# Patient Record
Sex: FEMALE | Race: WHITE | HISPANIC OR LATINO | Employment: OTHER | URBAN - METROPOLITAN AREA
[De-identification: names, ages, dates, MRNs, and addresses within clinical notes are randomized per-mention and may not be internally consistent; named-entity substitution may affect disease eponyms.]

---

## 2017-03-22 ENCOUNTER — OFFICE VISIT (OUTPATIENT)
Dept: INTERNAL MEDICINE | Facility: CLINIC | Age: 72
End: 2017-03-22
Payer: MEDICARE

## 2017-03-22 VITALS
OXYGEN SATURATION: 96 % | BODY MASS INDEX: 27.18 KG/M2 | WEIGHT: 138.44 LBS | SYSTOLIC BLOOD PRESSURE: 128 MMHG | HEART RATE: 74 BPM | DIASTOLIC BLOOD PRESSURE: 72 MMHG | HEIGHT: 60 IN

## 2017-03-22 DIAGNOSIS — Z00.00 ANNUAL PHYSICAL EXAM: Primary | ICD-10-CM

## 2017-03-22 DIAGNOSIS — E11.69 HYPERLIPIDEMIA ASSOCIATED WITH TYPE 2 DIABETES MELLITUS: ICD-10-CM

## 2017-03-22 DIAGNOSIS — K52.9 INFLAMMATORY BOWEL DISEASE: ICD-10-CM

## 2017-03-22 DIAGNOSIS — E78.5 HYPERLIPIDEMIA ASSOCIATED WITH TYPE 2 DIABETES MELLITUS: ICD-10-CM

## 2017-03-22 DIAGNOSIS — E11.65 CONTROLLED TYPE 2 DIABETES MELLITUS WITH HYPERGLYCEMIA, WITHOUT LONG-TERM CURRENT USE OF INSULIN: ICD-10-CM

## 2017-03-22 DIAGNOSIS — E11.59 HYPERTENSION COMPLICATING DIABETES: ICD-10-CM

## 2017-03-22 DIAGNOSIS — Z12.11 COLON CANCER SCREENING: ICD-10-CM

## 2017-03-22 DIAGNOSIS — Z78.0 ASYMPTOMATIC POSTMENOPAUSAL ESTROGEN DEFICIENCY: ICD-10-CM

## 2017-03-22 DIAGNOSIS — Z12.31 ENCOUNTER FOR SCREENING MAMMOGRAM FOR BREAST CANCER: ICD-10-CM

## 2017-03-22 DIAGNOSIS — I15.2 HYPERTENSION COMPLICATING DIABETES: ICD-10-CM

## 2017-03-22 PROBLEM — E11.9 HYPERTENSION COMPLICATING DIABETES: Status: ACTIVE | Noted: 2017-03-22

## 2017-03-22 PROBLEM — I10 HYPERTENSION COMPLICATING DIABETES: Status: ACTIVE | Noted: 2017-03-22

## 2017-03-22 PROCEDURE — 99387 INIT PM E/M NEW PAT 65+ YRS: CPT | Mod: S$GLB,,, | Performed by: INTERNAL MEDICINE

## 2017-03-22 PROCEDURE — 3074F SYST BP LT 130 MM HG: CPT | Mod: S$GLB,,, | Performed by: INTERNAL MEDICINE

## 2017-03-22 PROCEDURE — 3078F DIAST BP <80 MM HG: CPT | Mod: S$GLB,,, | Performed by: INTERNAL MEDICINE

## 2017-03-22 PROCEDURE — 99999 PR PBB SHADOW E&M-NEW PATIENT-LVL IV: CPT | Mod: PBBFAC,,, | Performed by: INTERNAL MEDICINE

## 2017-03-22 RX ORDER — VALSARTAN AND HYDROCHLOROTHIAZIDE 80; 12.5 MG/1; MG/1
1 TABLET, FILM COATED ORAL DAILY
Refills: 2 | COMMUNITY
Start: 2017-02-08 | End: 2017-07-18 | Stop reason: SDUPTHER

## 2017-03-22 RX ORDER — ATORVASTATIN CALCIUM 20 MG/1
20 TABLET, FILM COATED ORAL DAILY
Refills: 2 | COMMUNITY
Start: 2017-01-24 | End: 2017-07-18 | Stop reason: SDUPTHER

## 2017-03-22 RX ORDER — BALSALAZIDE DISODIUM 750 MG/1
CAPSULE ORAL
Refills: 2 | COMMUNITY
Start: 2017-01-24 | End: 2021-11-19

## 2017-03-22 RX ORDER — METFORMIN HYDROCHLORIDE 500 MG/1
500 TABLET, EXTENDED RELEASE ORAL DAILY
Refills: 2 | COMMUNITY
Start: 2016-12-16 | End: 2017-07-18 | Stop reason: SDUPTHER

## 2017-03-22 RX ORDER — HYDROCODONE BITARTRATE AND ACETAMINOPHEN 7.5; 325 MG/1; MG/1
1 TABLET ORAL
Refills: 0 | COMMUNITY
Start: 2017-02-09 | End: 2017-03-22

## 2017-03-22 NOTE — PROGRESS NOTES
Portions of this note are generated with voice recognition software. Typographical errors may exist.       Patient Name:MARIELENA OCAMPO  Patient MRN:   [id number]    History of Present Illness   ================================================================  MARIELENA OCAMPO is a 71 y.o. female here for primary care visit for  Chief Complaint   Patient presents with    [facility] [place] [facility]    Diabetes       Past Medical History:   Diagnosis Date    Controlled type 2 diabetes mellitus with hyperglycemia, without long-term current use of insulin 3/22/2017    Hyperlipidemia associated with type 2 diabetes mellitus 3/22/2017    Hypertension complicating diabetes 3/22/2017    Inflammatory bowel disease 3/22/2017       Past Surgical History:   Procedure Laterality Date    MASTECTOMY Left     for benign recurrent growth. Dr. Stephan Brown MD - [facility]  General Surgery & Surgery    TOTAL ABDOMINAL HYSTERECTOMY W/ BILATERAL SALPINGOOPHORECTOMY  2009    for benign cysts with concern for future malginancy. Fibromoid uterus for AUB. Bengin results       Review of patient's allergies indicates:  Allergies not on file    No current outpatient prescriptions on file prior to visit.     No current facility-administered medications on file prior to visit.        Family History   Problem Relation Age of Onset    Breast cancer Neg Hx        Social History     Social History    Marital status:      Spouse name: N/A    Number of children: N/A    Years of education: N/A     Occupational History    Not on file.     Social History Main Topics    Smoking status: Never Smoker    Smokeless tobacco: Never Used    Alcohol use Yes      Comment: not often    Drug use: No    Sexual activity: Yes     Partners: Male     Other Topics Concern    Not on file     Social History Narrative    MICHA Hall - [facility]        Dr. Stephan Brown MD - [facility]  General Surgery & Surgery -  mammograms        History   Sexual Activity    Sexual activity: Yes    Partners: Male         SUBJECTIVE:    Patient states that last mammogram was May 2016.  States that she wants to continue mammograms on schedule.    Patient states that she has inflammatory bowel disease.  She does not know what kind.  States that she was started on treatment about one or 2 years ago.  Has issues with loose stool but no bloody bowel movements.    History of mastectomy left side.  Patient states that she had a benign growth in the left breast that required mastectomy.  She is vague about the ultimate diagnosis.  States that the general surgeon that performed the procedure was ordering her mammographies ever since.    Diabetes hypertension and hyperlipidemia.  Patient states that all these things were diagnosed approximately 2 years ago.  She was kept on pharmacotherapy.  Denies any significant endorgan damage    Medications Reviewed and Updated    Past medical, family, and social histories were reviewed and updated.    Review of Systems negative unless otherwise noted in history of present illness-   General ROS: negative  Psychological ROS: negative  ENT ROS: negative  Allergy and Immunology ROS: negative  Cardiovascular ROS: negative  Gastrointestinal ROS: negative  Genito-Urinary ROS: negative  Musculoskeletal ROS: negative  Neurological ROS: negative  Dermatological ROS: negative      Allergic:  Review of patient's allergies indicates:  Allergies not on file    OBJECTIVE:  BP: 128/72 Pulse: 74    Wt Readings from Last 3 Encounters:   03/22/17 62.8 kg (138 lb 7.2 oz)    Body mass index is 27.04 kg/(m^2).  Previous Blood Pressure Readings :   BP Readings from Last 3 Encounters:   03/22/17 128/72     GEN: healthy appearing  HEENT: sclera non-icteric, conjunctiva clear.  No cervical lymphadenopathy.  No thyromegaly  CV: no peripheral edema.  Regular rate and rhythm.  No murmurs appreciated  PULM: breathing non-labored.  No  wheezing.  ABD: obese .  No point tenderness.  PSYCH: appropriate affect  MSK: able to rise from chair without assistance  SKIN: normal skin turgor    Pertinent Labs Reviewed       ASSESSMENT/PLAN:    Annual physical exam    Encounter for screening mammogram for breast cancer  -     Mammo Digital Screening Bilateral With CAD; Future; Expected date: 3/22/17    Asymptomatic postmenopausal estrogen deficiency  -     DXA Bone Density Spine And Hip; Future; Expected date: 3/22/17  -     DXA Bone Density Appendicular Skeleton; Future; Expected date: 3/22/17    Colon cancer screening  -     Ambulatory Referral to Gastroenterology    Controlled type 2 diabetes mellitus with hyperglycemia, without long-term current use of insulin  Continue current medical therapy.    Hyperlipidemia associated with type 2 diabetes mellitus  Continue current medical therapy.    Hypertension complicating diabetes.  Continue current medical therapy.    Inflammatory bowel disease.  Plan to get outside medical records.          Future Appointments  Date Time Provider Department Center   3/27/2017 1:00 PM [facility] DEXA1 [facility] [place] [facility]   3/27/2017 1:30 PM [facility] DEXA1 [facility] [place] [facility]   4/18/2017 10:20 AM Lawrence Logan MD [facility]   7/31/2017 10:00 AM [facility] MAMMO1 [facility] MAMMO [place] [facility]       Lawrence Logan  3/22/2017  10:14 AM

## 2017-03-22 NOTE — PATIENT INSTRUCTIONS
diovan - HCT tomorrow take in AM going forwaerd     If they cause dizziness call us, again       Call us with antibiotic reaction

## 2017-03-22 NOTE — MR AVS SNAPSHOT
[facility]   [address]  [place] [address]  Phone: [phone]  Fax: [phone]                  Aretha Nguyen   3/22/2017 8:20 AM   Office Visit    Descripción:  Female : 1945   Personal Médico:  Lawrence Logan MD   Departamento:  [facility]           Razón de la raphael     Establish Care           Diagnósticos de Esta Visita        Comentarios    Annual physical exam    -  Primario     Encounter for screening mammogram for breast cancer         Asymptomatic postmenopausal estrogen deficiency         Colon cancer screening         Controlled type 2 diabetes mellitus with hyperglycemia, without long-term current use of insulin         Hyperlipidemia associated with type 2 diabetes mellitus         Hypertension complicating diabetes         Inflammatory bowel disease                Lista de tareas           Citas próximas        Personal Médico Departamento Tfno del dpto    3/27/2017 1:00 PM [facility] DEXA1 [facility] [phone]    3/27/2017 1:30 PM [facility] DEXA1 [facility] [phone]    2017 10:20 AM Lawrence Logan MD [facility] [phone]    2017 10:00 AM [facility] MAMMO1 [facility] [phone]      Metas (5 Years of Data)     Ninguna      [facility] en Llamada     [facility] de Enfermeras - Asistencia   Enfermeras registradas de [facility] pueden ayudarle a reservar sree raphael, proveer educación para la shefali, asesoría clínica, y otros servicios de asesoramiento.   Llame para con servicio gratuito a [phone].             Medicamentos           Mensaje sobre Medicamentos     Verificar los cambios y / o adiciones a mauricio régimen de medicación son los mismos que discutir con mauricio médico. Si cualquiera de estos cambios o adiciones son incorrectos, por favor notifique a mauricio proveedor de atención médica.        DEJAR de lissette estos medicamentos      hydrocodone-acetaminophen 7.5-325mg (NORCO) 7.5-325 mg per tablet Take 1 tablet by mouth every 4 to 6 hours as needed.           Verifique que la siguiente lista de medicamentos es sree representación exacta de los medicamentos que está tomando actualmente. Si no hay ningunos reportados, la lista puede estar en mcrae. Si no es correcta, por favor póngase en contacto con mauricio proveedor de atención médica. Lleve esta lista con usted en bailee de emergencia.           Medicamentos Actuales     atorvastatin (LIPITOR) 20 MG tablet Take 20 mg by mouth nightly.    balsalazide (COLAZAL) 750 mg capsule TAKE 3 CAPSULE BY MOUTH THREE TIMES A DAY    metformin (GLUCOPHAGE-XR) 500 MG 24 hr tablet Take 500 mg by mouth once daily.     valsartan-hydrochlorothiazide (DIOVAN-HCT) 80-12.5 mg per tablet Take 1 tablet by mouth once daily.           Información de referencia clínica           Dorothy signos vitales philippe     PS [place] SpO2 BMI ([facility])    128/72 (BP Location: Right arm, Patient Position: Sitting, BP Method: Manual) 74 5' (1.524 m) 62.8 kg (138 lb 7.2 oz) 96% 27.04 kg/m2      Blood Pressure          Most Recent Value    BP  128/72      Alergias     A partir del:  3/22/2017        No está archivado/a      Vacunas     Administradas en la fecha de la visita:  3/22/2017        None      Orders Placed During Today's Visit      Órdenes normales de esta visita    Ambulatory Referral to Gastroenterology     Exámenes/Procedimientos futuros Se espera el Vence    DXA Bone Density Appendicular Skeleton  3/22/2017 3/22/2018    DXA Bone Density Spine And Hip  3/22/2017 3/22/2018    Mammo Digital Screening Bilateral With CAD  3/22/2017 5/22/2018      Registrarse para [facility]     La activación de mauricio cuenta [facility] es tan fácil do 1-2-3!    1) Ir a [email or web address], seleccione Registrarse Ahora, meter el código de activación y mauricio fecha de nacimiento, y seleccione Próximo.    [id number]  Expires: 5/6/2017  9:21 AM      2) Crear un  nombre de usuario y contraseña para usar cuando se visita [facility] en el futuro y selecciona sree pregunta de seguridad en bailee de que pierda mauricio contraseña y seleccione Próximo.    3) Introduzca mauricio dirección de correo electrónico y tal rita en Registrarse!    Información Adicional  Si tiene alguna pregunta, por favor, e-mail [email or web address] o llame al [phone] para hablar con nuestro personal. Recuerde, [facility] no debe ser usada para necesidades urgentes. En bailee de emergencia médica, llame al 911.        Instrucciones    diovan - HCT tomorrow take in AM going forwaerd     If they cause dizziness call us, again       Call us with antibiotic reaction              Language Assistance Services     ATTENTION: Language assistance services are available, free of charge. Please call [phone].      ATENCIÓN: Si habla español, tiene a mauricio disposición servicios gratuitos de asistencia lingüística. Llame al [phone].     CHÚ Ý: N?u b?n nói Ti?ng Vi?t, có các d?ch v? h? tr? ngôn ng? mi?n phí dành cho b?n. G?i s? [phone].         [facility] cumple con las leyes federales aplicables de derechos civiles y no discrimina por motivos de bereket, color, origen nacional, edad, discapacidad, o sexo.                 Aretha Patrick   3/22/2017 8:20 AM   Office Visit    Description:  Female : 1945   Provider:  Lawrence Logan MD   Department:  [facility] Internal Medicine           Reason for Visit     Establish Care           Diagnoses this Visit        Comments    Annual physical exam    -  Primary     Encounter for screening mammogram for breast cancer         Asymptomatic postmenopausal estrogen deficiency         Colon cancer screening         Controlled type 2 diabetes mellitus with hyperglycemia, without long-term current use of insulin         Hyperlipidemia associated with type 2 diabetes mellitus         Hypertension complicating diabetes         Inflammatory bowel  disease                To Do List           Future Appointments        Provider Department Dept Phone    3/27/2017 1:00 PM [facility] DEXA1 [facility] [phone]    3/27/2017 1:30 PM [facility] DEXA1 [facility] [phone]    4/18/2017 10:20 AM Lawrence Logan MD [facility] [phone]    7/31/2017 10:00 AM [facility] MAMMO1 [facility] [phone]      Goals     None      [facility] On Call     [facility] On Call Nurse Care Line - 24/7 Assistance  Registered nurses in the [facility] Center provide clinical advisement, health education, appointment booking, and other advisory services.  Call for this free service at [phone].             Medications           Message regarding Medications     Verify the changes and/or additions to your medication regime listed below are the same as discussed with your clinician today.  If any of these changes or additions are incorrect, please notify your healthcare provider.        STOP taking these medications     hydrocodone-acetaminophen 7.5-325mg (NORCO) 7.5-325 mg per tablet Take 1 tablet by mouth every 4 to 6 hours as needed.           Verify that the below list of medications is an accurate representation of the medications you are currently taking.  If none reported, the list may be blank. If incorrect, please contact your healthcare provider. Carry this list with you in case of emergency.           Current Medications     atorvastatin (LIPITOR) 20 MG tablet Take 20 mg by mouth nightly.    balsalazide (COLAZAL) 750 mg capsule TAKE 3 CAPSULE BY MOUTH THREE TIMES A DAY    metformin (GLUCOPHAGE-XR) 500 MG 24 hr tablet Take 500 mg by mouth once daily.     valsartan-hydrochlorothiazide (DIOVAN-HCT) 80-12.5 mg per tablet Take 1 tablet by mouth once daily.           Clinical Reference Information           Your Vitals Were     BP Pulse Height Weight SpO2 BMI    128/72 (BP Location: Right arm, Patient  Position: Sitting, BP Method: Manual) 74 5' (1.524 m) 62.8 kg (138 lb 7.2 oz) 96% 27.04 kg/m2      Blood Pressure          Most Recent Value    BP  128/72      Allergies as of 3/22/2017     Not on File      Immunizations Administered on Date of Encounter - 3/22/2017     None      Orders Placed During Today's Visit      Normal Orders This Visit    Ambulatory Referral to Gastroenterology     Future Labs/Procedures Expected by Expires    DXA Bone Density Appendicular Skeleton  3/22/2017 3/22/2018    DXA Bone Density Spine And Hip  3/22/2017 3/22/2018    Mammo Digital Screening Bilateral With CAD  3/22/2017 5/22/2018      [facility] Sign-Up     Activating your [facility] account is as easy as 1-2-3!     1) Visit [email or web address], select Sign Up Now, enter this activation code and your date of birth, then select Next.  [email or web address]  Expires: 5/6/2017  9:21 AM      2) Create a username and password to use when you visit [facility] in the future and select a security question in case you lose your password and select Next.    3) Enter your e-mail address and click Sign Up!    Additional Information  If you have questions, please e-mail [email or web address] or call [phone] to talk to our [facility] staff. Remember, [facility] is NOT to be used for urgent needs. For medical emergencies, dial 911.         Instructions    diovan - HCT tomorrow take in AM going forwaerd     If they cause dizziness call us, again       Call us with antibiotic reaction              Language Assistance Services     ATTENTION: Language assistance services are available, free of charge. Please call [phone].      ATENCIÓN: Si habla español, tiene a mauricio disposición servicios gratuitos de asistencia lingüística. Llame al [phone].     CHÚ Ý: N?u b?n nói Ti?ng Vi?t, có các d?ch v? h? tr? ngôn ng? mi?n phí dành cho b?n. G?i s? [phone].         [facility] complies with applicable Federal civil rights laws and does  not discriminate on the basis of race, color, national origin, age, disability, or sex.

## 2017-03-27 ENCOUNTER — HOSPITAL ENCOUNTER (OUTPATIENT)
Dept: RADIOLOGY | Facility: HOSPITAL | Age: 72
Discharge: HOME | End: 2017-03-27
Attending: INTERNAL MEDICINE
Payer: MEDICARE

## 2017-03-27 DIAGNOSIS — Z78.0 ASYMPTOMATIC POSTMENOPAUSAL ESTROGEN DEFICIENCY: ICD-10-CM

## 2017-03-27 PROCEDURE — 77080 DXA BONE DENSITY AXIAL: CPT | Mod: TC

## 2017-03-27 PROCEDURE — 77080 DXA BONE DENSITY AXIAL: CPT | Mod: 26,,, | Performed by: INTERNAL MEDICINE

## 2017-04-18 ENCOUNTER — LAB VISIT (OUTPATIENT)
Dept: LAB | Facility: HOSPITAL | Age: 72
End: 2017-04-18
Attending: INTERNAL MEDICINE
Payer: MEDICARE

## 2017-04-18 ENCOUNTER — OFFICE VISIT (OUTPATIENT)
Dept: INTERNAL MEDICINE | Facility: CLINIC | Age: 72
End: 2017-04-18
Payer: MEDICARE

## 2017-04-18 VITALS
SYSTOLIC BLOOD PRESSURE: 122 MMHG | HEIGHT: 60 IN | HEART RATE: 94 BPM | OXYGEN SATURATION: 98 % | BODY MASS INDEX: 27.09 KG/M2 | DIASTOLIC BLOOD PRESSURE: 70 MMHG | WEIGHT: 138 LBS

## 2017-04-18 DIAGNOSIS — E78.5 HYPERLIPIDEMIA ASSOCIATED WITH TYPE 2 DIABETES MELLITUS: ICD-10-CM

## 2017-04-18 DIAGNOSIS — E55.9 VITAMIN D DEFICIENCY: ICD-10-CM

## 2017-04-18 DIAGNOSIS — E11.65 CONTROLLED TYPE 2 DIABETES MELLITUS WITH HYPERGLYCEMIA, WITHOUT LONG-TERM CURRENT USE OF INSULIN: ICD-10-CM

## 2017-04-18 DIAGNOSIS — E11.69 HYPERLIPIDEMIA ASSOCIATED WITH TYPE 2 DIABETES MELLITUS: ICD-10-CM

## 2017-04-18 DIAGNOSIS — J30.9 ALLERGIC RHINITIS WITH POSTNASAL DRIP: ICD-10-CM

## 2017-04-18 DIAGNOSIS — D48.62 BORDERLINE FINDING OF PHYLLODES NEOPLASM OF BREAST, LEFT: Primary | ICD-10-CM

## 2017-04-18 DIAGNOSIS — K51.20 ULCERATIVE PROCTITIS WITHOUT COMPLICATION: ICD-10-CM

## 2017-04-18 DIAGNOSIS — R09.82 ALLERGIC RHINITIS WITH POSTNASAL DRIP: ICD-10-CM

## 2017-04-18 DIAGNOSIS — Z12.31 ENCOUNTER FOR SCREENING MAMMOGRAM FOR BREAST CANCER: ICD-10-CM

## 2017-04-18 LAB
25(OH)D3+25(OH)D2 SERPL-MCNC: 23 NG/ML
ALBUMIN SERPL BCP-MCNC: 4 G/DL
ALP SERPL-CCNC: 105 U/L
ALT SERPL W/O P-5'-P-CCNC: 10 U/L
ANION GAP SERPL CALC-SCNC: 16 MMOL/L
AST SERPL-CCNC: 25 U/L
BASOPHILS # BLD AUTO: 0.05 K/UL
BASOPHILS NFR BLD: 0.5 %
BILIRUB SERPL-MCNC: 0.6 MG/DL
BUN SERPL-MCNC: 15 MG/DL
CALCIUM SERPL-MCNC: 9.5 MG/DL
CHLORIDE SERPL-SCNC: 100 MMOL/L
CO2 SERPL-SCNC: 25 MMOL/L
CREAT SERPL-MCNC: 0.8 MG/DL
DIFFERENTIAL METHOD: ABNORMAL
EOSINOPHIL # BLD AUTO: 0.3 K/UL
EOSINOPHIL NFR BLD: 3.2 %
ERYTHROCYTE [DISTWIDTH] IN BLOOD BY AUTOMATED COUNT: 17.8 %
EST. GFR  (AFRICAN AMERICAN): >60 ML/MIN/1.73 M^2
EST. GFR  (NON AFRICAN AMERICAN): >60 ML/MIN/1.73 M^2
GLUCOSE SERPL-MCNC: 96 MG/DL
HCT VFR BLD AUTO: 34.8 %
HDLC SERPL-MCNC: 177 MG/DL
HDLC SERPL-MCNC: 62 MG/DL
HGB BLD-MCNC: 11.6 G/DL
LYMPHOCYTES # BLD AUTO: 2 K/UL
LYMPHOCYTES NFR BLD: 20.6 %
MCH RBC QN AUTO: 26 PG
MCHC RBC AUTO-ENTMCNC: 33.3 %
MCV RBC AUTO: 78 FL
MONOCYTES # BLD AUTO: 0.7 K/UL
MONOCYTES NFR BLD: 6.9 %
NEUTROPHILS # BLD AUTO: 6.7 K/UL
NEUTROPHILS NFR BLD: 68.6 %
PLATELET # BLD AUTO: 340 K/UL
PMV BLD AUTO: 10.3 FL
POTASSIUM SERPL-SCNC: 3.8 MMOL/L
PROT SERPL-MCNC: 8.5 G/DL
RBC # BLD AUTO: 4.46 M/UL
SODIUM SERPL-SCNC: 141 MMOL/L
WBC # BLD AUTO: 9.7 K/UL

## 2017-04-18 PROCEDURE — 3044F HG A1C LEVEL LT 7.0%: CPT | Mod: S$GLB,,, | Performed by: INTERNAL MEDICINE

## 2017-04-18 PROCEDURE — 83701 LIPOPROTEIN BLD HR FRACTION: CPT

## 2017-04-18 PROCEDURE — 1126F AMNT PAIN NOTED NONE PRSNT: CPT | Mod: S$GLB,,, | Performed by: INTERNAL MEDICINE

## 2017-04-18 PROCEDURE — 1159F MED LIST DOCD IN RCRD: CPT | Mod: S$GLB,,, | Performed by: INTERNAL MEDICINE

## 2017-04-18 PROCEDURE — 80053 COMPREHEN METABOLIC PANEL: CPT

## 2017-04-18 PROCEDURE — 83036 HEMOGLOBIN GLYCOSYLATED A1C: CPT

## 2017-04-18 PROCEDURE — 85025 COMPLETE CBC W/AUTO DIFF WBC: CPT

## 2017-04-18 PROCEDURE — 82306 VITAMIN D 25 HYDROXY: CPT

## 2017-04-18 PROCEDURE — 83718 ASSAY OF LIPOPROTEIN: CPT

## 2017-04-18 PROCEDURE — 3078F DIAST BP <80 MM HG: CPT | Mod: S$GLB,,, | Performed by: INTERNAL MEDICINE

## 2017-04-18 PROCEDURE — 99999 PR PBB SHADOW E&M-EST. PATIENT-LVL III: CPT | Mod: PBBFAC,,, | Performed by: INTERNAL MEDICINE

## 2017-04-18 PROCEDURE — 3074F SYST BP LT 130 MM HG: CPT | Mod: S$GLB,,, | Performed by: INTERNAL MEDICINE

## 2017-04-18 PROCEDURE — 1160F RVW MEDS BY RX/DR IN RCRD: CPT | Mod: S$GLB,,, | Performed by: INTERNAL MEDICINE

## 2017-04-18 PROCEDURE — 99215 OFFICE O/P EST HI 40 MIN: CPT | Mod: S$GLB,,, | Performed by: INTERNAL MEDICINE

## 2017-04-18 PROCEDURE — 4010F ACE/ARB THERAPY RXD/TAKEN: CPT | Mod: S$GLB,,, | Performed by: INTERNAL MEDICINE

## 2017-04-18 PROCEDURE — 82465 ASSAY BLD/SERUM CHOLESTEROL: CPT

## 2017-04-18 RX ORDER — BENZONATATE 200 MG/1
200 CAPSULE ORAL 2 TIMES DAILY PRN
Qty: 20 CAPSULE | Refills: 0 | Status: SHIPPED | OUTPATIENT
Start: 2017-04-18 | End: 2017-04-25

## 2017-04-18 NOTE — PATIENT INSTRUCTIONS
lissette por la shavon       Claritin, congestion toz seca. 4-5 dixon, la toz esau claritin, llama otros claritin.

## 2017-04-18 NOTE — PROGRESS NOTES
"Portions of this note are generated with voice recognition software. Typographical errors may exist.     SUBJECTIVE:    This is a/an 71 y.o. female here for primary care visit for  Chief Complaint   Patient presents with    Cough     Patient states that for the past several days she has been having cough with postnasal drip.  additional symptoms.  States that over-the-counter medications are not working adequately.    Ulcerative colitis.  Patient states that since starting medical therapy hematochezia has resolved.  Endorses formed stools.  No diarrhea.  States that she has colonoscopy scheduled with outside gastroenterologist.  States that she has not had cancerous lesions in the colon.    Phyllodes tumor.  States that she had removal of left breast several years ago because of a "benign tumor."  Patient states that she has not been back to surgical office because of insurance reasons and also because she doesn't know if she needs long-term follow-up for this issue other than mammograms.    Diabetes.  Patient states she is complying with medical therapy.    Bone density.  Patient states that she does not recall previous bone density scan.        Medications Reviewed and Updated    Past medical, family, and social histories were reviewed and updated.    Review of Systems negative unless otherwise noted in history of present illness-   General ROS: negative  Psychological ROS: negative  ENT ROS: negative  Allergy and Immunology ROS: negative  Cardiovascular ROS: negative  Gastrointestinal ROS: negative  Genito-Urinary ROS: negative  Musculoskeletal ROS: negative  Neurological ROS: negative  Dermatological ROS: negative      Allergic:    Review of patient's allergies indicates:   Allergen Reactions    Ciprofloxacin hcl Rash       OBJECTIVE:  BP: 122/70 Pulse: 94    Wt Readings from Last 3 Encounters:   04/18/17 62.6 kg (138 lb 0.1 oz)   03/22/17 62.8 kg (138 lb 7.2 oz)    Body mass index is 26.95 kg/(m^2).  Previous " Blood Pressure Readings :   BP Readings from Last 3 Encounters:   04/18/17 122/70   03/22/17 128/72     GEN: No apparent distress  HEENT: sclera non-icteric, conjunctiva clear  CV: no peripheral edema  PULM: breathing non-labored.  No wheezing bilateral lung fields.  ABD: non, protuberant abdomen.  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  Dispense: 20 capsule; Refill: 0    Ulcerative proctitis without complication  -     CBC auto differential; Future; Expected date: 4/18/17    Hyperlipidemia associated with type 2 diabetes mellitus  -     Cholesterol, total; Future; Expected date: 4/18/17  -     HDL CHOLESTEROL; Future; Expected date: 4/18/17  -     LDL CHOLESTEROL, DIRECT; Future; Expected date: 4/18/17    Controlled type 2 diabetes mellitus with hyperglycemia, without long-term current use of insulin  -     Comprehensive metabolic panel; Future; Expected date: 4/18/17  -     Hemoglobin A1c; Future; Expected date: 4/18/17    Vitamin D deficiency  -     Vitamin D; Future; Expected date: 4/18/17          Future Appointments  Date Time Provider Department Center   7/18/2017 9:00 AM Lawrence Logan MD [facility]   7/31/2017 10:00 AM [facility] MAMMO1 [facility] MAMMO [place]       Lawrence Logan  4/18/2017  3:28 PM

## 2017-04-18 NOTE — MR AVS SNAPSHOT
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, left    -  Primario     Encounter for screening mammogram for breast cancer         Allergic rhinitis with postnasal drip         Ulcerative proctitis without complication         Hyperlipidemia associated with type 2 diabetes mellitus         Controlled type 2 diabetes mellitus with hyperglycemia, without long-term current use of insulin         Vitamin D deficiency                Lista de tareas           Citas próximas        Personal Médico Departamento Tfno del dpto    2017 11:00 AM JERSEY KENNER [facility] [phone]    2017 9:00 AM Lawrence Logan MD [facility] [phone]    2017 10:00 AM [facility] MAMMO1 [facility] [phone]      Metas (5 Years of Data)     Ninguna      Recetas para recoger        Disp Refills Start End    benzonatate (TESSALON) 200 MG capsule 20 capsule 0 2017    Take 1 capsule (200 mg total) by mouth 2 (two) times daily as needed for Cough. - Oral    Farmacia: [facility]/pharmacy #[id number] - [place] - [address] AT [facility] No. de tlfo: #: [phone]         [facility]     [facility] Línea de Enfermeras - Asistencia   Enfermeras registradas de [facility] pueden ayudarle a reservar sree raphael, proveer educación para la shefali, asesoría clínica, y otros servicios de asesoramiento.   Llame para con servicio gratuito a [phone].             Medicamentos           Mensaje sobre Medicamentos     Verificar los cambios y / o adiciones a mauricio  régimen de medicación son los mismos que discutir con mauricio médico. Si cualquiera de estos cambios o adiciones son incorrectos, por favor notifique a mauricio proveedor de atención médica.        EMPEZAR a lissette estos medicamentos NUEVOS        Refills    benzonatate (TESSALON) 200 MG capsule 0    Sig: Take 1 capsule (200 mg total) by mouth 2 (two) times daily as needed for Cough.    Categoría: Normal    Vía: Oral           Verifique que la siguiente lista de medicamentos es sree representación exacta de los medicamentos que está tomando actualmente. Si no hay ningunos reportados, la lista puede estar en mcrae. Si no es correcta, por favor póngase en contacto con mauricio proveedor de atención médica. Lleve esta lista con usted en bailee de emergencia.           Medicamentos Actuales     atorvastatin (LIPITOR) 20 MG tablet Take 20 mg by mouth once daily.     balsalazide (COLAZAL) 750 mg capsule 2 tablets 2 times daily    metformin (GLUCOPHAGE-XR) 500 MG 24 hr tablet Take 500 mg by mouth once daily.     valsartan-hydrochlorothiazide (DIOVAN-HCT) 80-12.5 mg per tablet Take 1 tablet by mouth once daily.    benzonatate (TESSALON) 200 MG capsule Take 1 capsule (200 mg total) by mouth 2 (two) times daily as needed for Cough.           Información de referencia clínica           Dorothy signos vitales philippe     PS [place] Pes[place] SpO2 BMI (IMC)    122/70 (BP Location: Right arm, Patient Position: Sitting, BP Method: Manual) 94 5' (1.524 m) 62.6 kg (138 lb 0.1 oz) 98% 26.95 kg/m2      Blood Pressure          Most Recent Value    BP  122/70      Alergias     A partir del:  4/18/2017        Ciprofloxacin Hcl      Vacunas     Administradas en la fecha de la visita:  4/18/2017        None      Orders Placed During Today's Visit     Exámenes/Procedimientos futuros Se espera el Vence    CBC auto differential  4/18/2017 7/17/2017    Cholesterol, total  4/18/2017 6/17/2018    Comprehensive metabolic panel  4/18/2017 7/17/2017    HDL CHOLESTEROL   2017    Hemoglobin A1c  2017    LDL CHOLESTEROL, DIRECT  2017    Mammo Digital Screening Right with CAD  2017    Vitamin D  2017      Registrarse para [facility]     La activación de mauricio cuenta [facility] es tan fácil do 1-2-3!    1) Ir a [email or web address], seleccione Registrarse Ahora, meter el código de activación y mauricio fecha de nacimiento, y seleccione Próximo.    [id number]  Expires: 2017  9:21 AM      2) Crear un nombre de usuario y contraseña para usar cuando se visita [facility] en el futuro y selecciona sree pregunta de seguridad en bailee de que pierda mauricio contraseña y seleccione Próximo.    3) Introduzca mauricio dirección de correo electrónico y tal clic en Registrarse!    Información Adicional  Si tiene alguna pregunta, por favor, e-mail [email or web address] o llame al [phone] para hablar con nuestro personal. Recuerde, [facility] no debe ser usada para necesidades urgentes. En bailee de emergencia médica, llame al 911.        Instrucciones    lissette por la manana       Anton, congestion toz seca. 4-5 dixon, la toz mejora claritin, llama otros claritin.              Language Assistance Services     ATTENTION: Language assistance services are available, free of charge. Please call [phone].      ATENCIÓN: Si habla español, tiene a mauricio disposición servicios gratuitos de asistencia lingüística. Llame al [phone].     CHÚ Ý: N?u b?n nói Ti?ng Vi?t, có các d?ch v? h? tr? ngôn ng? mi?n phí dành cho b?n. G?i s? [phone].         [place] - Internal Medicine cumple con las leyes federales aplicables de derechos civiles y no discrimina por motivos de bereket, color, origen nacional, edad, discapacidad, o sexo.                 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, left    -  Primary     Encounter for screening mammogram for breast cancer         Allergic rhinitis with postnasal drip         Ulcerative proctitis without complication         Hyperlipidemia associated with type 2 diabetes mellitus         Controlled type 2 diabetes mellitus with hyperglycemia, without long-term current use of insulin         Vitamin D deficiency                To Do List           Future Appointments        Provider Department Dept Phone    4/18/2017 11:00 AM [place] KENNER [facility] [phone]    7/18/2017 9:00 AM Lawrence Logan MD [place] [facility] [phone]    7/31/2017 10:00 AM [facility] MAMMO1 [facility] [phone]      Goals     None       These Medications        Disp Refills Start End    benzonatate (TESSALON) 200 MG capsule 20 capsule 0 4/18/2017 4/25/2017    Take 1 capsule (200 mg total) by mouth 2 (two) times daily as needed for Cough. - Oral    Pharmacy: [facility]/pharmacy #[id number] - [place] - [address] AT [facility] Ph #: [phone]         [facility]     [facility] Nurse Care Line - 24/7 Assistance  Unless otherwise directed by your provider, please contact [facility], our nurse care line that is available for 24/7 assistance.     Registered nurses in the [facility] Center provide: appointment scheduling, clinical advisement, health education, and other advisory services.  Call: [phone] (toll free)               Medications           Message regarding Medications     Verify the changes and/or additions to your medication regime listed below are the same as discussed with your clinician today.  If any of these changes or additions are incorrect, please notify your healthcare provider.        START taking these NEW medications        Refills    benzonatate (TESSALON) 200 MG capsule 0    Sig:  Take 1 capsule (200 mg total) by mouth 2 (two) times daily as needed for Cough.    Class: Normal    Route: Oral           Verify that the below list of medications is an accurate representation of the medications you are currently taking.  If none reported, the list may be blank. If incorrect, please contact your healthcare provider. Carry this list with you in case of emergency.           Current Medications     atorvastatin (LIPITOR) 20 MG tablet Take 20 mg by mouth once daily.     balsalazide (COLAZAL) 750 mg capsule 2 tablets 2 times daily    metformin (GLUCOPHAGE-XR) 500 MG 24 hr tablet Take 500 mg by mouth once daily.     valsartan-hydrochlorothiazide (DIOVAN-HCT) 80-12.5 mg per tablet Take 1 tablet by mouth once daily.    benzonatate (TESSALON) 200 MG capsule Take 1 capsule (200 mg total) by mouth 2 (two) times daily as needed for Cough.           Clinical Reference Information           Your Vitals Were     BP Pulse Height Weight SpO2 BMI    122/70 (BP Location: Right arm, Patient Position: Sitting, BP Method: Manual) 94 5' (1.524 m) 62.6 kg (138 lb 0.1 oz) 98% 26.95 kg/m2      Blood Pressure          Most Recent Value    BP  122/70      Allergies as of 4/18/2017     Ciprofloxacin Hcl      Immunizations Administered on Date of Encounter - 4/18/2017     None      Orders Placed During Today's Visit     Future Labs/Procedures Expected by Expires    CBC auto differential  4/18/2017 7/17/2017    Cholesterol, total  4/18/2017 6/17/2018    Comprehensive metabolic panel  4/18/2017 7/17/2017    HDL CHOLESTEROL  4/18/2017 6/17/2018    Hemoglobin A1c  4/18/2017 7/17/2017    LDL CHOLESTEROL, DIRECT  4/18/2017 6/17/2018    Mammo Digital Screening Right with CAD  4/18/2017 6/18/2018    Vitamin D  4/18/2017 6/17/2018      [facility] Sign-Up     Activating your [facility] account is as easy as 1-2-3!     1) Visit [email or web address], select Sign Up Now, enter this activation code and your date of birth, then select  Next.  [email or web address]  Expires: 5/6/2017  9:21 AM      2) Create a username and password to use when you visit [facility] in the future and select a security question in case you lose your password and select Next.    3) Enter your e-mail address and click Sign Up!    Additional Information  If you have questions, please e-mail [email or web address] or call [phone] to talk to our [facility] staff. Remember, [facility] is NOT to be used for urgent needs. For medical emergencies, dial 911.         Instructions    lissette por la manana       Claritin, congestion toz seca. 4-5 dixon, la toz julitara claritin, llama otcarlos claritin.              Language Assistance Services     ATTENTION: Language assistance services are available, free of charge. Please call [phone].      ATENCIÓN: Si habla español, tiene a mauricio disposición servicios gratuitos de asistencia lingüística. Llame al [phone].     [facility] Ý: N?u b?n nói Ti?ng Vi?t, có các d?ch v? h? tr? ngôn ng? mi?n phí dành cho b?n. G?i s? [phone].         [facility] Internal Medicine complies with applicable Federal civil rights laws and does not discriminate on the basis of race, color, national origin, age, disability, or sex.

## 2017-04-19 LAB
ESTIMATED AVG GLUCOSE: 140 MG/DL
HBA1C MFR BLD HPLC: 6.5 %

## 2017-04-20 LAB — LDLC SERPL-MCNC: 111 MG/DL

## 2017-04-28 ENCOUNTER — TELEPHONE (OUTPATIENT)
Dept: SURGERY | Facility: CLINIC | Age: 72
End: 2017-04-28

## 2017-04-28 ENCOUNTER — TELEPHONE (OUTPATIENT)
Dept: INTERNAL MEDICINE | Facility: CLINIC | Age: 72
End: 2017-04-28

## 2017-04-28 DIAGNOSIS — D48.62 PHYLLODES TUMOR, LEFT: Primary | ICD-10-CM

## 2017-04-28 NOTE — TELEPHONE ENCOUNTER
----- Message from Aleta Spaulding MD sent at 4/28/2017 11:19 AM CDT -----  Can you call to make an appointment for her.  No huge rush.    Thanks  ----- Message -----     From: Lawrence Logan MD     Sent: 4/27/2017   2:33 PM       To: Aleta Spaulding MD, Evita Draper MD    Yes, Dr. Spaulding, can your staff contact patient to get in clinic?     ----- Message -----     From: Aleta Spaulding MD     Sent: 4/20/2017   8:55 AM       To: Evita Draper MD, #    She will need serial surveillance clinical exams of the chest wall.  There is no indication for imaging on that side as long as there is no abnormality noted on exam.  I am happy to follow her in my clinic.  Please just let me know if you would like me to arrange an appointment for her.    Thanks!  Aleta Spaulding      ----- Message -----     From: Evita Draper MD     Sent: 4/19/2017   4:23 PM       To: Aleta Spaulding MD, Lawrence Logan MD    I think the best person for this is one of our breast specialty docs. I've copied Dr. Aleta Spaulding on this to see if she has any thoughts on how best to follow and whom with.    Aleta, what do you think?       ----- Message -----     From: Lawrence Logan MD     Sent: 4/19/2017   4:18 PM       To: Evita Draper MD    Patient establishing in primary care practice with me. About 2 years ago she had mastectomy with outside path results saying borderline finding of phyllodes neoplasm of left breast. For insurance reasons don't think she can go back to her original surgeon. Don't know what the proper surveillance should be for L chest area. Doing MMG for right breast. Should she be seen by Gyn Onc to resume physical exam of the L chest?

## 2017-04-28 NOTE — TELEPHONE ENCOUNTER
----- Message from Cintia Nielsen sent at 4/28/2017 12:33 PM CDT -----  Contact: Nadya with Dr.Ricahrd Hall/[phone]  Nadya said patient needs referral for a procedure. Please advise

## 2017-04-28 NOTE — TELEPHONE ENCOUNTER
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 Pt did not answer. Call went to voice mail and message left to call back. Will await call back from pt.

## 2017-05-03 ENCOUNTER — TELEPHONE (OUTPATIENT)
Dept: INTERNAL MEDICINE | Facility: CLINIC | Age: 72
End: 2017-05-03

## 2017-05-03 NOTE — TELEPHONE ENCOUNTER
----- Message from Evonne Varma sent at 5/3/2017  8:17 AM CDT -----  Contact: ms deanna avina [phone]  Ms huerta states she never received the referral    Fax [phone]

## 2017-05-08 DIAGNOSIS — Z12.11 COLON CANCER SCREENING: Primary | ICD-10-CM

## 2017-05-08 DIAGNOSIS — K51.20 ULCERATIVE PROCTITIS WITHOUT COMPLICATION: Primary | ICD-10-CM

## 2017-05-09 ENCOUNTER — TELEPHONE (OUTPATIENT)
Dept: INTERNAL MEDICINE | Facility: CLINIC | Age: 72
End: 2017-05-09

## 2017-05-09 NOTE — TELEPHONE ENCOUNTER
----- Message from Lawrence Logan MD sent at 5/8/2017  6:30 PM CDT -----  Contact: [phone] Nadya from [facility]   Referrals are signed and waiting in the box on my desk for follow-up    ----- Message -----     From: Georgina Cabrera LPN     Sent: 5/1/2017   2:01 PM       To: MD Dr. Desmond Garcia pt needs outside referral for GI seen 3- and scheduled for Colonoscopy on 5-4-2017 both at [facility].  Thanks  ----- Message -----     From: Marilou Vargas     Sent: 5/1/2017   1:46 PM       To: Desmond MARTINEZ Staff    Patient is having a procedure on Thursday and it requires a referral from pcp. Nadya would like to have a referral sent to her so they can complete patient's colonoscopy. Please advise.

## 2017-05-17 ENCOUNTER — TELEPHONE (OUTPATIENT)
Dept: SURGERY | Facility: CLINIC | Age: 72
End: 2017-05-17

## 2017-05-17 NOTE — TELEPHONE ENCOUNTER
Call to pt per Dr Spaulding place Thursday 5/11/17, to have pt come in for an appt for f/u on hx of having a phyllodes tumor removed in past. Spoke w/pt and pt's . Per , does not want appt w/Dr Spaulding, as they already have a doctor. Per Dr Spaulding, pt should continue with her annual mammograms through her PCP or GYN.

## 2017-05-26 ENCOUNTER — TELEPHONE (OUTPATIENT)
Dept: INTERNAL MEDICINE | Facility: CLINIC | Age: 72
End: 2017-05-26

## 2017-07-18 ENCOUNTER — LAB VISIT (OUTPATIENT)
Dept: LAB | Facility: HOSPITAL | Age: 72
End: 2017-07-18
Attending: INTERNAL MEDICINE
Payer: MEDICARE

## 2017-07-18 ENCOUNTER — OFFICE VISIT (OUTPATIENT)
Dept: INTERNAL MEDICINE | Facility: CLINIC | Age: 72
End: 2017-07-18
Payer: MEDICARE

## 2017-07-18 ENCOUNTER — TELEPHONE (OUTPATIENT)
Dept: SURGERY | Facility: CLINIC | Age: 72
End: 2017-07-18

## 2017-07-18 VITALS
WEIGHT: 137.13 LBS | BODY MASS INDEX: 26.92 KG/M2 | HEIGHT: 60 IN | SYSTOLIC BLOOD PRESSURE: 126 MMHG | HEART RATE: 84 BPM | DIASTOLIC BLOOD PRESSURE: 84 MMHG | OXYGEN SATURATION: 96 %

## 2017-07-18 DIAGNOSIS — M85.80 OSTEOPENIA, UNSPECIFIED LOCATION: ICD-10-CM

## 2017-07-18 DIAGNOSIS — D48.62: ICD-10-CM

## 2017-07-18 DIAGNOSIS — Z11.59 ENCOUNTER FOR HEPATITIS C SCREENING TEST FOR LOW RISK PATIENT: ICD-10-CM

## 2017-07-18 DIAGNOSIS — E11.65 CONTROLLED TYPE 2 DIABETES MELLITUS WITH HYPERGLYCEMIA, WITHOUT LONG-TERM CURRENT USE OF INSULIN: Primary | ICD-10-CM

## 2017-07-18 DIAGNOSIS — E55.9 VITAMIN D DEFICIENCY: ICD-10-CM

## 2017-07-18 PROCEDURE — 1126F AMNT PAIN NOTED NONE PRSNT: CPT | Mod: S$GLB,,, | Performed by: INTERNAL MEDICINE

## 2017-07-18 PROCEDURE — 99214 OFFICE O/P EST MOD 30 MIN: CPT | Mod: S$GLB,,, | Performed by: INTERNAL MEDICINE

## 2017-07-18 PROCEDURE — 4010F ACE/ARB THERAPY RXD/TAKEN: CPT | Mod: S$GLB,,, | Performed by: INTERNAL MEDICINE

## 2017-07-18 PROCEDURE — 36415 COLL VENOUS BLD VENIPUNCTURE: CPT | Mod: PO

## 2017-07-18 PROCEDURE — 99499 UNLISTED E&M SERVICE: CPT | Mod: S$GLB,,, | Performed by: INTERNAL MEDICINE

## 2017-07-18 PROCEDURE — 3044F HG A1C LEVEL LT 7.0%: CPT | Mod: S$GLB,,, | Performed by: INTERNAL MEDICINE

## 2017-07-18 PROCEDURE — 86803 HEPATITIS C AB TEST: CPT

## 2017-07-18 PROCEDURE — 1159F MED LIST DOCD IN RCRD: CPT | Mod: S$GLB,,, | Performed by: INTERNAL MEDICINE

## 2017-07-18 PROCEDURE — 99999 PR PBB SHADOW E&M-EST. PATIENT-LVL III: CPT | Mod: PBBFAC,,, | Performed by: INTERNAL MEDICINE

## 2017-07-18 RX ORDER — METFORMIN HYDROCHLORIDE 500 MG/1
500 TABLET, EXTENDED RELEASE ORAL DAILY
Qty: 90 TABLET | Refills: 1 | Status: SHIPPED | OUTPATIENT
Start: 2017-07-18 | End: 2018-01-10 | Stop reason: SDUPTHER

## 2017-07-18 RX ORDER — ACETAMINOPHEN 500 MG
1 TABLET ORAL DAILY
COMMUNITY
Start: 2017-07-18

## 2017-07-18 RX ORDER — ONDANSETRON 4 MG/1
4 TABLET, FILM COATED ORAL EVERY 6 HOURS PRN
Refills: 0 | COMMUNITY
Start: 2017-05-03 | End: 2017-10-26

## 2017-07-18 RX ORDER — VALSARTAN AND HYDROCHLOROTHIAZIDE 80; 12.5 MG/1; MG/1
1 TABLET, FILM COATED ORAL DAILY
Qty: 90 TABLET | Refills: 1 | Status: SHIPPED | OUTPATIENT
Start: 2017-07-18 | End: 2018-01-24 | Stop reason: SDUPTHER

## 2017-07-18 RX ORDER — ATORVASTATIN CALCIUM 20 MG/1
20 TABLET, FILM COATED ORAL DAILY
Qty: 90 TABLET | Refills: 1 | Status: SHIPPED | OUTPATIENT
Start: 2017-07-18 | End: 2018-02-11 | Stop reason: SDUPTHER

## 2017-07-18 NOTE — PROGRESS NOTES
Portions of this note are generated with voice recognition software. Typographical errors may exist.     SUBJECTIVE:    This is a/an 71 y.o. female here for primary care visit for  Chief Complaint   Patient presents with    Diabetes     3 month follow up     Complying with meds. Not checking blood sugar consistently. Denies low BG symptoms. Overdue for diabetic eye exam.       Medications Reviewed and Updated    Past medical, family, and social histories were reviewed and updated.    Review of Systems negative unless otherwise noted in history of present illness-   General ROS: negative  Psychological ROS: negative  ENT ROS: negative  Allergy and Immunology ROS: negative  Cardiovascular ROS: negative  Gastrointestinal ROS: negative  Genito-Urinary ROS: negative  Musculoskeletal ROS: negative  Neurological ROS: negative  Dermatological ROS: negative      Allergic:    Review of patient's allergies indicates:   Allergen Reactions    Ciprofloxacin hcl Rash       OBJECTIVE:  BP: 126/84 Pulse: 84    Wt Readings from Last 3 Encounters:   07/18/17 62.2 kg (137 lb 2 oz)   04/18/17 62.6 kg (138 lb 0.1 oz)   03/22/17 62.8 kg (138 lb 7.2 oz)    Body mass index is 26.78 kg/m².  Previous Blood Pressure Readings :   BP Readings from Last 3 Encounters:   07/18/17 126/84   04/18/17 122/70   03/22/17 128/72       GEN: No apparent distress  HEENT: sclera non-icteric, conjunctiva clear  CV: no peripheral edema  PULM: breathing non-labored  ABD: non protuberant abdomen.  PSYCH: appropriate affect  MSK: able to rise from chair without assistance  SKIN: normal skin turgor    Pertinent Labs Reviewed       ASSESSMENT/PLAN:    Controlled type 2 diabetes mellitus with hyperglycemia, without long-term current use of insulin  -     Ambulatory Referral to Optometry  -     metformin (GLUCOPHAGE-XR) 500 MG 24 hr tablet; Take 1 tablet (500 mg total) by mouth once daily.  Dispense: 90 tablet; Refill: 1  -     valsartan-hydrochlorothiazide  (DIOVAN-HCT) 80-12.5 mg per tablet; Take 1 tablet by mouth once daily.  Dispense: 90 tablet; Refill: 1  -     atorvastatin (LIPITOR) 20 MG tablet; Take 1 tablet (20 mg total) by mouth once daily.  Dispense: 90 tablet; Refill: 1    Encounter for hepatitis C screening test for low risk patient  -     Hepatitis C antibody; Future; Expected date: 07/18/2017    Vitamin D deficiency  -     cholecalciferol, vitamin D3, 2,000 unit Cap; Take 1 capsule (2,000 Units total) by mouth once daily.    Osteopenia, unspecified location  -     multivit-min-iron-FA-lutein (CENTRUM SILVER WOMEN) 8 mg iron-400 mcg-300 mcg Tab; Take 1 tablet by mouth once daily at 6am.    Borderline finding of phyllodes neoplasm of left breast          Future Appointments  Date Time Provider Department Center   7/24/2017 3:00 PM Alexander Fregoso OD [facility] OPTOMTY [place]   7/31/2017 10:00 AM [facility] MAMMO1 [facility] MAMMO [place] [facility]   10/19/2017 9:00 AM Lawrence Logan MD [facility] Prudence Logan  7/18/2017  9:48 AM

## 2017-07-18 NOTE — TELEPHONE ENCOUNTER
----- Message from Letha Delvalle MD sent at 7/18/2017 11:05 AM CDT -----   Can you arrange consult please?          ----- Message -----  From: Lawrence Logan MD  Sent: 7/18/2017   9:44 AM  To: Edith Sweet DO, Letha Delvalle MD    Patient follows in primary care clinic with me.  She has history of phyllodes tumor status post mastectomy and former surgical oncologist was been doing annual physical examination on the side of the mastectomy for recurrence surveillance.  It was recommended that this continue but the patient does not like the idea of traveling to [facility] for surveillance visit.  She requests [place] appointment.  Would either review feel comfortable taking this case?

## 2017-07-18 NOTE — TELEPHONE ENCOUNTER
7/18/2017 Zuleyka please coordinate a new pt appointment for alissa 7/19/2017 at 1pm with Dr Delvalle in [place].

## 2017-07-18 NOTE — PATIENT INSTRUCTIONS
Recomendaciones para hoy    Es muy importante que revise mauricio nivel de azúcar en la maribel al menos 3-4 veces por semana. Compruebe cuándo está ayunando a primera hora de la mañana. El azúcar en la maribel debe estar entre 90 y 120. Si el nivel de azúcar en la maribel es consistentemente inferior a 85 o constantemente por encima de 130, comuníquese con la clínica.    No ajuste la dosificación de la medicina para la diabetes sin hablar con mauricio proveedor médico.    Le recomendamos que complete el examen ocular anual de la diabetes.    También le recomendamos que siga con un cirujano al menos sree vez al año para asegurarse de que el tumor phyllodes no regrese.      Recommendations for today    It is very important that you check your blood sugar at least 3-4 times per week.  Check when you are fasting first thing in the morning.  Blood sugar should be between 90 and 120.  If blood sugar is consistently below 85 or consistently above 130 please contact the clinic.    Do not adjust the dosage of diabetes medicine without talking to your medical provider.    We highly recommend that you complete annual eye examination for diabetes.    We also recommend that you follow with a surgeon at least annually to make sure phyllodes tumor is not coming back.

## 2017-07-19 LAB — HCV AB SERPL QL IA: NEGATIVE

## 2017-07-24 ENCOUNTER — OFFICE VISIT (OUTPATIENT)
Dept: OPTOMETRY | Facility: CLINIC | Age: 72
End: 2017-07-24
Payer: MEDICARE

## 2017-07-24 DIAGNOSIS — H25.13 NUCLEAR SCLEROSIS, BILATERAL: ICD-10-CM

## 2017-07-24 DIAGNOSIS — H52.7 REFRACTIVE ERROR: ICD-10-CM

## 2017-07-24 DIAGNOSIS — E11.9 TYPE 2 DIABETES MELLITUS WITHOUT RETINOPATHY: Primary | ICD-10-CM

## 2017-07-24 PROCEDURE — 99499 UNLISTED E&M SERVICE: CPT | Mod: S$GLB,,, | Performed by: OPTOMETRIST

## 2017-07-24 PROCEDURE — 99999 PR PBB SHADOW E&M-EST. PATIENT-LVL II: CPT | Mod: PBBFAC,,, | Performed by: OPTOMETRIST

## 2017-07-24 PROCEDURE — 92004 COMPRE OPH EXAM NEW PT 1/>: CPT | Mod: S$GLB,,, | Performed by: OPTOMETRIST

## 2017-07-24 PROCEDURE — 92015 DETERMINE REFRACTIVE STATE: CPT | Mod: S$GLB,,, | Performed by: OPTOMETRIST

## 2017-07-24 NOTE — LETTER
July 24, 2017      Lawrence Logan MD  [address] [address]           [place] [facility] Opt[facility]  [address]  [place] [address]  Phone: [phone]  Fax: [phone]          Patient: Aretha Nguyen   MR Number: [id number]   YOB: 1945   Date of Visit: 7/24/2017       Dear Dr. Lawrence Logan:    Thank you for referring Aretha Nguyen to me for evaluation. Attached you will find relevant portions of my assessment and plan of care.    If you have questions, please do not hesitate to call me. I look forward to following Aretha Nguyen along with you.    Sincerely,    Alexander Fregoso, OD    Enclosure  CC:  No Recipients    If you would like to receive this communication electronically, please contact [email or web address] or [phone] to request more information on [facility] Link access.    For providers and/or their staff who would like to refer a patient to [facility], please contact us through our one-stop-shop provider referral line, [facility], at [phone].    If you feel you have received this communication in error or would no longer like to receive these types of communications, please e-mail [email or web address]

## 2017-07-24 NOTE — PROGRESS NOTES
HPI     Diabetic eye exam  Hemoglobin A1C       Date                     Value               Ref Range             Status                04/18/2017               6.5 (H)             4.5 - 6.2 %           Final              Comment:    According to ADA guidelines, hemoglobin A1C <7.0% represents  optimal   control in non-pregnant diabetic patients.  Different  metrics may apply   to specific populations.   Standards of Medical Care in Diabetes -   2016.  For the purpose of screening for the presence of diabetes:  <5.7%       Consistent with the absence of diabetes  5.7-6.4%  Consistent with   increasing risk for diabetes   (prediabetes)  >or=6.5%  Consistent with   diabetes  Currently no consensus exists for use of hemoglobin A1C  for   diagnosis of diabetes for children.    ----------    Last edited by Alexander Fregoso, OD on 7/24/2017  3:30 PM. (History)            Assessment /Plan     For exam results, see Encounter Report.    Type 2 diabetes mellitus without retinopathy    Nuclear sclerosis, bilateral    Refractive error      1. No diabetic retinopathy, no csme. Return in 1 year for dilated eye exam.  2. Educated pt on presence of cataracts and effects on vision. No surgery at this time. Recheck in one year.  3. Spec Rx given. Different lens options discussed with patient. RTC 1 year full exam.

## 2017-07-31 ENCOUNTER — HOSPITAL ENCOUNTER (OUTPATIENT)
Dept: RADIOLOGY | Facility: HOSPITAL | Age: 72
Discharge: HOME | End: 2017-07-31
Attending: INTERNAL MEDICINE
Payer: MEDICARE

## 2017-07-31 DIAGNOSIS — Z12.31 ENCOUNTER FOR SCREENING MAMMOGRAM FOR BREAST CANCER: ICD-10-CM

## 2017-07-31 PROCEDURE — 77067 SCR MAMMO BI INCL CAD: CPT | Mod: TC

## 2017-07-31 PROCEDURE — 77067 SCR MAMMO BI INCL CAD: CPT | Mod: 26,52,, | Performed by: RADIOLOGY

## 2017-07-31 PROCEDURE — 77063 BREAST TOMOSYNTHESIS BI: CPT | Mod: 26,52,, | Performed by: RADIOLOGY

## 2017-08-04 ENCOUNTER — OFFICE VISIT (OUTPATIENT)
Dept: SURGERY | Facility: CLINIC | Age: 72
End: 2017-08-04
Payer: MEDICARE

## 2017-08-04 VITALS
WEIGHT: 140.13 LBS | BODY MASS INDEX: 27.51 KG/M2 | SYSTOLIC BLOOD PRESSURE: 148 MMHG | TEMPERATURE: 98 F | HEART RATE: 72 BPM | HEIGHT: 60 IN | DIASTOLIC BLOOD PRESSURE: 83 MMHG

## 2017-08-04 DIAGNOSIS — D48.62: Primary | ICD-10-CM

## 2017-08-04 PROCEDURE — 3079F DIAST BP 80-89 MM HG: CPT | Mod: S$GLB,,, | Performed by: SURGERY

## 2017-08-04 PROCEDURE — 3077F SYST BP >= 140 MM HG: CPT | Mod: S$GLB,,, | Performed by: SURGERY

## 2017-08-04 PROCEDURE — 1126F AMNT PAIN NOTED NONE PRSNT: CPT | Mod: S$GLB,,, | Performed by: SURGERY

## 2017-08-04 PROCEDURE — 1159F MED LIST DOCD IN RCRD: CPT | Mod: S$GLB,,, | Performed by: SURGERY

## 2017-08-04 PROCEDURE — 3008F BODY MASS INDEX DOCD: CPT | Mod: S$GLB,,, | Performed by: SURGERY

## 2017-08-04 PROCEDURE — 99999 PR PBB SHADOW E&M-EST. PATIENT-LVL III: CPT | Mod: PBBFAC,,, | Performed by: SURGERY

## 2017-08-04 PROCEDURE — 99204 OFFICE O/P NEW MOD 45 MIN: CPT | Mod: S$GLB,,, | Performed by: SURGERY

## 2017-08-04 NOTE — LETTER
August 7, 2017      Lawrence Logan MD  [address]  [place] [address]           [facility]  [address]  [address] [facility]  [place] [address]  Phone: [phone]          Patient: Aretha Nguyen   MR Number: [id number]   YOB: 1945   Date of Visit: 8/4/2017       Dear Dr. Lawrence Logan:    Thank you for referring Aretha Nguyen to me for evaluation. Attached you will find relevant portions of my assessment and plan of care.    If you have questions, please do not hesitate to call me. I look forward to following Aretha Nguyen along with you.    Sincerely,        Enclosure  CC:  No Recipients    If you would like to receive this communication electronically, please contact [email or web address] or [phone] to request more information on [facility] Link access.    For providers and/or their staff who would like to refer a patient to [facility], please contact us through our one-stop-shop provider referral line, [facility], at [phone].    If you feel you have received this communication in error or would no longer like to receive these types of communications, please e-mail [email or web address]

## 2017-08-04 NOTE — PROGRESS NOTES
History & Physical    Subjective:    Pt referred by Dr. 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 Dr. Justin portillo [facility] was her surgeon. Her surgery was done at [place] 4-5 years ago.  There was no cancer in her specimen per her report. No chemo or radiation.     She was 17 at onset of menstruation. Menopause occurred in her 59. She took hrt for 2 years.  with age of first pregnancy 26. No tobacco use. No family history of breast, ovary, uterine malignancy.      her last mmg on Right was 2017 BIRAD 1        Chief Complaint   Patient presents with    phyllodes       Review of patient's allergies indicates:   Allergen Reactions    Ciprofloxacin hcl Rash            Current Outpatient Prescriptions   Medication Sig Dispense Refill     Current Outpatient Prescriptions on File Prior to Visit   Medication Sig Dispense Refill    atorvastatin (LIPITOR) 20 MG tablet Take 1 tablet (20 mg total) by mouth once daily. 90 tablet 1    balsalazide (COLAZAL) 750 mg capsule 2 tablets 2 times daily  2    cholecalciferol, vitamin D3, 2,000 unit Cap Take 1 capsule (2,000 Units total) by mouth once daily.      metformin (GLUCOPHAGE-XR) 500 MG 24 hr tablet Take 1 tablet (500 mg total) by mouth once daily. 90 tablet 1    multivit-min-iron-FA-lutein (CENTRUM SILVER WOMEN) 8 mg iron-400 mcg-300 mcg Tab Take 1 tablet by mouth once daily at 6am.      valsartan-hydrochlorothiazide (DIOVAN-HCT) 80-12.5 mg per tablet Take 1 tablet by mouth once daily. 90 tablet 1    ondansetron (ZOFRAN) 4 MG tablet Take 4 mg by mouth every 6 (six) hours as needed.  0     No current facility-administered medications on file prior to visit.          Past Medical History     Past Medical History:   Diagnosis Date    Controlled type 2 diabetes mellitus with hyperglycemia, without long-term  current use of insulin 3/22/2017    Hyperlipidemia associated with type 2 diabetes mellitus 3/22/2017    Hypertension complicating diabetes 3/22/2017    Inflammatory bowel disease 3/22/2017       Past Surgical History     Past Surgical History:   Procedure Laterality Date    HYSTERECTOMY      MASTECTOMY Left     for benign recurrent growth. Dr. Stephan Brown MD - [place], [place] - General Surgery & Surgery    TOTAL ABDOMINAL HYSTERECTOMY W/ BILATERAL SALPINGOOPHORECTOMY  2009    for benign cysts with concern for future malginancy. Fibromoid uterus for AUB. [facility] results       Family History     Family History   Problem Relation Age of Onset    Breast cancer Neg Hx          Review of Systems  Review of Systems   Constitutional: Negative for chills and fever.   HENT: Negative for congestion.    Eyes: Negative for blurred vision and double vision.   Respiratory: Negative for cough and shortness of breath.    Cardiovascular: Negative for chest pain and palpitations.   Gastrointestinal: Negative for abdominal pain, nausea and vomiting.   Genitourinary: Negative for dysuria and frequency.   Musculoskeletal: Negative for back pain and neck pain.   Skin: Negative for itching and rash.   Neurological: Negative for dizziness, seizures and headaches.   Endo/Heme/Allergies: Does not bruise/bleed easily.   Psychiatric/Behavioral: Negative for depression and substance abuse.         OBJECTIVE:     Vital Signs (Most Recent)  Vitals:    08/04/17 1053   BP: (!) 148/83   Pulse: 72   Temp: 97.9 °F (36.6 °C)       Physical Exam   Constitutional: She is oriented to person, place, and time and well-developed, well-nourished, and in no distress. No distress.   HENT:   Head: Normocephalic and atraumatic.   Eyes: Conjunctivae are normal. No scleral icterus.   Neck: No JVD present.   Cardiovascular: Normal rate and regular rhythm.    Pulmonary/Chest: Effort normal and breath sounds normal. No stridor.       Abdominal: Soft. She  exhibits no distension. There is no tenderness.   Musculoskeletal: She exhibits no edema or tenderness.   Neurological: She is alert and oriented to person, place, and time.   Skin: No rash noted. She is not diaphoretic. No pallor.   Psychiatric: Affect and judgment normal.         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  Her mammogram of her right breast was reviewed and benign BI-RADS 1.  Return to clinic in 6 months for clinical breast exam.  Next Mammogram July 2018 right screening.

## 2017-10-26 ENCOUNTER — LAB VISIT (OUTPATIENT)
Dept: LAB | Facility: HOSPITAL | Age: 72
End: 2017-10-26
Attending: INTERNAL MEDICINE
Payer: MEDICARE

## 2017-10-26 ENCOUNTER — OFFICE VISIT (OUTPATIENT)
Dept: INTERNAL MEDICINE | Facility: CLINIC | Age: 72
End: 2017-10-26
Payer: MEDICARE

## 2017-10-26 VITALS
BODY MASS INDEX: 26.61 KG/M2 | HEIGHT: 60 IN | OXYGEN SATURATION: 97 % | DIASTOLIC BLOOD PRESSURE: 78 MMHG | SYSTOLIC BLOOD PRESSURE: 126 MMHG | HEART RATE: 72 BPM | WEIGHT: 135.56 LBS

## 2017-10-26 DIAGNOSIS — E55.9 VITAMIN D DEFICIENCY: ICD-10-CM

## 2017-10-26 DIAGNOSIS — E11.65 CONTROLLED TYPE 2 DIABETES MELLITUS WITH HYPERGLYCEMIA, WITHOUT LONG-TERM CURRENT USE OF INSULIN: ICD-10-CM

## 2017-10-26 DIAGNOSIS — E11.65 CONTROLLED TYPE 2 DIABETES MELLITUS WITH HYPERGLYCEMIA, WITHOUT LONG-TERM CURRENT USE OF INSULIN: Primary | ICD-10-CM

## 2017-10-26 DIAGNOSIS — D64.9 CHRONIC ANEMIA: ICD-10-CM

## 2017-10-26 DIAGNOSIS — K51.20 ULCERATIVE PROCTITIS WITHOUT COMPLICATION: ICD-10-CM

## 2017-10-26 DIAGNOSIS — E11.59 HYPERTENSION COMPLICATING DIABETES: ICD-10-CM

## 2017-10-26 DIAGNOSIS — I15.2 HYPERTENSION COMPLICATING DIABETES: ICD-10-CM

## 2017-10-26 LAB
25(OH)D3+25(OH)D2 SERPL-MCNC: 36 NG/ML
ESTIMATED AVG GLUCOSE: 123 MG/DL
HBA1C MFR BLD HPLC: 5.9 %

## 2017-10-26 PROCEDURE — 99999 PR PBB SHADOW E&M-EST. PATIENT-LVL III: CPT | Mod: PBBFAC,,, | Performed by: INTERNAL MEDICINE

## 2017-10-26 PROCEDURE — 82306 VITAMIN D 25 HYDROXY: CPT

## 2017-10-26 PROCEDURE — 99499 UNLISTED E&M SERVICE: CPT | Mod: S$GLB,,, | Performed by: INTERNAL MEDICINE

## 2017-10-26 PROCEDURE — 83036 HEMOGLOBIN GLYCOSYLATED A1C: CPT

## 2017-10-26 PROCEDURE — 36415 COLL VENOUS BLD VENIPUNCTURE: CPT | Mod: PO

## 2017-10-26 PROCEDURE — 99214 OFFICE O/P EST MOD 30 MIN: CPT | Mod: S$GLB,,, | Performed by: INTERNAL MEDICINE

## 2017-10-26 NOTE — PROGRESS NOTES
Portions of this note are generated with voice recognition software. Typographical errors may exist.     SUBJECTIVE:    This is a/an 72 y.o. female here for primary care visit for  Chief Complaint   Patient presents with    Diabetes     3 month follow up     Patient continues to comply with metformin.  Not checking blood sugar very often.  Denies hypoglycemic episodes.    Continues to follow with gastroenterology.  Ulcerative colitis symptoms have not flared up.  No change in medication.    Patient ambivalent about pursuing vaccination.  Limited understanding of actual risk factors for respiratory infections due to diabetes and ulcerative colitis.  Patient having anticipatory anxiety about vaccination side effects.    Patient complying with vitamin D and calcium supplementation is recommended at the previous visit.        Medications Reviewed and Updated    Past medical, family, and social histories were reviewed and updated.    Review of Systems negative unless otherwise noted in history of present illness-  ROS    General ROS: negative  Psychological ROS: negative  ENT ROS: negative  Allergy and Immunology ROS: negative  Cardiovascular ROS: negative  Gastrointestinal ROS: negative  Genito-Urinary ROS: negative  Musculoskeletal ROS: negative      Allergic:    Review of patient's allergies indicates:   Allergen Reactions    Ciprofloxacin hcl Rash       OBJECTIVE:  BP: 126/78 Pulse: 72    Wt Readings from Last 3 Encounters:   10/26/17 61.5 kg (135 lb 9.3 oz)   08/04/17 63.6 kg (140 lb 1.6 oz)   07/18/17 62.2 kg (137 lb 2 oz)    Body mass index is 26.48 kg/m².  Previous Blood Pressure Readings :   BP Readings from Last 3 Encounters:   10/26/17 126/78   08/04/17 (!) 148/83   07/18/17 126/84       Physical Exam    GEN: No apparent distress  HEENT: sclera non-icteric, conjunctiva clear  CV: no peripheral edema, RRR, no r/m/g  PULM: breathing non-labored  ABD: non, protuberant abdomen.  PSYCH: appropriate affect  MSK:  able to rise from chair without assistance  SKIN: normal skin turgor    Pertinent Labs Reviewed       ASSESSMENT/PLAN:    Controlled type 2 diabetes mellitus with hyperglycemia, without long-term current use of insulin.Condition stable.  Counseling on self-care measures. Plan to monitor clinically. Continue current medical plan.   -     Hemoglobin A1c; Standing  -     Lipid panel; Future; Expected date: 10/26/2017  -     Comprehensive metabolic panel; Future; Expected date: 10/26/2017    Ulcerative proctitis without complication.Condition stable.  Counseling on self-care measures. Plan to monitor clinically. Continue current medical plan.   - follow with GI    Chronic anemia.Condition stable.  . Plan to monitor clinically. Continue current medical plan.   -     Ferritin; Future; Expected date: 11/09/2017    Vitamin D deficiency.Condition stable.  Counseling on self-care measures. Plan to monitor clinically. Continue current medical plan.   -     Vitamin D; Standing    Hypertension complicating diabetes.Condition stable.  Counseling on self-care measures. Plan to monitor clinically. Continue current medical plan.       No future appointments.    Lawrence Logan  10/26/2017  8:30 AM

## 2018-01-10 DIAGNOSIS — E11.65 CONTROLLED TYPE 2 DIABETES MELLITUS WITH HYPERGLYCEMIA, WITHOUT LONG-TERM CURRENT USE OF INSULIN: ICD-10-CM

## 2018-01-10 RX ORDER — METFORMIN HYDROCHLORIDE 500 MG/1
500 TABLET, EXTENDED RELEASE ORAL DAILY
Qty: 90 TABLET | Refills: 1 | Status: SHIPPED | OUTPATIENT
Start: 2018-01-10 | End: 2018-04-26 | Stop reason: SDUPTHER

## 2018-01-18 ENCOUNTER — PES CALL (OUTPATIENT)
Dept: ADMINISTRATIVE | Facility: CLINIC | Age: 73
End: 2018-01-18

## 2018-01-24 DIAGNOSIS — E11.65 CONTROLLED TYPE 2 DIABETES MELLITUS WITH HYPERGLYCEMIA, WITHOUT LONG-TERM CURRENT USE OF INSULIN: ICD-10-CM

## 2018-01-24 RX ORDER — VALSARTAN AND HYDROCHLOROTHIAZIDE 80; 12.5 MG/1; MG/1
1 TABLET, FILM COATED ORAL DAILY
Qty: 90 TABLET | Refills: 3 | Status: SHIPPED | OUTPATIENT
Start: 2018-01-24 | End: 2019-01-25 | Stop reason: SDUPTHER

## 2018-02-11 DIAGNOSIS — E11.65 CONTROLLED TYPE 2 DIABETES MELLITUS WITH HYPERGLYCEMIA, WITHOUT LONG-TERM CURRENT USE OF INSULIN: ICD-10-CM

## 2018-02-12 RX ORDER — ATORVASTATIN CALCIUM 20 MG/1
20 TABLET, FILM COATED ORAL DAILY
Qty: 90 TABLET | Refills: 1 | Status: SHIPPED | OUTPATIENT
Start: 2018-02-12 | End: 2018-09-07 | Stop reason: SDUPTHER

## 2018-04-25 ENCOUNTER — LAB VISIT (OUTPATIENT)
Dept: LAB | Facility: HOSPITAL | Age: 73
End: 2018-04-25
Attending: INTERNAL MEDICINE
Payer: MEDICARE

## 2018-04-25 DIAGNOSIS — E11.65 CONTROLLED TYPE 2 DIABETES MELLITUS WITH HYPERGLYCEMIA, WITHOUT LONG-TERM CURRENT USE OF INSULIN: ICD-10-CM

## 2018-04-25 DIAGNOSIS — E55.9 VITAMIN D DEFICIENCY: ICD-10-CM

## 2018-04-25 DIAGNOSIS — D64.9 CHRONIC ANEMIA: ICD-10-CM

## 2018-04-25 LAB
25(OH)D3+25(OH)D2 SERPL-MCNC: 41 NG/ML
ALBUMIN SERPL BCP-MCNC: 3.9 G/DL
ALP SERPL-CCNC: 79 U/L
ALT SERPL W/O P-5'-P-CCNC: 13 U/L
ANION GAP SERPL CALC-SCNC: 12 MMOL/L
AST SERPL-CCNC: 28 U/L
BILIRUB SERPL-MCNC: 0.5 MG/DL
BUN SERPL-MCNC: 11 MG/DL
CALCIUM SERPL-MCNC: 10.2 MG/DL
CHLORIDE SERPL-SCNC: 104 MMOL/L
CHOLEST SERPL-MCNC: 198 MG/DL
CHOLEST/HDLC SERPL: 3.2 {RATIO}
CO2 SERPL-SCNC: 28 MMOL/L
CREAT SERPL-MCNC: 0.7 MG/DL
EST. GFR  (AFRICAN AMERICAN): >60 ML/MIN/1.73 M^2
EST. GFR  (NON AFRICAN AMERICAN): >60 ML/MIN/1.73 M^2
ESTIMATED AVG GLUCOSE: 117 MG/DL
FERRITIN SERPL-MCNC: 10 NG/ML
GLUCOSE SERPL-MCNC: 111 MG/DL
HBA1C MFR BLD HPLC: 5.7 %
HDLC SERPL-MCNC: 62 MG/DL
HDLC SERPL: 31.3 %
LDLC SERPL CALC-MCNC: 108.8 MG/DL
NONHDLC SERPL-MCNC: 136 MG/DL
POTASSIUM SERPL-SCNC: 3.8 MMOL/L
PROT SERPL-MCNC: 8 G/DL
SODIUM SERPL-SCNC: 144 MMOL/L
TRIGL SERPL-MCNC: 136 MG/DL

## 2018-04-25 PROCEDURE — 80053 COMPREHEN METABOLIC PANEL: CPT

## 2018-04-25 PROCEDURE — 80061 LIPID PANEL: CPT

## 2018-04-25 PROCEDURE — 82728 ASSAY OF FERRITIN: CPT

## 2018-04-25 PROCEDURE — 36415 COLL VENOUS BLD VENIPUNCTURE: CPT | Mod: PO

## 2018-04-25 PROCEDURE — 83036 HEMOGLOBIN GLYCOSYLATED A1C: CPT

## 2018-04-25 PROCEDURE — 82306 VITAMIN D 25 HYDROXY: CPT

## 2018-04-26 ENCOUNTER — OFFICE VISIT (OUTPATIENT)
Dept: INTERNAL MEDICINE | Facility: CLINIC | Age: 73
End: 2018-04-26
Payer: MEDICARE

## 2018-04-26 VITALS
TEMPERATURE: 98 F | WEIGHT: 135.56 LBS | SYSTOLIC BLOOD PRESSURE: 122 MMHG | OXYGEN SATURATION: 97 % | DIASTOLIC BLOOD PRESSURE: 89 MMHG | BODY MASS INDEX: 26.48 KG/M2 | HEART RATE: 76 BPM

## 2018-04-26 DIAGNOSIS — E11.65 CONTROLLED TYPE 2 DIABETES MELLITUS WITH HYPERGLYCEMIA, WITHOUT LONG-TERM CURRENT USE OF INSULIN: ICD-10-CM

## 2018-04-26 DIAGNOSIS — D50.0 IRON DEFICIENCY ANEMIA DUE TO CHRONIC BLOOD LOSS: Primary | ICD-10-CM

## 2018-04-26 DIAGNOSIS — M85.80 OSTEOPENIA, UNSPECIFIED LOCATION: ICD-10-CM

## 2018-04-26 DIAGNOSIS — E11.59 HYPERTENSION COMPLICATING DIABETES: ICD-10-CM

## 2018-04-26 DIAGNOSIS — I15.2 HYPERTENSION COMPLICATING DIABETES: ICD-10-CM

## 2018-04-26 DIAGNOSIS — K51.20 ULCERATIVE PROCTITIS WITHOUT COMPLICATION: ICD-10-CM

## 2018-04-26 PROCEDURE — 99999 PR PBB SHADOW E&M-EST. PATIENT-LVL III: CPT | Mod: PBBFAC,,, | Performed by: INTERNAL MEDICINE

## 2018-04-26 PROCEDURE — 3074F SYST BP LT 130 MM HG: CPT | Mod: CPTII,S$GLB,, | Performed by: INTERNAL MEDICINE

## 2018-04-26 PROCEDURE — 3079F DIAST BP 80-89 MM HG: CPT | Mod: CPTII,S$GLB,, | Performed by: INTERNAL MEDICINE

## 2018-04-26 PROCEDURE — 3044F HG A1C LEVEL LT 7.0%: CPT | Mod: CPTII,S$GLB,, | Performed by: INTERNAL MEDICINE

## 2018-04-26 PROCEDURE — 99499 UNLISTED E&M SERVICE: CPT | Mod: S$PBB,,, | Performed by: INTERNAL MEDICINE

## 2018-04-26 PROCEDURE — 99214 OFFICE O/P EST MOD 30 MIN: CPT | Mod: S$GLB,,, | Performed by: INTERNAL MEDICINE

## 2018-04-26 RX ORDER — FERROUS SULFATE 325(65) MG
325 TABLET ORAL 2 TIMES DAILY
Refills: 0 | COMMUNITY
Start: 2018-04-26 | End: 2019-01-31

## 2018-04-26 NOTE — PATIENT INSTRUCTIONS
Recomendaciones para hoy    En con momento puede suspender la vitamina B12 y el suplemento de calcio. Recomendamos que continúe con las unidades de vitamina Centrum Silver y vitamina D3 2000 sree vez al día. Además, le recomendamos que inicie el suplemento de dean llamado sulfato ferroso. Intenta lissette 1 tableta al día. Si esto no causa estreñimiento, recomendamos aumentar la dosis de suplemento de dean a sree tableta dos veces al día.    Recommendations for today    At this time you can suspend vitamin B12 and calcium supplement.  We recommend that you continue the Centrum Silver vitamin and vitamin D3 2000 units once daily.  In addition we recommend that you start the iron supplement called ferrous sulfate.  Try taking 1 tablet daily.  If this does not cause constipation and we recommend trying to increase the dosage of iron supplement to one tablet twice daily.

## 2018-04-26 NOTE — PROGRESS NOTES
Portions of this note are generated with voice recognition software. Typographical errors may exist.     SUBJECTIVE:    This is a/an 72 y.o. female here for primary care visit for  Chief Complaint   Patient presents with    ulcerative colitis     6mth     Patient states that she has not had any overt symptoms of GI bleeding.  States that she continues to have 3 bowel movements daily.  She is complying with ulcerative colitis medicines.  She has not had any complications and has not had a repeat colonoscopy in the interval since last evaluation.  She is still actively engaged with care and gastroenterology.    Patient has been monitored closely for iron deficiency anemia.  States that she has always had a mild degree of anemia but has not been on iron supplementation.  Current multivitamin does not include iron but she continues to take vitamin D3.    The patient states that she forgot to take blood pressure medication last night but otherwise home blood pressure numbers have been normal.    Patient is complying with diabetic medication.      Medications Reviewed and Updated    Past medical, family, and social histories were reviewed and updated.    Review of Systems negative unless otherwise noted in history of present illness-  ROS    General ROS: negative  Psychological ROS: negative  ENT ROS: negative  Endocrine ROS: Negative  Allergy and Immunology ROS: negative  Cardiovascular ROS: negative  Gastrointestinal ROS: negative  Genito-Urinary ROS: negative  Musculoskeletal ROS: negative      Allergic:    Review of patient's allergies indicates:   Allergen Reactions    Ciprofloxacin hcl Rash       OBJECTIVE:  BP: 122/89 Pulse: 76 Temp: 97.7 °F (36.5 °C)  Wt Readings from Last 3 Encounters:   04/26/18 61.5 kg (135 lb 9.3 oz)   10/26/17 61.5 kg (135 lb 9.3 oz)   08/04/17 63.6 kg (140 lb 1.6 oz)    Body mass index is 26.48 kg/m².  Previous Blood Pressure Readings :   BP Readings from Last 3 Encounters:   04/26/18  122/89   10/26/17 126/78   08/04/17 (!) 148/83       Physical Exam    GEN: No apparent distress  HEENT: sclera non-icteric, conjunctiva clear  CV: no peripheral edema  PULM: breathing non-labored  ABD: non protuberant abdomen.  Supple abdomen.  PSYCH: appropriate affect  MSK: able to rise from chair without assistance  SKIN: normal skin turgor    Pertinent Labs Reviewed       ASSESSMENT/PLAN:    Iron deficiency anemia due to chronic blood loss.Condition not optimally controlled. Detailed counseling on self care measures. Plan to monitor clinically in addition to plan below and on After Visit Summary.   -     Ferritin; Future; Expected date: 04/26/2018  -     Iron and TIBC; Future; Expected date: 04/26/2018  -     ferrous sulfate 325 mg (65 mg iron) Tab tablet; Take 1 tablet (325 mg total) by mouth 2 (two) times daily.; Refill: 0    Ulcerative proctitis without complication.Condition stable.  Counseling on self-care measures. Plan to monitor clinically. Continue current medical plan.     Controlled type 2 diabetes mellitus with hyperglycemia, without long-term current use of insulin.Condition stable.  Counseling on self-care measures. Plan to monitor clinically. Continue current medical plan.     Osteopenia, unspecified location.Condition stable.  Counseling on self-care measures. Plan to monitor clinically. Continue current medical plan.     Hypertension complicating diabetes.Condition stable.  Counseling on self-care measures. Plan to monitor clinically. Continue current medical plan.     Future Appointments  Date Time Provider Department Center   7/26/2018 10:15 AM [facility] [place]   7/30/2018 10:40 AM Lawrence Logan MD [facility]       Lawrence Logan  4/29/2018  8:31 AM

## 2018-04-30 RX ORDER — METFORMIN HYDROCHLORIDE 500 MG/1
500 TABLET, EXTENDED RELEASE ORAL DAILY
Qty: 90 TABLET | Refills: 3 | Status: SHIPPED | OUTPATIENT
Start: 2018-04-30 | End: 2019-07-13 | Stop reason: SDUPTHER

## 2018-05-10 ENCOUNTER — OFFICE VISIT (OUTPATIENT)
Dept: FAMILY MEDICINE | Facility: CLINIC | Age: 73
End: 2018-05-10
Payer: MEDICARE

## 2018-05-10 VITALS
TEMPERATURE: 99 F | WEIGHT: 136.44 LBS | HEIGHT: 60 IN | OXYGEN SATURATION: 98 % | DIASTOLIC BLOOD PRESSURE: 60 MMHG | BODY MASS INDEX: 26.79 KG/M2 | HEART RATE: 62 BPM | SYSTOLIC BLOOD PRESSURE: 124 MMHG

## 2018-05-10 DIAGNOSIS — I10 ESSENTIAL HYPERTENSION: Primary | ICD-10-CM

## 2018-05-10 PROCEDURE — 99499 UNLISTED E&M SERVICE: CPT | Mod: S$PBB,,, | Performed by: FAMILY MEDICINE

## 2018-05-10 PROCEDURE — 99213 OFFICE O/P EST LOW 20 MIN: CPT | Mod: S$GLB,,, | Performed by: FAMILY MEDICINE

## 2018-05-10 PROCEDURE — 3078F DIAST BP <80 MM HG: CPT | Mod: CPTII,S$GLB,, | Performed by: FAMILY MEDICINE

## 2018-05-10 PROCEDURE — 3074F SYST BP LT 130 MM HG: CPT | Mod: CPTII,S$GLB,, | Performed by: FAMILY MEDICINE

## 2018-05-10 PROCEDURE — 99999 PR PBB SHADOW E&M-EST. PATIENT-LVL III: CPT | Mod: PBBFAC,,, | Performed by: FAMILY MEDICINE

## 2018-05-10 NOTE — PROGRESS NOTES
Subjective:       Patient ID: Aretha Nguyen is a 72 y.o. female.    Chief Complaint: Hypertension (Recheck BP) and Headache    Aretha is a 72 y.o. female who presents today for an urgent care visit for possible elevated blood pressures. She checked her BP at home and her cuff showed that it was 146/80. However, today it was 124/60 when checked by nursing. She is currently asymptomatic.       Hypertension   This is a chronic problem. The current episode started more than 1 year ago. The problem has been waxing and waning since onset. The problem is controlled. Pertinent negatives include no anxiety, blurred vision, chest pain, headaches, malaise/fatigue, neck pain, orthopnea, palpitations, peripheral edema, shortness of breath or sweats. There are no associated agents to hypertension. Risk factors for coronary artery disease include diabetes mellitus and dyslipidemia. Past treatments include angiotensin blockers and diuretics. The current treatment provides significant improvement. There are no compliance problems.    Headache    Pertinent negatives include no blurred vision or neck pain.     Review of Systems   Constitutional: Negative for malaise/fatigue.   Eyes: Negative for blurred vision.   Respiratory: Negative for shortness of breath.    Cardiovascular: Negative for chest pain, palpitations and orthopnea.   Musculoskeletal: Negative for neck pain.   Neurological: Negative for headaches.       Objective:     Vitals:    05/10/18 1351   BP: 124/60   Pulse: 62   Temp: 98.6 °F (37 °C)        Physical Exam   Constitutional: She is oriented to person, place, and time. She appears well-developed and well-nourished. No distress.   HENT:   Head: Normocephalic and atraumatic.   Eyes: Conjunctivae are normal.   Neck: Normal range of motion. Neck supple.   Cardiovascular: Normal rate and regular rhythm.    Pulmonary/Chest: Effort normal and breath sounds normal.   Abdominal: Soft. There is no tenderness.   Musculoskeletal:  She exhibits no edema.   Neurological: She is alert and oriented to person, place, and time.   Skin: Skin is warm. She is not diaphoretic.   Psychiatric: She has a normal mood and affect. Her behavior is normal. Judgment and thought content normal.   Nursing note and vitals reviewed.      Assessment:       1. Essential hypertension        Plan:         Essential hypertension  Although this patient has been seen in [facility] for this diagnosis in the past by a previous provider, they are presenting today for the first time to see me.     BP stable  Repeat 128/80 (left arm)  Explained natural course of HTN and how it may increase and decrease with anger, anxiety, food, etc  No smart phone at home, can't use digital HTN. Advised to replaced batteries on her current machine and to bring it to her next appointment with her PCP  Continue current medications  DASH diet  Ambulatory BP monitoring PRN  Follow up as needed with PCP      Warning signs discussed, patient to call with any further issues or worsening of symptoms.

## 2018-05-10 NOTE — PATIENT INSTRUCTIONS

## 2018-06-11 ENCOUNTER — PES CALL (OUTPATIENT)
Dept: ADMINISTRATIVE | Facility: CLINIC | Age: 73
End: 2018-06-11

## 2018-07-26 ENCOUNTER — LAB VISIT (OUTPATIENT)
Dept: LAB | Facility: HOSPITAL | Age: 73
End: 2018-07-26
Attending: INTERNAL MEDICINE
Payer: MEDICARE

## 2018-07-26 DIAGNOSIS — D50.0 IRON DEFICIENCY ANEMIA DUE TO CHRONIC BLOOD LOSS: ICD-10-CM

## 2018-07-26 LAB
FERRITIN SERPL-MCNC: 39 NG/ML
IRON SERPL-MCNC: 66 UG/DL
SATURATED IRON: 16 %
TOTAL IRON BINDING CAPACITY: 419 UG/DL
TRANSFERRIN SERPL-MCNC: 283 MG/DL

## 2018-07-26 PROCEDURE — 83540 ASSAY OF IRON: CPT

## 2018-07-26 PROCEDURE — 82728 ASSAY OF FERRITIN: CPT

## 2018-07-26 PROCEDURE — 36415 COLL VENOUS BLD VENIPUNCTURE: CPT | Mod: PO

## 2018-07-30 ENCOUNTER — OFFICE VISIT (OUTPATIENT)
Dept: INTERNAL MEDICINE | Facility: CLINIC | Age: 73
End: 2018-07-30
Payer: MEDICARE

## 2018-07-30 VITALS
BODY MASS INDEX: 26.79 KG/M2 | DIASTOLIC BLOOD PRESSURE: 82 MMHG | HEIGHT: 60 IN | OXYGEN SATURATION: 97 % | HEART RATE: 76 BPM | WEIGHT: 136.44 LBS | SYSTOLIC BLOOD PRESSURE: 124 MMHG

## 2018-07-30 DIAGNOSIS — E11.9 ENCOUNTER FOR DIABETIC FOOT EXAM: ICD-10-CM

## 2018-07-30 DIAGNOSIS — N64.89 DEFECT DUE TO MASTECTOMY: ICD-10-CM

## 2018-07-30 DIAGNOSIS — Z12.31 BREAST CANCER SCREENING BY MAMMOGRAM: ICD-10-CM

## 2018-07-30 DIAGNOSIS — Z90.10 DEFECT DUE TO MASTECTOMY: ICD-10-CM

## 2018-07-30 DIAGNOSIS — E11.65 CONTROLLED TYPE 2 DIABETES MELLITUS WITH HYPERGLYCEMIA, WITHOUT LONG-TERM CURRENT USE OF INSULIN: Primary | ICD-10-CM

## 2018-07-30 DIAGNOSIS — D50.0 IRON DEFICIENCY ANEMIA DUE TO CHRONIC BLOOD LOSS: ICD-10-CM

## 2018-07-30 PROCEDURE — 99999 PR PBB SHADOW E&M-EST. PATIENT-LVL III: CPT | Mod: PBBFAC,,, | Performed by: INTERNAL MEDICINE

## 2018-07-30 PROCEDURE — 3079F DIAST BP 80-89 MM HG: CPT | Mod: CPTII,S$GLB,, | Performed by: INTERNAL MEDICINE

## 2018-07-30 PROCEDURE — 99215 OFFICE O/P EST HI 40 MIN: CPT | Mod: S$GLB,,, | Performed by: INTERNAL MEDICINE

## 2018-07-30 PROCEDURE — 3074F SYST BP LT 130 MM HG: CPT | Mod: CPTII,S$GLB,, | Performed by: INTERNAL MEDICINE

## 2018-07-30 PROCEDURE — 3044F HG A1C LEVEL LT 7.0%: CPT | Mod: CPTII,S$GLB,, | Performed by: INTERNAL MEDICINE

## 2018-07-30 NOTE — PROGRESS NOTES
Portions of this note are generated with voice recognition software. Typographical errors may exist.     SUBJECTIVE:    This is a/an 72 y.o. female here for primary care visit for  Chief Complaint   Patient presents with    Diabetes     follow up     Patient states that she only checks home blood pressure sporadically and was surprised by the 1st time she saw blood pressure above 140 systolic.  She has repeated the blood pressure a few times afterwards and has not seen blood pressure this high ever again.    Patient states that she has doubled her dosage of ferrous sulfate since the last evaluation.  She is taking 2 tablets once daily.  She voices no gastrointestinal complaints with this routine in will continue to do this.    Patient states that she has been taking a product that contain valsartan.  She received a letter from [facility] indicating that she would need to have a conversation with her pharmacist to see if she has a valsartan that has been recalled.  She has not contacted her pharmacy.  Today we contacted the pharmacy on the patient's behalf.  The patient's pharmacist indicates that she has a valsartan product that is not on the recall list.    Patient states that she does not have dysmorphic nails or any problems with sensory changes in the lower extremities particularly no tingling or burning on a recurring basis.    Medications Reviewed and Updated    Past medical, family, and social histories were reviewed and updated.    Review of Systems negative unless otherwise noted in history of present illness-  ROS    General ROS: negative  Psychological ROS: negative  ENT ROS: negative  Endocrine ROS: Negative  Allergy and Immunology ROS: negative  Cardiovascular ROS: negative  Pulmonary ROS: Negative  Gastrointestinal ROS: negative  Musculoskeletal ROS: negative  Neurological ROS: negative  Dermatological ROS: negative        Allergic:    Review of patient's allergies indicates:   Allergen Reactions     Ciprofloxacin hcl Rash       OBJECTIVE:  BP: 124/82 Pulse: 76    Wt Readings from Last 3 Encounters:   07/30/18 61.9 kg (136 lb 7.4 oz)   05/10/18 61.9 kg (136 lb 7.4 oz)   04/26/18 61.5 kg (135 lb 9.3 oz)    Body mass index is 26.65 kg/m².  Previous Blood Pressure Readings :   BP Readings from Last 3 Encounters:   07/30/18 124/82   05/10/18 124/60   04/26/18 122/89       Physical Exam    GEN: No apparent distress  HEENT: sclera non-icteric, conjunctiva clear  CV: no peripheral edema  PULM: breathing non-labored  ABD: Obese, protuberant abdomen.  PSYCH: appropriate affect  MSK: able to rise from chair without assistance  SKIN: normal skin turgor        Dermatologic Skin  - cracking: No  - thickened nails  No  - blistering  No    Infection:   - fungal infection between toes: No    Ulceration: No    Musculoskeletal Deformity  - claw toes No  - prominent metatarsal heads  No  - charcot joint  No  - muscle wasting (guttering between metatarsals) No      Pertinent Labs Reviewed       ASSESSMENT/PLAN:    Controlled type 2 diabetes mellitus with hyperglycemia, without long-term current use of insulin.Condition stable.  Counseling given today on self-care measures. Plan to monitor clinically. Continue current medical plan.   -     Hemoglobin A1c; Standing  -     Comprehensive metabolic panel; Standing  -     Lipid panel; Standing  -     Microalbumin/creatinine urine ratio; Standing    Encounter for diabetic foot exam    Breast cancer screening by mammogram  -     Mammo Digital Screening Bilat with CAD; Standing    Iron deficiency anemia due to chronic blood loss.Condition improving.  Counseling on self-care measures today.  Continue with current plan in addition to recommendations written on After Visit Summary.   -     Ferritin; Standing          Future Appointments  Date Time Provider Department Center   8/1/2018 11:15 AM [facility] MAMMO1 [facility]   1/28/2019 8:15 AM [facility] [place]   1/28/2019  8:30 AM [facility] [place]   [facility]2019 [facility]  7/30/2018  11:03 AM

## 2018-07-30 NOTE — PATIENT INSTRUCTIONS
Recommendations for today    We will contact your [facility] pharmacy to see if valsartan needs to be changed.  You should receive a telephone call within the next 2 days regarding the final status of the valsartan.  For the time being continue taking the medication.

## 2018-07-31 ENCOUNTER — TELEPHONE (OUTPATIENT)
Dept: INTERNAL MEDICINE | Facility: CLINIC | Age: 73
End: 2018-07-31

## 2018-07-31 PROBLEM — D50.0 IRON DEFICIENCY ANEMIA DUE TO CHRONIC BLOOD LOSS: Status: ACTIVE | Noted: 2018-07-31

## 2018-07-31 PROBLEM — N64.89 DEFECT DUE TO MASTECTOMY: Status: ACTIVE | Noted: 2018-07-31

## 2018-07-31 PROBLEM — Z90.10 DEFECT DUE TO MASTECTOMY: Status: ACTIVE | Noted: 2018-07-31

## 2018-07-31 NOTE — TELEPHONE ENCOUNTER
----- Message from Lawrence Logan MD sent at 7/30/2018  5:54 PM CDT -----  Please contact patient about her valsartan prescription.  I spoke with her pharmacist today who indicates that her valsartan was not on the recall list so she can continue taking this medication.

## 2018-08-13 ENCOUNTER — HOSPITAL ENCOUNTER (OUTPATIENT)
Dept: RADIOLOGY | Facility: HOSPITAL | Age: 73
Discharge: HOME | End: 2018-08-13
Attending: INTERNAL MEDICINE
Payer: MEDICARE

## 2018-08-13 DIAGNOSIS — Z12.31 BREAST CANCER SCREENING BY MAMMOGRAM: ICD-10-CM

## 2018-08-13 PROCEDURE — 77063 BREAST TOMOSYNTHESIS BI: CPT | Mod: 26,,, | Performed by: RADIOLOGY

## 2018-08-13 PROCEDURE — 77067 SCR MAMMO BI INCL CAD: CPT | Mod: 26,,, | Performed by: RADIOLOGY

## 2018-08-13 PROCEDURE — 77067 SCR MAMMO BI INCL CAD: CPT | Mod: TC

## 2018-09-07 DIAGNOSIS — E11.65 CONTROLLED TYPE 2 DIABETES MELLITUS WITH HYPERGLYCEMIA, WITHOUT LONG-TERM CURRENT USE OF INSULIN: ICD-10-CM

## 2018-09-07 RX ORDER — ATORVASTATIN CALCIUM 20 MG/1
20 TABLET, FILM COATED ORAL DAILY
Qty: 90 TABLET | Refills: 1 | Status: SHIPPED | OUTPATIENT
Start: 2018-09-07 | End: 2019-03-06 | Stop reason: SDUPTHER

## 2018-09-14 ENCOUNTER — PES CALL (OUTPATIENT)
Dept: ADMINISTRATIVE | Facility: CLINIC | Age: 73
End: 2018-09-14

## 2019-01-25 DIAGNOSIS — E11.65 CONTROLLED TYPE 2 DIABETES MELLITUS WITH HYPERGLYCEMIA, WITHOUT LONG-TERM CURRENT USE OF INSULIN: ICD-10-CM

## 2019-01-25 RX ORDER — VALSARTAN AND HYDROCHLOROTHIAZIDE 80; 12.5 MG/1; MG/1
1 TABLET, FILM COATED ORAL DAILY
Qty: 90 TABLET | Refills: 0 | Status: SHIPPED | OUTPATIENT
Start: 2019-01-25 | End: 2019-01-31

## 2019-01-28 ENCOUNTER — LAB VISIT (OUTPATIENT)
Dept: LAB | Facility: HOSPITAL | Age: 74
End: 2019-01-28
Attending: INTERNAL MEDICINE
Payer: MEDICARE

## 2019-01-28 DIAGNOSIS — E11.65 CONTROLLED TYPE 2 DIABETES MELLITUS WITH HYPERGLYCEMIA, WITHOUT LONG-TERM CURRENT USE OF INSULIN: ICD-10-CM

## 2019-01-28 DIAGNOSIS — D50.0 IRON DEFICIENCY ANEMIA DUE TO CHRONIC BLOOD LOSS: ICD-10-CM

## 2019-01-28 LAB
ALBUMIN SERPL BCP-MCNC: 3.7 G/DL
ALP SERPL-CCNC: 81 U/L
ALT SERPL W/O P-5'-P-CCNC: 11 U/L
ANION GAP SERPL CALC-SCNC: 10 MMOL/L
AST SERPL-CCNC: 26 U/L
BILIRUB SERPL-MCNC: 0.5 MG/DL
BUN SERPL-MCNC: 13 MG/DL
CALCIUM SERPL-MCNC: 9.7 MG/DL
CHLORIDE SERPL-SCNC: 103 MMOL/L
CHOLEST SERPL-MCNC: 176 MG/DL
CHOLEST/HDLC SERPL: 3.5 {RATIO}
CO2 SERPL-SCNC: 30 MMOL/L
CREAT SERPL-MCNC: 0.7 MG/DL
EST. GFR  (AFRICAN AMERICAN): >60 ML/MIN/1.73 M^2
EST. GFR  (NON AFRICAN AMERICAN): >60 ML/MIN/1.73 M^2
ESTIMATED AVG GLUCOSE: 128 MG/DL
FERRITIN SERPL-MCNC: 65 NG/ML
GLUCOSE SERPL-MCNC: 116 MG/DL
HBA1C MFR BLD HPLC: 6.1 %
HDLC SERPL-MCNC: 51 MG/DL
HDLC SERPL: 29 %
LDLC SERPL CALC-MCNC: 92.2 MG/DL
NONHDLC SERPL-MCNC: 125 MG/DL
POTASSIUM SERPL-SCNC: 3.6 MMOL/L
PROT SERPL-MCNC: 7.8 G/DL
SODIUM SERPL-SCNC: 143 MMOL/L
TRIGL SERPL-MCNC: 164 MG/DL

## 2019-01-28 PROCEDURE — 80061 LIPID PANEL: CPT | Mod: HCNC

## 2019-01-28 PROCEDURE — 82728 ASSAY OF FERRITIN: CPT | Mod: HCNC

## 2019-01-28 PROCEDURE — 83036 HEMOGLOBIN GLYCOSYLATED A1C: CPT | Mod: HCNC

## 2019-01-28 PROCEDURE — 80053 COMPREHEN METABOLIC PANEL: CPT | Mod: HCNC

## 2019-01-28 PROCEDURE — 36415 COLL VENOUS BLD VENIPUNCTURE: CPT | Mod: HCNC,PO

## 2019-01-31 ENCOUNTER — TELEPHONE (OUTPATIENT)
Dept: INTERNAL MEDICINE | Facility: CLINIC | Age: 74
End: 2019-01-31

## 2019-01-31 ENCOUNTER — OFFICE VISIT (OUTPATIENT)
Dept: INTERNAL MEDICINE | Facility: CLINIC | Age: 74
End: 2019-01-31
Payer: MEDICARE

## 2019-01-31 VITALS
OXYGEN SATURATION: 96 % | HEIGHT: 61 IN | DIASTOLIC BLOOD PRESSURE: 86 MMHG | SYSTOLIC BLOOD PRESSURE: 132 MMHG | BODY MASS INDEX: 26.02 KG/M2 | WEIGHT: 137.81 LBS | HEART RATE: 78 BPM

## 2019-01-31 DIAGNOSIS — E11.65 CONTROLLED TYPE 2 DIABETES MELLITUS WITH HYPERGLYCEMIA, WITHOUT LONG-TERM CURRENT USE OF INSULIN: ICD-10-CM

## 2019-01-31 DIAGNOSIS — Z23 NEED FOR SHINGLES VACCINE: ICD-10-CM

## 2019-01-31 DIAGNOSIS — D50.9 IRON DEFICIENCY ANEMIA, UNSPECIFIED IRON DEFICIENCY ANEMIA TYPE: ICD-10-CM

## 2019-01-31 DIAGNOSIS — Z00.00 ANNUAL PHYSICAL EXAM: Primary | ICD-10-CM

## 2019-01-31 DIAGNOSIS — Z23 NEED FOR VACCINATION WITH 13-POLYVALENT PNEUMOCOCCAL CONJUGATE VACCINE: ICD-10-CM

## 2019-01-31 DIAGNOSIS — Z78.0 ASYMPTOMATIC POSTMENOPAUSAL ESTROGEN DEFICIENCY: ICD-10-CM

## 2019-01-31 DIAGNOSIS — I10 ESSENTIAL HYPERTENSION: ICD-10-CM

## 2019-01-31 PROCEDURE — G0009 ADMIN PNEUMOCOCCAL VACCINE: HCPCS | Mod: HCNC,S$GLB,, | Performed by: INTERNAL MEDICINE

## 2019-01-31 PROCEDURE — 3079F PR MOST RECENT DIASTOLIC BLOOD PRESSURE 80-89 MM HG: ICD-10-PCS | Mod: HCNC,CPTII,S$GLB, | Performed by: INTERNAL MEDICINE

## 2019-01-31 PROCEDURE — 99397 PR PREVENTIVE VISIT,EST,65 & OVER: ICD-10-PCS | Mod: HCNC,25,S$GLB, | Performed by: INTERNAL MEDICINE

## 2019-01-31 PROCEDURE — 90670 PCV13 VACCINE IM: CPT | Mod: HCNC,S$GLB,, | Performed by: INTERNAL MEDICINE

## 2019-01-31 PROCEDURE — 3044F PR MOST RECENT HEMOGLOBIN A1C LEVEL <7.0%: ICD-10-PCS | Mod: HCNC,CPTII,S$GLB, | Performed by: INTERNAL MEDICINE

## 2019-01-31 PROCEDURE — 99999 PR PBB SHADOW E&M-EST. PATIENT-LVL III: CPT | Mod: PBBFAC,HCNC,, | Performed by: INTERNAL MEDICINE

## 2019-01-31 PROCEDURE — 3075F SYST BP GE 130 - 139MM HG: CPT | Mod: HCNC,CPTII,S$GLB, | Performed by: INTERNAL MEDICINE

## 2019-01-31 PROCEDURE — G0009 PNEUMOCOCCAL CONJUGATE VACCINE 13-VALENT LESS THAN 5YO & GREATER THAN: ICD-10-PCS | Mod: HCNC,S$GLB,, | Performed by: INTERNAL MEDICINE

## 2019-01-31 PROCEDURE — 3044F HG A1C LEVEL LT 7.0%: CPT | Mod: HCNC,CPTII,S$GLB, | Performed by: INTERNAL MEDICINE

## 2019-01-31 PROCEDURE — 3079F DIAST BP 80-89 MM HG: CPT | Mod: HCNC,CPTII,S$GLB, | Performed by: INTERNAL MEDICINE

## 2019-01-31 PROCEDURE — 90670 PNEUMOCOCCAL CONJUGATE VACCINE 13-VALENT LESS THAN 5YO & GREATER THAN: ICD-10-PCS | Mod: HCNC,S$GLB,, | Performed by: INTERNAL MEDICINE

## 2019-01-31 PROCEDURE — 3075F PR MOST RECENT SYSTOLIC BLOOD PRESS GE 130-139MM HG: ICD-10-PCS | Mod: HCNC,CPTII,S$GLB, | Performed by: INTERNAL MEDICINE

## 2019-01-31 PROCEDURE — 99999 PR PBB SHADOW E&M-EST. PATIENT-LVL III: ICD-10-PCS | Mod: PBBFAC,HCNC,, | Performed by: INTERNAL MEDICINE

## 2019-01-31 PROCEDURE — 99397 PER PM REEVAL EST PAT 65+ YR: CPT | Mod: HCNC,25,S$GLB, | Performed by: INTERNAL MEDICINE

## 2019-01-31 RX ORDER — OLMESARTAN MEDOXOMIL AND HYDROCHLOROTHIAZIDE 20/12.5 20; 12.5 MG/1; MG/1
1 TABLET ORAL DAILY
Qty: 90 TABLET | Refills: 1 | Status: SHIPPED | OUTPATIENT
Start: 2019-01-31 | End: 2019-06-27 | Stop reason: SDUPTHER

## 2019-01-31 RX ORDER — FERROUS SULFATE 324(65)MG
325 TABLET, DELAYED RELEASE (ENTERIC COATED) ORAL 2 TIMES DAILY
Qty: 180 TABLET | Refills: 3 | Status: SHIPPED | OUTPATIENT
Start: 2019-01-31 | End: 2020-05-04

## 2019-01-31 NOTE — TELEPHONE ENCOUNTER
----- Message from Lawrence Logan MD sent at 1/31/2019 10:31 AM CST -----  Please contact patient pharmacy to discontinue valsartan hydrochlorothiazide    [id number] - [place] - [address] AT [facility] ([phone])

## 2019-02-01 NOTE — PROGRESS NOTES
Portions of this note are generated with voice recognition software. Typographical errors may exist.     SUBJECTIVE:    This is a/an 73 y.o. female here for primary care visit for  Chief Complaint   Patient presents with    Diabetes     6m     Patient continues to follow with gastroenterologist and has not had any major flare-ups of her inflammatory bowel disease. She continues to take twice daily iron supplementation.  Patient is complying with blood pressure and diabetes medicine.  She was told that she might have had blood pressure medication recall by the FDA.  She would like to change medication in the event that she continues to struggle with further recalls.    Patient states that she continues with calcium and vitamin-D supplementation    Medications Reviewed and Updated    Past medical, family, and social histories were reviewed and updated.    Review of Systems negative unless otherwise noted in history of present illness-  ROS    General ROS: negative  Psychological ROS: negative  ENT ROS: negative  Endocrine ROS: Negative  Allergy and Immunology ROS: negative  Cardiovascular ROS: negative  Pulmonary ROS: Negative  Gastrointestinal ROS: negative  Genito-Urinary ROS: negative  Musculoskeletal ROS: negative    Allergic:    Review of patient's allergies indicates:   Allergen Reactions    Ciprofloxacin hcl Rash       OBJECTIVE:  BP: 132/86 Pulse: 78    Wt Readings from Last 3 Encounters:   01/31/19 62.5 kg (137 lb 12.6 oz)   07/30/18 61.9 kg (136 lb 7.4 oz)   05/10/18 61.9 kg (136 lb 7.4 oz)    Body mass index is 26.03 kg/m².  Previous Blood Pressure Readings :   BP Readings from Last 3 Encounters:   01/31/19 132/86   07/30/18 124/82   05/10/18 124/60       Physical Exam    GEN: No apparent distress  HEENT: sclera non-icteric, conjunctiva clear  CV: no peripheral edema regular rate and rhythm.  No murmurs.  PULM: breathing non-labored  ABD: non, protuberant abdomen.  PSYCH: appropriate affect  MSK: able to  rise from chair without assistance  SKIN: normal skin turgor    Pertinent Labs Reviewed       ASSESSMENT/PLAN:    Annual physical exam    Iron deficiency anemia, unspecified iron deficiency anemia type  -     ferrous sulfate 324 mg (65 mg iron) TbEC; Take 1 tablet (324 mg total) by mouth 2 (two) times daily.  Dispense: 180 tablet; Refill: 3    Essential hypertension  -     olmesartan-hydrochlorothiazide (BENICAR HCT) 20-12.5 mg per tablet; Take 1 tablet by mouth once daily.  Dispense: 90 tablet; Refill: 1    Controlled type 2 diabetes mellitus with hyperglycemia, without long-term current use of insulin    Need for vaccination with 13-polyvalent pneumococcal conjugate vaccine  -     (In Office Administered) Pneumococcal Conjugate Vaccine (13 Valent) (IM)    Need for shingles vaccine  -     varicella-zoster [id number], PF, (SHINGRIX, PF,) 50 mcg/0.5 mL injection; Inject 0.5 mLs into the muscle As instructed. 2 doses  Dispense: 0.5 mL; Refill: 2    Asymptomatic postmenopausal estrogen deficiency   -     DXA Bone Density Spine And Hip; Future; Expected date: 01/31/2019          Future Appointments   Date Time Provider Department Center   3/29/2019  9:00 AM [facility] DEXA1 LIMIT 350 LBS [facility]   6/24/2019  7:30 AM [facility], [facility] [place]   6/28/2019  9:40 AM Lawrence Logan MD [facility] Prudence Logan  2/1/2019  1:47 PM

## 2019-02-27 ENCOUNTER — PES CALL (OUTPATIENT)
Dept: ADMINISTRATIVE | Facility: CLINIC | Age: 74
End: 2019-02-27

## 2019-03-06 DIAGNOSIS — E11.65 CONTROLLED TYPE 2 DIABETES MELLITUS WITH HYPERGLYCEMIA, WITHOUT LONG-TERM CURRENT USE OF INSULIN: ICD-10-CM

## 2019-03-06 RX ORDER — ATORVASTATIN CALCIUM 20 MG/1
TABLET, FILM COATED ORAL
Qty: 90 TABLET | Refills: 1 | Status: SHIPPED | OUTPATIENT
Start: 2019-03-06 | End: 2019-06-27 | Stop reason: SDUPTHER

## 2019-03-29 ENCOUNTER — HOSPITAL ENCOUNTER (OUTPATIENT)
Dept: RADIOLOGY | Facility: HOSPITAL | Age: 74
Discharge: HOME | End: 2019-03-29
Attending: INTERNAL MEDICINE
Payer: MEDICARE

## 2019-03-29 DIAGNOSIS — Z78.0 ASYMPTOMATIC POSTMENOPAUSAL ESTROGEN DEFICIENCY: ICD-10-CM

## 2019-03-29 PROCEDURE — 77080 DXA BONE DENSITY AXIAL: CPT | Mod: 26,HCNC,, | Performed by: RADIOLOGY

## 2019-03-29 PROCEDURE — 77080 DEXA BONE DENSITY SPINE HIP: ICD-10-PCS | Mod: 26,HCNC,, | Performed by: RADIOLOGY

## 2019-03-29 PROCEDURE — 77080 DXA BONE DENSITY AXIAL: CPT | Mod: TC,HCNC

## 2019-05-22 ENCOUNTER — TELEPHONE (OUTPATIENT)
Dept: ADMINISTRATIVE | Facility: HOSPITAL | Age: 74
End: 2019-05-22

## 2019-05-22 ENCOUNTER — CLINICAL SUPPORT (OUTPATIENT)
Dept: FAMILY MEDICINE | Facility: CLINIC | Age: 74
End: 2019-05-22
Attending: INTERNAL MEDICINE
Payer: MEDICARE

## 2019-05-22 ENCOUNTER — OFFICE VISIT (OUTPATIENT)
Dept: INTERNAL MEDICINE | Facility: CLINIC | Age: 74
End: 2019-05-22
Payer: MEDICARE

## 2019-05-22 VITALS
WEIGHT: 138 LBS | OXYGEN SATURATION: 98 % | HEIGHT: 61 IN | DIASTOLIC BLOOD PRESSURE: 80 MMHG | BODY MASS INDEX: 26.06 KG/M2 | SYSTOLIC BLOOD PRESSURE: 140 MMHG | HEART RATE: 90 BPM

## 2019-05-22 DIAGNOSIS — Z13.5 SCREENING FOR DIABETIC RETINOPATHY: Primary | ICD-10-CM
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
## 2019-05-22 DIAGNOSIS — Z13.5 SCREENING FOR DIABETIC RETINOPATHY: ICD-10-CM

## 2019-05-22 DIAGNOSIS — F33.9 RECURRENT DEPRESSION: Primary | ICD-10-CM

## 2019-05-22 PROCEDURE — 99213 PR OFFICE/OUTPT VISIT, EST, LEVL III, 20-29 MIN: ICD-10-PCS | Mod: HCNC,S$GLB,, | Performed by: INTERNAL MEDICINE

## 2019-05-22 PROCEDURE — 1101F PR PT FALLS ASSESS DOC 0-1 FALLS W/OUT INJ PAST YR: ICD-10-PCS | Mod: HCNC,CPTII,S$GLB, | Performed by: INTERNAL MEDICINE

## 2019-05-22 PROCEDURE — 99999 PR PBB SHADOW E&M-EST. PATIENT-LVL I: ICD-10-PCS | Mod: PBBFAC,HCNC,,

## 2019-05-22 PROCEDURE — 99499 RISK ADDL DX/OHS AUDIT: ICD-10-PCS | Mod: HCNC,S$GLB,, | Performed by: INTERNAL MEDICINE

## 2019-05-22 PROCEDURE — 3079F DIAST BP 80-89 MM HG: CPT | Mod: HCNC,CPTII,S$GLB, | Performed by: INTERNAL MEDICINE

## 2019-05-22 PROCEDURE — 99213 OFFICE O/P EST LOW 20 MIN: CPT | Mod: HCNC,S$GLB,, | Performed by: INTERNAL MEDICINE

## 2019-05-22 PROCEDURE — 3079F PR MOST RECENT DIASTOLIC BLOOD PRESSURE 80-89 MM HG: ICD-10-PCS | Mod: HCNC,CPTII,S$GLB, | Performed by: INTERNAL MEDICINE

## 2019-05-22 PROCEDURE — 99999 PR PBB SHADOW E&M-EST. PATIENT-LVL IV: ICD-10-PCS | Mod: PBBFAC,HCNC,, | Performed by: INTERNAL MEDICINE

## 2019-05-22 PROCEDURE — 92250 FUNDUS PHOTOGRAPHY W/I&R: CPT | Mod: HCNC,S$GLB,, | Performed by: OPHTHALMOLOGY

## 2019-05-22 PROCEDURE — 99999 PR PBB SHADOW E&M-EST. PATIENT-LVL IV: CPT | Mod: PBBFAC,HCNC,, | Performed by: INTERNAL MEDICINE

## 2019-05-22 PROCEDURE — 3077F PR MOST RECENT SYSTOLIC BLOOD PRESSURE >= 140 MM HG: ICD-10-PCS | Mod: HCNC,CPTII,S$GLB, | Performed by: INTERNAL MEDICINE

## 2019-05-22 PROCEDURE — 99999 PR PBB SHADOW E&M-EST. PATIENT-LVL I: CPT | Mod: PBBFAC,HCNC,,

## 2019-05-22 PROCEDURE — 92250 DIABETIC EYE SCREENING PHOTO: ICD-10-PCS | Mod: HCNC,S$GLB,, | Performed by: OPHTHALMOLOGY

## 2019-05-22 PROCEDURE — 3077F SYST BP >= 140 MM HG: CPT | Mod: HCNC,CPTII,S$GLB, | Performed by: INTERNAL MEDICINE

## 2019-05-22 PROCEDURE — 99499 UNLISTED E&M SERVICE: CPT | Mod: HCNC,S$GLB,, | Performed by: INTERNAL MEDICINE

## 2019-05-22 PROCEDURE — 1101F PT FALLS ASSESS-DOCD LE1/YR: CPT | Mod: HCNC,CPTII,S$GLB, | Performed by: INTERNAL MEDICINE

## 2019-05-22 RX ORDER — ESCITALOPRAM OXALATE 5 MG/1
5 TABLET ORAL DAILY
Qty: 30 TABLET | Refills: 1 | Status: SHIPPED | OUTPATIENT
Start: 2019-05-22 | End: 2019-06-13 | Stop reason: SDUPTHER

## 2019-05-22 NOTE — PROGRESS NOTES
Portions of this note are generated with voice recognition software. Typographical errors may exist.     SUBJECTIVE:    This is a/an 73 y.o. female here for primary care visit for  Chief Complaint   Patient presents with    Fall     x1w     Mass     x1w    Depression       The patient completed the following standardized mental health symptom questionnaire(s) PHQ9. Results where reviewed and recommendations made to the patient based on these results. The questionnaire(s) are scanned into the EPIC media tab.       Medications Reviewed and Updated    Past medical, family, and social histories were reviewed and updated.    Review of Systems negative unless otherwise noted in history of present illness-  ROS    General ROS: negative  Psychological ROS: negative  ENT ROS: negative  Endocrine ROS: Negative  Allergy and Immunology ROS: negative  Cardiovascular ROS: negative  Pulmonary ROS: Negative  Gastrointestinal ROS: negative  Genito-Urinary ROS: negative  Musculoskeletal ROS: negative  Neurological ROS: negative  Dermatological ROS: negative        Allergic:    Review of patient's allergies indicates:   Allergen Reactions    Ciprofloxacin hcl Rash       OBJECTIVE:  BP: (!) 140/80 Pulse: 90    Wt Readings from Last 3 Encounters:   05/22/19 62.6 kg (138 lb 0.1 oz)   01/31/19 62.5 kg (137 lb 12.6 oz)   07/30/18 61.9 kg (136 lb 7.4 oz)    Body mass index is 26.08 kg/m².  Previous Blood Pressure Readings :   BP Readings from Last 3 Encounters:   05/22/19 (!) 140/80   01/31/19 132/86   07/30/18 124/82       Physical Exam    GEN: No apparent distress  HEENT: sclera non-icteric, conjunctiva clear  CV: no peripheral edema  PULM: breathing non-labored  ABD: non, protuberant abdomen.  PSYCH: appropriate affect  MSK: able to rise from chair without assistance  SKIN: normal skin turgor.  Small contusion to the scalp healing well.  No hematoma.    Pertinent Labs Reviewed       ASSESSMENT/PLAN:    Recurrent depression.Condition  not optimally controlled. Detailed counseling on self care measures. Plan to monitor clinically in addition to plan below or as listed on After Visit Summary.   -     Ambulatory referral to Psychology  -     escitalopram oxalate (LEXAPRO) 5 MG Tab; Take 1 tablet (5 mg total) by mouth once daily.  Dispense: 30 tablet; Refill: 1    Fall, initial encounter.Condition improving.  Counseling on self-care measures today.  Continue with current plan in addition to recommendations written on After Visit Summary.         Future Appointments   Date Time Provider Department [place]   6/24/2019  7:30 AM LAB, [facility]   6/27/2019 11:00 AM Lawrence Logan MD [facility] [place]   8/12/2019 11:00 AM Cain Hill LCSW [facility] Yared michael Logan  5/25/2019  3:06 PM

## 2019-05-22 NOTE — PROGRESS NOTES
Aretha Nguyen is a 73 y.o. female here for a diabetic eye screening with non-dilated fundus photos per DR EPPERSON.    Patient cooperative?: Yes  Small pupils?: Yes  Last eye exam: 03.08.18    For exam results, see Encounter Report.

## 2019-05-22 NOTE — PATIENT INSTRUCTIONS
AFTER CARE INSTRUCTIONS   · The name of your medicine is Lexapro    · You do not become addicted to this medicine.  However your body does become dependent on it after taking it more than 21 days.  Therefore do not stop taking it abruptly once you have been taking it more than 21 days.  If you need to address discontinuing the medicine call the office so we can give you special instructions.    · This medicine can cause you to either have more energy or make you sleepy. If it makes you sleepy take it at night. If it makes you have energy take it in the morning    · You will start on a low-dose of this medication.  This is to avoid any unwanted side effects or to let yourbody get use to mild side effects until they go away.     · Depression\anxiety symptoms do not typically get better until you have been on the medication for at least 3-4 weeks.  Therefore we will likely need to communicate with you after 3-4 weeks of therapy to determine if you need increased dosage of medication.    · This medicine may give you some stomach upset . Give this some time. If it is minor it should go away on it's own.     · If it does not go away or it bothers you, please call our office. Do NOT stop taking it all of a sudden. Stopping this medicine suddenly can cause your mood symptoms to come back in an unpleasant way.    · This is not generally a life threatening problem but if your mood symptoms worsen and you have persistent thoughts of hurting yourself or others, please call our office ASAP. If after hours call [phone] TALK or [phone]

## 2019-06-13 DIAGNOSIS — F33.9 RECURRENT DEPRESSION: ICD-10-CM

## 2019-06-14 RX ORDER — ESCITALOPRAM OXALATE 5 MG/1
TABLET ORAL
Qty: 30 TABLET | Refills: 0 | Status: SHIPPED | OUTPATIENT
Start: 2019-06-14 | End: 2019-06-27 | Stop reason: SDUPTHER

## 2019-06-24 ENCOUNTER — LAB VISIT (OUTPATIENT)
Dept: LAB | Facility: HOSPITAL | Age: 74
End: 2019-06-24
Attending: INTERNAL MEDICINE
Payer: MEDICARE

## 2019-06-24 DIAGNOSIS — E11.65 CONTROLLED TYPE 2 DIABETES MELLITUS WITH HYPERGLYCEMIA, WITHOUT LONG-TERM CURRENT USE OF INSULIN: ICD-10-CM

## 2019-06-24 LAB
ALBUMIN SERPL BCP-MCNC: 3.9 G/DL (ref 3.5–5.2)
ALP SERPL-CCNC: 80 U/L (ref 55–135)
ALT SERPL W/O P-5'-P-CCNC: 10 U/L (ref 10–44)
ANION GAP SERPL CALC-SCNC: 10 MMOL/L (ref 8–16)
AST SERPL-CCNC: 24 U/L (ref 10–40)
BILIRUB SERPL-MCNC: 0.6 MG/DL (ref 0.1–1)
BUN SERPL-MCNC: 11 MG/DL (ref 8–23)
CALCIUM SERPL-MCNC: 9.9 MG/DL (ref 8.7–10.5)
CHLORIDE SERPL-SCNC: 102 MMOL/L (ref 95–110)
CO2 SERPL-SCNC: 28 MMOL/L (ref 23–29)
CREAT SERPL-MCNC: 0.8 MG/DL (ref 0.5–1.4)
EST. GFR  (AFRICAN AMERICAN): >60 ML/MIN/1.73 M^2
EST. GFR  (NON AFRICAN AMERICAN): >60 ML/MIN/1.73 M^2
ESTIMATED AVG GLUCOSE: 123 MG/DL (ref 68–131)
GLUCOSE SERPL-MCNC: 101 MG/DL (ref 70–110)
HBA1C MFR BLD HPLC: 5.9 % (ref 4–5.6)
POTASSIUM SERPL-SCNC: 3.8 MMOL/L (ref 3.5–5.1)
PROT SERPL-MCNC: 7.8 G/DL (ref 6–8.4)
SODIUM SERPL-SCNC: 140 MMOL/L (ref 136–145)

## 2019-06-24 PROCEDURE — 80053 COMPREHEN METABOLIC PANEL: CPT | Mod: HCNC

## 2019-06-24 PROCEDURE — 83036 HEMOGLOBIN GLYCOSYLATED A1C: CPT | Mod: HCNC

## 2019-06-24 PROCEDURE — 36415 COLL VENOUS BLD VENIPUNCTURE: CPT | Mod: HCNC,PO

## 2019-06-27 ENCOUNTER — OFFICE VISIT (OUTPATIENT)
Dept: INTERNAL MEDICINE | Facility: CLINIC | Age: 74
End: 2019-06-27
Payer: MEDICARE

## 2019-06-27 VITALS
BODY MASS INDEX: 26.1 KG/M2 | DIASTOLIC BLOOD PRESSURE: 80 MMHG | SYSTOLIC BLOOD PRESSURE: 116 MMHG | HEART RATE: 72 BPM | WEIGHT: 138.25 LBS | OXYGEN SATURATION: 96 % | HEIGHT: 61 IN

## 2019-06-27 DIAGNOSIS — Z90.10 POST-MASTECTOMY DEFORMITY OF BREAST: Primary | ICD-10-CM

## 2019-06-27 DIAGNOSIS — I10 ESSENTIAL HYPERTENSION: ICD-10-CM

## 2019-06-27 DIAGNOSIS — N64.89 POST-MASTECTOMY DEFORMITY OF BREAST: Primary | ICD-10-CM

## 2019-06-27 DIAGNOSIS — F33.9 RECURRENT DEPRESSION: ICD-10-CM

## 2019-06-27 DIAGNOSIS — E11.65 CONTROLLED TYPE 2 DIABETES MELLITUS WITH HYPERGLYCEMIA, WITHOUT LONG-TERM CURRENT USE OF INSULIN: ICD-10-CM

## 2019-06-27 PROCEDURE — 99214 PR OFFICE/OUTPT VISIT, EST, LEVL IV, 30-39 MIN: ICD-10-PCS | Mod: HCNC,S$GLB,, | Performed by: INTERNAL MEDICINE

## 2019-06-27 PROCEDURE — 99499 RISK ADDL DX/OHS AUDIT: ICD-10-PCS | Mod: HCNC,S$GLB,, | Performed by: INTERNAL MEDICINE

## 2019-06-27 PROCEDURE — 99999 PR PBB SHADOW E&M-EST. PATIENT-LVL III: ICD-10-PCS | Mod: PBBFAC,HCNC,, | Performed by: INTERNAL MEDICINE

## 2019-06-27 PROCEDURE — 3079F PR MOST RECENT DIASTOLIC BLOOD PRESSURE 80-89 MM HG: ICD-10-PCS | Mod: HCNC,CPTII,S$GLB, | Performed by: INTERNAL MEDICINE

## 2019-06-27 PROCEDURE — 1101F PR PT FALLS ASSESS DOC 0-1 FALLS W/OUT INJ PAST YR: ICD-10-PCS | Mod: HCNC,CPTII,S$GLB, | Performed by: INTERNAL MEDICINE

## 2019-06-27 PROCEDURE — 3044F HG A1C LEVEL LT 7.0%: CPT | Mod: HCNC,CPTII,S$GLB, | Performed by: INTERNAL MEDICINE

## 2019-06-27 PROCEDURE — 3074F SYST BP LT 130 MM HG: CPT | Mod: HCNC,CPTII,S$GLB, | Performed by: INTERNAL MEDICINE

## 2019-06-27 PROCEDURE — 3044F PR MOST RECENT HEMOGLOBIN A1C LEVEL <7.0%: ICD-10-PCS | Mod: HCNC,CPTII,S$GLB, | Performed by: INTERNAL MEDICINE

## 2019-06-27 PROCEDURE — 2024F 7 FLD RTA PHOTO EVC RTNOPTHY: CPT | Mod: HCNC,S$GLB,, | Performed by: INTERNAL MEDICINE

## 2019-06-27 PROCEDURE — 1101F PT FALLS ASSESS-DOCD LE1/YR: CPT | Mod: HCNC,CPTII,S$GLB, | Performed by: INTERNAL MEDICINE

## 2019-06-27 PROCEDURE — 99214 OFFICE O/P EST MOD 30 MIN: CPT | Mod: HCNC,S$GLB,, | Performed by: INTERNAL MEDICINE

## 2019-06-27 PROCEDURE — 3079F DIAST BP 80-89 MM HG: CPT | Mod: HCNC,CPTII,S$GLB, | Performed by: INTERNAL MEDICINE

## 2019-06-27 PROCEDURE — 99499 UNLISTED E&M SERVICE: CPT | Mod: HCNC,S$GLB,, | Performed by: INTERNAL MEDICINE

## 2019-06-27 PROCEDURE — 3074F PR MOST RECENT SYSTOLIC BLOOD PRESSURE < 130 MM HG: ICD-10-PCS | Mod: HCNC,CPTII,S$GLB, | Performed by: INTERNAL MEDICINE

## 2019-06-27 PROCEDURE — 2024F PR 7 FIELD PHOTOS WITH INTERP/ REVIEW: ICD-10-PCS | Mod: HCNC,S$GLB,, | Performed by: INTERNAL MEDICINE

## 2019-06-27 PROCEDURE — 99999 PR PBB SHADOW E&M-EST. PATIENT-LVL III: CPT | Mod: PBBFAC,HCNC,, | Performed by: INTERNAL MEDICINE

## 2019-06-27 RX ORDER — OLMESARTAN MEDOXOMIL AND HYDROCHLOROTHIAZIDE 20/12.5 20; 12.5 MG/1; MG/1
1 TABLET ORAL DAILY
Qty: 90 TABLET | Refills: 1 | Status: SHIPPED | OUTPATIENT
Start: 2019-06-27 | End: 2019-07-02 | Stop reason: SDUPTHER

## 2019-06-27 RX ORDER — ESCITALOPRAM OXALATE 5 MG/1
5 TABLET ORAL DAILY
Qty: 90 TABLET | Refills: 0 | Status: SHIPPED | OUTPATIENT
Start: 2019-06-27 | End: 2019-10-07 | Stop reason: SDUPTHER

## 2019-06-27 RX ORDER — ATORVASTATIN CALCIUM 20 MG/1
20 TABLET, FILM COATED ORAL DAILY
Qty: 90 TABLET | Refills: 1 | Status: SHIPPED | OUTPATIENT
Start: 2019-06-27 | End: 2020-02-12

## 2019-06-30 NOTE — PROGRESS NOTES
Portions of this note are generated with voice recognition software. Typographical errors may exist.     SUBJECTIVE:    This is a/an 73 y.o. female here for primary care visit for  Chief Complaint   Patient presents with    Diabetes     follow up      Patient states that she has had a long history of problems with recurring significant depression and anxiety.  She feels that 3 weeks and to taking Lexapro she has had some stabilization of her depressive symptoms.  Overall she feels that her psychosocial stressors have improved as well.  The patient has never establish care with a counselor and does feel that she has some issues in her history that do way on her but she is nervous about can finding in anybody about prior painful events in her life.  She does on the other hand feel that she does not have many people to talk to and her primary coping strategy has been avoidance.  She recognizes that long-term this is not the healthiest approach to help with root causes of anxiety and depression and is willing to consider establishing care with a counselor    Patient states she has been complying with blood pressure and cholesterol medication and needs refills.  No adverse effects.    Medications Reviewed and Updated    Past medical, family, and social histories were reviewed and updated.    Review of Systems negative unless otherwise noted in history of present illness-  ROS    General ROS: negative  Psychological ROS: negative  ENT ROS: negative  Allergy and Immunology ROS: negative  Cardiovascular ROS: negative  Pulmonary ROS: Negative  Gastrointestinal ROS: negative  Genito-Urinary ROS: negative  Musculoskeletal ROS: negative  Neurological ROS: negative    Allergic:    Review of patient's allergies indicates:   Allergen Reactions    Ciprofloxacin hcl Rash       OBJECTIVE:  BP: 116/80 Pulse: 72    Wt Readings from Last 3 Encounters:   06/27/19 62.7 kg (138 lb 3.7 oz)   05/22/19 62.6 kg (138 lb 0.1 oz)   01/31/19  62.5 kg (137 lb 12.6 oz)    Body mass index is 26.12 kg/m².  Previous Blood Pressure Readings :   BP Readings from Last 3 Encounters:   06/27/19 116/80   05/22/19 (!) 140/80   01/31/19 132/86       Physical Exam    GEN: No apparent distress  HEENT: sclera non-icteric, conjunctiva clear  CV: no peripheral edema  PULM: breathing non-labored  ABD: non    , protuberant abdomen.  PSYCH: appropriate affect  MSK: able to rise from chair without assistance  SKIN: normal skin turgor    Pertinent Labs Reviewed       ASSESSMENT/PLAN:    Recurrent depression.Condition improving.  Counseling on self-care measures today.  Continue with current plan in addition to recommendations written on After Visit Summary.   -     escitalopram oxalate (LEXAPRO) 5 MG Tab; Take 1 tablet (5 mg total) by mouth once daily.  Dispense: 90 tablet; Refill: 0    Essential hypertension.Condition stable.  Counseling given today on self-care measures. Plan to monitor clinically. Continue current medical plan.   -     olmesartan-hydrochlorothiazide (BENICAR HCT) 20-12.5 mg per tablet; Take 1 tablet by mouth once daily.  Dispense: 90 tablet; Refill: 1    Controlled type 2 diabetes mellitus with hyperglycemia, without long-term current use of insulin.Condition stable.  Counseling given today on self-care measures. Plan to monitor clinically. Continue current medical plan.   -     atorvastatin (LIPITOR) 20 MG tablet; Take 1 tablet (20 mg total) by mouth once daily.  Dispense: 90 tablet; Refill: 1          Future Appointments   Date Time Provider Department Center   8/12/2019  9:40 AM Lawrence Logan MD [facility]   8/12/2019 11:00 AM Cain Hill LCSW [facility]I[facility]N Logan  6/30/2019  4:17 PM

## 2019-07-02 DIAGNOSIS — I10 ESSENTIAL HYPERTENSION: ICD-10-CM

## 2019-07-02 RX ORDER — VALSARTAN AND HYDROCHLOROTHIAZIDE 80; 12.5 MG/1; MG/1
TABLET, FILM COATED ORAL
Qty: 90 TABLET | Refills: 0 | OUTPATIENT
Start: 2019-07-02

## 2019-07-02 RX ORDER — OLMESARTAN MEDOXOMIL AND HYDROCHLOROTHIAZIDE 20/12.5 20; 12.5 MG/1; MG/1
1 TABLET ORAL DAILY
Qty: 90 TABLET | Refills: 0 | Status: SHIPPED | OUTPATIENT
Start: 2019-07-02 | End: 2019-07-06

## 2019-07-05 ENCOUNTER — TELEPHONE (OUTPATIENT)
Dept: INTERNAL MEDICINE | Facility: CLINIC | Age: 74
End: 2019-07-05

## 2019-07-05 NOTE — TELEPHONE ENCOUNTER
Pt come in to the office stating her BP medication  Valsartan HCT was change to Olmesartan and she wasn't it informed.  Patient med was changed since January 2019 due to back order.  Patient states that she went to pharmacy and the med will be $30 (olmesartan)  patient would like old medication back or to have something else cheaper sent in.  Please advised.

## 2019-07-06 DIAGNOSIS — I10 ESSENTIAL HYPERTENSION: Primary | ICD-10-CM

## 2019-07-06 RX ORDER — HYDROCHLOROTHIAZIDE 12.5 MG/1
12.5 CAPSULE ORAL DAILY
Qty: 90 CAPSULE | Refills: 0 | Status: SHIPPED | OUTPATIENT
Start: 2019-07-06 | End: 2019-09-29 | Stop reason: SDUPTHER

## 2019-07-06 RX ORDER — OLMESARTAN MEDOXOMIL 20 MG/1
20 TABLET ORAL DAILY
Qty: 90 TABLET | Refills: 0 | Status: SHIPPED | OUTPATIENT
Start: 2019-07-06 | End: 2019-09-29 | Stop reason: SDUPTHER

## 2019-07-08 ENCOUNTER — TELEPHONE (OUTPATIENT)
Dept: INTERNAL MEDICINE | Facility: CLINIC | Age: 74
End: 2019-07-08

## 2019-07-08 NOTE — TELEPHONE ENCOUNTER
Patient here in [facility]. Wants to know if she can switch Benicar back to Diovan. Informed will send message to the Dr. Patient adelinas nahomy.

## 2019-07-09 NOTE — TELEPHONE ENCOUNTER
Attempted to call patient.  Phone just rang and rang.  Unable to leave message.  Will tried to call her back later this pm.

## 2019-07-10 NOTE — TELEPHONE ENCOUNTER
Several attempted to call patient. Patient's phones numbers just rang and rang.  Unable to leave message.

## 2019-07-13 DIAGNOSIS — E11.65 CONTROLLED TYPE 2 DIABETES MELLITUS WITH HYPERGLYCEMIA, WITHOUT LONG-TERM CURRENT USE OF INSULIN: ICD-10-CM

## 2019-07-15 RX ORDER — METFORMIN HYDROCHLORIDE 500 MG/1
500 TABLET, EXTENDED RELEASE ORAL DAILY
Qty: 90 TABLET | Refills: 1 | Status: SHIPPED | OUTPATIENT
Start: 2019-07-15 | End: 2020-01-09

## 2019-08-12 ENCOUNTER — OFFICE VISIT (OUTPATIENT)
Dept: PSYCHIATRY | Facility: CLINIC | Age: 74
End: 2019-08-12
Payer: MEDICARE

## 2019-08-12 DIAGNOSIS — F43.23 ADJUSTMENT DISORDER WITH MIXED ANXIETY AND DEPRESSED MOOD: Primary | ICD-10-CM

## 2019-08-12 PROCEDURE — 90791 PSYCH DIAGNOSTIC EVALUATION: CPT | Mod: HCNC,S$GLB,, | Performed by: SOCIAL WORKER

## 2019-08-12 PROCEDURE — 90791 PR PSYCHIATRIC DIAGNOSTIC EVALUATION: ICD-10-PCS | Mod: HCNC,S$GLB,, | Performed by: SOCIAL WORKER

## 2019-08-12 NOTE — PROGRESS NOTES
Psychiatry Initial Visit (PhD/LCSW)  Diagnostic Interview - CPT 65012    Date: 8/12/2019    Site: [facility]    Referral source:   Dr. Logan    Clinical status of patient: Outpatient    Aretha Nguyen, a 73 y.o. female, for initial evaluation visit.  Met with patient.    Chief complaint/reason for encounter: depression    History of present illness:    That Dr. Logan gave her lexapro and she has been on this for over one month and she is no longer depressed.  Prior to this she had been dysphoric for 6 months.  Her sleep was normal and continues to be good.   No crying spells.  No thoughts of suicide and never.  She has no access to firearm.  Energy was normal.   Her motivation was ok.   That she was doing the same things.  Reading as she likes this.   That her concentration was normal.    She denies she was critical.   She reports that she has good appetite but this is her status quo.  However her weight is stable.     I addressed the contradiction between her answers today and her answers in the recent test (PHQ-9) that was given by Dr. Logan.  Pt looked at the test results and explained that she most have been nervious during the test.       Tells me that when she was feeling down she saw her close friend start losing her memory.      Pain: noncontributory    Symptoms:   · Mood: currently is euthymic.  Reports no dysphoria since on lexapro.     · Anxiety: excessive worry,  No irritable,  No restless,  Generally no muscle tension unless under more responsibility.   No hx of trauma.   No panic attacks.     · Substance abuse: denied  · Cognitive functioning: denied  · Health behaviors: noncontributory    Psychiatric history: none    Medical history: noncontributory    Family history of psychiatric illness: none    Social history (marriage, employment, etc.):        Daughter is a nurse.   Pt volunteers that her mother was/is controlling.  That pt had to please mother in every way.   is also  controlling.  Pt believes she finally got tire of not being herself.    Daughter in [place] and son in [place].   She sees her grandchildren weekly and sees daughter monthly but talks to her more often.      Substance use:   Alcohol: infrequent   Drugs: none   Tobacco: none   Caffeine: 3-4      Current medications and drug reactions (include OTC, herbal): see medication list     Strengths and liabilities: Strength: Patient accepts guidance/feedback, Strength: Patient is expressive/articulate., Strength: Patient is intelligent., Strength: Patient is motivated for change., Strength: Patient is physically healthy., Strength: Patient has positive support network., Strength: Patient has reasonable judgment., Strength: Patient is stable.    Current Evaluation:     Mental Status Exam:  General Appearance:  unremarkable, age appropriate   Speech: normal tone, normal rate, normal pitch, normal volume      Level of Cooperation: cooperative      Thought Processes: normal and logical   Mood: euthymic      Thought Content: normal, no suicidality, no homicidality, delusions, or paranoia   Affect: congruent and appropriate   Orientation: Oriented x3   Memory: recent >  words after brief delay: 3 of 3 words   Attention Span & Concentration: intact   Fund of General Knowledge: 3 of 4 recent presidents   Abstract Reasoning: interpretation of similarities was abstract, interpretation of proverbs was abstract   Judgment & Insight: good     Language  intact     Diagnostic Impression - Plan:       ICD-10-CM ICD-9-CM   1. Adjustment disorder with mixed anxiety and depressed mood F43.23 309.28       Plan:individual psychotherapy    Return to Clinic: as scheduled    Length of Service (minutes): 45

## 2019-08-13 ENCOUNTER — OFFICE VISIT (OUTPATIENT)
Dept: INTERNAL MEDICINE | Facility: CLINIC | Age: 74
End: 2019-08-13
Payer: MEDICARE

## 2019-08-13 VITALS
HEIGHT: 61 IN | HEART RATE: 72 BPM | SYSTOLIC BLOOD PRESSURE: 102 MMHG | DIASTOLIC BLOOD PRESSURE: 70 MMHG | WEIGHT: 138.44 LBS | BODY MASS INDEX: 26.14 KG/M2 | OXYGEN SATURATION: 97 %

## 2019-08-13 DIAGNOSIS — Z90.10 POST-MASTECTOMY DEFORMITY OF BREAST: ICD-10-CM

## 2019-08-13 DIAGNOSIS — G89.29 HIP PAIN, CHRONIC, LEFT: Primary | ICD-10-CM

## 2019-08-13 DIAGNOSIS — N64.89 POST-MASTECTOMY DEFORMITY OF BREAST: ICD-10-CM

## 2019-08-13 DIAGNOSIS — F41.9 ANXIETY: ICD-10-CM

## 2019-08-13 DIAGNOSIS — M25.552 HIP PAIN, CHRONIC, LEFT: Primary | ICD-10-CM

## 2019-08-13 PROCEDURE — 1101F PR PT FALLS ASSESS DOC 0-1 FALLS W/OUT INJ PAST YR: ICD-10-PCS | Mod: HCNC,CPTII,S$GLB, | Performed by: INTERNAL MEDICINE

## 2019-08-13 PROCEDURE — 99999 PR PBB SHADOW E&M-EST. PATIENT-LVL III: CPT | Mod: PBBFAC,HCNC,, | Performed by: INTERNAL MEDICINE

## 2019-08-13 PROCEDURE — 3078F DIAST BP <80 MM HG: CPT | Mod: HCNC,CPTII,S$GLB, | Performed by: INTERNAL MEDICINE

## 2019-08-13 PROCEDURE — 99213 OFFICE O/P EST LOW 20 MIN: CPT | Mod: HCNC,S$GLB,, | Performed by: INTERNAL MEDICINE

## 2019-08-13 PROCEDURE — 3074F SYST BP LT 130 MM HG: CPT | Mod: HCNC,CPTII,S$GLB, | Performed by: INTERNAL MEDICINE

## 2019-08-13 PROCEDURE — 3078F PR MOST RECENT DIASTOLIC BLOOD PRESSURE < 80 MM HG: ICD-10-PCS | Mod: HCNC,CPTII,S$GLB, | Performed by: INTERNAL MEDICINE

## 2019-08-13 PROCEDURE — 1101F PT FALLS ASSESS-DOCD LE1/YR: CPT | Mod: HCNC,CPTII,S$GLB, | Performed by: INTERNAL MEDICINE

## 2019-08-13 PROCEDURE — 99213 PR OFFICE/OUTPT VISIT, EST, LEVL III, 20-29 MIN: ICD-10-PCS | Mod: HCNC,S$GLB,, | Performed by: INTERNAL MEDICINE

## 2019-08-13 PROCEDURE — 3074F PR MOST RECENT SYSTOLIC BLOOD PRESSURE < 130 MM HG: ICD-10-PCS | Mod: HCNC,CPTII,S$GLB, | Performed by: INTERNAL MEDICINE

## 2019-08-13 PROCEDURE — 99999 PR PBB SHADOW E&M-EST. PATIENT-LVL III: ICD-10-PCS | Mod: PBBFAC,HCNC,, | Performed by: INTERNAL MEDICINE

## 2019-08-13 NOTE — PROGRESS NOTES
Portions of this note are generated with voice recognition software. Typographical errors may exist.     SUBJECTIVE:    This is a/an 73 y.o. female here for primary care visit for  Chief Complaint   Patient presents with    Anxiety     follow up     Hip Pain     left side x2m     Patient states that she is more accepting of things that she cannot control especially difficulties that she has with her 's personality.  Patient states that since she has started to see mental health support he has started to behave better toward her.  She denies physical abuse.  Patient states that she wants to for the time being continue on Lexapro.    Patient states that since her ground level fall she has had issues with persisting left hip pain.  Tends to be worse when she is sitting for prolonged time and then when getting up is stiff and has pain. There isn't much of a radiating component to the pain. No motor sensory deficits.    Medications Reviewed and Updated    Past medical, family, and social histories were reviewed and updated.    Review of Systems negative unless otherwise noted in history of present illness-  ROS    General ROS: negative  Psychological ROS: negative  ENT ROS: negative  Allergy and Immunology ROS: negative  Cardiovascular ROS: negative  Pulmonary ROS: Negative  Gastrointestinal ROS: negative  Genito-Urinary ROS: negative  Musculoskeletal ROS: negative  Neurological ROS: negative      Allergic:    Review of patient's allergies indicates:   Allergen Reactions    Ciprofloxacin hcl Rash       OBJECTIVE:  BP: 102/70 Pulse: 72    Wt Readings from Last 3 Encounters:   08/13/19 62.8 kg (138 lb 7.2 oz)   06/27/19 62.7 kg (138 lb 3.7 oz)   05/22/19 62.6 kg (138 lb 0.1 oz)    Body mass index is 26.16 kg/m².  Previous Blood Pressure Readings :   BP Readings from Last 3 Encounters:   08/13/19 102/70   06/27/19 116/80   05/22/19 (!) 140/80       Physical Exam    GEN: No apparent distress  HEENT: sclera  non-icteric, conjunctiva clear  CV: no peripheral edema  PULM: breathing non-labored  ABD: non, protuberant abdomen.  PSYCH: appropriate affect  MSK: able to rise from chair without assistance focal tenderness left trochanteric bursa.  Gluteal muscle point tenderness.  SKIN: normal skin turgor    Pertinent Labs Reviewed       ASSESSMENT/PLAN:    Hip pain, chronic, left.Condition not optimally controlled. Detailed counseling on self care measures. Plan to monitor clinically in addition to plan below or as listed on After Visit Summary.   -     Ambulatory Referral to Physical/Occupational Therapy    Post-mastectomy deformity of breast  -     POST-MASTECTOMY BRA FOR HOME USE    Anxiety.Condition improving.  Counseling on self-care measures today.  Continue with current plan in addition to recommendations written on After Visit Summary.       Future Appointments   Date Time Provider Department Center   8/26/2019  9:45 AM [facility] MAMMO1 [facility]   1/9/2020 10:00 AM [facility], [facility] [place]   1/14/2020  1:20 PM Lawrence Logan MD [facility] [place]       Lawrence Logan  8/13/2019  5:52 PM

## 2019-09-10 ENCOUNTER — HOSPITAL ENCOUNTER (OUTPATIENT)
Dept: RADIOLOGY | Facility: HOSPITAL | Age: 74
Discharge: HOME | End: 2019-09-10
Attending: INTERNAL MEDICINE
Payer: MEDICARE

## 2019-09-10 VITALS — WEIGHT: 138 LBS | HEIGHT: 61 IN | BODY MASS INDEX: 26.06 KG/M2

## 2019-09-10 DIAGNOSIS — Z12.31 BREAST CANCER SCREENING BY MAMMOGRAM: ICD-10-CM

## 2019-09-10 PROCEDURE — 77067 SCR MAMMO BI INCL CAD: CPT | Mod: TC,HCNC

## 2019-09-10 PROCEDURE — 77067 SCR MAMMO BI INCL CAD: CPT | Mod: 26,52,HCNC, | Performed by: RADIOLOGY

## 2019-09-10 PROCEDURE — 77063 MAMMO DIGITAL SCREENING RIGHT WITH TOMOSYNTHESIS_CAD: ICD-10-PCS | Mod: 26,52,HCNC, | Performed by: RADIOLOGY

## 2019-09-10 PROCEDURE — 77063 BREAST TOMOSYNTHESIS BI: CPT | Mod: 26,52,HCNC, | Performed by: RADIOLOGY

## 2019-09-10 PROCEDURE — 77067 MAMMO DIGITAL SCREENING RIGHT WITH TOMOSYNTHESIS_CAD: ICD-10-PCS | Mod: 26,52,HCNC, | Performed by: RADIOLOGY

## 2019-09-12 ENCOUNTER — TELEPHONE (OUTPATIENT)
Dept: RADIOLOGY | Facility: HOSPITAL | Age: 74
End: 2019-09-12

## 2019-09-23 ENCOUNTER — HOSPITAL ENCOUNTER (OUTPATIENT)
Dept: RADIOLOGY | Facility: HOSPITAL | Age: 74
Discharge: HOME | End: 2019-09-23
Attending: INTERNAL MEDICINE
Payer: MEDICARE

## 2019-09-23 DIAGNOSIS — R92.8 ABNORMAL MAMMOGRAM: ICD-10-CM

## 2019-09-23 PROCEDURE — 77061 BREAST TOMOSYNTHESIS UNI: CPT | Mod: TC,HCNC

## 2019-09-23 PROCEDURE — 76642 ULTRASOUND BREAST LIMITED: CPT | Mod: 26,HCNC,RT, | Performed by: RADIOLOGY

## 2019-09-23 PROCEDURE — 77065 DX MAMMO INCL CAD UNI: CPT | Mod: 26,HCNC,, | Performed by: RADIOLOGY

## 2019-09-23 PROCEDURE — 76642 ULTRASOUND BREAST LIMITED: CPT | Mod: TC,HCNC,RT

## 2019-09-23 PROCEDURE — 76642 US BREAST RIGHT LIMITED: ICD-10-PCS | Mod: 26,HCNC,RT, | Performed by: RADIOLOGY

## 2019-09-23 PROCEDURE — 77065 MAMMO DIGITAL DIAGNOSTIC RIGHT WITH TOMOSYNTHESIS_CAD: ICD-10-PCS | Mod: 26,HCNC,, | Performed by: RADIOLOGY

## 2019-09-23 PROCEDURE — 77061 MAMMO DIGITAL DIAGNOSTIC RIGHT WITH TOMOSYNTHESIS_CAD: ICD-10-PCS | Mod: 26,HCNC,, | Performed by: RADIOLOGY

## 2019-09-23 PROCEDURE — 77065 DX MAMMO INCL CAD UNI: CPT | Mod: TC,HCNC

## 2019-09-23 PROCEDURE — 77061 BREAST TOMOSYNTHESIS UNI: CPT | Mod: 26,HCNC,, | Performed by: RADIOLOGY

## 2019-09-29 DIAGNOSIS — I10 ESSENTIAL HYPERTENSION: ICD-10-CM

## 2019-09-30 RX ORDER — OLMESARTAN MEDOXOMIL 20 MG/1
TABLET ORAL
Qty: 90 TABLET | Refills: 1 | Status: SHIPPED | OUTPATIENT
Start: 2019-09-30 | End: 2020-03-31

## 2019-09-30 RX ORDER — HYDROCHLOROTHIAZIDE 12.5 MG/1
12.5 CAPSULE ORAL DAILY
Qty: 90 CAPSULE | Refills: 1 | Status: SHIPPED | OUTPATIENT
Start: 2019-09-30 | End: 2020-03-31

## 2019-10-07 DIAGNOSIS — F33.9 RECURRENT DEPRESSION: ICD-10-CM

## 2019-10-07 RX ORDER — ESCITALOPRAM OXALATE 5 MG/1
TABLET ORAL
Qty: 90 TABLET | Refills: 0 | Status: SHIPPED | OUTPATIENT
Start: 2019-10-07 | End: 2020-01-07 | Stop reason: SDUPTHER

## 2019-12-27 ENCOUNTER — HOSPITAL ENCOUNTER (EMERGENCY)
Facility: HOSPITAL | Age: 74
Discharge: HOME | End: 2019-12-27
Attending: EMERGENCY MEDICINE
Payer: MEDICARE

## 2019-12-27 VITALS
SYSTOLIC BLOOD PRESSURE: 115 MMHG | WEIGHT: 140 LBS | TEMPERATURE: 99 F | OXYGEN SATURATION: 95 % | RESPIRATION RATE: 20 BRPM | HEART RATE: 89 BPM | DIASTOLIC BLOOD PRESSURE: 73 MMHG | BODY MASS INDEX: 26.43 KG/M2 | HEIGHT: 61 IN

## 2019-12-27 DIAGNOSIS — J20.9 ACUTE BRONCHITIS, UNSPECIFIED ORGANISM: Primary | ICD-10-CM

## 2019-12-27 DIAGNOSIS — R06.2 WHEEZING: ICD-10-CM

## 2019-12-27 DIAGNOSIS — R07.89 CHEST TIGHTNESS: ICD-10-CM

## 2019-12-27 LAB
ALBUMIN SERPL BCP-MCNC: 4 G/DL (ref 3.5–5.2)
ALP SERPL-CCNC: 104 U/L (ref 55–135)
ALT SERPL W/O P-5'-P-CCNC: 16 U/L (ref 10–44)
ANION GAP SERPL CALC-SCNC: 14 MMOL/L (ref 8–16)
AST SERPL-CCNC: 33 U/L (ref 10–40)
BACTERIA #/AREA URNS HPF: NORMAL /HPF
BASOPHILS # BLD AUTO: 0.05 K/UL (ref 0–0.2)
BASOPHILS NFR BLD: 0.6 % (ref 0–1.9)
BILIRUB SERPL-MCNC: 0.4 MG/DL (ref 0.1–1)
BILIRUB UR QL STRIP: NEGATIVE
BNP SERPL-MCNC: 25 PG/ML (ref 0–99)
BUN SERPL-MCNC: 6 MG/DL (ref 8–23)
CALCIUM SERPL-MCNC: 9.9 MG/DL (ref 8.7–10.5)
CHLORIDE SERPL-SCNC: 100 MMOL/L (ref 95–110)
CLARITY UR: CLEAR
CO2 SERPL-SCNC: 22 MMOL/L (ref 23–29)
COLOR UR: YELLOW
CREAT SERPL-MCNC: 0.7 MG/DL (ref 0.5–1.4)
DIFFERENTIAL METHOD: ABNORMAL
EOSINOPHIL # BLD AUTO: 0.5 K/UL (ref 0–0.5)
EOSINOPHIL NFR BLD: 6.2 % (ref 0–8)
ERYTHROCYTE [DISTWIDTH] IN BLOOD BY AUTOMATED COUNT: 14.4 % (ref 11.5–14.5)
EST. GFR  (AFRICAN AMERICAN): >60 ML/MIN/1.73 M^2
EST. GFR  (NON AFRICAN AMERICAN): >60 ML/MIN/1.73 M^2
GLUCOSE SERPL-MCNC: 92 MG/DL (ref 70–110)
GLUCOSE UR QL STRIP: NEGATIVE
HCT VFR BLD AUTO: 39.1 % (ref 37–48.5)
HGB BLD-MCNC: 12.5 G/DL (ref 12–16)
HGB UR QL STRIP: NEGATIVE
INFLUENZA A, MOLECULAR: NEGATIVE
INFLUENZA B, MOLECULAR: NEGATIVE
KETONES UR QL STRIP: NEGATIVE
LEUKOCYTE ESTERASE UR QL STRIP: ABNORMAL
LYMPHOCYTES # BLD AUTO: 1.5 K/UL (ref 1–4.8)
LYMPHOCYTES NFR BLD: 16.5 % (ref 18–48)
MCH RBC QN AUTO: 27.7 PG (ref 27–31)
MCHC RBC AUTO-ENTMCNC: 32 G/DL (ref 32–36)
MCV RBC AUTO: 87 FL (ref 82–98)
MICROSCOPIC COMMENT: NORMAL
MONOCYTES # BLD AUTO: 0.8 K/UL (ref 0.3–1)
MONOCYTES NFR BLD: 9.1 % (ref 4–15)
NEUTROPHILS # BLD AUTO: 5.9 K/UL (ref 1.8–7.7)
NEUTROPHILS NFR BLD: 67.6 % (ref 38–73)
NITRITE UR QL STRIP: NEGATIVE
PH UR STRIP: 7 [PH] (ref 5–8)
PLATELET # BLD AUTO: 298 K/UL (ref 150–350)
PMV BLD AUTO: 10.4 FL (ref 9.2–12.9)
POCT GLUCOSE: 102 MG/DL (ref 70–110)
POTASSIUM SERPL-SCNC: 4.3 MMOL/L (ref 3.5–5.1)
PROT SERPL-MCNC: 8.5 G/DL (ref 6–8.4)
PROT UR QL STRIP: NEGATIVE
RBC # BLD AUTO: 4.52 M/UL (ref 4–5.4)
RBC #/AREA URNS HPF: 0 /HPF (ref 0–4)
SODIUM SERPL-SCNC: 136 MMOL/L (ref 136–145)
SP GR UR STRIP: <=1.005 (ref 1–1.03)
SPECIMEN SOURCE: NORMAL
TROPONIN I SERPL DL<=0.01 NG/ML-MCNC: <0.006 NG/ML (ref 0–0.03)
URN SPEC COLLECT METH UR: ABNORMAL
UROBILINOGEN UR STRIP-ACNC: NEGATIVE EU/DL
WBC # BLD AUTO: 8.78 K/UL (ref 3.9–12.7)
WBC #/AREA URNS HPF: 1 /HPF (ref 0–5)

## 2019-12-27 PROCEDURE — 87502 INFLUENZA DNA AMP PROBE: CPT | Mod: HCNC

## 2019-12-27 PROCEDURE — 27100098 HC SPACER: Mod: HCNC

## 2019-12-27 PROCEDURE — 96374 THER/PROPH/DIAG INJ IV PUSH: CPT | Mod: HCNC

## 2019-12-27 PROCEDURE — 25000242 PHARM REV CODE 250 ALT 637 W/ HCPCS: Mod: HCNC | Performed by: PHYSICIAN ASSISTANT

## 2019-12-27 PROCEDURE — 93005 ELECTROCARDIOGRAM TRACING: CPT | Mod: HCNC

## 2019-12-27 PROCEDURE — 85025 COMPLETE CBC W/AUTO DIFF WBC: CPT | Mod: HCNC

## 2019-12-27 PROCEDURE — 96361 HYDRATE IV INFUSION ADD-ON: CPT | Mod: HCNC

## 2019-12-27 PROCEDURE — 94640 AIRWAY INHALATION TREATMENT: CPT | Mod: HCNC

## 2019-12-27 PROCEDURE — 80053 COMPREHEN METABOLIC PANEL: CPT | Mod: HCNC

## 2019-12-27 PROCEDURE — 82962 GLUCOSE BLOOD TEST: CPT | Mod: HCNC

## 2019-12-27 PROCEDURE — 81000 URINALYSIS NONAUTO W/SCOPE: CPT | Mod: HCNC

## 2019-12-27 PROCEDURE — 63600175 PHARM REV CODE 636 W HCPCS: Mod: HCNC | Performed by: PHYSICIAN ASSISTANT

## 2019-12-27 PROCEDURE — 93010 ELECTROCARDIOGRAM REPORT: CPT | Mod: HCNC,,, | Performed by: INTERNAL MEDICINE

## 2019-12-27 PROCEDURE — 93010 EKG 12-LEAD: ICD-10-PCS | Mod: HCNC,,, | Performed by: INTERNAL MEDICINE

## 2019-12-27 PROCEDURE — 99285 EMERGENCY DEPT VISIT HI MDM: CPT | Mod: 25,HCNC

## 2019-12-27 PROCEDURE — 84484 ASSAY OF TROPONIN QUANT: CPT | Mod: HCNC

## 2019-12-27 PROCEDURE — 83880 ASSAY OF NATRIURETIC PEPTIDE: CPT | Mod: HCNC

## 2019-12-27 RX ORDER — METHYLPREDNISOLONE SOD SUCC 125 MG
125 VIAL (EA) INJECTION
Status: COMPLETED | OUTPATIENT
Start: 2019-12-27 | End: 2019-12-27

## 2019-12-27 RX ORDER — PREDNISONE 20 MG/1
40 TABLET ORAL DAILY
Qty: 10 TABLET | Refills: 0 | Status: SHIPPED | OUTPATIENT
Start: 2019-12-27 | End: 2020-01-01

## 2019-12-27 RX ORDER — ALBUTEROL SULFATE 90 UG/1
1-2 AEROSOL, METERED RESPIRATORY (INHALATION) EVERY 6 HOURS PRN
Qty: 1 INHALER | Refills: 0 | Status: SHIPPED | OUTPATIENT
Start: 2019-12-27 | End: 2020-09-11

## 2019-12-27 RX ORDER — CETIRIZINE HYDROCHLORIDE 10 MG/1
10 TABLET ORAL DAILY
Qty: 30 TABLET | Refills: 0 | Status: SHIPPED | OUTPATIENT
Start: 2019-12-27 | End: 2020-09-11 | Stop reason: SDUPTHER

## 2019-12-27 RX ORDER — ALBUTEROL SULFATE 2.5 MG/.5ML
2.5 SOLUTION RESPIRATORY (INHALATION) EVERY 4 HOURS PRN
Qty: 1 EACH | Refills: 30 | Status: SHIPPED | OUTPATIENT
Start: 2019-12-27 | End: 2020-09-11

## 2019-12-27 RX ORDER — GUAIFENESIN AND DEXTROMETHORPHAN HYDROBROMIDE 1200; 60 MG/1; MG/1
1 TABLET, EXTENDED RELEASE ORAL 2 TIMES DAILY PRN
Qty: 30 TABLET | Refills: 0 | Status: SHIPPED | OUTPATIENT
Start: 2019-12-27 | End: 2020-01-06

## 2019-12-27 RX ORDER — ALBUTEROL SULFATE 90 UG/1
2 AEROSOL, METERED RESPIRATORY (INHALATION) ONCE
Status: COMPLETED | OUTPATIENT
Start: 2019-12-27 | End: 2019-12-27

## 2019-12-27 RX ORDER — IPRATROPIUM BROMIDE AND ALBUTEROL SULFATE 2.5; .5 MG/3ML; MG/3ML
3 SOLUTION RESPIRATORY (INHALATION)
Status: COMPLETED | OUTPATIENT
Start: 2019-12-27 | End: 2019-12-27

## 2019-12-27 RX ADMIN — IPRATROPIUM BROMIDE AND ALBUTEROL SULFATE 3 ML: .5; 3 SOLUTION RESPIRATORY (INHALATION) at 12:12

## 2019-12-27 RX ADMIN — ALBUTEROL SULFATE 2 PUFF: 90 AEROSOL, METERED RESPIRATORY (INHALATION) at 03:12

## 2019-12-27 RX ADMIN — METHYLPREDNISOLONE SODIUM SUCCINATE 125 MG: 125 INJECTION, POWDER, FOR SOLUTION INTRAMUSCULAR; INTRAVENOUS at 02:12

## 2019-12-27 RX ADMIN — IPRATROPIUM BROMIDE AND ALBUTEROL SULFATE 3 ML: .5; 2.5 SOLUTION RESPIRATORY (INHALATION) at 02:12

## 2019-12-27 RX ADMIN — SODIUM CHLORIDE 1000 ML: 0.9 INJECTION, SOLUTION INTRAVENOUS at 01:12

## 2019-12-27 NOTE — ED TRIAGE NOTES
Pt seen in the ER with complaints of SOB, and chest tightness. Pt reports that she has been having a cold for the pat three weeks and this morning she started feeling SOB and experiencing chest tightness. Pt denies any cardiac or pulmonary hx. She is in not cute distress during time of assessment, pt has auditory wheezing. She denies any fever, chills, body aches and she had the flu shot this season.

## 2019-12-27 NOTE — ED PROVIDER NOTES
Encounter Date: 12/27/2019       History     Chief Complaint   Patient presents with    Shortness of Breath     sob x 2 days. pt reports uri symptoms x 1 month     Aretha Nguyen 74 y.o.afebrile female with PMH of DM, HLD, HTN and inflammatory bowel disease presented to the ED with c/o cough and flu like symptoms began one month ago and improved after one wee. She reports that they recurred one week ago. She c/o congestion, cough, wheezing, SOB and chest tightness. She states symptoms have been constant since onset. She has tried OTC medications with n o improvement in symptoms. She denies any headache, productive cough, vomiting, diarrhea, or other complaints at this time.     The history is provided by the patient and the spouse.     Review of patient's allergies indicates:   Allergen Reactions    Ciprofloxacin hcl Rash     Past Medical History:   Diagnosis Date    Controlled type 2 diabetes mellitus with hyperglycemia, without long-term current use of insulin 3/22/2017    Hyperlipidemia associated with type 2 diabetes mellitus 3/22/2017    Hypertension complicating diabetes 3/22/2017    Inflammatory bowel disease 3/22/2017     Past Surgical History:   Procedure Laterality Date    HYSTERECTOMY      MASTECTOMY Left     for benign recurrent growth. Dr. Stephan Brown MD - [place], [place] - General Surgery & Surgery    TOTAL ABDOMINAL HYSTERECTOMY W/ BILATERAL SALPINGOOPHORECTOMY  2009    for benign cysts with concern for future malginancy. Fibromoid uterus for AUB. Bengin results     Family History   Problem Relation Age of Onset    Breast cancer Neg Hx      Social History     Tobacco Use    Smoking status: Never Smoker    Smokeless tobacco: Never Used   Substance Use Topics    Alcohol use: Yes     Comment: not often    Drug use: No     Review of Systems   Constitutional: Negative for chills and fever.   HENT: Positive for congestion, postnasal drip and rhinorrhea. Negative for sore throat.    Eyes:  Negative for visual disturbance.   Respiratory: Positive for cough, chest tightness, shortness of breath and wheezing.    Cardiovascular: Negative for chest pain and palpitations.   Gastrointestinal: Negative for nausea and vomiting.   Genitourinary: Negative for dysuria.   Musculoskeletal: Negative for arthralgias, back pain and myalgias.   Skin: Negative for rash.   Allergic/Immunologic: Negative for immunocompromised state.   Neurological: Negative for weakness, light-headedness and headaches.   Hematological: Does not bruise/bleed easily.       Physical Exam     Initial Vitals   BP Pulse Resp Temp SpO2   12/27/19 1204 12/27/19 1204 12/27/19 1204 12/27/19 1214 12/27/19 1204   (!) 149/75 90 18 98.6 °F (37 °C) (!) 94 %      MAP       --                Vitals:    12/27/19 1559   BP: 115/73   Pulse: 89   Resp: 20   Temp:        Physical Exam    Nursing note and vitals reviewed.  Constitutional: She appears well-developed and well-nourished. She is cooperative.  Non-toxic appearance. She appears ill. She appears distressed.   HENT:   Head: Normocephalic and atraumatic.   Nose: Mucosal edema present.   Mouth/Throat: Posterior oropharyngeal erythema present. No posterior oropharyngeal edema.   Eyes: Conjunctivae and lids are normal.   Neck: Neck supple. No neck rigidity.   Cardiovascular: Normal rate and regular rhythm.   Pulmonary/Chest: No accessory muscle usage. Tachypnea noted. No bradypnea. No respiratory distress. She has wheezes. She has no rhonchi.   Abdominal: Soft. Normal appearance and bowel sounds are normal. There is no tenderness. There is no rigidity and no guarding.   Musculoskeletal: Normal range of motion.   Neurological: She is alert and oriented to person, place, and time. GCS eye subscore is 4. GCS verbal subscore is 5. GCS motor subscore is 6.   Skin: Skin is warm, dry and intact. No rash noted.   Psychiatric: She has a normal mood and affect. Her speech is normal and behavior is normal. Thought  content normal.         ED Course   Procedures  Labs Reviewed   CBC W/ AUTO DIFFERENTIAL - Abnormal; Notable for the following components:       Result Value    Lymph% 16.5 (*)     All other components within normal limits   COMPREHENSIVE METABOLIC PANEL - Abnormal; Notable for the following components:    CO2 22 (*)     BUN, Bld 6 (*)     Total Protein 8.5 (*)     All other components within normal limits   URINALYSIS, REFLEX TO URINE CULTURE - Abnormal; Notable for the following components:    Specific Gravity, UA <=1.005 (*)     Leukocytes, UA 1+ (*)     All other components within normal limits    Narrative:     Preferred Collection Type->Urine, Clean Catch   INFLUENZA A & B BY MOLECULAR   B-TYPE NATRIURETIC PEPTIDE   TROPONIN I   URINALYSIS MICROSCOPIC    Narrative:     Preferred Collection Type->Urine, Clean Catch   POCT GLUCOSE        ECG Results          EKG 12-lead (Final result)  Result time 12/27/19 13:56:58    Final result by Interface, Lab In [facility] (12/27/19 13:56:58)                 Narrative:    Test Reason : R07.89,    Vent. Rate : 089 BPM     Atrial Rate : 089 BPM     P-R Int : 138 ms          QRS Dur : 074 ms      QT Int : 372 ms       P-R-T Axes : 108 013 097 degrees     QTc Int : 452 ms    Normal sinus rhythm  Low voltage QRS  Abnormal QRS-T angle, consider primary T wave abnormality  Abnormal ECG  No previous ECGs available  Confirmed by Mason GREENWOOD, Dino PETE ([id number]) on 12/27/2019 1:56:42 PM    Referred By: [id number]   SELF           Confirmed By:Dino Cuevas MD                            Imaging Results          X-Ray Chest PA And Lateral (Final result)  Result time 12/27/19 13:00:45    Final result by Ale Marques MD (12/27/19 13:00:45)                 Impression:      No convincing evidence of intrathoracic disease identified in this patient with a history of asthma.      Electronically signed by: Ale Marques MD  Date:    12/27/2019  Time:    13:00             Narrative:     EXAMINATION:  XR CHEST PA AND LATERAL    CLINICAL HISTORY:  Asthma;    TECHNIQUE:  PA and lateral views of the chest were performed.    COMPARISON:  None    FINDINGS:  Soft tissues of the patient's arms project over lateral view obscuring some detail of the retrosternal airspace and mediastinal margins.    Mediastinal structures are midline. Cardiac silhouette and pulmonary vascular distribution are normal.  Descending thoracic aorta is tortuous.    Lung volumes are at the upper limits of normal and symmetric. I detect no pulmonary disease, pleural fluid, lymph node enlargement, cardiac decompensation, pneumothorax, pneumomediastinum, pneumoperitoneum or significant osseous abnormality.                                Aretha Nguyen 74 y.o.afebrile female with PMH of DM, HLD, HTN and inflammatory bowel disease presented to the ED with c/o cough and flu like symptoms began one month ago and improved after one wee. She reports that they recurred one week ago. She c/o congestion, cough, wheezing, SOB and chest tightness. She states symptoms have been constant since onset. She has tried OTC medications with n o improvement in symptoms. She denies any headache, productive cough, vomiting, diarrhea, or other complaints at this time. ROS positive for URI symptoms.  Physical exam reveals patient that appears ill but nontoxic. TM's with bilateral serous otitis media; nose with congestion and rhinorrhea. Oropharynx with mild erythema; no edema or exudate. Tachypnea an wheezes noted. Heart regular rate and rhythm. Abdomen is soft and nontender with normal bowel sounds. FROM of neck, no lymphadenopathy and FROM of all extremities with strength 5/5 bilaterally. Skin free of rash, pallor and diaphoresis.    DDX: influenza, viral URI with cough, bronchitis, pneumonia, ACS, CHF, electrolyte abnormality, UTI, asthma exacerbation    ED management: EKG with no acute abnormalities. Flu swab negative. Labs including troponin grossly  unremarkable. CXR with no acute process. Moderate improvement in symptoms and wheezing after DUO neb and solumedrol in the ED. No further labs or imaging warranted at this time as patient remains well appearing, afebrile and in no distress at this time.  We will send home with supportive medications and continued course of steroids as patient reports feeling well to go home with instructions on close follow up and return. Instructed patient on fever and symptom control.      Impression/Plan: The primary encounter diagnosis was Acute bronchitis, unspecified organism. Diagnoses of Chest tightness and Wheezing were also pertinent to this visit. Discharged with pro air, albuterol inhaler, nebulizer, zyrtec,Mucinex and deltasone. Patient will follow up with Primary.  Patient cautioned on when to return to ED.  Pt. Understands and agrees with current treatment plan                                                         Clinical Impression:       ICD-10-CM ICD-9-CM   1. Acute bronchitis, unspecified organism J20.9 466.0   2. Chest tightness R07.89 786.59   3. Wheezing R06.2 786.07                             DAVID Hernandez  12/28/19 1835

## 2020-01-07 DIAGNOSIS — F33.9 RECURRENT DEPRESSION: ICD-10-CM

## 2020-01-07 RX ORDER — ESCITALOPRAM OXALATE 5 MG/1
5 TABLET ORAL DAILY
Qty: 90 TABLET | Refills: 0 | Status: SHIPPED | OUTPATIENT
Start: 2020-01-07 | End: 2020-02-13 | Stop reason: SDUPTHER

## 2020-01-07 NOTE — TELEPHONE ENCOUNTER
----- Message from Celine Lindsay sent at 1/7/2020 12:46 PM CST -----  Patient requested refill for ESCITALOPRAM  5 MG, patient stated that she really needs her refill asap, that she can't stop taking this medicine.

## 2020-01-09 ENCOUNTER — LAB VISIT (OUTPATIENT)
Dept: LAB | Facility: HOSPITAL | Age: 75
End: 2020-01-09
Attending: INTERNAL MEDICINE
Payer: MEDICARE

## 2020-01-09 DIAGNOSIS — E11.65 CONTROLLED TYPE 2 DIABETES MELLITUS WITH HYPERGLYCEMIA, WITHOUT LONG-TERM CURRENT USE OF INSULIN: ICD-10-CM

## 2020-01-09 DIAGNOSIS — D50.0 IRON DEFICIENCY ANEMIA DUE TO CHRONIC BLOOD LOSS: ICD-10-CM

## 2020-01-09 LAB
ALBUMIN SERPL BCP-MCNC: 3.7 G/DL (ref 3.5–5.2)
ALP SERPL-CCNC: 87 U/L (ref 55–135)
ALT SERPL W/O P-5'-P-CCNC: 12 U/L (ref 10–44)
ANION GAP SERPL CALC-SCNC: 7 MMOL/L (ref 8–16)
AST SERPL-CCNC: 23 U/L (ref 10–40)
BILIRUB SERPL-MCNC: 0.5 MG/DL (ref 0.1–1)
BUN SERPL-MCNC: 9 MG/DL (ref 8–23)
CALCIUM SERPL-MCNC: 9.6 MG/DL (ref 8.7–10.5)
CHLORIDE SERPL-SCNC: 103 MMOL/L (ref 95–110)
CO2 SERPL-SCNC: 31 MMOL/L (ref 23–29)
CREAT SERPL-MCNC: 0.8 MG/DL (ref 0.5–1.4)
EST. GFR  (AFRICAN AMERICAN): >60 ML/MIN/1.73 M^2
EST. GFR  (NON AFRICAN AMERICAN): >60 ML/MIN/1.73 M^2
ESTIMATED AVG GLUCOSE: 131 MG/DL (ref 68–131)
FERRITIN SERPL-MCNC: 118 NG/ML (ref 20–300)
GLUCOSE SERPL-MCNC: 112 MG/DL (ref 70–110)
HBA1C MFR BLD HPLC: 6.2 % (ref 4–5.6)
POTASSIUM SERPL-SCNC: 3.9 MMOL/L (ref 3.5–5.1)
PROT SERPL-MCNC: 7.7 G/DL (ref 6–8.4)
SODIUM SERPL-SCNC: 141 MMOL/L (ref 136–145)

## 2020-01-09 PROCEDURE — 36415 COLL VENOUS BLD VENIPUNCTURE: CPT | Mod: HCNC,PO

## 2020-01-09 PROCEDURE — 80053 COMPREHEN METABOLIC PANEL: CPT | Mod: HCNC

## 2020-01-09 PROCEDURE — 82728 ASSAY OF FERRITIN: CPT | Mod: HCNC

## 2020-01-09 PROCEDURE — 83036 HEMOGLOBIN GLYCOSYLATED A1C: CPT | Mod: HCNC

## 2020-01-09 RX ORDER — METFORMIN HYDROCHLORIDE 500 MG/1
500 TABLET, EXTENDED RELEASE ORAL DAILY
Qty: 90 TABLET | Refills: 1 | Status: SHIPPED | OUTPATIENT
Start: 2020-01-09 | End: 2020-08-24

## 2020-01-14 ENCOUNTER — OFFICE VISIT (OUTPATIENT)
Dept: INTERNAL MEDICINE | Facility: CLINIC | Age: 75
End: 2020-01-14
Payer: MEDICARE

## 2020-01-14 VITALS
DIASTOLIC BLOOD PRESSURE: 70 MMHG | BODY MASS INDEX: 26.76 KG/M2 | OXYGEN SATURATION: 98 % | HEART RATE: 80 BPM | SYSTOLIC BLOOD PRESSURE: 100 MMHG | HEIGHT: 61 IN | WEIGHT: 141.75 LBS

## 2020-01-14 DIAGNOSIS — Z23 NEED FOR SHINGLES VACCINE: ICD-10-CM

## 2020-01-14 DIAGNOSIS — F41.9 ANXIETY: Primary | ICD-10-CM

## 2020-01-14 DIAGNOSIS — Z23 NEED FOR 23-POLYVALENT PNEUMOCOCCAL POLYSACCHARIDE VACCINE: ICD-10-CM

## 2020-01-14 PROCEDURE — 3074F SYST BP LT 130 MM HG: CPT | Mod: HCNC,CPTII,S$GLB, | Performed by: INTERNAL MEDICINE

## 2020-01-14 PROCEDURE — 3074F PR MOST RECENT SYSTOLIC BLOOD PRESSURE < 130 MM HG: ICD-10-PCS | Mod: HCNC,CPTII,S$GLB, | Performed by: INTERNAL MEDICINE

## 2020-01-14 PROCEDURE — 1159F PR MEDICATION LIST DOCUMENTED IN MEDICAL RECORD: ICD-10-PCS | Mod: HCNC,S$GLB,, | Performed by: INTERNAL MEDICINE

## 2020-01-14 PROCEDURE — 3078F DIAST BP <80 MM HG: CPT | Mod: HCNC,CPTII,S$GLB, | Performed by: INTERNAL MEDICINE

## 2020-01-14 PROCEDURE — 99213 PR OFFICE/OUTPT VISIT, EST, LEVL III, 20-29 MIN: ICD-10-PCS | Mod: HCNC,S$GLB,, | Performed by: INTERNAL MEDICINE

## 2020-01-14 PROCEDURE — 1159F MED LIST DOCD IN RCRD: CPT | Mod: HCNC,S$GLB,, | Performed by: INTERNAL MEDICINE

## 2020-01-14 PROCEDURE — 99999 PR PBB SHADOW E&M-EST. PATIENT-LVL IV: ICD-10-PCS | Mod: PBBFAC,HCNC,, | Performed by: INTERNAL MEDICINE

## 2020-01-14 PROCEDURE — 1126F PR PAIN SEVERITY QUANTIFIED, NO PAIN PRESENT: ICD-10-PCS | Mod: HCNC,S$GLB,, | Performed by: INTERNAL MEDICINE

## 2020-01-14 PROCEDURE — G0009 ADMIN PNEUMOCOCCAL VACCINE: HCPCS | Mod: HCNC,S$GLB,, | Performed by: INTERNAL MEDICINE

## 2020-01-14 PROCEDURE — 99213 OFFICE O/P EST LOW 20 MIN: CPT | Mod: HCNC,S$GLB,, | Performed by: INTERNAL MEDICINE

## 2020-01-14 PROCEDURE — 90732 PPSV23 VACC 2 YRS+ SUBQ/IM: CPT | Mod: HCNC,S$GLB,, | Performed by: INTERNAL MEDICINE

## 2020-01-14 PROCEDURE — G0009 PNEUMOCOCCAL POLYSACCHARIDE VACCINE 23-VALENT =>2YO SQ IM: ICD-10-PCS | Mod: HCNC,S$GLB,, | Performed by: INTERNAL MEDICINE

## 2020-01-14 PROCEDURE — 1126F AMNT PAIN NOTED NONE PRSNT: CPT | Mod: HCNC,S$GLB,, | Performed by: INTERNAL MEDICINE

## 2020-01-14 PROCEDURE — 1101F PT FALLS ASSESS-DOCD LE1/YR: CPT | Mod: HCNC,CPTII,S$GLB, | Performed by: INTERNAL MEDICINE

## 2020-01-14 PROCEDURE — 3078F PR MOST RECENT DIASTOLIC BLOOD PRESSURE < 80 MM HG: ICD-10-PCS | Mod: HCNC,CPTII,S$GLB, | Performed by: INTERNAL MEDICINE

## 2020-01-14 PROCEDURE — 90732 PNEUMOCOCCAL POLYSACCHARIDE VACCINE 23-VALENT =>2YO SQ IM: ICD-10-PCS | Mod: HCNC,S$GLB,, | Performed by: INTERNAL MEDICINE

## 2020-01-14 PROCEDURE — 1101F PR PT FALLS ASSESS DOC 0-1 FALLS W/OUT INJ PAST YR: ICD-10-PCS | Mod: HCNC,CPTII,S$GLB, | Performed by: INTERNAL MEDICINE

## 2020-01-14 PROCEDURE — 99999 PR PBB SHADOW E&M-EST. PATIENT-LVL IV: CPT | Mod: PBBFAC,HCNC,, | Performed by: INTERNAL MEDICINE

## 2020-01-14 NOTE — PATIENT INSTRUCTIONS
ISAAC PARA Lexapro / Escitalopram     yani la tableta cada otro marisela por 2 semanas  despues para de tomarla     Llamanos, renovar la pastilla

## 2020-01-18 NOTE — PROGRESS NOTES
Portions of this note are generated with voice recognition software. Typographical errors may exist.     SUBJECTIVE:    This is a/an 74 y.o. female here for primary care visit for  Chief Complaint   Patient presents with    Follow-up    Anxiety     Feels like life circumstances have improved. Anxiety better. She wants to taper and stop SSRI.       Medications Reviewed and Updated    Past medical, family, and social histories were reviewed and updated.    Review of Systems negative unless otherwise noted in history of present illness-  ROS    General ROS: negative  Psychological ROS: negative  ENT ROS: negative  Endocrine ROS: Negative  Allergy and Immunology ROS: negative  Cardiovascular ROS: negative  Pulmonary ROS: Negative  Gastrointestinal ROS: negative  Genito-Urinary ROS: negative  Musculoskeletal ROS: negative      Allergic:    Review of patient's allergies indicates:   Allergen Reactions    Ciprofloxacin hcl Rash       OBJECTIVE:  BP: 100/70 Pulse: 80    Wt Readings from Last 3 Encounters:   01/14/20 64.3 kg (141 lb 12.1 oz)   12/27/19 63.5 kg (140 lb)   09/10/19 62.6 kg (138 lb)    Body mass index is 26.78 kg/m².  Previous Blood Pressure Readings :   BP Readings from Last 3 Encounters:   01/14/20 100/70   12/27/19 115/73   08/13/19 102/70       Physical Exam    GEN: No apparent distress  HEENT: sclera non-icteric, conjunctiva clear  CV: no peripheral edema  PULM: breathing non-labored  ABD: non, protuberant abdomen.  PSYCH: appropriate affect  MSK: able to rise from chair without assistance  SKIN: normal skin turgor    Pertinent Labs Reviewed       ASSESSMENT/PLAN:    Anxiety.Condition improving.  Counseling on self-care measures today.  Continue with current plan in addition to recommendations written on After Visit Summary.     Need for shingles vaccine  -     Discontinue: varicella-zoster gE-AS01B, PF, (SHINGRIX, PF,) 50 mcg/0.5 mL injection; Inject 0.5 mLs into the muscle As instructed. 2 doses   Dispense: 0.5 mL; Refill: 2  -     varicella-zoster gE-AS01B, PF, (SHINGRIX, PF,) 50 mcg/0.5 mL injection; Inject 0.5 mLs into the muscle As instructed. 2 doses  Dispense: 0.5 mL; Refill: 2    Need for 23-polyvalent pneumococcal polysaccharide vaccine  -     (In Office Administered) Pneumococcal Polysaccharide Vaccine (23 Valent) (SQ/IM)          Future Appointments   Date Time Provider Department [place]   6/4/2020  8:15 AM [facility] [place]   6/8/2020  1:40 PM Lawrence Logan MD [facility] [place]       Lawrence Logan  1/17/2020  6:44 PM

## 2020-02-07 ENCOUNTER — TELEPHONE (OUTPATIENT)
Dept: INTERNAL MEDICINE | Facility: CLINIC | Age: 75
End: 2020-02-07

## 2020-02-07 NOTE — TELEPHONE ENCOUNTER
Spoke with patient who informed me medication Lexapro has helped her . She had stopped medication for two weeks but she  did not feel good without taking medication . I informed patient I will send  a message . Patient voices understanding

## 2020-02-12 DIAGNOSIS — E11.65 CONTROLLED TYPE 2 DIABETES MELLITUS WITH HYPERGLYCEMIA, WITHOUT LONG-TERM CURRENT USE OF INSULIN: ICD-10-CM

## 2020-02-12 RX ORDER — ATORVASTATIN CALCIUM 20 MG/1
TABLET, FILM COATED ORAL
Qty: 90 TABLET | Refills: 1 | Status: SHIPPED | OUTPATIENT
Start: 2020-02-12 | End: 2020-09-11 | Stop reason: SDUPTHER

## 2020-02-12 NOTE — TELEPHONE ENCOUNTER
Spoke with patient who informed me she has been feeling much better while taking medication . I did inform patient a refill has been sent to pharmacy ,patient voices understanding and will  medication .

## 2020-02-12 NOTE — TELEPHONE ENCOUNTER
----- Message from Rosenda Pablo sent at 2/12/2020 10:29 AM CST -----  Contact: patient  Patient called to check the status of a previous message left last week.    Please call [phone] to discuss today.

## 2020-02-13 DIAGNOSIS — F33.9 RECURRENT DEPRESSION: ICD-10-CM

## 2020-02-13 RX ORDER — ESCITALOPRAM OXALATE 5 MG/1
5 TABLET ORAL DAILY
Qty: 90 TABLET | Refills: 1 | Status: SHIPPED | OUTPATIENT
Start: 2020-02-13 | End: 2020-09-11 | Stop reason: SDUPTHER

## 2020-02-14 NOTE — TELEPHONE ENCOUNTER
----- Message from Lawrence Logan MD sent at 2/13/2020  5:47 PM CST -----    Yes she can continue taking medication.  Refills have been sent to her pharmacy  ----- Message -----  From: Elizabeth Zambrano MA  Sent: 2/7/2020  10:32 AM CST  To: Lawrence Logan MD     called to inform us she followed last visits directions and stopped Lexapro as instructed.During the two weeks she was not taking medication she felt sick . Patient informed me she started to take medication last night and feels a lot better today . She would like to know if it is ok to keep taking medication .

## 2020-03-06 ENCOUNTER — PES CALL (OUTPATIENT)
Dept: ADMINISTRATIVE | Facility: CLINIC | Age: 75
End: 2020-03-06

## 2020-03-31 DIAGNOSIS — I10 ESSENTIAL HYPERTENSION: ICD-10-CM

## 2020-03-31 RX ORDER — HYDROCHLOROTHIAZIDE 12.5 MG/1
12.5 CAPSULE ORAL DAILY
Qty: 90 CAPSULE | Refills: 0 | Status: SHIPPED | OUTPATIENT
Start: 2020-03-31 | End: 2020-06-24

## 2020-03-31 RX ORDER — OLMESARTAN MEDOXOMIL 20 MG/1
TABLET ORAL
Qty: 90 TABLET | Refills: 0 | Status: SHIPPED | OUTPATIENT
Start: 2020-03-31 | End: 2020-06-24

## 2020-05-02 DIAGNOSIS — D50.9 IRON DEFICIENCY ANEMIA, UNSPECIFIED IRON DEFICIENCY ANEMIA TYPE: ICD-10-CM

## 2020-05-04 RX ORDER — FERROUS SULFATE 324(65)MG
325 TABLET, DELAYED RELEASE (ENTERIC COATED) ORAL 2 TIMES DAILY
Qty: 180 TABLET | Refills: 0 | Status: SHIPPED | OUTPATIENT
Start: 2020-05-04 | End: 2020-08-24

## 2020-06-03 ENCOUNTER — LAB VISIT (OUTPATIENT)
Dept: LAB | Facility: HOSPITAL | Age: 75
End: 2020-06-03
Attending: INTERNAL MEDICINE
Payer: MEDICARE

## 2020-06-03 DIAGNOSIS — E11.65 CONTROLLED TYPE 2 DIABETES MELLITUS WITH HYPERGLYCEMIA, WITHOUT LONG-TERM CURRENT USE OF INSULIN: ICD-10-CM

## 2020-06-03 LAB
ALBUMIN SERPL BCP-MCNC: 4 G/DL (ref 3.5–5.2)
ALP SERPL-CCNC: 95 U/L (ref 55–135)
ALT SERPL W/O P-5'-P-CCNC: 11 U/L (ref 10–44)
ANION GAP SERPL CALC-SCNC: 11 MMOL/L (ref 8–16)
AST SERPL-CCNC: 23 U/L (ref 10–40)
BILIRUB SERPL-MCNC: 0.6 MG/DL (ref 0.1–1)
BUN SERPL-MCNC: 7 MG/DL (ref 8–23)
CALCIUM SERPL-MCNC: 10.1 MG/DL (ref 8.7–10.5)
CHLORIDE SERPL-SCNC: 100 MMOL/L (ref 95–110)
CHOLEST SERPL-MCNC: 187 MG/DL (ref 120–199)
CHOLEST/HDLC SERPL: 3.1 {RATIO} (ref 2–5)
CO2 SERPL-SCNC: 29 MMOL/L (ref 23–29)
CREAT SERPL-MCNC: 0.8 MG/DL (ref 0.5–1.4)
EST. GFR  (AFRICAN AMERICAN): >60 ML/MIN/1.73 M^2
EST. GFR  (NON AFRICAN AMERICAN): >60 ML/MIN/1.73 M^2
GLUCOSE SERPL-MCNC: 89 MG/DL (ref 70–110)
HDLC SERPL-MCNC: 61 MG/DL (ref 40–75)
HDLC SERPL: 32.6 % (ref 20–50)
LDLC SERPL CALC-MCNC: 89.6 MG/DL (ref 63–159)
NONHDLC SERPL-MCNC: 126 MG/DL
POTASSIUM SERPL-SCNC: 3.7 MMOL/L (ref 3.5–5.1)
PROT SERPL-MCNC: 8.2 G/DL (ref 6–8.4)
SODIUM SERPL-SCNC: 140 MMOL/L (ref 136–145)
TRIGL SERPL-MCNC: 182 MG/DL (ref 30–150)

## 2020-06-03 PROCEDURE — 80061 LIPID PANEL: CPT | Mod: HCNC

## 2020-06-03 PROCEDURE — 80053 COMPREHEN METABOLIC PANEL: CPT | Mod: HCNC

## 2020-06-03 PROCEDURE — 36415 COLL VENOUS BLD VENIPUNCTURE: CPT | Mod: HCNC,PO

## 2020-06-08 ENCOUNTER — TELEPHONE (OUTPATIENT)
Dept: INTERNAL MEDICINE | Facility: CLINIC | Age: 75
End: 2020-06-08

## 2020-06-08 NOTE — TELEPHONE ENCOUNTER
----- Message from Lawrence Logan MD sent at 6/8/2020  6:10 PM CDT -----  Call to encourage urgent care [facility]. She has not gone yet    ----- Message -----  From: Elizabeth Zambrano MA  Sent: 6/8/2020  12:01 PM CDT  To: Lawrence Logan MD     patient who informed me she has recently has a fall two weeks ago patient is not able to take pain anymore . Patient is wishing to go to ER I advised patient to go to urgent care if unable to wait until tomorrow .

## 2020-06-08 NOTE — TELEPHONE ENCOUNTER
Spoke with patient who informed me she has recently has a fall two weeks ago patient is not able to take pain anymore . Patient is wishing to go to ER I advised patient to go to urgent care if unable to wait until tomorrow . Patient voices understanding

## 2020-06-08 NOTE — TELEPHONE ENCOUNTER
----- Message from Jovita Orlando sent at 6/8/2020  9:31 AM CDT -----  Contact: [phone]/Self   Patient is requesting to speak with nurse regarding a fall she recently had. Please call and advise.

## 2020-06-09 ENCOUNTER — TELEPHONE (OUTPATIENT)
Dept: INTERNAL MEDICINE | Facility: CLINIC | Age: 75
End: 2020-06-09

## 2020-06-09 ENCOUNTER — HOSPITAL ENCOUNTER (OUTPATIENT)
Dept: RADIOLOGY | Facility: HOSPITAL | Age: 75
Discharge: HOME | End: 2020-06-09
Attending: INTERNAL MEDICINE
Payer: MEDICARE

## 2020-06-09 ENCOUNTER — OFFICE VISIT (OUTPATIENT)
Dept: INTERNAL MEDICINE | Facility: CLINIC | Age: 75
End: 2020-06-09
Payer: MEDICARE

## 2020-06-09 VITALS
DIASTOLIC BLOOD PRESSURE: 64 MMHG | TEMPERATURE: 98 F | HEART RATE: 84 BPM | SYSTOLIC BLOOD PRESSURE: 116 MMHG | OXYGEN SATURATION: 98 % | WEIGHT: 141.75 LBS | BODY MASS INDEX: 26.76 KG/M2 | HEIGHT: 61 IN

## 2020-06-09 DIAGNOSIS — S69.92XA INJURY OF LEFT WRIST, INITIAL ENCOUNTER: ICD-10-CM

## 2020-06-09 DIAGNOSIS — S69.92XA INJURY OF LEFT WRIST, INITIAL ENCOUNTER: Primary | ICD-10-CM

## 2020-06-09 PROCEDURE — 1125F AMNT PAIN NOTED PAIN PRSNT: CPT | Mod: HCNC,S$GLB,, | Performed by: INTERNAL MEDICINE

## 2020-06-09 PROCEDURE — 73100 XR WRIST 2 VIEW LEFT: ICD-10-PCS | Mod: 26,HCNC,LT, | Performed by: RADIOLOGY

## 2020-06-09 PROCEDURE — 3078F PR MOST RECENT DIASTOLIC BLOOD PRESSURE < 80 MM HG: ICD-10-PCS | Mod: HCNC,CPTII,S$GLB, | Performed by: INTERNAL MEDICINE

## 2020-06-09 PROCEDURE — 1100F PTFALLS ASSESS-DOCD GE2>/YR: CPT | Mod: HCNC,CPTII,S$GLB, | Performed by: INTERNAL MEDICINE

## 2020-06-09 PROCEDURE — 3078F DIAST BP <80 MM HG: CPT | Mod: HCNC,CPTII,S$GLB, | Performed by: INTERNAL MEDICINE

## 2020-06-09 PROCEDURE — 73100 X-RAY EXAM OF WRIST: CPT | Mod: 26,HCNC,LT, | Performed by: RADIOLOGY

## 2020-06-09 PROCEDURE — 99214 PR OFFICE/OUTPT VISIT, EST, LEVL IV, 30-39 MIN: ICD-10-PCS | Mod: HCNC,S$GLB,, | Performed by: INTERNAL MEDICINE

## 2020-06-09 PROCEDURE — 3074F SYST BP LT 130 MM HG: CPT | Mod: HCNC,CPTII,S$GLB, | Performed by: INTERNAL MEDICINE

## 2020-06-09 PROCEDURE — 99214 OFFICE O/P EST MOD 30 MIN: CPT | Mod: HCNC,S$GLB,, | Performed by: INTERNAL MEDICINE

## 2020-06-09 PROCEDURE — 1159F PR MEDICATION LIST DOCUMENTED IN MEDICAL RECORD: ICD-10-PCS | Mod: HCNC,S$GLB,, | Performed by: INTERNAL MEDICINE

## 2020-06-09 PROCEDURE — 99999 PR PBB SHADOW E&M-EST. PATIENT-LVL IV: ICD-10-PCS | Mod: PBBFAC,HCNC,, | Performed by: INTERNAL MEDICINE

## 2020-06-09 PROCEDURE — 1159F MED LIST DOCD IN RCRD: CPT | Mod: HCNC,S$GLB,, | Performed by: INTERNAL MEDICINE

## 2020-06-09 PROCEDURE — 1125F PR PAIN SEVERITY QUANTIFIED, PAIN PRESENT: ICD-10-PCS | Mod: HCNC,S$GLB,, | Performed by: INTERNAL MEDICINE

## 2020-06-09 PROCEDURE — 73100 X-RAY EXAM OF WRIST: CPT | Mod: TC,HCNC,FY,LT

## 2020-06-09 PROCEDURE — 3288F PR FALLS RISK ASSESSMENT DOCUMENTED: ICD-10-PCS | Mod: HCNC,CPTII,S$GLB, | Performed by: INTERNAL MEDICINE

## 2020-06-09 PROCEDURE — 1100F PR PT FALLS ASSESS DOC 2+ FALLS/FALL W/INJURY/YR: ICD-10-PCS | Mod: HCNC,CPTII,S$GLB, | Performed by: INTERNAL MEDICINE

## 2020-06-09 PROCEDURE — 3288F FALL RISK ASSESSMENT DOCD: CPT | Mod: HCNC,CPTII,S$GLB, | Performed by: INTERNAL MEDICINE

## 2020-06-09 PROCEDURE — 99999 PR PBB SHADOW E&M-EST. PATIENT-LVL IV: CPT | Mod: PBBFAC,HCNC,, | Performed by: INTERNAL MEDICINE

## 2020-06-09 PROCEDURE — 3074F PR MOST RECENT SYSTOLIC BLOOD PRESSURE < 130 MM HG: ICD-10-PCS | Mod: HCNC,CPTII,S$GLB, | Performed by: INTERNAL MEDICINE

## 2020-06-09 RX ORDER — TRAMADOL HYDROCHLORIDE 50 MG/1
50 TABLET ORAL EVERY 8 HOURS PRN
Qty: 21 TABLET | Refills: 0 | Status: SHIPPED | OUTPATIENT
Start: 2020-06-09 | End: 2020-06-16

## 2020-06-09 NOTE — PROGRESS NOTES
Portions of this note are generated with voice recognition software. Typographical errors may exist.     SUBJECTIVE:    This is a/an 74 y.o. female here for primary care visit for  Chief Complaint   Patient presents with    Arm Pain       2 semanas     Curled in arm left     ormigeo mano            Medications Reviewed and Updated    Past medical, family, and social histories were reviewed and updated.    Review of Systems negative unless otherwise noted in history of present illness-  ROS    General ROS: negative  Psychological ROS: negative  ENT ROS: negative  Endocrine ROS: Negative  Allergy and Immunology ROS: negative  Cardiovascular ROS: negative  Pulmonary ROS: Negative  Gastrointestinal ROS: negative  Genito-Urinary ROS: negative  Musculoskeletal ROS: negative  Neurological ROS: negative  Dermatological ROS: negative        Allergic:    Review of patient's allergies indicates:   Allergen Reactions    Ciprofloxacin hcl Rash       OBJECTIVE:  BP: 116/64 Pulse: 84 Temp: 97.8 °F (36.6 °C)  Wt Readings from Last 3 Encounters:   06/09/20 64.3 kg (141 lb 12.1 oz)   01/14/20 64.3 kg (141 lb 12.1 oz)   12/27/19 63.5 kg (140 lb)    Body mass index is 26.78 kg/m².  Previous Blood Pressure Readings :   BP Readings from Last 3 Encounters:   06/09/20 116/64   01/14/20 100/70   12/27/19 115/73       Physical Exam    GEN: No apparent distress  HEENT: sclera non-icteric, conjunctiva clear  CV: no peripheral edema  PULM: breathing non-labored  ABD: Obese, protuberant abdomen.  PSYCH: appropriate affect  MSK: able to rise from chair without assistance  SKIN: normal skin turgor    Pertinent Labs Reviewed       ASSESSMENT/PLAN:    Injury of left wrist, initial encounter  -     X-Ray Wrist 2 View Left; Future; Expected date: 06/09/2020          Future Appointments   Date Time Provider Department Center   6/9/2020  5:30 PM [facility] XR-A LIMIT 350 LBS [facility] AZRA Logan  6/9/2020  4:45 PM

## 2020-06-09 NOTE — PROGRESS NOTES
Portions of this note are generated with voice recognition software. Typographical errors may exist.     SUBJECTIVE:    This is a/an 74 y.o. female here for primary care visit for  Chief Complaint   Patient presents with    Arm Pain     Patient states that about 2 weeks ago she was walking and is stumbled forward on to hard ground.  Left forearm was curled inward is and left ulna sustain the brunt of the impact.  Patient thinks that the bone might have popped out of place but popped back in on its own.  Patient has had significant swelling of the distal ulna for 2 weeks in because of lack of improvement she comes in today for her 1st medical evaluation of the issue.  Pain is significant with basic activities such as cooking and holding utensils in the hand.  She has been using over-the-counter NSAID medication with only modest relief in pain.    She does report that there has been some tingling of the palm since the injury this is a new issue.  Denies any pallor or pain in the distal hand.  She has been wrapping the wrist with an Ace bandage.        Medications Reviewed and Updated    Past medical, family, and social histories were reviewed and updated.    Review of Systems negative unless otherwise noted in history of present illness-  ROS    General ROS: negative  Psychological ROS: negative  ENT ROS: negative  Endocrine ROS: Negative  Allergy and Immunology ROS: negative  Cardiovascular ROS: negative  Musculoskeletal ROS: negative  Neurological ROS: negative  Dermatological ROS: negative        Allergic:    Review of patient's allergies indicates:   Allergen Reactions    Ciprofloxacin hcl Rash       OBJECTIVE:  BP: 116/64 Pulse: 84 Temp: 97.8 °F (36.6 °C)  Wt Readings from Last 3 Encounters:   06/09/20 64.3 kg (141 lb 12.1 oz)   01/14/20 64.3 kg (141 lb 12.1 oz)   12/27/19 63.5 kg (140 lb)    Body mass index is 26.78 kg/m².  Previous Blood Pressure Readings :   BP Readings from Last 3 Encounters:   06/09/20  116/64   01/14/20 100/70   12/27/19 115/73       Physical Exam    GEN: No apparent distress  HEENT: sclera non-icteric, conjunctiva clear  CV: intact distal pulses L wrist  PULM: breathing non-labored  ABD: non, protuberant abdomen.  PSYCH: appropriate affect  MSK: able to rise from chair without assistance  Left wrist, swelling and point tenderness over distal ulna. No skin deformity.   NEURO: pinprick sensation diminished L palm   SKIN: normal skin turgor    Pertinent Labs Reviewed       ASSESSMENT/PLAN:    Injury of left wrist, initial encounter. Concerning for distal ulna fracture with displacement causing neurologic sequelae. Condition not optimally controlled. Detailed counseling on self care measures. Plan to monitor clinically in addition to plan below or as listed on After Visit Summary.   -     X-Ray Wrist 2 View Left; Future; Expected date: 06/09/2020  -     Ambulatory referral/consult to Orthopedics  - .patient advised if symptoms change or intensify to seek care in the nearest qualified health center   -     traMADoL (ULTRAM) 50 mg tablet; Take 1 tablet (50 mg total) by mouth every 8 (eight) hours as needed for Pain.  Dispense: 21 tablet; Refill: 0        Future Appointments   Date Time Provider Department Center   6/9/2020  5:30 PM [facility] XR-A LIMIT 350 LBS [facility] AZRA Logan  6/9/2020  4:39 PM

## 2020-06-09 NOTE — TELEPHONE ENCOUNTER
Spoke with patient and informed appt scheduled at [facility] for Ortho for Friday. Info given. Patient reports she will see if she can find someone to help her get to the appt. Instructed to call Dr. Logan office if unable to keep appt. Patient voices understanding.

## 2020-06-09 NOTE — TELEPHONE ENCOUNTER
----- Message from Lawrence Logan MD sent at 6/9/2020  4:55 PM CDT -----  Patient needs urgent appointment with [facility], dr french. If she cannot get in within the next 7 days, let me know

## 2020-06-10 ENCOUNTER — TELEPHONE (OUTPATIENT)
Dept: INTERNAL MEDICINE | Facility: CLINIC | Age: 75
End: 2020-06-10

## 2020-06-10 NOTE — TELEPHONE ENCOUNTER
Tell patient that the x-ray does confirm that there is a fracture and that there is some displacement of the bone which means it is absolutely important that she attend the hand specialist appointment in a couple days.  She should keep a bandage wrapped around the wrist to help keep it stable and reducing swelling.

## 2020-06-12 ENCOUNTER — DOCUMENTATION ONLY (OUTPATIENT)
Dept: ORTHOPEDICS | Facility: CLINIC | Age: 75
End: 2020-06-12

## 2020-06-12 ENCOUNTER — OFFICE VISIT (OUTPATIENT)
Dept: ORTHOPEDICS | Facility: CLINIC | Age: 75
End: 2020-06-12
Payer: MEDICARE

## 2020-06-12 VITALS
HEART RATE: 77 BPM | DIASTOLIC BLOOD PRESSURE: 70 MMHG | SYSTOLIC BLOOD PRESSURE: 115 MMHG | HEIGHT: 61 IN | WEIGHT: 141 LBS | BODY MASS INDEX: 26.62 KG/M2

## 2020-06-12 DIAGNOSIS — M65.312 TRIGGER THUMB OF LEFT HAND: ICD-10-CM

## 2020-06-12 DIAGNOSIS — S52.502A CLOSED FRACTURE OF DISTAL END OF LEFT RADIUS, UNSPECIFIED FRACTURE MORPHOLOGY, INITIAL ENCOUNTER: Primary | ICD-10-CM

## 2020-06-12 PROCEDURE — 29075 APPL CST ELBW FNGR SHORT ARM: CPT | Mod: 59,LT,S$GLB, | Performed by: PHYSICIAN ASSISTANT

## 2020-06-12 PROCEDURE — 99204 PR OFFICE/OUTPT VISIT, NEW, LEVL IV, 45-59 MIN: ICD-10-PCS | Mod: 25,HCNC,S$GLB, | Performed by: PHYSICIAN ASSISTANT

## 2020-06-12 PROCEDURE — 3074F SYST BP LT 130 MM HG: CPT | Mod: HCNC,CPTII,S$GLB, | Performed by: PHYSICIAN ASSISTANT

## 2020-06-12 PROCEDURE — 3288F FALL RISK ASSESSMENT DOCD: CPT | Mod: HCNC,CPTII,S$GLB, | Performed by: PHYSICIAN ASSISTANT

## 2020-06-12 PROCEDURE — 20550 NJX 1 TENDON SHEATH/LIGAMENT: CPT | Mod: 51,HCNC,FA,S$GLB | Performed by: PHYSICIAN ASSISTANT

## 2020-06-12 PROCEDURE — 3288F PR FALLS RISK ASSESSMENT DOCUMENTED: ICD-10-PCS | Mod: HCNC,CPTII,S$GLB, | Performed by: PHYSICIAN ASSISTANT

## 2020-06-12 PROCEDURE — 99999 PR PBB SHADOW E&M-EST. PATIENT-LVL III: CPT | Mod: PBBFAC,HCNC,, | Performed by: PHYSICIAN ASSISTANT

## 2020-06-12 PROCEDURE — 29075 PR APPLY FOREARM CAST: ICD-10-PCS | Mod: 59,LT,S$GLB, | Performed by: PHYSICIAN ASSISTANT

## 2020-06-12 PROCEDURE — 99999 PR PBB SHADOW E&M-EST. PATIENT-LVL III: ICD-10-PCS | Mod: PBBFAC,HCNC,, | Performed by: PHYSICIAN ASSISTANT

## 2020-06-12 PROCEDURE — 76942 ECHO GUIDE FOR BIOPSY: CPT | Mod: 26,HCNC,S$GLB, | Performed by: PHYSICIAN ASSISTANT

## 2020-06-12 PROCEDURE — 76942 PR U/S GUIDANCE FOR NEEDLE GUIDANCE: ICD-10-PCS | Mod: 26,HCNC,S$GLB, | Performed by: PHYSICIAN ASSISTANT

## 2020-06-12 PROCEDURE — 1125F PR PAIN SEVERITY QUANTIFIED, PAIN PRESENT: ICD-10-PCS | Mod: HCNC,S$GLB,, | Performed by: PHYSICIAN ASSISTANT

## 2020-06-12 PROCEDURE — 99204 OFFICE O/P NEW MOD 45 MIN: CPT | Mod: 25,HCNC,S$GLB, | Performed by: PHYSICIAN ASSISTANT

## 2020-06-12 PROCEDURE — 1100F PTFALLS ASSESS-DOCD GE2>/YR: CPT | Mod: HCNC,CPTII,S$GLB, | Performed by: PHYSICIAN ASSISTANT

## 2020-06-12 PROCEDURE — 1100F PR PT FALLS ASSESS DOC 2+ FALLS/FALL W/INJURY/YR: ICD-10-PCS | Mod: HCNC,CPTII,S$GLB, | Performed by: PHYSICIAN ASSISTANT

## 2020-06-12 PROCEDURE — 20550 PR INJECT TENDON SHEATH/LIGAMENT: ICD-10-PCS | Mod: 51,HCNC,FA,S$GLB | Performed by: PHYSICIAN ASSISTANT

## 2020-06-12 PROCEDURE — 1159F PR MEDICATION LIST DOCUMENTED IN MEDICAL RECORD: ICD-10-PCS | Mod: HCNC,S$GLB,, | Performed by: PHYSICIAN ASSISTANT

## 2020-06-12 PROCEDURE — 3078F PR MOST RECENT DIASTOLIC BLOOD PRESSURE < 80 MM HG: ICD-10-PCS | Mod: HCNC,CPTII,S$GLB, | Performed by: PHYSICIAN ASSISTANT

## 2020-06-12 PROCEDURE — 3074F PR MOST RECENT SYSTOLIC BLOOD PRESSURE < 130 MM HG: ICD-10-PCS | Mod: HCNC,CPTII,S$GLB, | Performed by: PHYSICIAN ASSISTANT

## 2020-06-12 PROCEDURE — 3078F DIAST BP <80 MM HG: CPT | Mod: HCNC,CPTII,S$GLB, | Performed by: PHYSICIAN ASSISTANT

## 2020-06-12 PROCEDURE — 1159F MED LIST DOCD IN RCRD: CPT | Mod: HCNC,S$GLB,, | Performed by: PHYSICIAN ASSISTANT

## 2020-06-12 PROCEDURE — 1125F AMNT PAIN NOTED PAIN PRSNT: CPT | Mod: HCNC,S$GLB,, | Performed by: PHYSICIAN ASSISTANT

## 2020-06-12 RX ORDER — LIDOCAINE HYDROCHLORIDE 10 MG/ML
0.5 INJECTION, SOLUTION EPIDURAL; INFILTRATION; INTRACAUDAL; PERINEURAL ONCE
Status: COMPLETED | OUTPATIENT
Start: 2020-06-12 | End: 2020-06-12

## 2020-06-12 RX ORDER — DEXAMETHASONE SODIUM PHOSPHATE 4 MG/ML
4 INJECTION, SOLUTION INTRA-ARTICULAR; INTRALESIONAL; INTRAMUSCULAR; INTRAVENOUS; SOFT TISSUE
Status: COMPLETED | OUTPATIENT
Start: 2020-06-12 | End: 2020-06-12

## 2020-06-12 RX ADMIN — DEXAMETHASONE SODIUM PHOSPHATE 4 MG: 4 INJECTION, SOLUTION INTRA-ARTICULAR; INTRALESIONAL; INTRAMUSCULAR; INTRAVENOUS; SOFT TISSUE at 10:06

## 2020-06-12 RX ADMIN — LIDOCAINE HYDROCHLORIDE 5 MG: 10 INJECTION, SOLUTION EPIDURAL; INFILTRATION; INTRACAUDAL; PERINEURAL at 11:06

## 2020-06-12 NOTE — PROGRESS NOTES
Subjective:      Patient ID: Aretha Nguyen is a 74 y.o. female.    Chief Complaint: Pain of the Left Wrist      HPI  Aretha Nguyen is a  74 y.o. female presenting today referred by her PCP for a left distal radius fracture. She reports an injury a little over two weeks ago. She was walking and stumble forward landing on the left wrist. She was recently seen by her PCP, xrays were obtained which revealed fracture. She presents with an ACE wrap over the wrist. Pt reports pain is intermittent. She is taking Tramadol as needed with adequate pain relief.     In addition, pt reports pain and triggering of the left thumb.     Review of patient's allergies indicates:   Allergen Reactions    Ciprofloxacin hcl Rash         Current Outpatient Medications   Medication Sig Dispense Refill    albuterol (PROVENTIL/VENTOLIN HFA) 90 mcg/actuation inhaler Inhale 1-2 puffs into the lungs every 6 (six) hours as needed for Wheezing. Rescue 1 Inhaler 0    albuterol sulfate 2.5 mg/0.5 mL Nebu Take 2.5 mg by nebulization every 4 (four) hours as needed. Rescue 1 each 30    atorvastatin (LIPITOR) 20 MG tablet TAKE 1 TABLET BY MOUTH EVERY DAY 90 tablet 1    balsalazide (COLAZAL) 750 mg capsule 2 tablets 2 times daily  2    cetirizine (ZYRTEC) 10 MG tablet Take 1 tablet (10 mg total) by mouth once daily. 30 tablet 0    cholecalciferol, vitamin D3, 2,000 unit Cap Take 1 capsule (2,000 Units total) by mouth once daily.      escitalopram oxalate (LEXAPRO) 5 MG Tab Take 1 tablet (5 mg total) by mouth once daily. 90 tablet 1    ferrous sulfate 324 mg (65 mg iron) TbEC TAKE 1 TABLET (324 MG TOTAL) BY MOUTH 2 (TWO) TIMES DAILY. 180 tablet 0    FLUAD 9595-4430, 65 YR UP,,PF, 45 mcg (15 mcg x 3)/0.5 mL Syrg       hydroCHLOROthiazide (MICROZIDE) 12.5 mg capsule TAKE 1 CAPSULE (12.5 MG TOTAL) BY MOUTH ONCE DAILY. 90 capsule 0    metFORMIN (GLUCOPHAGE-XR) 500 MG 24 hr tablet TAKE 1 TABLET (500 MG TOTAL) BY MOUTH ONCE DAILY. 90 tablet 1     "multivit-min-iron-FA-lutein (CENTRUM SILVER WOMEN) 8 mg iron-400 mcg-300 mcg Tab Take 1 tablet by mouth once daily at 6am.      olmesartan (BENICAR) 20 MG tablet TAKE 1 TABLET BY MOUTH EVERY DAY 90 tablet 0    traMADoL (ULTRAM) 50 mg tablet Take 1 tablet (50 mg total) by mouth every 8 (eight) hours as needed for Pain. 21 tablet 0    varicella-zoster gE-AS01B, PF, (SHINGRIX, PF,) 50 mcg/0.5 mL injection Inject 0.5 mLs into the muscle As instructed. 2 doses 0.5 mL 2     Current Facility-Administered Medications   Medication Dose Route Frequency Provider Last Rate Last Dose    dexamethasone injection 4 mg  4 mg Other 1 time in Clinic/HOD Radha Torres PA-C        lidocaine (PF) 10 mg/ml (1%) injection 5 mg  0.5 mL Other Once Radha Torres PA-C           Past Medical History:   Diagnosis Date    Controlled type 2 diabetes mellitus with hyperglycemia, without long-term current use of insulin 3/22/2017    Hyperlipidemia associated with type 2 diabetes mellitus 3/22/2017    Hypertension complicating diabetes 3/22/2017    Inflammatory bowel disease 3/22/2017       Past Surgical History:   Procedure Laterality Date    HYSTERECTOMY      MASTECTOMY Left     for benign recurrent growth. Dr. Stephan Brown MD - [place], [place] - General Surgery & Surgery    TOTAL ABDOMINAL HYSTERECTOMY W/ BILATERAL SALPINGOOPHORECTOMY  2009    for benign cysts with concern for future malginancy. Fibromoid uterus for AUB. Bengin results       Review of Systems:  Constitutional: Negative for chills and fever.   Respiratory: Negative for cough and shortness of breath.    Gastrointestinal: Negative for nausea and vomiting.   Skin: Negative for rash.   Neurological: Negative for dizziness and headaches.   Psychiatric/Behavioral: Negative for depression.   MSK as in HPI       OBJECTIVE:     PHYSICAL EXAM:  /70   Pulse 77   Ht 5' 1" (1.549 m)   Wt 64 kg (141 lb)   LMP  (LMP Unknown)   BMI 26.64 kg/m²     GEN:  NAD, well-developed, " well-groomed.  NEURO: Awake, alert, and oriented. Normal attention and concentration.    PSYCH: Normal mood and affect. Behavior is normal.  HEENT: No cervical lymphadenopathy noted.  CARDIOVASCULAR: Radial pulses 2+ bilaterally. No LE edema noted.  PULMONARY: Breath sounds normal. No respiratory distress.  SKIN: Intact, no rashes.      MSK:   LUE:  Good active ROM of the fingers. Wrist motion not assessed. Mild dorsal wrist edema with ttp over the fracture site. There is ttp over the thumb A1 pulley with triggering. AIN/PIN/Radial/Median/Ulnar Nerves assessed in isolation without deficit. Radial & Ulnar arteries palpated 2+. Capillary Refill <3s.      RADIOGRAPHS:  Xray left wrist 6/9/20  FINDINGS:  There is transverse appearing, minimally displaced, impaction-type fracture through the distal radius.  No definite intra-articular extension.  Remaining carpal articulations appear maintained.  Base of thumb DJD.  Soft tissue swelling about the wrist.  Comments: I have personally reviewed the imaging and I agree with the above radiologist's report.    The left wrist was also evaluated under fluoroscopy today to include an oblique view of the fracture. Fracture remains in stable alignment.     ASSESSMENT/PLAN:       ICD-10-CM ICD-9-CM   1. Closed fracture of distal end of left radius, unspecified fracture morphology, initial encounter S52.502A 813.42   2. Trigger thumb of left hand M65.312 727.03       Orders Placed This Encounter    X-Ray Wrist Complete Left    lidocaine (PF) 10 mg/ml (1%) injection 5 mg    dexamethasone injection 4 mg     Plan:   -plan for non-op treatment left distal radius fracture, short arm cast applied today   -pt wishes to proceed with a left trigger thumb injection today   -RTC about 3 weeks with xray out of cast       PROCEDURE:  I have explained the risks, benefits, and alternatives of the procedure in detail.  The patient voices understanding and all questions have been answered.  The  patient agrees to proceed as planned. US guidance used. So after a sterile prep of the skin in the normal fashion the left thumb flexor tendon sheath is injected from the volar approach using a 25 gauge needle with a combination of 0.5cc 1% plain lidocaine and 4 mg of dexamethasone.  The patient is cautioned and immediate relief of pain is secondary to the local anesthetic and will be temporary.  After the anesthetic wears off there may be a increase in pain that may last for a few hours or a few days and they should use ice to help alleviate this flair up of pain. Patient tolerated the procedure well.       The patient indicates understanding of these issues and agrees to the plan.    Radha Torres PA-C  [facility]   [facility] [place], [place]

## 2020-06-12 NOTE — LETTER
June 12, 2020      Lawrence Logan MD  [address]  [place] [address]           [place] [address]  [address] [address]  [facility] [address]  Phone: [phone]          Patient: Aretha Nguyen   MR Number: [id number]   YOB: 1945   Date of Visit: 6/12/2020       Dear Dr. Lawrence Logan:    Thank you for referring Aretha Nguyen to me for evaluation. Attached you will find relevant portions of my assessment and plan of care.    If you have questions, please do not hesitate to call me. I look forward to following Aretha Nguyen along with you.    Sincerely,    Radha Torres PA-C    Enclosure  CC:  No Recipients    If you would like to receive this communication electronically, please contact [email or web address] or [phone] to request more information on [employer] Link access.    For providers and/or their staff who would like to refer a patient to [facility], please contact us through our one-stop-shop provider referral line, [facility], at [phone].    If you feel you have received this communication in error or would no longer like to receive these types of communications, please e-mail [email or web address]

## 2020-07-06 ENCOUNTER — TELEPHONE (OUTPATIENT)
Dept: ORTHOPEDICS | Facility: CLINIC | Age: 75
End: 2020-07-06

## 2020-07-06 NOTE — TELEPHONE ENCOUNTER
Attempted to contact pt to remind her of xray and appt with Radha Torres tomorrow. Unable to leave a message. Voicemail not set up yet.

## 2020-07-07 ENCOUNTER — TELEPHONE (OUTPATIENT)
Dept: ORTHOPEDICS | Facility: CLINIC | Age: 75
End: 2020-07-07

## 2020-07-07 ENCOUNTER — PATIENT OUTREACH (OUTPATIENT)
Dept: ADMINISTRATIVE | Facility: OTHER | Age: 75
End: 2020-07-07

## 2020-07-07 NOTE — TELEPHONE ENCOUNTER
Unable to leave a message for pt to confirm tomorrow's xray and appt with Radha Torres. Voicemail not set up yet.

## 2020-07-07 NOTE — PROGRESS NOTES
Chart reviewed.   Immunizations: Triggered Imm Registry     Orders placed: n/a  Upcoming appts to satisfy MELISA topics: n/a

## 2020-07-08 ENCOUNTER — OFFICE VISIT (OUTPATIENT)
Dept: ORTHOPEDICS | Facility: CLINIC | Age: 75
End: 2020-07-08
Payer: MEDICARE

## 2020-07-08 ENCOUNTER — HOSPITAL ENCOUNTER (OUTPATIENT)
Dept: RADIOLOGY | Facility: OTHER | Age: 75
Discharge: HOME | End: 2020-07-08
Attending: PHYSICIAN ASSISTANT
Payer: MEDICARE

## 2020-07-08 VITALS — HEART RATE: 73 BPM | DIASTOLIC BLOOD PRESSURE: 70 MMHG | SYSTOLIC BLOOD PRESSURE: 107 MMHG

## 2020-07-08 DIAGNOSIS — S52.502A CLOSED FRACTURE OF DISTAL END OF LEFT RADIUS, UNSPECIFIED FRACTURE MORPHOLOGY, INITIAL ENCOUNTER: Primary | ICD-10-CM

## 2020-07-08 DIAGNOSIS — S52.502A CLOSED FRACTURE OF DISTAL END OF LEFT RADIUS, UNSPECIFIED FRACTURE MORPHOLOGY, INITIAL ENCOUNTER: ICD-10-CM

## 2020-07-08 PROCEDURE — 3074F SYST BP LT 130 MM HG: CPT | Mod: HCNC,CPTII,S$GLB, | Performed by: PHYSICIAN ASSISTANT

## 2020-07-08 PROCEDURE — 3078F PR MOST RECENT DIASTOLIC BLOOD PRESSURE < 80 MM HG: ICD-10-PCS | Mod: HCNC,CPTII,S$GLB, | Performed by: PHYSICIAN ASSISTANT

## 2020-07-08 PROCEDURE — 99214 PR OFFICE/OUTPT VISIT, EST, LEVL IV, 30-39 MIN: ICD-10-PCS | Mod: HCNC,25,S$GLB, | Performed by: PHYSICIAN ASSISTANT

## 2020-07-08 PROCEDURE — 97760 PR ORTHOTIC MGMT&TRAINJ INITIAL ENC EA 15 MINS: ICD-10-PCS | Mod: HCNC,GP,S$GLB, | Performed by: PHYSICIAN ASSISTANT

## 2020-07-08 PROCEDURE — 73110 X-RAY EXAM OF WRIST: CPT | Mod: 26,HCNC,LT, | Performed by: RADIOLOGY

## 2020-07-08 PROCEDURE — 1159F PR MEDICATION LIST DOCUMENTED IN MEDICAL RECORD: ICD-10-PCS | Mod: HCNC,S$GLB,, | Performed by: PHYSICIAN ASSISTANT

## 2020-07-08 PROCEDURE — 99214 OFFICE O/P EST MOD 30 MIN: CPT | Mod: HCNC,25,S$GLB, | Performed by: PHYSICIAN ASSISTANT

## 2020-07-08 PROCEDURE — 73110 XR WRIST COMPLETE 3 VIEWS LEFT: ICD-10-PCS | Mod: 26,HCNC,LT, | Performed by: RADIOLOGY

## 2020-07-08 PROCEDURE — 3074F PR MOST RECENT SYSTOLIC BLOOD PRESSURE < 130 MM HG: ICD-10-PCS | Mod: HCNC,CPTII,S$GLB, | Performed by: PHYSICIAN ASSISTANT

## 2020-07-08 PROCEDURE — 3078F DIAST BP <80 MM HG: CPT | Mod: HCNC,CPTII,S$GLB, | Performed by: PHYSICIAN ASSISTANT

## 2020-07-08 PROCEDURE — 99999 PR PBB SHADOW E&M-EST. PATIENT-LVL III: CPT | Mod: PBBFAC,HCNC,, | Performed by: PHYSICIAN ASSISTANT

## 2020-07-08 PROCEDURE — 99999 PR PBB SHADOW E&M-EST. PATIENT-LVL III: ICD-10-PCS | Mod: PBBFAC,HCNC,, | Performed by: PHYSICIAN ASSISTANT

## 2020-07-08 PROCEDURE — 1126F PR PAIN SEVERITY QUANTIFIED, NO PAIN PRESENT: ICD-10-PCS | Mod: HCNC,S$GLB,, | Performed by: PHYSICIAN ASSISTANT

## 2020-07-08 PROCEDURE — 97760 ORTHOTIC MGMT&TRAING 1ST ENC: CPT | Mod: HCNC,GP,S$GLB, | Performed by: PHYSICIAN ASSISTANT

## 2020-07-08 PROCEDURE — 1101F PT FALLS ASSESS-DOCD LE1/YR: CPT | Mod: HCNC,CPTII,S$GLB, | Performed by: PHYSICIAN ASSISTANT

## 2020-07-08 PROCEDURE — 73110 X-RAY EXAM OF WRIST: CPT | Mod: TC,HCNC,FY,LT

## 2020-07-08 PROCEDURE — 1159F MED LIST DOCD IN RCRD: CPT | Mod: HCNC,S$GLB,, | Performed by: PHYSICIAN ASSISTANT

## 2020-07-08 PROCEDURE — 1126F AMNT PAIN NOTED NONE PRSNT: CPT | Mod: HCNC,S$GLB,, | Performed by: PHYSICIAN ASSISTANT

## 2020-07-08 PROCEDURE — 1101F PR PT FALLS ASSESS DOC 0-1 FALLS W/OUT INJ PAST YR: ICD-10-PCS | Mod: HCNC,CPTII,S$GLB, | Performed by: PHYSICIAN ASSISTANT

## 2020-07-08 NOTE — PROGRESS NOTES
Subjective:      Patient ID: Aretha Nguyen is a 74 y.o. female.    Chief Complaint: f/u wrist fx      HPI  Aretha Nguyen is a  74 y.o. female presenting today referred by her PCP for a left distal radius fracture. She reports an injury a little over two weeks ago. She was walking and stumble forward landing on the left wrist. She was recently seen by her PCP, xrays were obtained which revealed fracture. She presents with an ACE wrap over the wrist. Pt reports pain is intermittent. She is taking Tramadol as needed with adequate pain relief.     In addition, pt reports pain and triggering of the left thumb.     7/8/20  Pt presents for follow up. No pain at fracture. Wrist stiffness following cast removal. No improvement from trigger thumb injection.       Review of patient's allergies indicates:   Allergen Reactions    Ciprofloxacin hcl Rash         Current Outpatient Medications   Medication Sig Dispense Refill    albuterol (PROVENTIL/VENTOLIN HFA) 90 mcg/actuation inhaler Inhale 1-2 puffs into the lungs every 6 (six) hours as needed for Wheezing. Rescue 1 Inhaler 0    albuterol sulfate 2.5 mg/0.5 mL Nebu Take 2.5 mg by nebulization every 4 (four) hours as needed. Rescue 1 each 30    atorvastatin (LIPITOR) 20 MG tablet TAKE 1 TABLET BY MOUTH EVERY DAY 90 tablet 1    balsalazide (COLAZAL) 750 mg capsule 2 tablets 2 times daily  2    cetirizine (ZYRTEC) 10 MG tablet Take 1 tablet (10 mg total) by mouth once daily. 30 tablet 0    cholecalciferol, vitamin D3, 2,000 unit Cap Take 1 capsule (2,000 Units total) by mouth once daily.      escitalopram oxalate (LEXAPRO) 5 MG Tab Take 1 tablet (5 mg total) by mouth once daily. 90 tablet 1    ferrous sulfate 324 mg (65 mg iron) TbEC TAKE 1 TABLET (324 MG TOTAL) BY MOUTH 2 (TWO) TIMES DAILY. 180 tablet 0    FLUAD 4393-3248, 65 YR UP,,PF, 45 mcg (15 mcg x 3)/0.5 mL Syrg       hydroCHLOROthiazide (MICROZIDE) 12.5 mg capsule TAKE 1 CAPSULE BY MOUTH ONCE DAILY 90 capsule 0     metFORMIN (GLUCOPHAGE-XR) 500 MG 24 hr tablet TAKE 1 TABLET (500 MG TOTAL) BY MOUTH ONCE DAILY. 90 tablet 1    multivit-min-iron-FA-lutein (CENTRUM SILVER WOMEN) 8 mg iron-400 mcg-300 mcg Tab Take 1 tablet by mouth once daily at 6am.      olmesartan (BENICAR) 20 MG tablet TAKE 1 TABLET BY MOUTH EVERY DAY 90 tablet 0    varicella-zoster gE-AS01B, PF, (SHINGRIX, PF,) 50 mcg/0.5 mL injection Inject 0.5 mLs into the muscle As instructed. 2 doses 0.5 mL 2     No current facility-administered medications for this visit.        Past Medical History:   Diagnosis Date    Controlled type 2 diabetes mellitus with hyperglycemia, without long-term current use of insulin 3/22/2017    Hyperlipidemia associated with type 2 diabetes mellitus 3/22/2017    Hypertension complicating diabetes 3/22/2017    Inflammatory bowel disease 3/22/2017       Past Surgical History:   Procedure Laterality Date    HYSTERECTOMY      MASTECTOMY Left     for benign recurrent growth. Dr. Stephan Brown MD - [place], [place] - General Surgery & Surgery    TOTAL ABDOMINAL HYSTERECTOMY W/ BILATERAL SALPINGOOPHORECTOMY  2009    for benign cysts with concern for future malginancy. Fibromoid uterus for AUB. Bengin results       Review of Systems:  Constitutional: Negative for chills and fever.   Respiratory: Negative for cough and shortness of breath.    Gastrointestinal: Negative for nausea and vomiting.   Skin: Negative for rash.   Neurological: Negative for dizziness and headaches.   Psychiatric/Behavioral: Negative for depression.   MSK as in HPI       OBJECTIVE:     PHYSICAL EXAM:  /70 (BP Location: Right arm, Patient Position: Sitting, BP Method: Large (Automatic))   Pulse 73   LMP  (LMP Unknown)     GEN:  NAD, well-developed, well-groomed.  NEURO: Awake, alert, and oriented. Normal attention and concentration.    PSYCH: Normal mood and affect. Behavior is normal.  HEENT: No cervical lymphadenopathy noted.  CARDIOVASCULAR: Radial  pulses 2+ bilaterally. No LE edema noted.  PULMONARY: Breath sounds normal. No respiratory distress.  SKIN: Intact, no rashes.      MSK:   LUE:  Good wrist/ finger motion. No ttp over fracture site. There is ttp over the thumb A1 pulley with triggering. AIN/PIN/Radial/Median/Ulnar Nerves assessed in isolation without deficit. Radial & Ulnar arteries palpated 2+. Capillary Refill <3s.      RADIOGRAPHS:  Xray left wrist 6/9/20  FINDINGS:  There is transverse appearing, minimally displaced, impaction-type fracture through the distal radius.  No definite intra-articular extension.  Remaining carpal articulations appear maintained.  Base of thumb DJD.  Soft tissue swelling about the wrist.  Comments: I have personally reviewed the imaging and I agree with the above radiologist's report.    The left wrist was also evaluated under fluoroscopy today to include an oblique view of the fracture. Fracture remains in stable alignment.     ASSESSMENT/PLAN:       ICD-10-CM ICD-9-CM   1. Closed fracture of distal end of left radius, unspecified fracture morphology, initial encounter  S52.502A 813.42       Orders Placed This Encounter    X-Ray Wrist Complete Left    Ambulatory referral/consult to Physical/Occupational Therapy     Plan:   -left forearm brace  -OT ordered  -RTC 4 wks with xray, will f/u trigger thumb       15 min spent preparing/fitting/educating pt on brace.   The patient indicates understanding of these issues and agrees to the plan.    Radha Torres PA-C  [facility]   [facility] [place]

## 2020-07-08 NOTE — LETTER
July 8, 2020      Lawrence Logan MD  [address]  [place] [address]           [place] [address]  [address] [address]  [facility] [address]  Phone: [phone]          Patient: Aretha Nguyen   MR Number: [id number]   YOB: 1945   Date of Visit: 7/8/2020       Dear Dr. Lawrence Logan:    Thank you for referring Aretha Nguyen to me for evaluation. Attached you will find relevant portions of my assessment and plan of care.    If you have questions, please do not hesitate to call me. I look forward to following Aretha Nguyen along with you.    Sincerely,    Radha Torres PA-C    Enclosure  CC:  No Recipients    If you would like to receive this communication electronically, please contact [email or web address] or [phone] to request more information on [employer] Link access.    For providers and/or their staff who would like to refer a patient to [facility], please contact us through our one-stop-shop provider referral line, [facility], at [phone].    If you feel you have received this communication in error or would no longer like to receive these types of communications, please e-mail [email or web address]

## 2020-07-20 ENCOUNTER — CLINICAL SUPPORT (OUTPATIENT)
Dept: REHABILITATION | Facility: HOSPITAL | Age: 75
End: 2020-07-20
Payer: MEDICARE

## 2020-07-20 DIAGNOSIS — M25.532 LEFT WRIST PAIN: ICD-10-CM

## 2020-07-20 DIAGNOSIS — M25.632 DECREASED RANGE OF MOTION OF LEFT WRIST: ICD-10-CM

## 2020-07-20 DIAGNOSIS — S52.502A CLOSED FRACTURE OF DISTAL END OF LEFT RADIUS, UNSPECIFIED FRACTURE MORPHOLOGY, INITIAL ENCOUNTER: ICD-10-CM

## 2020-07-20 PROCEDURE — 97166 OT EVAL MOD COMPLEX 45 MIN: CPT | Mod: HCNC,PN

## 2020-07-20 NOTE — PLAN OF CARE
[facility] Occupational Therapy  Initial Evaluation     Date: 7/20/2020  Name: Aretha Nguyen  Clinic Number: [id number]    Therapy Diagnosis:   Encounter Diagnoses   Name Primary?    Closed fracture of distal end of left radius, unspecified fracture morphology, initial encounter     Left wrist pain     Decreased range of motion of left wrist      Physician: Radha Torres PA-C    Physician Orders: eval and treat  Medical Diagnosis: Closed fracture of distal end of left radius, unspecified fracture morphology, initial encounter [S52.502A]  Surgical Procedure and Date: n/a / Date of Injury/Onset: early June  Evaluation Date: 7/20/2020  Insurance Authorization Period Expiration: 12/31/2020  Plan of Care Certification Period: 9/18/2020  Date of Return to MD: 8/5/2020    Visit # / Visits authorized: 1 / 1    FOTO: initial eval    Time In: 1:30 pm  Time Out: 2:10  Total treatment time: 40 minutes  Total Timed minutes0 minutes    Precautions:  Standard and Weightbearing, NWB    Subjective     Involved Side: left  Dominant Side: Right  Mechanism of Injury: fell on arm  History of Current Condition: Pt fell onto arm when she tripped at her son's house. Pt was in a cast for ~4 weeks then put in a pre alethea wrist brace  Imaging: x-ray on 7/8/2020 indicates: Fiberglass cast obscures bone and soft tissue detail.  Nondisplaced fracture of the distal radius demonstrates healing change.  Degenerative changes are noted at the base of thumb and triscaphe joints.  Previous Therapy: no prior therapy    Past Medical History/Physical Systems Review:   Aretha Nguyen  has a past medical history of Controlled type 2 diabetes mellitus with hyperglycemia, without long-term current use of insulin, Hyperlipidemia associated with type 2 diabetes mellitus, Hypertension complicating diabetes, and Inflammatory bowel disease.    Aretha Nguyen  has a past surgical history that includes Total abdominal hysterectomy w/ bilateral  salpingoophorectomy (2009); Mastectomy (Left); and Hysterectomy.    Aretha has a current medication list which includes the following prescription(s): albuterol, albuterol sulfate, atorvastatin, balsalazide, cetirizine, cholecalciferol (vitamin d3), escitalopram oxalate, ferrous sulfate, fluad [id number] (65 yr up)(pf), hydrochlorothiazide, metformin, multivit-min-iron-fa-lutein, olmesartan, and varicella-zoster ge-as01b (pf).    Review of patient's allergies indicates:   Allergen Reactions    Ciprofloxacin hcl Rash        Patient's Goals for Therapy: to have the hand like the other one    Pain:  Functional Pain Scale Rating 0-10:   1/10 on average  1/10 at best  6/10 at worst  Location: L ulnar styloid  Description: Aching and Sharp  Aggravating Factors: pronation and wrist movement  Easing Factors: rest    Occupation:    Working presently: home-maker  Duties: house hold cleaning, cooking    Functional Limitations/Social History:    Previous functional status includes: Independent with all ADLs. I    Current FunctionalStatus   Home/Living environment : lives with their spouse      Limitation of Functional Status as follows:   ADLs/IADLs:     - Feeding: I    - Bathing: mod I     - Dressing/Grooming: uses different method to [place],     - Driving: I    -unable to cook and clean,  assists, unable to open jars     Leisure: pt reports enjoys cooking, has not been able to         Objective     Pt is grossly 6 weeks post injury    Observation/Appearance: mod swelling at wrist, wearing pre alethea wrist brace to clinic. Mild atrophy noted in palm. Tenderness noted at volar thumb    Edema. Measured in centimeters.   7/20/2020 7/20/2020    Left Right   Wrist Crease 14.8 14.3   DPC 17.6 18   MCPs 17.4 17.5         Elbow and Wrist ROM. Measured in degrees.   7/20/2020 7/20/2020    Left Right   Elbow Ext/Flex     Supination/Pronation 50/46 WNL   Wrist Ext/Flex 52/44 56/66   Wrist RD/UD 16/10 18/26     Hand ROM. Measured in  degrees.  Digits 2-5 WNL   7/20/2020 7/20/2020    Left Right        Thumb: MP WFL 45                IP WFL 8       Rad ADD/ABD WFL 38       Pal ADD/ABD WFL 45          Opposition WFL WFL      Strength (Dynamometer) and Pinch Strength (Pinch Gauge)  Measured in pounds.   7/20/2020 7/20/2020    Left Right   Rung II deferred deferred   Pardo Pinch deferred deferred   3pt Pinch deferred deferred   2pt Pinch deferred deferred     Sensation: denies numbness and tingling, sensation intact    Manual Muscle Test: deferred          CMS Impairment/Limitation/Restriction for FOTO WRist Survey    Therapist reviewed FOTO scores for Aretha Nguyen on 7/20/2020.   FOTO documents entered into [employer] - see Media section.    Limitation Score: 56%         Treatment       Aretha received the following supervised modalities after being cleared for contradictions for 5 minutes:   -Patient received paraffin bath to L hand(s) for 5 minutes to increase blood flow, circulation, pain management and for tissue elasticity prior to therex.       Aretha received therapeutic exercises for 5 minutes including:  -  AROM   Sup/Pro  Wrist  Ext/flx  RD/UD    X 5 reps each    AROM   Composite fist  Lifts X 5 reps each   AROM thumb:  Rad abd/add  Palmar abd/add  Pinky Slides    X 5 reps each      Pt given Oval 8 - size 8 for L thumb to prevent triggering and rest joint. Good fit noted. Pt verbalized understanding of     Home Exercise Program/Education:  Issued HEP (see patient instructions in EMR) and educated on modality use for pain management . Exercises were reviewed and Aretha was able to demonstrate them prior to the end of the session.   Pt received a written copy of exercises to perform at home. Aretha demonstrated good  understanding of the education provided.  Pt was advised to perform these exercises free of pain, and to stop performing them if pain occurs.    Patient/Family Education: role of OT, goals for OT, scheduling/cancellations - pt  verbalized understanding. Discussed insurance limitations with patient.    Additional Education provided: retrograde massage    Assessment     Aretha Nguyen is a 74 y.o. female referred to outpatient occupational therapy and presents with a medical diagnosis of closed L DR fx and trigger thumb, resulting in pain, swelling, decreased ROM, limited functional hand use, and demonstrates limitations as described in the chart below. Following medical record review it is determined that pt will benefit from occupational therapy services in order to maximize pain free and/or functional use of left hand. The following goals were discussed with the patient and patient is in agreement with them as to be addressed in the treatment plan. The patient's rehab potential is Good.     Anticipated barriers to occupational therapy: none  Pt has no cultural, educational or language barriers to learning provided.    Profile and History Assessment of Occupational Performance Level of Clinical Decision Making Complexity Score   Occupational Profile:   Aretha Nguyen is a 74 y.o. female who lives with their spouse and is currently home-maker. Aretha Nguyen has difficulty with  grooming and dressing  driving/transportation management, phone/computer use, housework/household chores and cooking  affecting his/her daily functional abilities. His/her main goal for therapy is to get back normal ROM.     Comorbidities:   advanced age    Medical and Therapy History Review:   Expanded               Performance Deficits    Physical:  Joint Mobility  Joint Stability  Muscle Power/Strength  Muscle Endurance  Edema   Strength  Fine Motor Coordination  Pain    Cognitive:  No Deficits    Psychosocial:    Habits  Routines     Clinical Decision Making:  moderate    Assessment Process:  Detailed Assessments    Modification/Need for Assistance:  Minimal-Moderate Modifications/Assistance    Intervention Selection:  Multiple Treatment Options        moderate  Based on PMHX, co morbidities , data from assessments and functional level of assistance required with task and clinical presentation directly impacting function.         Goals:   The following goals were discussed with the patient and patient is in agreement with them as to be addressed in the treatment plan.   Long Term Goals (LTGs); to be met by discharge.  LTG #1: Pt will report a pain level of 0 out of 10 with cooking and cleaning tasks   LTG #2: Pt will demo improved FOTO score by at least 20 points.   LTG #3: Pt will return to prior level of function for ADLs and household management.   LTG #4: Pt will demonstrate improved L wrist AROM WFL for performing house hold tasks  LTG #5:  strength to be assessed when appropriate and goals set accordingly    Short Term Goals (STGs); to be met within 4 weeks (8/20/2020).  STG #1a: Pt will report 3 out of 10 pain level with ADLs and house hold tasks.  STG #2a: Pt will report/demo [place] with cooking.  STG #2b: Pt will report/demo [place] with house hold cleaning.  STG #3a: Pt will demonstrate independence with issued HEP.   STG #3b: Pt will demo improved L wrist AROM by at least 10 degrees needed to aid with ADLs.        Plan   Certification Period/Plan of care expiration: 7/20/2020 to 9/18/2020.    Outpatient Occupational Therapy 2 times weekly for 8 weeks to include the following interventions: Paraffin, Fluidotherapy, Manual therapy/joint mobilizations, Modalities for pain management, Therapeutic exercises/activities., Strengthening, Edema Control, Joint Protection and Energy Conservation.      ALFRED Sanchez  Date: 7/20/2020      I certify the need for these services furnished under the plan of treatment and while under my care.     ____________________________________________________________________  Physician/Referring Practitioner   Date of Signature

## 2020-07-20 NOTE — PATIENT INSTRUCTIONS
[facility]  OCCUPATIONAL THERAPY  HOME EXERCISE PROGRAM   [facility]  [phone]           Complete massage 2-3 minutes 4 times a day:        Complete 10 repetitions of each exercise 4 times a day:                 _

## 2020-07-28 ENCOUNTER — CLINICAL SUPPORT (OUTPATIENT)
Dept: REHABILITATION | Facility: HOSPITAL | Age: 75
End: 2020-07-28
Payer: MEDICARE

## 2020-07-28 DIAGNOSIS — M25.632 DECREASED RANGE OF MOTION OF LEFT WRIST: ICD-10-CM

## 2020-07-28 DIAGNOSIS — M25.532 LEFT WRIST PAIN: ICD-10-CM

## 2020-07-28 PROCEDURE — 97018 PARAFFIN BATH THERAPY: CPT | Mod: HCNC,PN

## 2020-07-28 PROCEDURE — 97110 THERAPEUTIC EXERCISES: CPT | Mod: HCNC,PN

## 2020-07-28 NOTE — PROGRESS NOTES
"  Occupational Therapy Daily Treatment Note     Name: Aretha Nguyen  Clinic Number: [id number]    Therapy Diagnosis:   Encounter Diagnoses   Name Primary?    Left wrist pain     Decreased range of motion of left wrist      Physician: Radha Torres PA-C    Visit Date: 7/28/2020  Physician Orders: eval and treat  Medical Diagnosis: Closed fracture of distal end of left radius, unspecified fracture morphology, initial encounter [S52.502A]  Surgical Procedure and Date: n/a / Date of Injury/Onset: early June  Evaluation Date: 7/20/2020  Insurance Authorization Period Expiration: 12/31/2020  Plan of Care Certification Period: 9/18/2020  Date of Return to MD: 8/5/2020     Visit # / Visits authorized: 2 / 24     FOTO: initial eval     Time In: 11:30 pm  Time Out: 12:08 pm   Total treatment time: 38 minutes  Total Timed minutes 28minutes  2 TE 1 P     Precautions:  Standard and Weightbearing, NWB      Subjective     Pt reports: "Its going good I did the homework"  she was compliant with home exercise program given last session.   Response to previous treatment:Good increased use  Functional change: States only pain with certain movements    Pain: 2/10  Location: left wrists      Objective   Pt is grossly 7 weeks post injury     Observation/Appearance: mod swelling at wrist, wearing pre alethea wrist brace to clinic. Mild atrophy noted in palm. Tenderness noted at volar thumb     Edema. Measured in centimeters.    7/20/2020 7/20/2020     Left Right   Wrist Crease 14.8 14.3   DPC 17.6 18   MCPs 17.4 17.5         Elbow and Wrist ROM. Measured in degrees.    7/20/2020 7/20/2020     Left Right   Elbow Ext/Flex       Supination/Pronation 50/46 WNL   Wrist Ext/Flex 52/44 56/66   Wrist RD/UD 16/10 18/26      Hand ROM. Measured in degrees.  Digits 2-5 WNL    7/20/2020 7/20/2020     Left Right           Thumb: MP WFL 45                IP WFL 8       Rad ADD/ABD WFL 38       Pal ADD/ABD WFL 45          Opposition WFL WFL       Strength " (Dynamometer) and Pinch Strength (Pinch Gauge)  Measured in pounds.    7/20/2020 7/20/2020     Left Right   Rung II deferred deferred   Pardo Pinch deferred deferred   3pt Pinch deferred deferred   2pt Pinch deferred deferred        Aretah received the following supervised modalities after being cleared for contradictions for 10 minutes:   -Patient received paraffin bath to L hand(s) for 10 minutes to increase blood flow, circulation, pain management and for tissue elasticity prior to therex.      Aretha received the following manual therapy techniques for 5 minutes:   -Pt received retrograde massage as well as scar massage to decrease edema and stiffness for increased ROM. STM performed to decreased stiffness in surrounding musculature.     Aretha received therapeutic exercises for 23 minutes including:  -  AROM   Sup/Pro  Wrist  Ext/flx  RD/UD    X 20  reps each    AROM   Composite fist  Lifts X 20 reps each   AROM thumb:  Rad abd/add  Palmar abd/add  Pinky Slides    X 20 reps each    Isospheres 2 min    Wrist Wheel 2 min    Wrist dextraciser 2 min    Yellow RB ext  2/10           Home Exercises and Education Provided     Education provided:   - Continue established HEP  - Progress towards goals     Written Home Exercises Provided: Patient instructed to cont prior HEP.  Exercises were reviewed and Aretha was able to demonstrate them prior to the end of the session.  Aretha demonstrated good  understanding of the HEP provided.   .   See EMR under Patient Instructions for exercises provided prior visit.        Assessment     Pt would continue to benefit from skilled OT. Pt tolerated first after well. She is doing her exercises. States progress at home. No pain today only fatigue with the new exercises. Progressing well and motivated.      Aretha is progressing well towards her goals and there are no updates to goals at this time. Pt prognosis is Excellent.     Pt will continue to benefit from skilled outpatient  occupational therapy to address the deficits listed in the problem list on initial evaluation provide pt/family education and to maximize pt's level of independence in the home and community environment.     Anticipated barriers to occupational therapy: none noted    Pt's spiritual, cultural and educational needs considered and pt agreeable to plan of care and goals.    Goals:  Goals:   The following goals were discussed with the patient and patient is in agreement with them as to be addressed in the treatment plan.   Long Term Goals (LTGs); to be met by discharge.  LTG #1: Pt will report a pain level of 0 out of 10 with cooking and cleaning tasks   Progressing 7/28/2020  LTG #2: Pt will demo improved FOTO score by at least 20 points. Progressing 7/28/2020  LTG #3: Pt will return to prior level of function for ADLs and household management. Progressing 7/28/2020  LTG #4: Pt will demonstrate improved L wrist AROM WFL for performing house hold tasks Progressing 7/28/2020  LTG #5:  strength to be assessed when appropriate and goals set accordingly Progressing 7/28/2020     Short Term Goals (STGs); to be met within 4 weeks (8/20/2020).  STG #1a: Pt will report 3 out of 10 pain level with ADLs and house hold tasks. Progressing 7/28/2020  STG #2a: Pt will report/demo [place] with cooking. Progressing 7/28/2020  STG #2b: Pt will report/demo [place] with house hold cleaning. Progressing 7/28/2020  STG #3a: Pt will demonstrate independence with issued HEP.  Progressing 7/28/2020  STG #3b: Pt will demo improved L wrist AROM by at least 10 degrees needed to aid with ADLs. Progressing 7/28/2020        Plan   Continue per initial POC.   Updates/Grading for next session: Progress as tolerated.       Nnamdi Albert, OT

## 2020-08-01 DIAGNOSIS — D50.9 IRON DEFICIENCY ANEMIA, UNSPECIFIED IRON DEFICIENCY ANEMIA TYPE: ICD-10-CM

## 2020-08-03 ENCOUNTER — CLINICAL SUPPORT (OUTPATIENT)
Dept: REHABILITATION | Facility: HOSPITAL | Age: 75
End: 2020-08-03
Payer: MEDICARE

## 2020-08-03 ENCOUNTER — TELEPHONE (OUTPATIENT)
Dept: ORTHOPEDICS | Facility: CLINIC | Age: 75
End: 2020-08-03

## 2020-08-03 DIAGNOSIS — M25.532 LEFT WRIST PAIN: ICD-10-CM

## 2020-08-03 DIAGNOSIS — M25.632 DECREASED RANGE OF MOTION OF LEFT WRIST: ICD-10-CM

## 2020-08-03 DIAGNOSIS — R52 PAIN: Primary | ICD-10-CM

## 2020-08-03 PROCEDURE — 97110 THERAPEUTIC EXERCISES: CPT | Mod: HCNC,PN

## 2020-08-03 PROCEDURE — 97018 PARAFFIN BATH THERAPY: CPT | Mod: HCNC,PN

## 2020-08-03 NOTE — PROGRESS NOTES
"  Occupational Therapy Daily Treatment Note     Name: Aretha Nguyen  Clinic Number: [id number]    Therapy Diagnosis:   Encounter Diagnoses   Name Primary?    Left wrist pain     Decreased range of motion of left wrist      Physician: Radha Torres PA-C    Visit Date: 8/3/2020  Physician Orders: eval and treat  Medical Diagnosis: Closed fracture of distal end of left radius, unspecified fracture morphology, initial encounter [S52.502A]  Surgical Procedure and Date: n/a / Date of Injury/Onset: early June  Evaluation Date: 7/20/2020  Insurance Authorization Period Expiration: 12/31/2020  Plan of Care Certification Period: 9/18/2020  Date of Return to MD: 8/5/2020     Visit # / Visits authorized: 3 / 24     FOTO: initial eval     Time In: 12:00 pm  Time Out: 12:40 pm   Total treatment time: 40 minutes  Total Timed minutes  30 minutes  2 TE 1 P     Precautions:  Standard and Weightbearing, NWB      Subjective     Pt reports: "the wrist is doing better"  she was compliant with home exercise program given last session.   Response to previous treatment:Good increased use  Functional change: States only pain with certain movements    Pain: 0/10  Location: left wrists      Objective   Pt is grossly 7 weeks post injury     Observation/Appearance: mod swelling at wrist, wearing pre alethea wrist brace to clinic. Mild atrophy noted in palm. Tenderness noted at volar thumb     Edema. Measured in centimeters.    7/20/2020 7/20/2020 8/3/2020     Left Right Left   Wrist Crease 14.8 14.3 15   DPC 17.6 18 NT   MCPs 17.4 17.5 NT         Elbow and Wrist ROM. Measured in degrees.    7/20/2020 7/20/2020 8/3/2020     Left Right Left   Elbow Ext/Flex        Supination/Pronation 50/46 WNL 56/58   Wrist Ext/Flex 52/44 56/66 54/50   Wrist RD/UD 16/10 18/26 20/16      Hand ROM. Measured in degrees.  Digits 2-5 WNL    7/20/2020 7/20/2020 8/3/2020     Left Right Left            Thumb: MP WFL 45 48                IP WFL 8 30       Rad ADD/ABD WFL 38 " 44       Pal ADD/ABD WFL 45 42          Opposition WFL WFL WFL       Strength (Dynamometer) and Pinch Strength (Pinch Gauge)  Measured in pounds.    7/20/2020 7/20/2020     Left Right   Rung II deferred deferred   Pardo Pinch deferred deferred   3pt Pinch deferred deferred   2pt Pinch deferred deferred        Aretha received the following supervised modalities after being cleared for contradictions for 10 minutes:   -Patient received paraffin bath to L hand(s) for 10 minutes to increase blood flow, circulation, pain management and for tissue elasticity prior to therex.      Aretha received the following manual therapy techniques for 5 minutes:   -Pt received retrograde massage as well as scar massage to decrease edema and stiffness for increased ROM. STM performed to decreased stiffness in surrounding musculature.     Aretha received therapeutic exercises for 25 minutes including:  -  AROM   Sup/Pro  Wrist  Ext/flx  RD/UD    X 20  reps each    AROM   Composite fist  Lifts X 20 reps each   AROM thumb:  Rad abd/add  Palmar abd/add  Pinky Slides    X 20 reps each    Isospheres 2 min    Wrist Wheel 2 min    Wrist dextraciser 3 min    Yellow RB ext  2/10    Wrist AROM with med dowel, 3 ways 2/10 each          Home Exercises and Education Provided     Education provided:   - Continue established HEP  - Progress towards goals     Written Home Exercises Provided: Patient instructed to cont prior HEP.  Exercises were reviewed and Aretha was able to demonstrate them prior to the end of the session.  Aretha demonstrated good  understanding of the HEP provided.   .   See EMR under Patient Instructions for exercises provided prior visit.        Assessment     Pt would continue to benefit from skilled OT. Pt tolerated tx well this date. She denied pain. She is doing her HEP, and states she feels progress at home. ROM improving. Progressing well and motivated.      Aretha is progressing well towards her goals and there are no  updates to goals at this time. Pt prognosis is Excellent.     Pt will continue to benefit from skilled outpatient occupational therapy to address the deficits listed in the problem list on initial evaluation provide pt/family education and to maximize pt's level of independence in the home and community environment.     Anticipated barriers to occupational therapy: none noted    Pt's spiritual, cultural and educational needs considered and pt agreeable to plan of care and goals.    Goals:  Goals:   The following goals were discussed with the patient and patient is in agreement with them as to be addressed in the treatment plan.   Long Term Goals (LTGs); to be met by discharge.  LTG #1: Pt will report a pain level of 0 out of 10 with cooking and cleaning tasks   Progressing 8/3/2020  LTG #2: Pt will demo improved FOTO score by at least 20 points. Progressing 8/3/2020  LTG #3: Pt will return to prior level of function for ADLs and household management. Progressing 8/3/2020  LTG #4: Pt will demonstrate improved L wrist AROM WFL for performing house hold tasks Progressing 8/3/2020  LTG #5:  strength to be assessed when appropriate and goals set accordingly Progressing 8/3/2020     Short Term Goals (STGs); to be met within 4 weeks (8/20/2020).  STG #1a: Pt will report 3 out of 10 pain level with ADLs and house hold tasks. Progressing 8/3/2020  STG #2a: Pt will report/demo [place] with cooking. Progressing 8/3/2020  STG #2b: Pt will report/demo [place] with house hold cleaning. Progressing 8/3/2020  STG #3a: Pt will demonstrate independence with issued HEP.  Progressing 8/3/2020  STG #3b: Pt will demo improved L wrist AROM by at least 10 degrees needed to aid with ADLs. Progressing 8/3/2020        Plan   Continue per initial POC.   Updates/Grading for next session: Progress as tolerated.       Sandrine Jackson, OT

## 2020-08-04 ENCOUNTER — PATIENT OUTREACH (OUTPATIENT)
Dept: ADMINISTRATIVE | Facility: OTHER | Age: 75
End: 2020-08-04

## 2020-08-04 DIAGNOSIS — E11.65 CONTROLLED TYPE 2 DIABETES MELLITUS WITH HYPERGLYCEMIA, WITHOUT LONG-TERM CURRENT USE OF INSULIN: Primary | ICD-10-CM

## 2020-08-04 DIAGNOSIS — E11.65 CONTROLLED TYPE 2 DIABETES MELLITUS WITH HYPERGLYCEMIA, WITHOUT LONG-TERM CURRENT USE OF INSULIN: ICD-10-CM

## 2020-08-04 NOTE — PROGRESS NOTES
Chart reviewed.   Immunizations: Triggered Imm Registry     Orders placed: eye exam   Upcoming appts to satisfy MELISA topics: n/a

## 2020-08-05 ENCOUNTER — HOSPITAL ENCOUNTER (OUTPATIENT)
Dept: RADIOLOGY | Facility: OTHER | Age: 75
Discharge: HOME | End: 2020-08-05
Attending: PHYSICIAN ASSISTANT
Payer: MEDICARE

## 2020-08-05 ENCOUNTER — OFFICE VISIT (OUTPATIENT)
Dept: ORTHOPEDICS | Facility: CLINIC | Age: 75
End: 2020-08-05
Payer: MEDICARE

## 2020-08-05 VITALS
WEIGHT: 141 LBS | DIASTOLIC BLOOD PRESSURE: 88 MMHG | HEART RATE: 65 BPM | BODY MASS INDEX: 26.62 KG/M2 | HEIGHT: 61 IN | SYSTOLIC BLOOD PRESSURE: 157 MMHG

## 2020-08-05 DIAGNOSIS — R52 PAIN: ICD-10-CM

## 2020-08-05 DIAGNOSIS — S52.502A CLOSED FRACTURE OF DISTAL END OF LEFT RADIUS, UNSPECIFIED FRACTURE MORPHOLOGY, INITIAL ENCOUNTER: Primary | ICD-10-CM

## 2020-08-05 DIAGNOSIS — M65.312 TRIGGER THUMB OF LEFT HAND: ICD-10-CM

## 2020-08-05 PROCEDURE — 99213 PR OFFICE/OUTPT VISIT, EST, LEVL III, 20-29 MIN: ICD-10-PCS | Mod: HCNC,S$GLB,, | Performed by: PHYSICIAN ASSISTANT

## 2020-08-05 PROCEDURE — 3008F PR BODY MASS INDEX (BMI) DOCUMENTED: ICD-10-PCS | Mod: HCNC,CPTII,S$GLB, | Performed by: PHYSICIAN ASSISTANT

## 2020-08-05 PROCEDURE — 3079F DIAST BP 80-89 MM HG: CPT | Mod: HCNC,CPTII,S$GLB, | Performed by: PHYSICIAN ASSISTANT

## 2020-08-05 PROCEDURE — 73110 XR WRIST COMPLETE 3 VIEWS LEFT: ICD-10-PCS | Mod: 26,HCNC,LT, | Performed by: RADIOLOGY

## 2020-08-05 PROCEDURE — 3079F PR MOST RECENT DIASTOLIC BLOOD PRESSURE 80-89 MM HG: ICD-10-PCS | Mod: HCNC,CPTII,S$GLB, | Performed by: PHYSICIAN ASSISTANT

## 2020-08-05 PROCEDURE — 3077F PR MOST RECENT SYSTOLIC BLOOD PRESSURE >= 140 MM HG: ICD-10-PCS | Mod: HCNC,CPTII,S$GLB, | Performed by: PHYSICIAN ASSISTANT

## 2020-08-05 PROCEDURE — 73110 X-RAY EXAM OF WRIST: CPT | Mod: 26,HCNC,LT, | Performed by: RADIOLOGY

## 2020-08-05 PROCEDURE — 1126F AMNT PAIN NOTED NONE PRSNT: CPT | Mod: HCNC,S$GLB,, | Performed by: PHYSICIAN ASSISTANT

## 2020-08-05 PROCEDURE — 1101F PT FALLS ASSESS-DOCD LE1/YR: CPT | Mod: HCNC,CPTII,S$GLB, | Performed by: PHYSICIAN ASSISTANT

## 2020-08-05 PROCEDURE — 99999 PR PBB SHADOW E&M-EST. PATIENT-LVL III: CPT | Mod: PBBFAC,HCNC,, | Performed by: PHYSICIAN ASSISTANT

## 2020-08-05 PROCEDURE — 3077F SYST BP >= 140 MM HG: CPT | Mod: HCNC,CPTII,S$GLB, | Performed by: PHYSICIAN ASSISTANT

## 2020-08-05 PROCEDURE — 1126F PR PAIN SEVERITY QUANTIFIED, NO PAIN PRESENT: ICD-10-PCS | Mod: HCNC,S$GLB,, | Performed by: PHYSICIAN ASSISTANT

## 2020-08-05 PROCEDURE — 1159F PR MEDICATION LIST DOCUMENTED IN MEDICAL RECORD: ICD-10-PCS | Mod: HCNC,S$GLB,, | Performed by: PHYSICIAN ASSISTANT

## 2020-08-05 PROCEDURE — 73110 X-RAY EXAM OF WRIST: CPT | Mod: TC,HCNC,FY,LT

## 2020-08-05 PROCEDURE — 99999 PR PBB SHADOW E&M-EST. PATIENT-LVL III: ICD-10-PCS | Mod: PBBFAC,HCNC,, | Performed by: PHYSICIAN ASSISTANT

## 2020-08-05 PROCEDURE — 1159F MED LIST DOCD IN RCRD: CPT | Mod: HCNC,S$GLB,, | Performed by: PHYSICIAN ASSISTANT

## 2020-08-05 PROCEDURE — 3008F BODY MASS INDEX DOCD: CPT | Mod: HCNC,CPTII,S$GLB, | Performed by: PHYSICIAN ASSISTANT

## 2020-08-05 PROCEDURE — 99213 OFFICE O/P EST LOW 20 MIN: CPT | Mod: HCNC,S$GLB,, | Performed by: PHYSICIAN ASSISTANT

## 2020-08-05 PROCEDURE — 1101F PR PT FALLS ASSESS DOC 0-1 FALLS W/OUT INJ PAST YR: ICD-10-PCS | Mod: HCNC,CPTII,S$GLB, | Performed by: PHYSICIAN ASSISTANT

## 2020-08-05 NOTE — PROGRESS NOTES
Subjective:      Patient ID: Aretha Nguyen is a 74 y.o. female.    Chief Complaint: f/u left radius fx      HPI  Aretha Nguyen is a  74 y.o. female presenting today referred by her PCP for a left distal radius fracture. She reports an injury a little over two weeks ago. She was walking and stumble forward landing on the left wrist. She was recently seen by her PCP, xrays were obtained which revealed fracture. She presents with an ACE wrap over the wrist. Pt reports pain is intermittent. She is taking Tramadol as needed with adequate pain relief.     In addition, pt reports pain and triggering of the left thumb.     7/8/20  Pt presents for follow up. No pain at fracture. Wrist stiffness following cast removal. No improvement from trigger thumb injection.     8/5/20   Pt presents for follow up left distal radius fracture and left trigger thumb. She has been attending OT. She reports she is doing well. The trigger thumb is improving. Denies pain at wrist.     Review of patient's allergies indicates:   Allergen Reactions    Ciprofloxacin hcl Rash         Current Outpatient Medications   Medication Sig Dispense Refill    albuterol (PROVENTIL/VENTOLIN HFA) 90 mcg/actuation inhaler Inhale 1-2 puffs into the lungs every 6 (six) hours as needed for Wheezing. Rescue 1 Inhaler 0    albuterol sulfate 2.5 mg/0.5 mL Nebu Take 2.5 mg by nebulization every 4 (four) hours as needed. Rescue 1 each 30    atorvastatin (LIPITOR) 20 MG tablet TAKE 1 TABLET BY MOUTH EVERY DAY 90 tablet 1    balsalazide (COLAZAL) 750 mg capsule 2 tablets 2 times daily  2    cetirizine (ZYRTEC) 10 MG tablet Take 1 tablet (10 mg total) by mouth once daily. 30 tablet 0    cholecalciferol, vitamin D3, 2,000 unit Cap Take 1 capsule (2,000 Units total) by mouth once daily.      escitalopram oxalate (LEXAPRO) 5 MG Tab Take 1 tablet (5 mg total) by mouth once daily. 90 tablet 1    FLUAD [id number], 65 YR UP,,PF, 45 mcg (15 mcg x 3)/0.5 mL Syrg        "hydroCHLOROthiazide (MICROZIDE) 12.5 mg capsule TAKE 1 CAPSULE BY MOUTH ONCE DAILY 90 capsule 0    metFORMIN (GLUCOPHAGE-XR) 500 MG 24 hr tablet TAKE 1 TABLET (500 MG TOTAL) BY MOUTH ONCE DAILY. 90 tablet 1    multivit-min-iron-FA-lutein (CENTRUM SILVER WOMEN) 8 mg iron-400 mcg-300 mcg Tab Take 1 tablet by mouth once daily at 6am.      olmesartan (BENICAR) 20 MG tablet TAKE 1 TABLET BY MOUTH EVERY DAY 90 tablet 0    varicella-zoster [id number], PF, (SHINGRIX, PF,) 50 mcg/0.5 mL injection Inject 0.5 mLs into the muscle As instructed. 2 doses 0.5 mL 2     No current facility-administered medications for this visit.        Past Medical History:   Diagnosis Date    Controlled type 2 diabetes mellitus with hyperglycemia, without long-term current use of insulin 3/22/2017    Hyperlipidemia associated with type 2 diabetes mellitus 3/22/2017    Hypertension complicating diabetes 3/22/2017    Inflammatory bowel disease 3/22/2017       Past Surgical History:   Procedure Laterality Date    HYSTERECTOMY      MASTECTOMY Left     for benign recurrent growth. Dr. Stephan Brown MD - [place], [place] - General Surgery & Surgery    TOTAL ABDOMINAL HYSTERECTOMY W/ BILATERAL SALPINGOOPHORECTOMY  2009    for benign cysts with concern for future malginancy. Fibromoid uterus for AUB. [facility] results       Review of Systems:  Constitutional: Negative for chills and fever.   Respiratory: Negative for cough and shortness of breath.    Gastrointestinal: Negative for nausea and vomiting.   Skin: Negative for rash.   Neurological: Negative for dizziness and headaches.   Psychiatric/Behavioral: Negative for depression.   MSK as in HPI       OBJECTIVE:     PHYSICAL EXAM:  BP (!) 157/88 (BP Location: Right arm, Patient Position: Sitting, BP Method: Large (Automatic))   Pulse 65   Ht 5' 1" (1.549 m)   Wt 64 kg (141 lb)   LMP  (LMP Unknown)   BMI 26.64 kg/m²     GEN:  NAD, well-developed, well-groomed.  NEURO: Awake, alert, and oriented. " Normal attention and concentration.    PSYCH: Normal mood and affect. Behavior is normal.  HEENT: No cervical lymphadenopathy noted.  CARDIOVASCULAR: Radial pulses 2+ bilaterally. No LE edema noted.  PULMONARY: Breath sounds normal. No respiratory distress.  SKIN: Intact, no rashes.      MSK:   LUE:  Good wrist/ finger motion. No ttp over fracture site. No ttp at thumb A1 pulley, no triggering. AIN/PIN/Radial/Median/Ulnar Nerves assessed in isolation without deficit. Radial & Ulnar arteries palpated 2+. Capillary Refill <3s.      RADIOGRAPHS:  Xray left wrist 6/9/20  FINDINGS:  There is transverse appearing, minimally displaced, impaction-type fracture through the distal radius.  No definite intra-articular extension.  Remaining carpal articulations appear maintained.  Base of thumb DJD.  Soft tissue swelling about the wrist.  Comments: I have personally reviewed the imaging and I agree with the above radiologist's report.    The left wrist was also evaluated under fluoroscopy today to include an oblique view of the fracture. Fracture remains in stable alignment.     ASSESSMENT/PLAN:       ICD-10-CM ICD-9-CM   1. Closed fracture of distal end of left radius, unspecified fracture morphology, initial encounter  S52.502A 813.42   2. Trigger thumb of left hand  M65.312 727.03       Plan:   -continue OT/HEP, gradually wean from brace, gradual increase in activity   -monitor trigger finger   -RTC as needed       The patient indicates understanding of these issues and agrees to the plan.    Radha Torres PA-C  [facility]   [facility]  [place], [place]

## 2020-08-06 NOTE — PROGRESS NOTES
"  Occupational Therapy Daily Treatment Note     Name: Aretha Nguyen  Clinic Number: [id number]    Therapy Diagnosis:   Encounter Diagnoses   Name Primary?    Left wrist pain     Decreased range of motion of left wrist      Physician: Radha Torres PA-C    Visit Date: 8/7/2020  Physician Orders: eval and treat  Medical Diagnosis: Closed fracture of distal end of left radius, unspecified fracture morphology, initial encounter [S52.502A]  Surgical Procedure and Date: n/a / Date of Injury/Onset: early June  Evaluation Date: 7/20/2020  Insurance Authorization Period Expiration: 12/31/2020  Plan of Care Certification Period: 9/18/2020  Date of Return to MD: 8/5/2020     Visit # / Visits authorized: 4 / 24     FOTO: initial eval     Time In: 11:00 pm  Time Out:  11:45 pm   Total treatment time: 45 minutes  Total Timed minutes  45 minutes  2 TE 1 P     Precautions:  Standard and Weightbearing, NWB      Subjective     Pt reports: "the wrist is great I saw the doctor and they said I dont have to wear the brace unless outside the house."  she was compliant with home exercise program given last session.   Response to previous treatment:Good increased use  Functional change: States only pain with certain movements    Pain: 0/10  Location: left wrists      Objective   Pt is grossly 8 weeks post injury     Observation/Appearance: mod swelling at wrist, wearing pre alethea wrist brace to clinic. Mild atrophy noted in palm. Tenderness noted at volar thumb     Edema. Measured in centimeters.    7/20/2020 7/20/2020 8/3/2020     Left Right Left   Wrist Crease 14.8 14.3 15   DPC 17.6 18 NT   MCPs 17.4 17.5 NT         Elbow and Wrist ROM. Measured in degrees.    7/20/2020 7/20/2020 8/3/2020     Left Right Left   Elbow Ext/Flex        Supination/Pronation 50/46 WNL 56/58   Wrist Ext/Flex 52/44 56/66 54/50   Wrist RD/UD 16/10 18/26 20/16      Hand ROM. Measured in degrees.  Digits 2-5 WNL    7/20/2020 7/20/2020 8/3/2020     Left Right Left     "        Thumb: MP WFL 45 48                IP WFL 8 30       Rad ADD/ABD WFL 38 44       Pal ADD/ABD WFL 45 42          Opposition WFL WFL WFL       Strength (Dynamometer) and Pinch Strength (Pinch Gauge)  Measured in pounds.    7/20/2020 7/20/2020     Left Right   Rung II deferred deferred   Pardo Pinch deferred deferred   3pt Pinch deferred deferred   2pt Pinch deferred deferred        Aretha received the following supervised modalities after being cleared for contradictions for 10 minutes:   -Patient received paraffin bath to L hand(s) for 10 minutes to increase blood flow, circulation, pain management and for tissue elasticity prior to therex.      Aretha received the following manual therapy techniques for 5 minutes:   -Pt received retrograde massage as well as scar massage to decrease edema and stiffness for increased ROM. STM performed to decreased stiffness in surrounding musculature.     Aretha received therapeutic exercises for 30 minutes including:  -  AROM   Sup/Pro  Wrist  Ext/flx  RD/UD    X 20  reps each    AROM   Composite fist  Lifts  Full fist CW/CCW X 20 reps each      X 10 each    AROM thumb:  Rad abd/add  Palmar abd/add  Pinky Slides    X 20 reps each    Isospheres 2 min    Wrist Wheel 2 min    Wrist dextraciser 3 min    Yellow RB ext  2/10    Wrist AROM with 1#, 3 ways 2/10 each   Yellow CP Pinch  2/10   Light grippers X 10           Home Exercises and Education Provided     Education provided:   - Continue established HEP  - Progress towards goals     Written Home Exercises Provided: Patient instructed to cont prior HEP.  Exercises were reviewed and Aretha was able to demonstrate them prior to the end of the session.  Aretha demonstrated good  understanding of the HEP provided.   .   See EMR under Patient Instructions for exercises provided prior visit.        Assessment     Pt would continue to benefit from skilled OT. She is doing really well. She says MD states complete healing of the  bone. Increased tolerance to exercises today with gentle restive exercises added today. No pain throughout session. Continue focus on strengthening and AROM in future sessions.     Aretha is progressing well towards her goals and there are no updates to goals at this time. Pt prognosis is Excellent.     Pt will continue to benefit from skilled outpatient occupational therapy to address the deficits listed in the problem list on initial evaluation provide pt/family education and to maximize pt's level of independence in the home and community environment.     Anticipated barriers to occupational therapy: none noted    Pt's spiritual, cultural and educational needs considered and pt agreeable to plan of care and goals.    Goals:  Goals:   The following goals were discussed with the patient and patient is in agreement with them as to be addressed in the treatment plan.   Long Term Goals (LTGs); to be met by discharge.  LTG #1: Pt will report a pain level of 0 out of 10 with cooking and cleaning tasks   Progressing 8/7/2020  LTG #2: Pt will demo improved FOTO score by at least 20 points. Progressing 8/7/2020  LTG #3: Pt will return to prior level of function for ADLs and household management. Progressing 8/7/2020  LTG #4: Pt will demonstrate improved L wrist AROM WFL for performing house hold tasks Progressing 8/7/2020  LTG #5:  strength to be assessed when appropriate and goals set accordingly Progressing 8/7/2020     Short Term Goals (STGs); to be met within 4 weeks (8/20/2020).  STG #1a: Pt will report 3 out of 10 pain level with ADLs and house hold tasks. Progressing 8/7/2020  STG #2a: Pt will report/demo [place] with cooking. Progressing 8/7/2020  STG #2b: Pt will report/demo [place] with house hold cleaning. Progressing 8/7/2020  STG #3a: Pt will demonstrate independence with issued HEP.  Progressing 8/7/2020  STG #3b: Pt will demo improved L wrist AROM by at least 10 degrees needed to aid with ADLs.  Progressing 8/7/2020        Plan   Continue per initial POC.   Updates/Grading for next session: Progress as tolerated.       Nnamdi Albert, OT

## 2020-08-07 ENCOUNTER — CLINICAL SUPPORT (OUTPATIENT)
Dept: REHABILITATION | Facility: HOSPITAL | Age: 75
End: 2020-08-07
Payer: MEDICARE

## 2020-08-07 DIAGNOSIS — M25.532 LEFT WRIST PAIN: ICD-10-CM

## 2020-08-07 DIAGNOSIS — M25.632 DECREASED RANGE OF MOTION OF LEFT WRIST: ICD-10-CM

## 2020-08-07 PROCEDURE — 97110 THERAPEUTIC EXERCISES: CPT | Mod: HCNC,PN

## 2020-08-07 PROCEDURE — 97018 PARAFFIN BATH THERAPY: CPT | Mod: HCNC,PN

## 2020-08-10 ENCOUNTER — CLINICAL SUPPORT (OUTPATIENT)
Dept: REHABILITATION | Facility: HOSPITAL | Age: 75
End: 2020-08-10
Payer: MEDICARE

## 2020-08-10 DIAGNOSIS — M25.632 DECREASED RANGE OF MOTION OF LEFT WRIST: ICD-10-CM

## 2020-08-10 DIAGNOSIS — M25.532 LEFT WRIST PAIN: ICD-10-CM

## 2020-08-10 PROCEDURE — 97018 PARAFFIN BATH THERAPY: CPT | Mod: HCNC,PN

## 2020-08-10 PROCEDURE — 97140 MANUAL THERAPY 1/> REGIONS: CPT | Mod: HCNC,PN

## 2020-08-10 PROCEDURE — 97110 THERAPEUTIC EXERCISES: CPT | Mod: HCNC,PN

## 2020-08-10 NOTE — PROGRESS NOTES
"  Occupational Therapy Daily Treatment Note     Name: Aretha Nguyen  Clinic Number: [id number]    Therapy Diagnosis:   No diagnosis found.  Physician: Radha Torres PA-C    Visit Date: 8/10/2020  Physician Orders: eval and treat  Medical Diagnosis: Closed fracture of distal end of left radius, unspecified fracture morphology, initial encounter [S52.502A]  Surgical Procedure and Date: n/a / Date of Injury/Onset: early June  Evaluation Date: 7/20/2020  Insurance Authorization Period Expiration: 12/31/2020  Plan of Care Certification Period: 9/18/2020  Date of Return to MD: 8/5/2020     Visit # / Visits authorized: 4 / 24     FOTO: initial eval     Time In: 11:00 pm  Time Out:  11:45 pm   Total treatment time: 45 minutes  Total Timed minutes  45 minutes  2 TE 1 P     Precautions:  Standard and Weightbearing, NWB      Subjective     Pt reports: "the wrist is great I saw the doctor and they said I dont have to wear the brace unless outside the house."  she was compliant with home exercise program given last session.   Response to previous treatment:Good increased use  Functional change: States only pain with certain movements    Pain: 0/10  Location: left wrists      Objective   Pt is grossly 8 weeks post injury     Observation/Appearance: mod swelling at wrist, wearing pre alethea wrist brace to clinic. Mild atrophy noted in palm. Tenderness noted at volar thumb     Edema. Measured in centimeters.    7/20/2020 7/20/2020 8/3/2020     Left Right Left   Wrist Crease 14.8 14.3 15   DPC 17.6 18 NT   MCPs 17.4 17.5 NT         Elbow and Wrist ROM. Measured in degrees.    7/20/2020 7/20/2020 8/3/2020     Left Right Left   Elbow Ext/Flex        Supination/Pronation 50/46 WNL 56/58   Wrist Ext/Flex 52/44 56/66 54/50   Wrist RD/UD 16/10 18/26 20/16      Hand ROM. Measured in degrees.  Digits 2-5 WNL    7/20/2020 7/20/2020 8/3/2020     Left Right Left            Thumb: MP WFL 45 48                IP WFL 8 30       Rad ADD/ABD WFL 38 44 "       Pal ADD/ABD WFL 45 42          Opposition WFL WFL WFL       Strength (Dynamometer) and Pinch Strength (Pinch Gauge)  Measured in pounds.    7/20/2020 7/20/2020     Left Right   Rung II deferred deferred   Pardo Pinch deferred deferred   3pt Pinch deferred deferred   2pt Pinch deferred deferred        Aretha received the following supervised modalities after being cleared for contradictions for 10 minutes:   -Patient received paraffin bath to L hand(s) for 10 minutes to increase blood flow, circulation, pain management and for tissue elasticity prior to therex.      Aretha received the following manual therapy techniques for 5 minutes:   -Pt received retrograde massage as well as scar massage to decrease edema and stiffness for increased ROM. STM performed to decreased stiffness in surrounding musculature.     Aretha received therapeutic exercises for 30 minutes including:  -  AROM   Sup/Pro  Wrist  Ext/flx  RD/UD    X 20  reps each    AROM   Composite fist  Lifts  Full fist CW/CCW X 20 reps each      X 10 each    AROM thumb:  Rad abd/add  Palmar abd/add  Pinky Slides    X 20 reps each    Isospheres 2 min    Wrist Wheel 2 min    Wrist dextraciser 3 min    Yellow RB ext  2/10    Wrist AROM with 1#, 3 ways 2/10 each   Yellow CP Pinch  2/10   Light grippers X 10           Home Exercises and Education Provided     Education provided:   - Continue established HEP  - Progress towards goals     Written Home Exercises Provided: Patient instructed to cont prior HEP.  Exercises were reviewed and Aretha was able to demonstrate them prior to the end of the session.  Aretha demonstrated good  understanding of the HEP provided.   .   See EMR under Patient Instructions for exercises provided prior visit.        Assessment     Pt would continue to benefit from skilled OT. She is doing really well. She says MD states complete healing of the bone. Increased tolerance to exercises today with gentle restive exercises added  today. No pain throughout session. Continue focus on strengthening and AROM in future sessions.     Aretha is progressing well towards her goals and there are no updates to goals at this time. Pt prognosis is Excellent.     Pt will continue to benefit from skilled outpatient occupational therapy to address the deficits listed in the problem list on initial evaluation provide pt/family education and to maximize pt's level of independence in the home and community environment.     Anticipated barriers to occupational therapy: none noted    Pt's spiritual, cultural and educational needs considered and pt agreeable to plan of care and goals.    Goals:  Goals:   The following goals were discussed with the patient and patient is in agreement with them as to be addressed in the treatment plan.   Long Term Goals (LTGs); to be met by discharge.  LTG #1: Pt will report a pain level of 0 out of 10 with cooking and cleaning tasks   Progressing 8/10/2020  LTG #2: Pt will demo improved FOTO score by at least 20 points. Progressing 8/10/2020  LTG #3: Pt will return to prior level of function for ADLs and household management. Progressing 8/10/2020  LTG #4: Pt will demonstrate improved L wrist AROM WFL for performing house hold tasks Progressing 8/10/2020  LTG #5:  strength to be assessed when appropriate and goals set accordingly Progressing 8/10/2020     Short Term Goals (STGs); to be met within 4 weeks (8/20/2020).  STG #1a: Pt will report 3 out of 10 pain level with ADLs and house hold tasks. Progressing 8/10/2020  STG #2a: Pt will report/demo [place] with cooking. Progressing 8/10/2020  STG #2b: Pt will report/demo [place] with house hold cleaning. Progressing 8/10/2020  STG #3a: Pt will demonstrate independence with issued HEP.  Progressing 8/10/2020  STG #3b: Pt will demo improved L wrist AROM by at least 10 degrees needed to aid with ADLs. Progressing 8/10/2020        Plan   Continue per initial POC.    Updates/Grading for next session: Progress as tolerated.       Nnamdi Albert, OT

## 2020-08-10 NOTE — PROGRESS NOTES
"  Occupational Therapy Daily Treatment Note     Name: Aretha Nguyen  Clinic Number: [id number]    Therapy Diagnosis:   Encounter Diagnoses   Name Primary?    Left wrist pain     Decreased range of motion of left wrist      Physician: Radha Torres PA-C    Visit Date: 8/10/2020  Physician Orders: eval and treat  Medical Diagnosis: Closed fracture of distal end of left radius, unspecified fracture morphology, initial encounter [S52.502A]  Surgical Procedure and Date: n/a / Date of Injury/Onset: early June  Evaluation Date: 7/20/2020  Insurance Authorization Period Expiration: 12/31/2020  Plan of Care Certification Period: 9/18/2020  Date of Return to MD: 8/5/2020     Visit # / Visits authorized: 5 / 24     FOTO: initial eval, 5th:38%     Time In: 12:15 pm  Time Out:  1:00 pm   Total treatment time: 45 minutes  Total Timed minutes  45 minutes  1 TE 1 P MT1     Precautions:  Standard and Weightbearing, NWB      Subjective     Pt reports: "it only hurst when I make this motion" pt demonstrating supination  she was compliant with home exercise program given last session.   Response to previous treatment:Good increased use  Functional change: States only pain with certain movements    Pain: 0/10  Location: left wrists      Objective   Pt is grossly 8 weeks 3 days post injury     Aretha received the following supervised modalities after being cleared for contradictions for 10 minutes:   -Patient received paraffin bath to L hand for 10 minutes to increase blood flow, circulation, pain management and for tissue elasticity prior to therex.      Aretha received the following manual therapy techniques for 10 minutes:   -Pt received retrograde massage as well as scar massage to decrease edema and stiffness for increased ROM. STM performed to decreased stiffness in surrounding musculature.     Aretha received therapeutic exercises for 25 minutes including:  -  AROM   Sup/Pro  Wrist  Ext/flx  RD/UD    X 20  reps each    AROM "   Composite fist  Lifts  Full fist CW/CCW X 20 reps each      X 10 each    AROM thumb:  Rad abd/add  Palmar abd/add  Pinky Slides    X 20 reps each    Isospheres 2 min    Wrist Wheel 2 min    Wrist dextraciser 3 min    Yellow RB ext  2/10    Wrist AROM with 1#, 3 ways 2/10 each   Yellow CP Pinch  2/10   Light grippers X 10        Home Exercises and Education Provided     Education provided:   - Continue established HEP  - Progress towards goals     Written Home Exercises Provided: Patient instructed to cont prior HEP.  Exercises were reviewed and Aretha was able to demonstrate them prior to the end of the session.  Aretha demonstrated good  understanding of the HEP provided.   .   See EMR under Patient Instructions for exercises provided prior visit.        Assessment   Pt participated well today. She was pain free throughout session with minimal c/o discomfort with supination. Good technique with exercises. Required very minimal cueing. Responded well to manual therapy. Pt very motivated.     Aretha is progressing well towards her goals and there are no updates to goals at this time. Pt prognosis is Excellent.     Pt will continue to benefit from skilled outpatient occupational therapy to address the deficits listed in the problem list on initial evaluation provide pt/family education and to maximize pt's level of independence in the home and community environment.     Anticipated barriers to occupational therapy: none noted    Pt's spiritual, cultural and educational needs considered and pt agreeable to plan of care and goals.    Goals:  Goals:   The following goals were discussed with the patient and patient is in agreement with them as to be addressed in the treatment plan.   Long Term Goals (LTGs); to be met by discharge.  LTG #1: Pt will report a pain level of 0 out of 10 with cooking and cleaning tasks   Progressing 8/10/2020  LTG #2: Pt will demo improved FOTO score by at least 20 points. Progressing  8/10/2020  LTG #3: Pt will return to prior level of function for ADLs and household management. Progressing 8/10/2020  LTG #4: Pt will demonstrate improved L wrist AROM WFL for performing house hold tasks Progressing 8/10/2020  LTG #5:  strength to be assessed when appropriate and goals set accordingly Progressing 8/10/2020     Short Term Goals (STGs); to be met within 4 weeks (8/20/2020).  STG #1a: Pt will report 3 out of 10 pain level with ADLs and house hold tasks. Progressing 8/10/2020  STG #2a: Pt will report/demo [place] with cooking. Progressing 8/10/2020  STG #2b: Pt will report/demo [place] with house hold cleaning. Progressing 8/10/2020  STG #3a: Pt will demonstrate independence with issued HEP.  Progressing 8/10/2020  STG #3b: Pt will demo improved L wrist AROM by at least 10 degrees needed to aid with ADLs. Progressing 8/10/2020        Plan   Continue per initial POC.   Updates/Grading for next session: Progress as tolerated.       ALFRED Vigil

## 2020-08-14 ENCOUNTER — CLINICAL SUPPORT (OUTPATIENT)
Dept: REHABILITATION | Facility: HOSPITAL | Age: 75
End: 2020-08-14
Payer: MEDICARE

## 2020-08-14 DIAGNOSIS — M25.632 DECREASED RANGE OF MOTION OF LEFT WRIST: ICD-10-CM

## 2020-08-14 DIAGNOSIS — M25.532 LEFT WRIST PAIN: ICD-10-CM

## 2020-08-14 PROCEDURE — 97110 THERAPEUTIC EXERCISES: CPT | Mod: HCNC,PN

## 2020-08-14 PROCEDURE — 97018 PARAFFIN BATH THERAPY: CPT | Mod: HCNC,PN

## 2020-08-14 NOTE — PROGRESS NOTES
"  Occupational Therapy Daily Treatment Note     Name: Aretha Nguyen  Clinic Number: [id number]    Therapy Diagnosis:   Encounter Diagnoses   Name Primary?    Left wrist pain     Decreased range of motion of left wrist      Physician: Radha Torres PA-C    Visit Date: 8/14/2020  Physician Orders: eval and treat  Medical Diagnosis: Closed fracture of distal end of left radius, unspecified fracture morphology, initial encounter [S52.502A]  Surgical Procedure and Date: n/a / Date of Injury/Onset: early June  Evaluation Date: 7/20/2020  Insurance Authorization Period Expiration: 12/31/2020  Plan of Care Certification Period: 9/18/2020  Date of Return to MD: 8/5/2020     Visit # / Visits authorized: 5/ 24     FOTO: initial eval     Time In: 10:59 am  Time Out:  11:40 pm   Total treatment time: 41 minutes  Total Timed minutes  41 minutes  2 TE 1 P     Precautions:  Standard and Weightbearing, NWB    Subjective     Pt reports: "I feel really good the wrist is letting me do stuff like I was before."  she was compliant with home exercise program given last session.   Response to previous treatment:Good increased use  Functional change: States only pain with certain movements    Pain: 0/10  Location: left wrists      Objective   Pt is grossly 10 weeks post injury     Observation/Appearance: mod swelling at wrist, wearing pre alethea wrist brace to clinic. Mild atrophy noted in palm. Tenderness noted at volar thumb     Edema. Measured in centimeters.    7/20/2020 7/20/2020 8/3/2020     Left Right Left   Wrist Crease 14.8 14.3 15   DPC 17.6 18 NT   MCPs 17.4 17.5 NT         Elbow and Wrist ROM. Measured in degrees.    7/20/2020 7/20/2020 8/3/2020     Left Right Left   Elbow Ext/Flex        Supination/Pronation 50/46 WNL 56/58   Wrist Ext/Flex 52/44 56/66 54/50   Wrist RD/UD 16/10 18/26 20/16      Hand ROM. Measured in degrees.  Digits 2-5 WNL    7/20/2020 7/20/2020 8/3/2020     Left Right Left            Thumb: MP WFL 45 48          "       IP WFL 8 30       Rad ADD/ABD WFL 38 44       Pal ADD/ABD WFL 45 42          Opposition WFL WFL WFL       Strength (Dynamometer) and Pinch Strength (Pinch Gauge)  Measured in pounds.    7/20/2020 7/20/2020     Left Right   Rung II deferred deferred   Pardo Pinch deferred deferred   3pt Pinch deferred deferred   2pt Pinch deferred deferred        Aretha received the following supervised modalities after being cleared for contradictions for 10 minutes:   -Patient received paraffin bath to L hand(s) for 10 minutes to increase blood flow, circulation, pain management and for tissue elasticity prior to therex.      Aretha received the following manual therapy techniques for 5 minutes:   -Pt received retrograde massage as well as scar massage to decrease edema and stiffness for increased ROM. STM performed to decreased stiffness in surrounding musculature.     Aretha received therapeutic exercises for 25 minutes including:  -  AROM   Sup/Pro  Wrist  Ext/flx  RD/UD    X 20  reps each    AROM   Composite fist  Lifts  Full fist CW/CCW X 20 reps each      X 10 each    AROM thumb:  Rad abd/add  Palmar abd/add  Pinky Slides    X 20 reps each    Isospheres 2 min    Wrist Wheel 2 min    Wrist dextraciser 3 min    Yellow RB ext  2/10    Wrist AROM with 1#, 3 ways 2/10 each   Red CP Pinch  2/10   Medium grippers X 10         Home Exercises and Education Provided     Education provided:   - Continue established HEP  - Progress towards goals     Written Home Exercises Provided: Patient instructed to cont prior HEP.  Exercises were reviewed and Aretha was able to demonstrate them prior to the end of the session.  Aretha demonstrated good  understanding of the HEP provided.   .   See EMR under Patient Instructions for exercises provided prior visit.        Assessment     Pt would continue to benefit from skilled OT. She is progressing well. Main focus today on established exercises with added resistance and activity tolerance.  No pain noted today. Edema seems to be completely resolved. Only limitation being fatigue in the wrist with some movements. She is progressing well with strengthening and exercises.   Aretha is progressing well towards her goals and there are no updates to goals at this time. Pt prognosis is Excellent.     Pt will continue to benefit from skilled outpatient occupational therapy to address the deficits listed in the problem list on initial evaluation provide pt/family education and to maximize pt's level of independence in the home and community environment.     Anticipated barriers to occupational therapy: none noted    Pt's spiritual, cultural and educational needs considered and pt agreeable to plan of care and goals.    Goals:   The following goals were discussed with the patient and patient is in agreement with them as to be addressed in the treatment plan.   Long Term Goals (LTGs); to be met by discharge.  LTG #1: Pt will report a pain level of 0 out of 10 with cooking and cleaning tasks   Progressing 8/14/2020  LTG #2: Pt will demo improved FOTO score by at least 20 points. Progressing 8/14/2020  LTG #3: Pt will return to prior level of function for ADLs and household management. Progressing 8/14/2020  LTG #4: Pt will demonstrate improved L wrist AROM WFL for performing house hold tasks Progressing 8/14/2020  LTG #5:  strength to be assessed when appropriate and goals set accordingly Progressing 8/14/2020     Short Term Goals (STGs); to be met within 4 weeks (8/20/2020).  STG #1a: Pt will report 3 out of 10 pain level with ADLs and house hold tasks. Progressing 8/14/2020  STG #2a: Pt will report/demo [place] with cooking. Progressing 8/14/2020  STG #2b: Pt will report/demo [place] with house hold cleaning. Progressing 8/14/2020  STG #3a: Pt will demonstrate independence with issued HEP.  Progressing 8/14/2020  STG #3b: Pt will demo improved L wrist AROM by at least 10 degrees needed to aid with  ADLs. Progressing 8/14/2020      Plan   Continue per initial POC.   Updates/Grading for next session: Progress as tolerated.       Nnamdi Albert OT

## 2020-08-17 ENCOUNTER — CLINICAL SUPPORT (OUTPATIENT)
Dept: REHABILITATION | Facility: HOSPITAL | Age: 75
End: 2020-08-17
Payer: MEDICARE

## 2020-08-17 DIAGNOSIS — M25.632 DECREASED RANGE OF MOTION OF LEFT WRIST: ICD-10-CM

## 2020-08-17 DIAGNOSIS — M25.532 LEFT WRIST PAIN: ICD-10-CM

## 2020-08-17 PROCEDURE — 97110 THERAPEUTIC EXERCISES: CPT | Mod: HCNC,PN

## 2020-08-17 PROCEDURE — 97018 PARAFFIN BATH THERAPY: CPT | Mod: HCNC,PN

## 2020-08-17 NOTE — PATIENT INSTRUCTIONS
Strengthening (Resistive Putty)        Squeeze putty using thumb and all fingers.  Repeat __30__ times. Do ___2-3_ sessions per day.      Copyright © [facility]. All rights reserved.       Roll putty back and forth, being sure to use all fingertips.  Repeat __10__ times. Do ___2-3_ sessions per day.    Lateral Pinch Strengthening (Resistive Putty)        Squeeze between thumb and side of each finger in turn.  Repeat __30__ times. Do __2-3 sessions per day.    Copyright © [facility]. All rights reserved.   MP Flexion (Resistive Putty)        Bending only at large knuckles, press putty down against thumb. Keep fingertips straight.  Repeat ___30_ times. Do ___2-3_ sessions per day.    Copyright © I. All rights reserved.   Palmar Pinch Strengthening (Resistive Putty)      Pinch putty between thumb and each fingertip in turn.  Repeat __30__ times. Do __2-3__ sessions per day.  Extension (Assistive Putty)      Copyright © I. All rights reserved.         COIN FLIP    Place various coins on a table and flip each coin with your affected hand.

## 2020-08-17 NOTE — PROGRESS NOTES
"  Occupational Therapy Daily Treatment Note     Name: Aretha Nguyen  Clinic Number: [id number]    Therapy Diagnosis:   Encounter Diagnoses   Name Primary?    Left wrist pain     Decreased range of motion of left wrist      Physician: Radha Torres PA-C    Visit Date: 8/17/2020  Physician Orders: eval and treat  Medical Diagnosis: Closed fracture of distal end of left radius, unspecified fracture morphology, initial encounter [S52.502A]  Surgical Procedure and Date: n/a / Date of Injury/Onset: early June  Evaluation Date: 7/20/2020  Insurance Authorization Period Expiration: 12/31/2020  Plan of Care Certification Period: 9/18/2020  Date of Return to MD: 8/5/2020     Visit # / Visits authorized: 7/ 24     FOTO: initial eval     Time In: 10:40 am  Time Out:  11:20 pm   Total treatment time: 41 minutes  Total Timed minutes  41 minutes  2 TE 1 P     Precautions:  Standard and Weightbearing, NWB    Subjective     Pt reports: "Its doing really well I am using it more at home."  she was compliant with home exercise program given last session.   Response to previous treatment:Good increased use  Functional change: States only pain with certain movements    Pain: 0/10  Location: left wrists      Objective   Pt is grossly 10 weeks post injury     Observation/Appearance: mod swelling at wrist, wearing pre alethea wrist brace to clinic. Mild atrophy noted in palm. Tenderness noted at volar thumb     Edema. Measured in centimeters.    7/20/2020 7/20/2020 8/3/2020     Left Right Left   Wrist Crease 14.8 14.3 15   DPC 17.6 18 NT   MCPs 17.4 17.5 NT         Elbow and Wrist ROM. Measured in degrees.    7/20/2020 7/20/2020 8/3/2020     Left Right Left   Elbow Ext/Flex        Supination/Pronation 50/46 WNL 56/58   Wrist Ext/Flex 52/44 56/66 54/50   Wrist RD/UD 16/10 18/26 20/16      Hand ROM. Measured in degrees.  Digits 2-5 WNL    7/20/2020 7/20/2020 8/3/2020     Left Right Left            Thumb: MP WFL 45 48                IP WFL 8 30    "    Rad ADD/ABD WFL 38 44       Pal ADD/ABD WFL 45 42          Opposition WFL WFL WFL       Strength (Dynamometer) and Pinch Strength (Pinch Gauge)  Measured in pounds.    7/20/2020 7/20/2020     Left Right   Rung II deferred deferred   Pardo Pinch deferred deferred   3pt Pinch deferred deferred   2pt Pinch deferred deferred        Aretha received the following supervised modalities after being cleared for contradictions for 8 minutes:   -Patient received paraffin bath to L hand(s) for 10 minutes to increase blood flow, circulation, pain management and for tissue elasticity prior to therex.      Aretha received the following manual therapy techniques for 5 minutes:   -Pt received retrograde massage as well as scar massage to decrease edema and stiffness for increased ROM. STM performed to decreased stiffness in surrounding musculature.     Aretha received therapeutic exercises for 28 minutes including:  -  AROM   Sup/Pro  Wrist  Ext/flx  RD/UD    X 20  reps each    AROM   Composite fist  Lifts  Full fist CW/CCW X 20 reps each      X 10 each    AROM thumb:  Rad abd/add  Palmar abd/add  Pinky Slides    X 20 reps each    Isospheres 2 min    Wrist Wheel 2 min    Wrist dextraciser 3 min    Red RB ext  2/10    Wrist AROM with 2#, 3 ways 2/10 each   Red CP Pinch  2/10   Medium grippers X 10         Home Exercises and Education Provided     Education provided:   - Continue established HEP  - Progress towards goals     Written Home Exercises Provided: Patient instructed to cont prior HEP.  Exercises were reviewed and Aretha was able to demonstrate them prior to the end of the session.  Aretha demonstrated good  understanding of the HEP provided.   .   See EMR under Patient Instructions for exercises provided prior visit.        Assessment     Pt would continue to benefit from skilled OT. She is doing great with therapy services. Main focus today being updated exercises and progression with strengthening and ROM. Only minor  pain and fatigue with some new exercises against resistance but tolerated challenges well. Pt continues to be motivated and progressing well.   Aretha is progressing well towards her goals and there are no updates to goals at this time. Pt prognosis is Excellent.     Pt will continue to benefit from skilled outpatient occupational therapy to address the deficits listed in the problem list on initial evaluation provide pt/family education and to maximize pt's level of independence in the home and community environment.     Anticipated barriers to occupational therapy: none noted    Pt's spiritual, cultural and educational needs considered and pt agreeable to plan of care and goals.    Goals:   The following goals were discussed with the patient and patient is in agreement with them as to be addressed in the treatment plan.   Long Term Goals (LTGs); to be met by discharge.  LTG #1: Pt will report a pain level of 0 out of 10 with cooking and cleaning tasks   Progressing 8/17/2020  LTG #2: Pt will demo improved FOTO score by at least 20 points. Progressing 8/17/2020  LTG #3: Pt will return to prior level of function for ADLs and household management. Progressing 8/17/2020  LTG #4: Pt will demonstrate improved L wrist AROM WFL for performing house hold tasks Progressing 8/17/2020  LTG #5:  strength to be assessed when appropriate and goals set accordingly Progressing 8/17/2020     Short Term Goals (STGs); to be met within 4 weeks (8/20/2020).  STG #1a: Pt will report 3 out of 10 pain level with ADLs and house hold tasks. Progressing 8/17/2020  STG #2a: Pt will report/demo [place] with cooking. Progressing 8/17/2020  STG #2b: Pt will report/demo [place] with house hold cleaning. Progressing 8/17/2020  STG #3a: Pt will demonstrate independence with issued HEP.  Progressing 8/17/2020  STG #3b: Pt will demo improved L wrist AROM by at least 10 degrees needed to aid with ADLs. Progressing 8/17/2020      Plan    Continue per initial POC.   Updates/Grading for next session: Progress as tolerated.       Nnamdi Albert, OT

## 2020-08-24 ENCOUNTER — TELEPHONE (OUTPATIENT)
Dept: REHABILITATION | Facility: HOSPITAL | Age: 75
End: 2020-08-24

## 2020-08-24 RX ORDER — METFORMIN HYDROCHLORIDE 500 MG/1
TABLET, EXTENDED RELEASE ORAL
Qty: 90 TABLET | Refills: 0 | Status: SHIPPED | OUTPATIENT
Start: 2020-08-24 | End: 2020-09-11 | Stop reason: SDUPTHER

## 2020-08-24 RX ORDER — FERROUS SULFATE 324(65)MG
325 TABLET, DELAYED RELEASE (ENTERIC COATED) ORAL 2 TIMES DAILY
Qty: 180 TABLET | Refills: 0 | Status: SHIPPED | OUTPATIENT
Start: 2020-08-24 | End: 2020-09-11 | Stop reason: SDUPTHER

## 2020-08-27 ENCOUNTER — CLINICAL SUPPORT (OUTPATIENT)
Dept: REHABILITATION | Facility: HOSPITAL | Age: 75
End: 2020-08-27
Payer: MEDICARE

## 2020-08-27 DIAGNOSIS — M25.532 LEFT WRIST PAIN: ICD-10-CM

## 2020-08-27 DIAGNOSIS — M25.632 DECREASED RANGE OF MOTION OF LEFT WRIST: ICD-10-CM

## 2020-08-27 PROCEDURE — 97110 THERAPEUTIC EXERCISES: CPT | Mod: HCNC,PN

## 2020-08-27 PROCEDURE — 97022 WHIRLPOOL THERAPY: CPT | Mod: HCNC,PN

## 2020-08-27 NOTE — PROGRESS NOTES
"  Occupational Therapy Daily Treatment Note     Name: Aretha Nguyen  Clinic Number: [id number]    Therapy Diagnosis:   Encounter Diagnoses   Name Primary?    Left wrist pain     Decreased range of motion of left wrist      Physician: Radha Torres PA-C    Visit Date: 8/27/2020  Physician Orders: eval and treat  Medical Diagnosis: Closed fracture of distal end of left radius, unspecified fracture morphology, initial encounter [S52.502A]  Surgical Procedure and Date: n/a / Date of Injury/Onset: early Yvette  Evaluation Date: 7/20/2020  Insurance Authorization Period Expiration: 12/31/2020  Plan of Care Certification Period: 9/18/2020  Date of Return to MD: none scheduled     Visit # / Visits authorized: 8/ 24     FOTO: initial eval     Time In: 915 am  Time Out:  10:00 am   Total treatment time: 45 minutes  Total Timed minutes  45 minutes  Fluido 1 TE2     Precautions:  Standard and Weightbearing, NWB    Subjective     Pt reports: "I was sore after last time."  she was compliant with home exercise program given last session.   Response to previous treatment:Good increased use  Functional change: States only pain with certain movements    Pain: 0/10  Location: left wrist    Objective      Aretha received the following supervised modalities after being cleared for contradictions for 10 minutes:   -Patient received fluidotherapy to L hand for 10 minutes to increase blood flow, circulation, pain management and for tissue elasticity prior to therex.      Aretha received therapeutic exercises for 35 minutes including:  -  AROM   Sup/Pro  Wrist  Ext/flx  RD/UD    X 20  reps each    AROM   Composite fist  Lifts  Full fist CW/CCW X 20 reps each      X 10 each    AROM thumb:  Rad abd/add  Palmar abd/add  Pinky Slides    X 20 reps each    Isospheres 2 min    Wrist Wheel 2 min    Wrist dextraciser 3 min    Red RB ext  2/10    Wrist AROM with 2#, 3 ways 2/10 each   Red CP Pinch  2/10   Medium grippers X 10         Home Exercises and " Education Provided     Education provided:   - Continue established HEP  - Progress towards goals     Written Home Exercises Provided: Patient instructed to cont prior HEP.  Exercises were reviewed and Aretha was able to demonstrate them prior to the end of the session.  Aretha demonstrated good  understanding of the HEP provided.     See EMR under Patient Instructions for exercises provided prior visit.      Assessment     Pt participated well today. She was pain free throughout session. Good endurance with exercises. She reported increased ease with exercises today.  Pt continues to be motivated and progressing well.   Aretha is progressing well towards her goals and there are no updates to goals at this time. Pt prognosis is Excellent.     Pt will continue to benefit from skilled outpatient occupational therapy to address the deficits listed in the problem list on initial evaluation provide pt/family education and to maximize pt's level of independence in the home and community environment.     Anticipated barriers to occupational therapy: none noted    Pt's spiritual, cultural and educational needs considered and pt agreeable to plan of care and goals.    Goals:   The following goals were discussed with the patient and patient is in agreement with them as to be addressed in the treatment plan.   Long Term Goals (LTGs); to be met by discharge.  LTG #1: Pt will report a pain level of 0 out of 10 with cooking and cleaning tasks   Progressing 8/27/2020  LTG #2: Pt will demo improved FOTO score by at least 20 points. Progressing 8/27/2020  LTG #3: Pt will return to prior level of function for ADLs and household management. Progressing 8/27/2020  LTG #4: Pt will demonstrate improved L wrist AROM WFL for performing house hold tasks Progressing 8/27/2020  LTG #5:  strength to be assessed when appropriate and goals set accordingly Progressing 8/27/2020     Short Term Goals (STGs); to be met within 4 weeks  (8/20/2020).  STG #1a: Pt will report 3 out of 10 pain level with ADLs and house hold tasks. Progressing 8/27/2020  STG #2a: Pt will report/demo [place] with cooking. Progressing 8/27/2020  STG #2b: Pt will report/demo [place] with house hold cleaning. Progressing 8/27/2020  STG #3a: Pt will demonstrate independence with issued HEP.  Progressing 8/27/2020  STG #3b: Pt will demo improved L wrist AROM by at least 10 degrees needed to aid with ADLs. Progressing 8/27/2020      Plan   Continue per initial POC.   Updates/Grading for next session: Progress as tolerated.       ALFRED Vigil

## 2020-08-31 ENCOUNTER — CLINICAL SUPPORT (OUTPATIENT)
Dept: REHABILITATION | Facility: HOSPITAL | Age: 75
End: 2020-08-31
Payer: MEDICARE

## 2020-08-31 DIAGNOSIS — M25.632 DECREASED RANGE OF MOTION OF LEFT WRIST: ICD-10-CM

## 2020-08-31 DIAGNOSIS — M25.532 LEFT WRIST PAIN: ICD-10-CM

## 2020-08-31 PROCEDURE — 97022 WHIRLPOOL THERAPY: CPT | Mod: HCNC,PN

## 2020-08-31 PROCEDURE — 97110 THERAPEUTIC EXERCISES: CPT | Mod: HCNC,PN

## 2020-08-31 NOTE — PROGRESS NOTES
"  Occupational Therapy Daily Treatment Note     Name: Aretha Nguyen  Clinic Number: [id number]    Therapy Diagnosis:   Encounter Diagnoses   Name Primary?    Left wrist pain     Decreased range of motion of left wrist      Physician: Radha Torres PA-C    Visit Date: 8/31/2020  Physician Orders: eval and treat  Medical Diagnosis: Closed fracture of distal end of left radius, unspecified fracture morphology, initial encounter [S52.502A]  Surgical Procedure and Date: n/a / Date of Injury/Onset: early Yvette  Evaluation Date: 7/20/2020  Insurance Authorization Period Expiration: 12/31/2020  Plan of Care Certification Period: 9/18/2020  Date of Return to MD: none scheduled     Visit # / Visits authorized: 9/ 24     FOTO: initial eval     Time In: 10:45 am  Time Out:  11:25 am   Total treatment time: 40 minutes  Total Timed minutes  40 minutes  Fluido 1 TE2     Precautions:  Standard and Weightbearing, NWB    Subjective     Pt reports: "I was a little sore when I move it a certain way ."  she was compliant with home exercise program given last session.   Response to previous treatment:Good increased use  Functional change: States only pain with certain movements    Pain: 0/10  Location: left wrist    Objective      Aretha received the following supervised modalities after being cleared for contradictions for 10 minutes:   -Patient received fluidotherapy to L hand for 10 minutes to increase blood flow, circulation, pain management and for tissue elasticity prior to therex.      Aretha received therapeutic exercises for 35 minutes including:  -  AROM   Sup/Pro  Wrist  Ext/flx  RD/UD    X 20  reps each    AROM   Composite fist  Lifts  Full fist CW/CCW X 20 reps each      X 10 each    AROM thumb:  Rad abd/add  Palmar abd/add  Pinky Slides    X 20 reps each    Isospheres 2 min    Wrist Wheel 2 ways 2 min    Wrist dextraciser 3 min    Red RB ext  2/10    Wrist AROM with 2#, 3 ways 2/10 each   Green CP Pinch  2/10   Medium " grippers X 10         Home Exercises and Education Provided     Education provided:   - Continue established HEP  - Progress towards goals     Written Home Exercises Provided: Patient instructed to cont prior HEP.  Exercises were reviewed and Aretha was able to demonstrate them prior to the end of the session.  Aretha demonstrated good  understanding of the HEP provided.     See EMR under Patient Instructions for exercises provided prior visit.      Assessment     Pt participated well today. Continues to improve AROM and strength in the wrist but still has some pain with certain movements. She is doing well with therapy. Main focus being strengthening and activity tolerance in future sessions.   Aretha is progressing well towards her goals and there are no updates to goals at this time. Pt prognosis is Excellent.     Pt will continue to benefit from skilled outpatient occupational therapy to address the deficits listed in the problem list on initial evaluation provide pt/family education and to maximize pt's level of independence in the home and community environment.     Anticipated barriers to occupational therapy: none noted    Pt's spiritual, cultural and educational needs considered and pt agreeable to plan of care and goals.    Goals:   The following goals were discussed with the patient and patient is in agreement with them as to be addressed in the treatment plan.   Long Term Goals (LTGs); to be met by discharge.  LTG #1: Pt will report a pain level of 0 out of 10 with cooking and cleaning tasks   Progressing 8/31/2020  LTG #2: Pt will demo improved FOTO score by at least 20 points. Progressing 8/31/2020  LTG #3: Pt will return to prior level of function for ADLs and household management. Progressing 8/31/2020  LTG #4: Pt will demonstrate improved L wrist AROM WFL for performing house hold tasks Progressing 8/31/2020  LTG #5:  strength to be assessed when appropriate and goals set accordingly Progressing  8/31/2020     Short Term Goals (STGs); to be met within 4 weeks (8/20/2020).  STG #1a: Pt will report 3 out of 10 pain level with ADLs and house hold tasks. Progressing 8/31/2020  STG #2a: Pt will report/demo [place] with cooking. Progressing 8/31/2020  STG #2b: Pt will report/demo [place] with house hold cleaning. Progressing 8/31/2020  STG #3a: Pt will demonstrate independence with issued HEP.  Progressing 8/31/2020  STG #3b: Pt will demo improved L wrist AROM by at least 10 degrees needed to aid with ADLs. Progressing 8/31/2020      Plan   Continue per initial POC.   Updates/Grading for next session: Progress as tolerated.       Nnamdi Albert, OT

## 2020-09-03 ENCOUNTER — CLINICAL SUPPORT (OUTPATIENT)
Dept: REHABILITATION | Facility: HOSPITAL | Age: 75
End: 2020-09-03
Payer: MEDICARE

## 2020-09-03 DIAGNOSIS — M25.532 LEFT WRIST PAIN: ICD-10-CM

## 2020-09-03 DIAGNOSIS — M25.632 DECREASED RANGE OF MOTION OF LEFT WRIST: ICD-10-CM

## 2020-09-03 PROCEDURE — 97022 WHIRLPOOL THERAPY: CPT | Mod: HCNC,PN

## 2020-09-03 PROCEDURE — 97110 THERAPEUTIC EXERCISES: CPT | Mod: HCNC,PN

## 2020-09-03 NOTE — PROGRESS NOTES
"  Occupational Therapy Progress Note & Discharge Summary     Name: Aretha Nguyen  Clinic Number: [id number]    Therapy Diagnosis:   Encounter Diagnoses   Name Primary?    Left wrist pain     Decreased range of motion of left wrist      Physician: Radha Torres PA-C    Visit Date: 9/3/2020  Physician Orders: eval and treat  Medical Diagnosis: Closed fracture of distal end of left radius, unspecified fracture morphology, initial encounter [S52.502A]  Surgical Procedure and Date: n/a / Date of Injury/Onset: early June  Evaluation Date: 7/20/2020  Insurance Authorization Period Expiration: 12/31/2020  Plan of Care Certification Period: 9/18/2020  Date of Return to MD: none scheduled     Visit # / Visits authorized: 10/ 24     FOTO: initial eval, 10th visit: 28% limitation (10 points improved)     Time In: 11:10 am  Time Out:  11:50 am   Total treatment time: 40 minutes  Total Timed minutes  30 minutes  Fluido 1 TE2     Precautions:  Standard and Weightbearing, NWB    Subjective     Pt reports: "I feel good. I can do stuff at home" Pt states she would like to be discharged this date.  she was compliant with home exercise program given last session.   Response to previous treatment:Good increased use  Functional change: States only mild pain with certain movements, able to do everything at home except heavier lifting    Pain: 0/10  Location: left wrist    Objective      Edema. Measured in centimeters.    7/20/2020 7/20/2020 8/3/2020 9/3/2020     Left Right Left Left   Wrist Crease 14.8 14.3 15 15.4   DPC 17.6 18 NT    MCPs 17.4 17.5 NT          Elbow and Wrist ROM. Measured in degrees.    7/20/2020 7/20/2020 8/3/2020 9/3/2020     Left Right Left Left   Elbow Ext/Flex          Supination/Pronation 50/46 WNL 56/58 68/74   Wrist Ext/Flex 52/44 56/66 54/50 62/58   Wrist RD/UD 16/10 18/26 20/16 20/20      Hand ROM. Measured in degrees.  Digits 2-5 WNL    7/20/2020 7/20/2020 8/3/2020 9/3/2020     Left Right Left Left            "   Thumb: MP WFL 45 48 WFL                IP WFL 8 30 WFL       Rad ADD/ABD WFL 38 44 WFL       Pal ADD/ABD WFL 45 42 WFL          Opposition WFL WFL WFL WFL       Strength (Dynamometer) and Pinch Strength (Pinch Gauge)  Measured in pounds.    9/3/2020 9/3/2020     Left Right   Rung II 32 45   Key Pinch 6 9   3pt Pinch 9 12   2pt Pinch 6 11           Aretha received the following supervised modalities after being cleared for contradictions for 10 minutes:   -Patient received fluidotherapy to L hand for 10 minutes to increase blood flow, circulation, pain management and for tissue elasticity prior to therex.      Aretha received therapeutic exercises for 30 minutes including:  -  AROM   Sup/Pro  Wrist  Ext/flx  RD/UD    X 20  reps each    Isospheres 2 min    Wrist dextraciser 3 min    Red RB ext  2/10    Wrist AROM with 2#, 3 ways 2/10 each   Green CP Pinch  2/10   Medium grippers X 10    Theraputty - red  -molding  -gripping  -log rolls with lateral key and 3 pt  pinch     -2 min  -15 reps        Home Exercises and Education Provided     Education provided:   - Continue established HEP, given red putty for HEP  - Progress towards goals     Written Home Exercises Provided: Patient instructed to cont prior HEP.  Exercises were reviewed and Aretha was able to demonstrate them prior to the end of the session.  Aretha demonstrated good  understanding of the HEP provided.     See EMR under Patient Instructions for exercises provided prior visit.      Assessment     Pt participated well today. She requests discharge from therapy today, and reports she does not have any functional deficits except lifting heavy items. AROM improved and WFL, and has fair  strength. Pt met all goals except FOTO goal, however demonstrated improvement. Pt made good progress in therapy. No further OT services recommended at this time.     Anticipated barriers to occupational therapy: none noted    Pt's spiritual, cultural and educational  needs considered and pt agreeable to plan of care and goals.    Goals:   The following goals were discussed with the patient and patient is in agreement with them as to be addressed in the treatment plan.   Long Term Goals (LTGs); to be met by discharge.  LTG #1: Pt will report a pain level of 0 out of 10 with cooking and cleaning tasks   met 9/3/2020  LTG #2: Pt will demo improved FOTO score by at least 20 points. Not met 9/3/2020  LTG #3: Pt will return to prior level of function for ADLs and household management. met 9/3/2020  LTG #4: Pt will demonstrate improved L wrist AROM WFL for performing house hold tasks met 9/3/2020  LTG #5:  strength to be assessed when appropriate and goals set accordingly met 9/3/2020     Short Term Goals (STGs); to be met within 4 weeks (8/20/2020).  STG #1a: Pt will report 3 out of 10 pain level with ADLs and house hold tasks. met 9/3/2020  STG #2a: Pt will report/demo [place] with cooking. met 9/3/2020  STG #2b: Pt will report/demo [place] with house hold cleaning. met 9/3/2020  STG #3a: Pt will demonstrate independence with issued HEP.  met 9/3/2020  STG #3b: Pt will demo improved L wrist AROM by at least 10 degrees needed to aid with ADLs. met 9/3/2020      Plan     Discharge pt from skilled OT services at this time.      Sandrine Jackson, OT

## 2020-09-11 ENCOUNTER — OFFICE VISIT (OUTPATIENT)
Dept: FAMILY MEDICINE | Facility: CLINIC | Age: 75
End: 2020-09-11
Payer: MEDICARE

## 2020-09-11 VITALS
WEIGHT: 141.75 LBS | HEIGHT: 61 IN | OXYGEN SATURATION: 96 % | TEMPERATURE: 98 F | DIASTOLIC BLOOD PRESSURE: 76 MMHG | HEART RATE: 77 BPM | SYSTOLIC BLOOD PRESSURE: 130 MMHG | BODY MASS INDEX: 26.76 KG/M2

## 2020-09-11 DIAGNOSIS — F33.9 RECURRENT DEPRESSION: ICD-10-CM

## 2020-09-11 DIAGNOSIS — D50.9 IRON DEFICIENCY ANEMIA, UNSPECIFIED IRON DEFICIENCY ANEMIA TYPE: ICD-10-CM

## 2020-09-11 DIAGNOSIS — E11.65 CONTROLLED TYPE 2 DIABETES MELLITUS WITH HYPERGLYCEMIA, WITHOUT LONG-TERM CURRENT USE OF INSULIN: ICD-10-CM

## 2020-09-11 DIAGNOSIS — E78.5 HYPERLIPIDEMIA ASSOCIATED WITH TYPE 2 DIABETES MELLITUS: Primary | ICD-10-CM

## 2020-09-11 DIAGNOSIS — I10 ESSENTIAL HYPERTENSION: ICD-10-CM

## 2020-09-11 DIAGNOSIS — Z12.31 ENCOUNTER FOR SCREENING MAMMOGRAM FOR MALIGNANT NEOPLASM OF BREAST: ICD-10-CM

## 2020-09-11 DIAGNOSIS — Z12.39 BREAST CANCER SCREENING: ICD-10-CM

## 2020-09-11 DIAGNOSIS — E11.59 HYPERTENSION COMPLICATING DIABETES: ICD-10-CM

## 2020-09-11 DIAGNOSIS — I15.2 HYPERTENSION COMPLICATING DIABETES: ICD-10-CM

## 2020-09-11 DIAGNOSIS — Z23 NEEDS FLU SHOT: ICD-10-CM

## 2020-09-11 DIAGNOSIS — K51.20 ULCERATIVE PROCTITIS WITHOUT COMPLICATION: ICD-10-CM

## 2020-09-11 DIAGNOSIS — E11.69 HYPERLIPIDEMIA ASSOCIATED WITH TYPE 2 DIABETES MELLITUS: Primary | ICD-10-CM

## 2020-09-11 PROCEDURE — 1100F PR PT FALLS ASSESS DOC 2+ FALLS/FALL W/INJURY/YR: ICD-10-PCS | Mod: HCNC,CPTII,S$GLB, | Performed by: INTERNAL MEDICINE

## 2020-09-11 PROCEDURE — 99214 PR OFFICE/OUTPT VISIT, EST, LEVL IV, 30-39 MIN: ICD-10-PCS | Mod: HCNC,25,S$GLB, | Performed by: INTERNAL MEDICINE

## 2020-09-11 PROCEDURE — G0008 ADMIN INFLUENZA VIRUS VAC: HCPCS | Mod: HCNC,S$GLB,, | Performed by: INTERNAL MEDICINE

## 2020-09-11 PROCEDURE — 99999 PR PBB SHADOW E&M-EST. PATIENT-LVL IV: ICD-10-PCS | Mod: PBBFAC,HCNC,, | Performed by: INTERNAL MEDICINE

## 2020-09-11 PROCEDURE — 90694 VACC AIIV4 NO PRSRV 0.5ML IM: CPT | Mod: HCNC,S$GLB,, | Performed by: INTERNAL MEDICINE

## 2020-09-11 PROCEDURE — 3044F PR MOST RECENT HEMOGLOBIN A1C LEVEL <7.0%: ICD-10-PCS | Mod: HCNC,CPTII,S$GLB, | Performed by: INTERNAL MEDICINE

## 2020-09-11 PROCEDURE — 1126F AMNT PAIN NOTED NONE PRSNT: CPT | Mod: HCNC,S$GLB,, | Performed by: INTERNAL MEDICINE

## 2020-09-11 PROCEDURE — 90694 FLU VACCINE - QUADRIVALENT - ADJUVANTED: ICD-10-PCS | Mod: HCNC,S$GLB,, | Performed by: INTERNAL MEDICINE

## 2020-09-11 PROCEDURE — 3078F DIAST BP <80 MM HG: CPT | Mod: HCNC,CPTII,S$GLB, | Performed by: INTERNAL MEDICINE

## 2020-09-11 PROCEDURE — 99214 OFFICE O/P EST MOD 30 MIN: CPT | Mod: HCNC,25,S$GLB, | Performed by: INTERNAL MEDICINE

## 2020-09-11 PROCEDURE — 99499 RISK ADDL DX/OHS AUDIT: ICD-10-PCS | Mod: HCNC,S$GLB,, | Performed by: INTERNAL MEDICINE

## 2020-09-11 PROCEDURE — 3044F HG A1C LEVEL LT 7.0%: CPT | Mod: HCNC,CPTII,S$GLB, | Performed by: INTERNAL MEDICINE

## 2020-09-11 PROCEDURE — 3288F FALL RISK ASSESSMENT DOCD: CPT | Mod: HCNC,CPTII,S$GLB, | Performed by: INTERNAL MEDICINE

## 2020-09-11 PROCEDURE — 3008F BODY MASS INDEX DOCD: CPT | Mod: HCNC,CPTII,S$GLB, | Performed by: INTERNAL MEDICINE

## 2020-09-11 PROCEDURE — 3288F PR FALLS RISK ASSESSMENT DOCUMENTED: ICD-10-PCS | Mod: HCNC,CPTII,S$GLB, | Performed by: INTERNAL MEDICINE

## 2020-09-11 PROCEDURE — 3075F SYST BP GE 130 - 139MM HG: CPT | Mod: HCNC,CPTII,S$GLB, | Performed by: INTERNAL MEDICINE

## 2020-09-11 PROCEDURE — 99499 UNLISTED E&M SERVICE: CPT | Mod: HCNC,S$GLB,, | Performed by: INTERNAL MEDICINE

## 2020-09-11 PROCEDURE — 3075F PR MOST RECENT SYSTOLIC BLOOD PRESS GE 130-139MM HG: ICD-10-PCS | Mod: HCNC,CPTII,S$GLB, | Performed by: INTERNAL MEDICINE

## 2020-09-11 PROCEDURE — 3078F PR MOST RECENT DIASTOLIC BLOOD PRESSURE < 80 MM HG: ICD-10-PCS | Mod: HCNC,CPTII,S$GLB, | Performed by: INTERNAL MEDICINE

## 2020-09-11 PROCEDURE — 1100F PTFALLS ASSESS-DOCD GE2>/YR: CPT | Mod: HCNC,CPTII,S$GLB, | Performed by: INTERNAL MEDICINE

## 2020-09-11 PROCEDURE — 1159F PR MEDICATION LIST DOCUMENTED IN MEDICAL RECORD: ICD-10-PCS | Mod: HCNC,S$GLB,, | Performed by: INTERNAL MEDICINE

## 2020-09-11 PROCEDURE — 3008F PR BODY MASS INDEX (BMI) DOCUMENTED: ICD-10-PCS | Mod: HCNC,CPTII,S$GLB, | Performed by: INTERNAL MEDICINE

## 2020-09-11 PROCEDURE — G0008 PR ADMIN INFLUENZA VIRUS VAC: ICD-10-PCS | Mod: HCNC,S$GLB,, | Performed by: INTERNAL MEDICINE

## 2020-09-11 PROCEDURE — 1159F MED LIST DOCD IN RCRD: CPT | Mod: HCNC,S$GLB,, | Performed by: INTERNAL MEDICINE

## 2020-09-11 PROCEDURE — 1126F PR PAIN SEVERITY QUANTIFIED, NO PAIN PRESENT: ICD-10-PCS | Mod: HCNC,S$GLB,, | Performed by: INTERNAL MEDICINE

## 2020-09-11 PROCEDURE — 99999 PR PBB SHADOW E&M-EST. PATIENT-LVL IV: CPT | Mod: PBBFAC,HCNC,, | Performed by: INTERNAL MEDICINE

## 2020-09-11 RX ORDER — ESCITALOPRAM OXALATE 5 MG/1
5 TABLET ORAL DAILY
Qty: 90 TABLET | Refills: 1 | Status: SHIPPED | OUTPATIENT
Start: 2020-09-11 | End: 2021-03-04

## 2020-09-11 RX ORDER — HYDROCHLOROTHIAZIDE 12.5 MG/1
12.5 CAPSULE ORAL DAILY
Qty: 90 CAPSULE | Refills: 0 | Status: SHIPPED | OUTPATIENT
Start: 2020-09-11 | End: 2020-12-14 | Stop reason: SDUPTHER

## 2020-09-11 RX ORDER — METFORMIN HYDROCHLORIDE 500 MG/1
500 TABLET, EXTENDED RELEASE ORAL DAILY
Qty: 90 TABLET | Refills: 0 | Status: SHIPPED | OUTPATIENT
Start: 2020-09-11 | End: 2020-12-14 | Stop reason: SDUPTHER

## 2020-09-11 RX ORDER — CETIRIZINE HYDROCHLORIDE 10 MG/1
10 TABLET ORAL DAILY
Qty: 30 TABLET | Refills: 0 | Status: SHIPPED | OUTPATIENT
Start: 2020-09-11 | End: 2021-05-31 | Stop reason: CLARIF

## 2020-09-11 RX ORDER — OLMESARTAN MEDOXOMIL 20 MG/1
20 TABLET ORAL DAILY
Qty: 90 TABLET | Refills: 0 | Status: SHIPPED | OUTPATIENT
Start: 2020-09-11 | End: 2020-12-11

## 2020-09-11 RX ORDER — FERROUS SULFATE 324(65)MG
325 TABLET, DELAYED RELEASE (ENTERIC COATED) ORAL 2 TIMES DAILY
Qty: 180 TABLET | Refills: 0 | Status: SHIPPED | OUTPATIENT
Start: 2020-09-11 | End: 2020-10-05

## 2020-09-11 RX ORDER — ATORVASTATIN CALCIUM 20 MG/1
20 TABLET, FILM COATED ORAL DAILY
Qty: 90 TABLET | Refills: 1 | Status: SHIPPED | OUTPATIENT
Start: 2020-09-11 | End: 2021-05-12 | Stop reason: SDUPTHER

## 2020-09-11 NOTE — PROGRESS NOTES
(Portions of this note were dictated using voice recognition software and may contain dictation related errors in spelling/grammar/syntax not found on text review)    CC:   Chief Complaint   Patient presents with    Establish Care       HPI: 74 y.o. female with hx  Of DM-2, HTN, HLD, and UC, who comes to get established with new PCP. Feels well and reports BP has been good except for some occasions when she is anxious. Has been gaining weight since has not been strict with diet and unable to go to usual walks at the mall due to social distancing. Does not check accuchecks but no polydipsia, polyuria, or increased appetite. No recent GI bleeds and bm have been normal.    Past Medical History:   Diagnosis Date    Controlled type 2 diabetes mellitus with hyperglycemia, without long-term current use of insulin 3/22/2017    Hyperlipidemia associated with type 2 diabetes mellitus 3/22/2017    Hypertension complicating diabetes 3/22/2017    Inflammatory bowel disease 3/22/2017       Past Surgical History:   Procedure Laterality Date    HYSTERECTOMY      MASTECTOMY Left     for benign recurrent growth. Dr. Stephan Brown MD - [facility]    TOTAL ABDOMINAL HYSTERECTOMY W/ BILATERAL SALPINGOOPHORECTOMY  2009    for benign cysts with concern for future malginancy. Fibromoid uterus for AUB. Bengin results       Family History   Problem Relation Age of Onset    Breast cancer Neg Hx        Social History     Tobacco Use    Smoking status: Never Smoker    Smokeless tobacco: Never Used   Substance Use Topics    Alcohol use: Yes     Comment: not often    Drug use: No       Lab Results   Component Value Date    WBC 8.78 12/27/2019    HGB 12.5 12/27/2019    HCT 39.1 12/27/2019    MCV 87 12/27/2019     12/27/2019    CHOL 187 06/03/2020    TRIG 182 (H) 06/03/2020    HDL 61 06/03/2020    ALT 11 06/03/2020    AST 23 06/03/2020    BILITOT 0.6 06/03/2020    ALKPHOS 95 06/03/2020      06/03/2020    K 3.7 06/03/2020     06/03/2020    CREATININE 0.8 06/03/2020    ESTGFRAFRICA >60.0 06/03/2020    EGFRNONAA >60.0 06/03/2020    CALCIUM 10.1 06/03/2020    ALBUMIN 4.0 06/03/2020    BUN 7 (L) 06/03/2020    CO2 29 06/03/2020    HGBA1C 6.2 (H) 01/09/2020    MICALBCREAT 12.1 01/28/2019    LDLCALC 89.6 06/03/2020    GLU 89 06/03/2020           Current Outpatient Medications on File Prior to Visit   Medication Sig Dispense Refill    balsalazide (COLAZAL) 750 mg capsule 2 tablets 2 times daily  2    cholecalciferol, vitamin D3, 2,000 unit Cap Take 1 capsule (2,000 Units total) by mouth once daily.      multivit-min-iron-FA-lutein (CENTRUM SILVER WOMEN) 8 mg iron-400 mcg-300 mcg Tab Take 1 tablet by mouth once daily at 6am.      [DISCONTINUED] albuterol (PROVENTIL/VENTOLIN HFA) 90 mcg/actuation inhaler Inhale 1-2 puffs into the lungs every 6 (six) hours as needed for Wheezing. Rescue 1 Inhaler 0    [DISCONTINUED] albuterol sulfate 2.5 mg/0.5 mL Nebu Take 2.5 mg by nebulization every 4 (four) hours as needed. Rescue 1 each 30    [DISCONTINUED] atorvastatin (LIPITOR) 20 MG tablet TAKE 1 TABLET BY MOUTH EVERY DAY 90 tablet 1    [DISCONTINUED] cetirizine (ZYRTEC) 10 MG tablet Take 1 tablet (10 mg total) by mouth once daily. 30 tablet 0    [DISCONTINUED] escitalopram oxalate (LEXAPRO) 5 MG Tab Take 1 tablet (5 mg total) by mouth once daily. 90 tablet 1    [DISCONTINUED] ferrous sulfate 324 mg (65 mg iron) TbEC TAKE 1 TABLET (324 MG TOTAL) BY MOUTH 2 (TWO) TIMES DAILY. 180 tablet 0    [DISCONTINUED] hydroCHLOROthiazide (MICROZIDE) 12.5 mg capsule TAKE 1 CAPSULE BY MOUTH ONCE DAILY 90 capsule 0    [DISCONTINUED] metFORMIN (GLUCOPHAGE-XR) 500 MG ER 24hr tablet TAKE 1 TABLET BY MOUTH EVERY DAY 90 tablet 0    [DISCONTINUED] olmesartan (BENICAR) 20 MG tablet TAKE 1 TABLET BY MOUTH EVERY DAY 90 tablet 0    FLUAD 0620-2570, 65 YR UP,,PF, 45 mcg (15 mcg x 3)/0.5 mL Syrg       varicella-zoster [id number],  PF, (SHINGRIX, PF,) 50 mcg/0.5 mL injection Inject 0.5 mLs into the muscle As instructed. 2 doses (Patient not taking: Reported on 9/11/2020) 0.5 mL 2     No current facility-administered medications on file prior to visit.          ROS:  GENERAL: No fever, chills, fatigability or weight loss.  SKIN: No rashes, no itching.  HEAD: No headaches.  EYES: No visual changes. Mild blurred vision(not new) with hx of cataract.  EARS: No ear pain or changes in hearing.  NOSE: No congestion or rhinorrhea.  MOUTH & THROAT: No hoarseness, change in voice, or sore throat.  NODES: Denies swollen glands.  CHEST: Denies CUENCA, cyanosis, wheezing, cough and sputum production.  CARDIOVASCULAR: Denies chest pain, PND, orthopnea.  ABDOMEN: No nausea, vomiting, or changes in bowel function.  URINARY: No flank pain, dysuria or hematuria.  PERIPHERAL VASCULAR: No claudication or cyanosis.  MUSCULOSKELETAL: No joint stiffness or swelling. Denies back pain.  NEUROLOGIC: No weakness or numbness.    Vital signs reviewed  Vitals:    09/11/20 1358   BP: 130/76   Pulse: 77   Temp: 98.2 °F (36.8 °C)   Body mass index is 26.78 kg/m².  PE:   APPEARANCE: Well nourished, well developed, in no acute distress.    HEAD: Normocephalic, atraumatic.  EYES: PERRL. EOMI.   Conjunctivae noninjected.  EARS: TM's intact. Light reflex normal. No retraction or perforation  MOUTH & THROAT: No tonsillar enlargement.   NECK: Supple with no cervical lymphadenopathy.    CHEST: Good inspiratory effort. Lungs clear to auscultation with no wheezes or crackles.  CARDIOVASCULAR: Normal S1, S2. No rubs, murmurs, or gallops.  ABDOMEN: Bowel sounds normal. Not distended. Soft. No tenderness or masses. No organomegaly.  EXTREMITIES: No edema, cyanosis, or clubbing.  Foot exam. No lesions, good pulses, no sensory deficits. Toenails polished but no thickening noted.      IMPRESSION/PLAN  Aretha was seen today for establish care.    Diagnoses and all orders for this  visit:    Hyperlipidemia associated with type 2 diabetes mellitus  -     Lipid Panel; Future  -  Last labs improving. Continue Lipitor.   - Diet and increased activity recommended. Weight control with 10% weight loss recommended.    Controlled type 2 diabetes mellitus with hyperglycemia, without long-term current use of insulin  -     metFORMIN (GLUCOPHAGE-XR) 500 MG ER 24hr tablet; Take 1 tablet (500 mg total) by mouth once daily.  -     Ambulatory referral/consult to Ophthalmology; Future  -     Hemoglobin A1C; Future  -     atorvastatin (LIPITOR) 20 MG tablet; Take 1 tablet (20 mg total) by mouth once daily.  -  Diet and increased activity recommended. Weight control with 10% weight loss recommended.    Hypertension complicating diabetes  -     CBC auto differential; Future  -     Comprehensive metabolic panel; Future  - Controlled. Continue Benicar and HCTZ.    Ulcerative proctitis without complication  -     CBC auto differential; Future  -  Stable. Following with GI svc.  -  Pt reports she discussed colonoscopy with gastroenterologist and will postpone to 5 years.    Breast cancer screening  -     Mammo Digital Screening Bilat; Future    Needs flu shot  -     Influenza (FLUAD) - Quadrivalent (Adjuvanted) *Preferred* (65+) (PF)    Encounter for screening mammogram for malignant neoplasm of breast   -     Mammo Digital Screening Bilat; Future    Recurrent depression  -     escitalopram oxalate (LEXAPRO) 5 MG Tab; Take 1 tablet (5 mg total) by mouth once daily.  - Stable mood. Wants to continue Lexapro as she feels it helps.     Iron deficiency anemia, unspecified iron deficiency anemia type  -     ferrous sulfate 324 mg (65 mg iron) TbEC; Take 1 tablet (324 mg total) by mouth 2 (two) times daily.    Essential hypertension  -     hydroCHLOROthiazide (MICROZIDE) 12.5 mg capsule; Take 1 capsule (12.5 mg total) by mouth once daily.  -     olmesartan (BENICAR) 20 MG tablet; Take 1 tablet (20 mg total) by mouth once  daily.  - Controlled.    Other orders  -     cetirizine (ZYRTEC) 10 MG tablet; Take 1 tablet (10 mg total) by mouth once daily.

## 2020-09-16 ENCOUNTER — HOSPITAL ENCOUNTER (OUTPATIENT)
Dept: RADIOLOGY | Facility: HOSPITAL | Age: 75
Discharge: HOME | End: 2020-09-16
Attending: INTERNAL MEDICINE
Payer: MEDICARE

## 2020-09-16 DIAGNOSIS — Z12.31 ENCOUNTER FOR SCREENING MAMMOGRAM FOR MALIGNANT NEOPLASM OF BREAST: ICD-10-CM

## 2020-09-16 DIAGNOSIS — Z12.39 BREAST CANCER SCREENING: ICD-10-CM

## 2020-09-16 PROCEDURE — 77067 SCR MAMMO BI INCL CAD: CPT | Mod: TC,HCNC

## 2020-09-16 PROCEDURE — 77067 SCR MAMMO BI INCL CAD: CPT | Mod: 26,HCNC,, | Performed by: RADIOLOGY

## 2020-09-16 PROCEDURE — 77063 BREAST TOMOSYNTHESIS BI: CPT | Mod: 26,HCNC,, | Performed by: RADIOLOGY

## 2020-09-16 PROCEDURE — 77067 MAMMO DIGITAL SCREENING RIGHT WITH TOMO: ICD-10-PCS | Mod: 26,HCNC,, | Performed by: RADIOLOGY

## 2020-09-16 PROCEDURE — 77063 MAMMO DIGITAL SCREENING RIGHT WITH TOMO: ICD-10-PCS | Mod: 26,HCNC,, | Performed by: RADIOLOGY

## 2020-09-18 ENCOUNTER — TELEPHONE (OUTPATIENT)
Dept: FAMILY MEDICINE | Facility: CLINIC | Age: 75
End: 2020-09-18

## 2020-09-18 NOTE — TELEPHONE ENCOUNTER
----- Message from Brittany Floyd MD sent at 9/18/2020  1:36 PM CDT -----  Please contact the patient and let them know that mammogram was good and do not require any change in plan of care. Recommendations are to follow up in 1 year.

## 2020-10-08 ENCOUNTER — PATIENT OUTREACH (OUTPATIENT)
Dept: ADMINISTRATIVE | Facility: OTHER | Age: 75
End: 2020-10-08

## 2020-10-08 NOTE — PROGRESS NOTES
Care Everywhere: updated  Immunization: updated  Health Maintenance: updated  Media Review: uploaded 2017 colonoscopy report   Legacy Review:   Order placed:   Upcoming appts:optometry 10/9   efax sent to Dr. Hall office to possible obtain updated colon cancer report

## 2020-10-08 NOTE — LETTER
AUTHORIZATION FOR RELEASE OF   CONFIDENTIAL INFORMATION    Dear Dino Hall MD,    We are seeing Aretha Nguyen, date of birth 1945, in the clinic at [facility]. Brittany Floyd MD is the patient's PCP. Aretha Nguyen has an outstanding lab/procedure at the time we reviewed her chart. In order to help keep her health information updated, she has authorized us to request the following medical record(s):        (  )  MAMMOGRAM                                      ( x )  COLONOSCOPY after       (  )  PAP SMEAR                                          (  )  OUTSIDE LAB RESULTS     (  )  DEXA SCAN                                          (  )  EYE EXAM            (  )  FOOT EXAM                                          (  )  ENTIRE RECORD     (  )  OUTSIDE IMMUNIZATIONS                 (  )  _______________         Please fax records to [facility], Elena Rada, MD, [phone]     If you have any questions, please contact Chelsey Andrew at [phone].           Patient Name: Aretha Nguyen  : 1945  Patient Phone #: [phone]

## 2020-10-09 ENCOUNTER — OFFICE VISIT (OUTPATIENT)
Dept: OPTOMETRY | Facility: CLINIC | Age: 75
End: 2020-10-09
Payer: COMMERCIAL

## 2020-10-09 DIAGNOSIS — E11.36 TYPE 2 DIABETES MELLITUS WITH CATARACT: ICD-10-CM

## 2020-10-09 DIAGNOSIS — H52.4 HYPEROPIA WITH ASTIGMATISM AND PRESBYOPIA, BILATERAL: Primary | ICD-10-CM

## 2020-10-09 DIAGNOSIS — E11.9 TYPE 2 DIABETES MELLITUS WITHOUT RETINOPATHY: ICD-10-CM

## 2020-10-09 DIAGNOSIS — E11.65 CONTROLLED TYPE 2 DIABETES MELLITUS WITH HYPERGLYCEMIA, WITHOUT LONG-TERM CURRENT USE OF INSULIN: ICD-10-CM

## 2020-10-09 DIAGNOSIS — H52.203 HYPEROPIA WITH ASTIGMATISM AND PRESBYOPIA, BILATERAL: Primary | ICD-10-CM

## 2020-10-09 DIAGNOSIS — H25.13 SENILE NUCLEAR SCLEROSIS, BILATERAL: ICD-10-CM

## 2020-10-09 DIAGNOSIS — H52.03 HYPEROPIA WITH ASTIGMATISM AND PRESBYOPIA, BILATERAL: Primary | ICD-10-CM

## 2020-10-09 DIAGNOSIS — H40.013 OAG (OPEN ANGLE GLAUCOMA) SUSPECT, LOW RISK, BILATERAL: ICD-10-CM

## 2020-10-09 DIAGNOSIS — Z83.511 FAMILY HISTORY OF GLAUCOMA IN MOTHER: ICD-10-CM

## 2020-10-09 PROCEDURE — 99999 PR PBB SHADOW E&M-EST. PATIENT-LVL III: ICD-10-PCS | Mod: PBBFAC,,, | Performed by: OPTOMETRIST

## 2020-10-09 PROCEDURE — 92004 COMPRE OPH EXAM NEW PT 1/>: CPT | Mod: S$GLB,,, | Performed by: OPTOMETRIST

## 2020-10-09 PROCEDURE — 92004 PR EYE EXAM, NEW PATIENT,COMPREHESV: ICD-10-PCS | Mod: S$GLB,,, | Performed by: OPTOMETRIST

## 2020-10-09 PROCEDURE — 92015 DETERMINE REFRACTIVE STATE: CPT | Mod: S$GLB,,, | Performed by: OPTOMETRIST

## 2020-10-09 PROCEDURE — 99999 PR PBB SHADOW E&M-EST. PATIENT-LVL III: CPT | Mod: PBBFAC,,, | Performed by: OPTOMETRIST

## 2020-10-09 PROCEDURE — 92015 PR REFRACTION: ICD-10-PCS | Mod: S$GLB,,, | Performed by: OPTOMETRIST

## 2020-10-09 NOTE — PROGRESS NOTES
HPI     RINKU:1 yr   Glasses? Yes   Contacts? no  H/o eye surgery, injections or laser: no  H/o eye injury: no  Known eye conditions? no  Family h/o eye conditions? Mother-glaucoma   Eye gtts?no    (-) Flashes (-) Floaters (-) Mucous   (-) Tearing (-) Itching (-) Burning   (-) Headaches (-) Eye Pain/discomfort (-) Irritation   (-) Redness (-) Double vision (-) Blurry vision    Diabetic? (+)  A1c?  (Hemoglobin A1C       Date                     Value               Ref Range             Status                01/09/2020               6.2 (H)             4.0 - 5.6 %           Final              Comment:    ADA Screening Guidelines:  5.7-6.4%  Consistent with   prediabetes  >or=6.5%  Consistent with diabetes  High levels of fetal   hemoglobin interfere with the HbA1C  assay. Heterozygous hemoglobin   variants (HbS, HgC, etc)do  not significantly interfere with this assay.     However, presence of multiple variants may affect accuracy.         06/24/2019               5.9 (H)             4.0 - 5.6 %           Final              Comment:    ADA Screening Guidelines:  5.7-6.4%  Consistent with   prediabetes  >or=6.5%  Consistent with diabetes  High levels of fetal   hemoglobin interfere with the HbA1C  assay. Heterozygous hemoglobin   variants (HbS, HgC, etc)do  not significantly interfere with this assay.     However, presence of multiple variants may affect accuracy.         01/28/2019               6.1 (H)             4.0 - 5.6 %           Final              Comment:    ADA Screening Guidelines:  5.7-6.4%  Consistent with   prediabetes  >or=6.5%  Consistent with diabetes  High levels of fetal   hemoglobin interfere with the HbA1C  assay. Heterozygous hemoglobin   variants (HbS, HgC, etc)do  not significantly interfere with this assay.     However, presence of multiple variants may affect accuracy.    ----------)        Last edited by Merry Garcia on 10/9/2020  1:52 PM. (History)            Assessment /Plan     For  exam results, see Encounter Report.    Type 2 diabetes mellitus without retinopathy    Controlled type 2 diabetes mellitus with hyperglycemia, without long-term current use of insulin  -     Ambulatory referral/consult to Ophthalmology    Senile nuclear sclerosis, bilateral    Type 2 diabetes mellitus with cataract    Hyperopia with astigmatism and presbyopia, bilateral    OAG (open angle glaucoma) suspect, low risk, bilateral    Family history of glaucoma in mother      1-2. BS control. No signs of diabetic retinopathy. Monitor with annual exam.  3-4. Mildly visually significant. MOnitor.   5. SRx released to patient. Patient educated on lens options. Normal ocular health. RTC 1 year for routine exam.   6-7. (+)FHx- mother. IOp 19 OD, 14 OS. C/d 0.65 OD, 0.6 OS. Prev pt of Dr Fregoso. C/d slightly larger today than prev noted. Educated pt on findings w/understanding. RTc 1 mo IOP/OCt/HvF/pachy.

## 2020-10-09 NOTE — LETTER
October 9, 2020      Brittany Floyd MD  [address]  [address]  [place] [address]           [facility]  [address] [facility].  [place] [address]  Phone: [phone]  Fax: [phone]          Patient: Aretha Nguyen   MR Number: [id number]   YOB: 1945   Date of Visit: 10/9/2020       Dear Dr. Brittany Floyd:    Thank you for referring Aretha Nguyen to me for evaluation. Attached you will find relevant portions of my assessment and plan of care.    If you have questions, please do not hesitate to call me. I look forward to following Aretha Nguyen along with you.    Sincerely,    Kacie Brannon, OD    Enclosure  CC:  No Recipients    If you would like to receive this communication electronically, please contact [email or web address] or [phone] to request more information on [employer] Link access.    For providers and/or their staff who would like to refer a patient to [facility], please contact us through our one-stop-shop provider referral line, [facility], at [phone].    If you feel you have received this communication in error or would no longer like to receive these types of communications, please e-mail [email or web address]

## 2020-10-15 ENCOUNTER — TELEPHONE (OUTPATIENT)
Dept: OPTOMETRY | Facility: CLINIC | Age: 75
End: 2020-10-15

## 2020-10-15 NOTE — TELEPHONE ENCOUNTER
Scheduled [facility] Met call couldn't be completed scheduled appointment and mailed reminder. No active portal at this time .

## 2020-10-28 ENCOUNTER — PATIENT OUTREACH (OUTPATIENT)
Dept: ADMINISTRATIVE | Facility: OTHER | Age: 75
End: 2020-10-28

## 2020-11-17 ENCOUNTER — PES CALL (OUTPATIENT)
Dept: ADMINISTRATIVE | Facility: CLINIC | Age: 75
End: 2020-11-17

## 2020-11-18 ENCOUNTER — OFFICE VISIT (OUTPATIENT)
Dept: OPTOMETRY | Facility: CLINIC | Age: 75
End: 2020-11-18
Payer: MEDICARE

## 2020-11-18 ENCOUNTER — CLINICAL SUPPORT (OUTPATIENT)
Dept: OPHTHALMOLOGY | Facility: CLINIC | Age: 75
End: 2020-11-18
Payer: MEDICARE

## 2020-11-18 DIAGNOSIS — H40.013 OAG (OPEN ANGLE GLAUCOMA) SUSPECT, LOW RISK, BILATERAL: Primary | ICD-10-CM

## 2020-11-18 PROCEDURE — 99999 PR PBB SHADOW E&M-EST. PATIENT-LVL III: CPT | Mod: PBBFAC,HCNC,, | Performed by: OPTOMETRIST

## 2020-11-18 PROCEDURE — 92012 PR EYE EXAM, EST PATIENT,INTERMED: ICD-10-PCS | Mod: HCNC,S$GLB,, | Performed by: OPTOMETRIST

## 2020-11-18 PROCEDURE — 3288F PR FALLS RISK ASSESSMENT DOCUMENTED: ICD-10-PCS | Mod: HCNC,CPTII,S$GLB, | Performed by: OPTOMETRIST

## 2020-11-18 PROCEDURE — 92133 POSTERIOR SEGMENT OCT OPTIC NERVE(OCULAR COHERENCE TOMOGRAPHY) - OU - BOTH EYES: ICD-10-PCS | Mod: HCNC,S$GLB,, | Performed by: OPTOMETRIST

## 2020-11-18 PROCEDURE — 99999 PR PBB SHADOW E&M-EST. PATIENT-LVL III: ICD-10-PCS | Mod: PBBFAC,HCNC,, | Performed by: OPTOMETRIST

## 2020-11-18 PROCEDURE — 92083 EXTENDED VISUAL FIELD XM: CPT | Mod: HCNC,S$GLB,, | Performed by: OPTOMETRIST

## 2020-11-18 PROCEDURE — 1126F AMNT PAIN NOTED NONE PRSNT: CPT | Mod: HCNC,S$GLB,, | Performed by: OPTOMETRIST

## 2020-11-18 PROCEDURE — 76514 ECHO EXAM OF EYE THICKNESS: CPT | Mod: HCNC,S$GLB,, | Performed by: OPTOMETRIST

## 2020-11-18 PROCEDURE — 1126F PR PAIN SEVERITY QUANTIFIED, NO PAIN PRESENT: ICD-10-PCS | Mod: HCNC,S$GLB,, | Performed by: OPTOMETRIST

## 2020-11-18 PROCEDURE — 1101F PT FALLS ASSESS-DOCD LE1/YR: CPT | Mod: HCNC,CPTII,S$GLB, | Performed by: OPTOMETRIST

## 2020-11-18 PROCEDURE — 3288F FALL RISK ASSESSMENT DOCD: CPT | Mod: HCNC,CPTII,S$GLB, | Performed by: OPTOMETRIST

## 2020-11-18 PROCEDURE — 92083 HUMPHREY VISUAL FIELD - OU - BOTH EYES: ICD-10-PCS | Mod: HCNC,S$GLB,, | Performed by: OPTOMETRIST

## 2020-11-18 PROCEDURE — 92012 INTRM OPH EXAM EST PATIENT: CPT | Mod: HCNC,S$GLB,, | Performed by: OPTOMETRIST

## 2020-11-18 PROCEDURE — 1101F PR PT FALLS ASSESS DOC 0-1 FALLS W/OUT INJ PAST YR: ICD-10-PCS | Mod: HCNC,CPTII,S$GLB, | Performed by: OPTOMETRIST

## 2020-11-18 PROCEDURE — 76514 PR  US, EYE, FOR CORNEAL THICKNESS: ICD-10-PCS | Mod: HCNC,S$GLB,, | Performed by: OPTOMETRIST

## 2020-11-18 PROCEDURE — 92133 CPTRZD OPH DX IMG PST SGM ON: CPT | Mod: HCNC,S$GLB,, | Performed by: OPTOMETRIST

## 2020-11-19 NOTE — PROGRESS NOTES
HPI     RINKU: 10/20  Chief complaint (CC): Here for HVF,OCT, Pachymetry and IOP check today.  Glasses? +  Contacts? -  H/o eye surgery, injections or laser: -  H/o eye injury: -  Known eye conditions? Cataracts, glaucoma suspect  Family h/o eye conditions? +mother with glaucoma  Eye gtts? -      (-) Flashes (-)  Floaters (-) Mucous   (-)  Tearing (-) Itching (-) Burning   (-) Headaches (-) Eye Pain/discomfort (-) Irritation   (-)  Redness (-) Double vision (-) Blurry vision    Diabetic? +  A1c? Hemoglobin A1C       Date                     Value               Ref Range             Status                01/09/2020               6.2 (H)             4.0 - 5.6 %           Final       06/24/2019               5.9 (H)             4.0 - 5.6 %           Final         01/28/2019               6.1 (H)             4.0 - 5.6 %           Final                  Last edited by Nikki Fuentes on 11/18/2020  3:45 PM. (History)            Assessment /Plan     For exam results, see Encounter Report.    OAG (open angle glaucoma) suspect, low risk, bilateral  -     Posterior Segment OCT Optic Nerve- Both eyes  -     [place] Visual Field - OU - Extended - Both Eyes      (+)FHx- mother. IOP 15 OD, OS. Last IOp 19 OD, 14 OS. C/d 0.65 OD, 0.6 OS. Prev pt of Dr Fregoso. C/d slightly larger at last DFE 10/9/2020 than prev noted.  11/18/2020 HVF WNL OU.   11/18/2020 OCT WnL OU   Educated pt on findings w/understanding.   RTc 1 year Routine

## 2020-11-19 NOTE — PROGRESS NOTES
OCT DONE OU   JT     24-2  Ss done ou     Rel & Fix = POOR OD                      GOOD OS                            Coop =   Good     PATIENT DENIES ANY ALLERGIES TO LATEX/ADHESIVES AT THIS TIME  PATIENT HAD POOR FIXATION WITH OD PATIENT WAS FOLLOWING FLASHING   LIGHT WITH OD     JTHOMAS/GM     MRX     +2.25 + 1.00 X 110  OD  +1.75 + .25 X 10    OS

## 2020-12-04 ENCOUNTER — LAB VISIT (OUTPATIENT)
Dept: LAB | Facility: HOSPITAL | Age: 75
End: 2020-12-04
Attending: INTERNAL MEDICINE
Payer: MEDICARE

## 2020-12-04 DIAGNOSIS — E78.5 HYPERLIPIDEMIA ASSOCIATED WITH TYPE 2 DIABETES MELLITUS: ICD-10-CM

## 2020-12-04 DIAGNOSIS — E11.59 HYPERTENSION COMPLICATING DIABETES: ICD-10-CM

## 2020-12-04 DIAGNOSIS — I15.2 HYPERTENSION COMPLICATING DIABETES: ICD-10-CM

## 2020-12-04 DIAGNOSIS — E11.69 HYPERLIPIDEMIA ASSOCIATED WITH TYPE 2 DIABETES MELLITUS: ICD-10-CM

## 2020-12-04 DIAGNOSIS — K51.20 ULCERATIVE PROCTITIS WITHOUT COMPLICATION: ICD-10-CM

## 2020-12-04 DIAGNOSIS — E11.65 CONTROLLED TYPE 2 DIABETES MELLITUS WITH HYPERGLYCEMIA, WITHOUT LONG-TERM CURRENT USE OF INSULIN: ICD-10-CM

## 2020-12-04 LAB
ALBUMIN SERPL BCP-MCNC: 3.7 G/DL (ref 3.5–5.2)
ALP SERPL-CCNC: 88 U/L (ref 55–135)
ALT SERPL W/O P-5'-P-CCNC: 10 U/L (ref 10–44)
ANION GAP SERPL CALC-SCNC: 7 MMOL/L (ref 8–16)
AST SERPL-CCNC: 20 U/L (ref 10–40)
BASOPHILS # BLD AUTO: 0.06 K/UL (ref 0–0.2)
BASOPHILS NFR BLD: 1.2 % (ref 0–1.9)
BILIRUB SERPL-MCNC: 0.5 MG/DL (ref 0.1–1)
BUN SERPL-MCNC: 11 MG/DL (ref 8–23)
CALCIUM SERPL-MCNC: 9.3 MG/DL (ref 8.7–10.5)
CHLORIDE SERPL-SCNC: 102 MMOL/L (ref 95–110)
CHOLEST SERPL-MCNC: 175 MG/DL (ref 120–199)
CHOLEST/HDLC SERPL: 2.9 {RATIO} (ref 2–5)
CO2 SERPL-SCNC: 31 MMOL/L (ref 23–29)
CREAT SERPL-MCNC: 0.8 MG/DL (ref 0.5–1.4)
DIFFERENTIAL METHOD: ABNORMAL
EOSINOPHIL # BLD AUTO: 0.3 K/UL (ref 0–0.5)
EOSINOPHIL NFR BLD: 5.9 % (ref 0–8)
ERYTHROCYTE [DISTWIDTH] IN BLOOD BY AUTOMATED COUNT: 15.4 % (ref 11.5–14.5)
EST. GFR  (AFRICAN AMERICAN): >60 ML/MIN/1.73 M^2
EST. GFR  (NON AFRICAN AMERICAN): >60 ML/MIN/1.73 M^2
ESTIMATED AVG GLUCOSE: 126 MG/DL (ref 68–131)
GLUCOSE SERPL-MCNC: 106 MG/DL (ref 70–110)
HBA1C MFR BLD HPLC: 6 % (ref 4–5.6)
HCT VFR BLD AUTO: 37.6 % (ref 37–48.5)
HDLC SERPL-MCNC: 60 MG/DL (ref 40–75)
HDLC SERPL: 34.3 % (ref 20–50)
HGB BLD-MCNC: 11.6 G/DL (ref 12–16)
IMM GRANULOCYTES # BLD AUTO: 0.01 K/UL (ref 0–0.04)
IMM GRANULOCYTES NFR BLD AUTO: 0.2 % (ref 0–0.5)
LDLC SERPL CALC-MCNC: 84.8 MG/DL (ref 63–159)
LYMPHOCYTES # BLD AUTO: 1.5 K/UL (ref 1–4.8)
LYMPHOCYTES NFR BLD: 28.6 % (ref 18–48)
MCH RBC QN AUTO: 28.1 PG (ref 27–31)
MCHC RBC AUTO-ENTMCNC: 30.9 G/DL (ref 32–36)
MCV RBC AUTO: 91 FL (ref 82–98)
MONOCYTES # BLD AUTO: 0.6 K/UL (ref 0.3–1)
MONOCYTES NFR BLD: 11.4 % (ref 4–15)
NEUTROPHILS # BLD AUTO: 2.7 K/UL (ref 1.8–7.7)
NEUTROPHILS NFR BLD: 52.7 % (ref 38–73)
NONHDLC SERPL-MCNC: 115 MG/DL
NRBC BLD-RTO: 0 /100 WBC
PLATELET # BLD AUTO: 269 K/UL (ref 150–350)
PMV BLD AUTO: 10.7 FL (ref 9.2–12.9)
POTASSIUM SERPL-SCNC: 3.8 MMOL/L (ref 3.5–5.1)
PROT SERPL-MCNC: 7.5 G/DL (ref 6–8.4)
RBC # BLD AUTO: 4.13 M/UL (ref 4–5.4)
SODIUM SERPL-SCNC: 140 MMOL/L (ref 136–145)
TRIGL SERPL-MCNC: 151 MG/DL (ref 30–150)
WBC # BLD AUTO: 5.11 K/UL (ref 3.9–12.7)

## 2020-12-04 PROCEDURE — 85025 COMPLETE CBC W/AUTO DIFF WBC: CPT | Mod: HCNC

## 2020-12-04 PROCEDURE — 36415 COLL VENOUS BLD VENIPUNCTURE: CPT | Mod: HCNC,PO

## 2020-12-04 PROCEDURE — 80053 COMPREHEN METABOLIC PANEL: CPT | Mod: HCNC

## 2020-12-04 PROCEDURE — 80061 LIPID PANEL: CPT | Mod: HCNC

## 2020-12-04 PROCEDURE — 83036 HEMOGLOBIN GLYCOSYLATED A1C: CPT | Mod: HCNC

## 2020-12-07 ENCOUNTER — PES CALL (OUTPATIENT)
Dept: ADMINISTRATIVE | Facility: CLINIC | Age: 75
End: 2020-12-07

## 2020-12-08 ENCOUNTER — TELEPHONE (OUTPATIENT)
Dept: FAMILY MEDICINE | Facility: CLINIC | Age: 75
End: 2020-12-08

## 2020-12-08 NOTE — TELEPHONE ENCOUNTER
----- Message from Brittany Floyd MD sent at 12/7/2020 12:28 PM CST -----  Please call the patient regarding her abnormal blood glucose results and to please keep her appointment.

## 2020-12-14 ENCOUNTER — OFFICE VISIT (OUTPATIENT)
Dept: FAMILY MEDICINE | Facility: CLINIC | Age: 75
End: 2020-12-14
Payer: MEDICARE

## 2020-12-14 VITALS
HEART RATE: 77 BPM | WEIGHT: 140.19 LBS | DIASTOLIC BLOOD PRESSURE: 68 MMHG | HEIGHT: 61 IN | BODY MASS INDEX: 26.47 KG/M2 | SYSTOLIC BLOOD PRESSURE: 116 MMHG | TEMPERATURE: 98 F | OXYGEN SATURATION: 97 %

## 2020-12-14 DIAGNOSIS — I10 ESSENTIAL HYPERTENSION: ICD-10-CM

## 2020-12-14 DIAGNOSIS — E78.5 HYPERLIPIDEMIA ASSOCIATED WITH TYPE 2 DIABETES MELLITUS: ICD-10-CM

## 2020-12-14 DIAGNOSIS — D50.0 IRON DEFICIENCY ANEMIA DUE TO CHRONIC BLOOD LOSS: ICD-10-CM

## 2020-12-14 DIAGNOSIS — E11.65 CONTROLLED TYPE 2 DIABETES MELLITUS WITH HYPERGLYCEMIA, WITHOUT LONG-TERM CURRENT USE OF INSULIN: Primary | ICD-10-CM

## 2020-12-14 DIAGNOSIS — E11.69 HYPERLIPIDEMIA ASSOCIATED WITH TYPE 2 DIABETES MELLITUS: ICD-10-CM

## 2020-12-14 DIAGNOSIS — K51.20 ULCERATIVE PROCTITIS WITHOUT COMPLICATION: ICD-10-CM

## 2020-12-14 PROCEDURE — 3078F DIAST BP <80 MM HG: CPT | Mod: HCNC,CPTII,S$GLB, | Performed by: INTERNAL MEDICINE

## 2020-12-14 PROCEDURE — 3044F PR MOST RECENT HEMOGLOBIN A1C LEVEL <7.0%: ICD-10-PCS | Mod: HCNC,CPTII,S$GLB, | Performed by: INTERNAL MEDICINE

## 2020-12-14 PROCEDURE — 3008F PR BODY MASS INDEX (BMI) DOCUMENTED: ICD-10-PCS | Mod: HCNC,CPTII,S$GLB, | Performed by: INTERNAL MEDICINE

## 2020-12-14 PROCEDURE — 1101F PR PT FALLS ASSESS DOC 0-1 FALLS W/OUT INJ PAST YR: ICD-10-PCS | Mod: HCNC,CPTII,S$GLB, | Performed by: INTERNAL MEDICINE

## 2020-12-14 PROCEDURE — 3074F SYST BP LT 130 MM HG: CPT | Mod: HCNC,CPTII,S$GLB, | Performed by: INTERNAL MEDICINE

## 2020-12-14 PROCEDURE — 99999 PR PBB SHADOW E&M-EST. PATIENT-LVL IV: ICD-10-PCS | Mod: PBBFAC,HCNC,, | Performed by: INTERNAL MEDICINE

## 2020-12-14 PROCEDURE — 3288F FALL RISK ASSESSMENT DOCD: CPT | Mod: HCNC,CPTII,S$GLB, | Performed by: INTERNAL MEDICINE

## 2020-12-14 PROCEDURE — 99999 PR PBB SHADOW E&M-EST. PATIENT-LVL IV: CPT | Mod: PBBFAC,HCNC,, | Performed by: INTERNAL MEDICINE

## 2020-12-14 PROCEDURE — 3008F BODY MASS INDEX DOCD: CPT | Mod: HCNC,CPTII,S$GLB, | Performed by: INTERNAL MEDICINE

## 2020-12-14 PROCEDURE — 3288F PR FALLS RISK ASSESSMENT DOCUMENTED: ICD-10-PCS | Mod: HCNC,CPTII,S$GLB, | Performed by: INTERNAL MEDICINE

## 2020-12-14 PROCEDURE — 1101F PT FALLS ASSESS-DOCD LE1/YR: CPT | Mod: HCNC,CPTII,S$GLB, | Performed by: INTERNAL MEDICINE

## 2020-12-14 PROCEDURE — 1159F PR MEDICATION LIST DOCUMENTED IN MEDICAL RECORD: ICD-10-PCS | Mod: HCNC,S$GLB,, | Performed by: INTERNAL MEDICINE

## 2020-12-14 PROCEDURE — 3078F PR MOST RECENT DIASTOLIC BLOOD PRESSURE < 80 MM HG: ICD-10-PCS | Mod: HCNC,CPTII,S$GLB, | Performed by: INTERNAL MEDICINE

## 2020-12-14 PROCEDURE — 1126F PR PAIN SEVERITY QUANTIFIED, NO PAIN PRESENT: ICD-10-PCS | Mod: HCNC,S$GLB,, | Performed by: INTERNAL MEDICINE

## 2020-12-14 PROCEDURE — 3044F HG A1C LEVEL LT 7.0%: CPT | Mod: HCNC,CPTII,S$GLB, | Performed by: INTERNAL MEDICINE

## 2020-12-14 PROCEDURE — 99214 OFFICE O/P EST MOD 30 MIN: CPT | Mod: HCNC,S$GLB,, | Performed by: INTERNAL MEDICINE

## 2020-12-14 PROCEDURE — 3074F PR MOST RECENT SYSTOLIC BLOOD PRESSURE < 130 MM HG: ICD-10-PCS | Mod: HCNC,CPTII,S$GLB, | Performed by: INTERNAL MEDICINE

## 2020-12-14 PROCEDURE — 1159F MED LIST DOCD IN RCRD: CPT | Mod: HCNC,S$GLB,, | Performed by: INTERNAL MEDICINE

## 2020-12-14 PROCEDURE — 99214 PR OFFICE/OUTPT VISIT, EST, LEVL IV, 30-39 MIN: ICD-10-PCS | Mod: HCNC,S$GLB,, | Performed by: INTERNAL MEDICINE

## 2020-12-14 PROCEDURE — 1126F AMNT PAIN NOTED NONE PRSNT: CPT | Mod: HCNC,S$GLB,, | Performed by: INTERNAL MEDICINE

## 2020-12-14 RX ORDER — HYDROCHLOROTHIAZIDE 12.5 MG/1
12.5 CAPSULE ORAL DAILY
Qty: 90 CAPSULE | Refills: 0 | Status: SHIPPED | OUTPATIENT
Start: 2020-12-14 | End: 2020-12-14

## 2020-12-14 RX ORDER — HYDROCHLOROTHIAZIDE 12.5 MG/1
12.5 CAPSULE ORAL DAILY
Qty: 90 CAPSULE | Refills: 1 | Status: SHIPPED | OUTPATIENT
Start: 2020-12-14 | End: 2021-08-22

## 2020-12-14 RX ORDER — METFORMIN HYDROCHLORIDE 500 MG/1
500 TABLET, EXTENDED RELEASE ORAL DAILY
Qty: 90 TABLET | Refills: 0 | Status: SHIPPED | OUTPATIENT
Start: 2020-12-14 | End: 2020-12-14

## 2020-12-14 RX ORDER — METFORMIN HYDROCHLORIDE 500 MG/1
500 TABLET, EXTENDED RELEASE ORAL DAILY
Qty: 90 TABLET | Refills: 1 | Status: SHIPPED | OUTPATIENT
Start: 2020-12-14 | End: 2021-08-09

## 2020-12-14 NOTE — PROGRESS NOTES
Subjective:       Patient ID: Aretha Nguyen is a 75 y.o. female.    Chief Complaint: Follow-up (3 month )      This is a 75-year-old female with diabetes mellitus type 2, hyperlipidemia, hypertension, and inflammatory bowel disease who is here for follow-up.  Reports no GI bleed since last changed on her medications. Denies abdominal pains, nausea vomiting, changes in bowel habits, chest pains, shortness of breath, or headaches.  No other evidence of bleeding.     She was supposed to have the colonoscopy this year but she requested to be changed to 5 years since she is doing well.      She used to walk at the mall but since social distancing has not been exercising and gained a few lb.    Review of Systems   Constitutional: Negative for appetite change, chills, fatigue and fever.   HENT: Negative for nasal congestion and sore throat.    Eyes: Negative for visual disturbance.   Respiratory: Negative for cough and shortness of breath.    Cardiovascular: Negative for chest pain, palpitations and leg swelling.   Gastrointestinal: Negative for abdominal pain, blood in stool, change in bowel habit, vomiting and change in bowel habit.   Genitourinary: Negative for bladder incontinence, difficulty urinating and dysuria.   Neurological: Negative for dizziness, weakness, light-headedness, numbness and headaches.   Hematological: Does not bruise/bleed easily.   Psychiatric/Behavioral: Negative for sleep disturbance. The patient is not nervous/anxious.       Past Medical History:   Diagnosis Date    Breast cancer, left     Cataract     Controlled type 2 diabetes mellitus with hyperglycemia, without long-term current use of insulin 3/22/2017    Hyperlipidemia associated with type 2 diabetes mellitus 3/22/2017    Hypertension complicating diabetes 3/22/2017    Inflammatory bowel disease 3/22/2017       Past Surgical History:   Procedure Laterality Date    HYSTERECTOMY      MASTECTOMY Left     for benign recurrent growth.  Dr. Stephan Brown MD - [facility] - [facility]    TOTAL ABDOMINAL HYSTERECTOMY W/ BILATERAL SALPINGOOPHORECTOMY  2009    for benign cysts with concern for future malginancy. Fibromoid uterus for AUB. Bengin results       Family History   Problem Relation Age of Onset    Breast cancer Neg Hx        Social History     Socioeconomic History    Marital status:      Spouse name: Not on file    Number of children: Not on file    Years of education: Not on file    Highest education level: Not on file   Occupational History    Not on file   Social Needs    Financial resource strain: Not on file    Food insecurity     Worry: Not on file     Inability: Not on file    Transportation needs     Medical: Not on file     Non-medical: Not on file   Tobacco Use    Smoking status: Never Smoker    Smokeless tobacco: Never Used   Substance and Sexual Activity    Alcohol use: Yes     Comment: not often    Drug use: No    Sexual activity: Yes     Partners: Male   Lifestyle    Physical activity     Days per week: Not on file     Minutes per session: Not on file    Stress: Not on file   Relationships    Social connections     Talks on phone: Not on file     Gets together: Not on file     Attends Shinto service: Not on file     Active member of club or organization: Not on file     Attends meetings of clubs or organizations: Not on file     Relationship status: Not on file   Other Topics Concern    Not on file   Social History Narrative    [facility] - [facility]        Dr. Stephan Brown MD - [facility] - General Surgery & Surgery - mammograms        Current Outpatient Medications   Medication Sig Dispense Refill    atorvastatin (LIPITOR) 20 MG tablet Take 1 tablet (20 mg total) by mouth once daily. 90 tablet 1    cholecalciferol, vitamin D3, 2,000 unit Cap Take 1 capsule (2,000 Units total) by mouth once daily.      escitalopram oxalate (LEXAPRO) 5  "MG Tab Take 1 tablet (5 mg total) by mouth once daily. 90 tablet 1    ferrous sulfate 324 mg (65 mg iron) TbEC TAKE 1 TABLET (324 MG TOTAL) BY MOUTH 2 (TWO) TIMES DAILY. 100 tablet 0    multivit-min-iron-FA-lutein (CENTRUM SILVER WOMEN) 8 mg iron-400 mcg-300 mcg Tab Take 1 tablet by mouth once daily at 6am.      olmesartan (BENICAR) 20 MG tablet TAKE 1 TABLET BY MOUTH EVERY DAY 90 tablet 0    balsalazide (COLAZAL) 750 mg capsule 2 tablets 2 times daily  2    cetirizine (ZYRTEC) 10 MG tablet Take 1 tablet (10 mg total) by mouth once daily. (Patient not taking: Reported on 12/14/2020) 30 tablet 0    FLUAD [id number], 65 YR UP,,PF, 45 mcg (15 mcg x 3)/0.5 mL Syrg       hydroCHLOROthiazide (MICROZIDE) 12.5 mg capsule Take 1 capsule (12.5 mg total) by mouth once daily. 90 capsule 1    metFORMIN (GLUCOPHAGE-XR) 500 MG ER 24hr tablet Take 1 tablet (500 mg total) by mouth once daily. 90 tablet 1    varicella-zoster [id number], PF, (SHINGRIX, PF,) 50 mcg/0.5 mL injection Inject 0.5 mLs into the muscle As instructed. 2 doses (Patient not taking: Reported on 12/14/2020) 0.5 mL 2     No current facility-administered medications for this visit.        Review of patient's allergies indicates:   Allergen Reactions    Ciprofloxacin hcl Rash         Objective:       Last 3 sets of Vitals    Vitals - 1 value per visit 10/9/2020 11/18/2020 12/14/2020   SYSTOLIC - - 116   DIASTOLIC - - 68   PULSE - - 77   TEMPERATURE - - 97.7   RESPIRATIONS - - -   SPO2 - - 97   Weight (lb) - - 140.21   Weight (kg) - - 63.6   HEIGHT - - 5' 1"   BODY MASS INDEX - - 26.49   VISIT REPORT - - -   Pain Score  0 0 0   Physical Exam  Constitutional:       General: She is not in acute distress.     Appearance: Normal appearance. She is normal weight.   HENT:      Head: Normocephalic.      Nose: Nose normal.      Mouth/Throat:      Mouth: Mucous membranes are moist.   Eyes:      General: No scleral icterus.     Extraocular Movements: Extraocular " movements intact.      Conjunctiva/sclera: Conjunctivae normal.      Pupils: Pupils are equal, round, and reactive to light.   Neck:      Musculoskeletal: Neck supple.      Vascular: No carotid bruit.   Cardiovascular:      Rate and Rhythm: Normal rate and regular rhythm.      Pulses: Normal pulses.      Heart sounds: Normal heart sounds.   Pulmonary:      Effort: Pulmonary effort is normal.      Breath sounds: Normal breath sounds.   Abdominal:      General: Bowel sounds are normal. There is no distension.      Palpations: Abdomen is soft. There is no mass.      Tenderness: There is no abdominal tenderness.   Musculoskeletal: Normal range of motion.         General: No swelling.   Lymphadenopathy:      Cervical: No cervical adenopathy.   Skin:     General: Skin is warm and dry.   Neurological:      General: No focal deficit present.      Mental Status: She is alert and oriented to person, place, and time.   Psychiatric:         Mood and Affect: Mood normal.         Behavior: Behavior normal.       Protective Sensation (w/ 10 gram monofilament):  Right: Intact  Left: Intact    Visual Inspection:  Normal -  Bilateral    Pedal Pulses:   Right: Present  Left: Present    Posterior tibialis:   Right:Present  Left: Present      CBC:  Recent Labs   Lab Result Units 12/04/20  0900   WBC K/uL 5.11   RBC M/uL 4.13   Hemoglobin g/dL 11.6*   Hematocrit % 37.6   Platelets K/uL 269   MCV fL 91   MCH pg 28.1   MCHC g/dL 30.9*     CMP:  Recent Labs   Lab Result Units 12/04/20  0900   Glucose mg/dL 106   Calcium mg/dL 9.3   Albumin g/dL 3.7   Total Protein g/dL 7.5   Sodium mmol/L 140   Potassium mmol/L 3.8   CO2 mmol/L 31*   Chloride mmol/L 102   BUN mg/dL 11   Alkaline Phosphatase U/L 88   ALT U/L 10   AST U/L 20   Total Bilirubin mg/dL 0.5     URINALYSIS:  No results for input(s): COLORU, CLARITYU, SPECGRAV, PHUR, PROTEINUA, GLUCOSEU, BILIRUBINCON, BLOODU, WBCU, RBCU, BACTERIA, MUCUS, NITRITE, LEUKOCYTESUR, UROBILINOGEN,  HYALINECASTS in the last 2160 hours.   LIPIDS:  Recent Labs   Lab Result Units 12/04/20  0900   HDL mg/dL 60   Cholesterol mg/dL 175   Triglycerides mg/dL 151*   LDL Cholesterol mg/dL 84.8   HDL/Cholesterol Ratio % 34.3   Non-HDL Cholesterol mg/dL 115   Total Cholesterol/HDL Ratio  2.9     TSH:  No results for input(s): TSH in the last 2160 hours.    A1C:  Recent Labs   Lab 04/25/18  0902 01/28/19  0805 06/24/19  0726 01/09/20  1007 12/04/20  0900   Hemoglobin A1C 5.7 H 6.1 H 5.9 H 6.2 H 6.0 H           Assessment:       1. Controlled type 2 diabetes mellitus with hyperglycemia, without long-term current use of insulin    2. Essential hypertension    3. Ulcerative proctitis without complication    4. Iron deficiency anemia due to chronic blood loss    5. Hyperlipidemia associated with type 2 diabetes mellitus        Plan:       Aretha was seen today for follow-up.    Diagnoses and all orders for this visit:    Controlled type 2 diabetes mellitus with hyperglycemia, without long-term current use of insulin  -     metFORMIN (GLUCOPHAGE-XR) 500 MG ER 24hr tablet; Take 1 tablet (500 mg total) by mouth once daily.  - A1c slowly increasing.  Patient wants to adjust her diet and increase activity prior change in medications.    Essential hypertension  -     hydroCHLOROthiazide (MICROZIDE) 12.5 mg capsule; Take 1 capsule (12.5 mg total) by mouth once daily.  -  controlled blood pressure.  Continue same treatment.    Ulcerative proctitis without complication   - Stable without bleeding.     - Follow-up with GI service   - colonoscopy as recommended by GI service.    Iron deficiency anemia due to chronic blood loss   - Stable labs with no bleeding.    Hyperlipidemia associated with type 2 diabetes mellitus   - Triglycerides elevated. Patient wants to adjust her diet and re-evaluate.

## 2021-01-10 ENCOUNTER — IMMUNIZATION (OUTPATIENT)
Dept: INTERNAL MEDICINE | Facility: CLINIC | Age: 76
End: 2021-01-10
Payer: MEDICARE

## 2021-01-10 DIAGNOSIS — Z23 NEED FOR VACCINATION: ICD-10-CM

## 2021-01-10 PROCEDURE — 91300 COVID-19, MRNA, LNP-S, PF, 30 MCG/0.3 ML DOSE VACCINE: CPT | Mod: PBBFAC | Performed by: FAMILY MEDICINE

## 2021-01-31 ENCOUNTER — IMMUNIZATION (OUTPATIENT)
Dept: INTERNAL MEDICINE | Facility: CLINIC | Age: 76
End: 2021-01-31
Payer: MEDICARE

## 2021-01-31 DIAGNOSIS — Z23 NEED FOR VACCINATION: Primary | ICD-10-CM

## 2021-01-31 PROCEDURE — 0002A COVID-19, MRNA, LNP-S, PF, 30 MCG/0.3 ML DOSE VACCINE: CPT | Mod: PBBFAC | Performed by: FAMILY MEDICINE

## 2021-01-31 PROCEDURE — 91300 COVID-19, MRNA, LNP-S, PF, 30 MCG/0.3 ML DOSE VACCINE: CPT | Mod: PBBFAC | Performed by: FAMILY MEDICINE

## 2021-03-22 DIAGNOSIS — I10 ESSENTIAL HYPERTENSION: ICD-10-CM

## 2021-03-22 RX ORDER — OLMESARTAN MEDOXOMIL 20 MG/1
20 TABLET ORAL DAILY
Qty: 90 TABLET | Refills: 0 | Status: SHIPPED | OUTPATIENT
Start: 2021-03-22 | End: 2021-06-14

## 2021-05-12 DIAGNOSIS — E11.65 CONTROLLED TYPE 2 DIABETES MELLITUS WITH HYPERGLYCEMIA, WITHOUT LONG-TERM CURRENT USE OF INSULIN: ICD-10-CM

## 2021-05-12 RX ORDER — ATORVASTATIN CALCIUM 20 MG/1
20 TABLET, FILM COATED ORAL DAILY
Qty: 90 TABLET | Refills: 1 | Status: SHIPPED | OUTPATIENT
Start: 2021-05-12 | End: 2021-11-01

## 2021-05-24 ENCOUNTER — PES CALL (OUTPATIENT)
Dept: ADMINISTRATIVE | Facility: CLINIC | Age: 76
End: 2021-05-24

## 2021-05-31 ENCOUNTER — TELEPHONE (OUTPATIENT)
Dept: FAMILY MEDICINE | Facility: CLINIC | Age: 76
End: 2021-05-31

## 2021-05-31 ENCOUNTER — HOSPITAL ENCOUNTER (EMERGENCY)
Facility: HOSPITAL | Age: 76
Discharge: HOME | End: 2021-05-31
Attending: EMERGENCY MEDICINE
Payer: MEDICARE

## 2021-05-31 VITALS
DIASTOLIC BLOOD PRESSURE: 74 MMHG | BODY MASS INDEX: 25.76 KG/M2 | OXYGEN SATURATION: 96 % | RESPIRATION RATE: 17 BRPM | SYSTOLIC BLOOD PRESSURE: 125 MMHG | WEIGHT: 140 LBS | HEART RATE: 76 BPM | HEIGHT: 62 IN | TEMPERATURE: 98 F

## 2021-05-31 DIAGNOSIS — M25.561 RIGHT KNEE PAIN: Primary | ICD-10-CM

## 2021-05-31 LAB
B-HCG UR QL: NEGATIVE
CTP QC/QA: YES

## 2021-05-31 PROCEDURE — 81025 URINE PREGNANCY TEST: CPT | Performed by: PHYSICIAN ASSISTANT

## 2021-05-31 PROCEDURE — 25000003 PHARM REV CODE 250: Performed by: PHYSICIAN ASSISTANT

## 2021-05-31 PROCEDURE — 99283 EMERGENCY DEPT VISIT LOW MDM: CPT | Mod: 25

## 2021-05-31 RX ORDER — NAPROXEN 500 MG/1
500 TABLET ORAL 2 TIMES DAILY WITH MEALS
Qty: 14 TABLET | Refills: 0 | Status: SHIPPED | OUTPATIENT
Start: 2021-05-31 | End: 2021-06-04 | Stop reason: SDUPTHER

## 2021-05-31 RX ORDER — IBUPROFEN 600 MG/1
600 TABLET ORAL
Status: COMPLETED | OUTPATIENT
Start: 2021-05-31 | End: 2021-05-31

## 2021-05-31 RX ADMIN — IBUPROFEN 600 MG: 600 TABLET, FILM COATED ORAL at 11:05

## 2021-06-01 ENCOUNTER — OFFICE VISIT (OUTPATIENT)
Dept: FAMILY MEDICINE | Facility: CLINIC | Age: 76
End: 2021-06-01
Payer: MEDICARE

## 2021-06-01 VITALS
HEART RATE: 70 BPM | DIASTOLIC BLOOD PRESSURE: 76 MMHG | SYSTOLIC BLOOD PRESSURE: 108 MMHG | TEMPERATURE: 98 F | WEIGHT: 142 LBS | OXYGEN SATURATION: 98 % | BODY MASS INDEX: 26.13 KG/M2 | HEIGHT: 62 IN

## 2021-06-01 DIAGNOSIS — I10 ESSENTIAL HYPERTENSION: ICD-10-CM

## 2021-06-01 DIAGNOSIS — E11.65 CONTROLLED TYPE 2 DIABETES MELLITUS WITH HYPERGLYCEMIA, WITHOUT LONG-TERM CURRENT USE OF INSULIN: ICD-10-CM

## 2021-06-01 DIAGNOSIS — S86.911A STRAIN OF RIGHT KNEE, INITIAL ENCOUNTER: Primary | ICD-10-CM

## 2021-06-01 PROCEDURE — 1159F MED LIST DOCD IN RCRD: CPT | Mod: S$GLB,,, | Performed by: INTERNAL MEDICINE

## 2021-06-01 PROCEDURE — 1126F PR PAIN SEVERITY QUANTIFIED, NO PAIN PRESENT: ICD-10-PCS | Mod: S$GLB,,, | Performed by: INTERNAL MEDICINE

## 2021-06-01 PROCEDURE — 3072F PR LOW RISK FOR RETINOPATHY: ICD-10-PCS | Mod: S$GLB,,, | Performed by: INTERNAL MEDICINE

## 2021-06-01 PROCEDURE — 99999 PR PBB SHADOW E&M-EST. PATIENT-LVL IV: CPT | Mod: PBBFAC,,, | Performed by: INTERNAL MEDICINE

## 2021-06-01 PROCEDURE — 1101F PT FALLS ASSESS-DOCD LE1/YR: CPT | Mod: CPTII,S$GLB,, | Performed by: INTERNAL MEDICINE

## 2021-06-01 PROCEDURE — 3288F PR FALLS RISK ASSESSMENT DOCUMENTED: ICD-10-PCS | Mod: CPTII,S$GLB,, | Performed by: INTERNAL MEDICINE

## 2021-06-01 PROCEDURE — 3008F PR BODY MASS INDEX (BMI) DOCUMENTED: ICD-10-PCS | Mod: CPTII,S$GLB,, | Performed by: INTERNAL MEDICINE

## 2021-06-01 PROCEDURE — 3008F BODY MASS INDEX DOCD: CPT | Mod: CPTII,S$GLB,, | Performed by: INTERNAL MEDICINE

## 2021-06-01 PROCEDURE — 1159F PR MEDICATION LIST DOCUMENTED IN MEDICAL RECORD: ICD-10-PCS | Mod: S$GLB,,, | Performed by: INTERNAL MEDICINE

## 2021-06-01 PROCEDURE — 99214 OFFICE O/P EST MOD 30 MIN: CPT | Mod: S$GLB,,, | Performed by: INTERNAL MEDICINE

## 2021-06-01 PROCEDURE — 1126F AMNT PAIN NOTED NONE PRSNT: CPT | Mod: S$GLB,,, | Performed by: INTERNAL MEDICINE

## 2021-06-01 PROCEDURE — 3288F FALL RISK ASSESSMENT DOCD: CPT | Mod: CPTII,S$GLB,, | Performed by: INTERNAL MEDICINE

## 2021-06-01 PROCEDURE — 99499 RISK ADDL DX/OHS AUDIT: ICD-10-PCS | Mod: S$GLB,,, | Performed by: INTERNAL MEDICINE

## 2021-06-01 PROCEDURE — 1101F PR PT FALLS ASSESS DOC 0-1 FALLS W/OUT INJ PAST YR: ICD-10-PCS | Mod: CPTII,S$GLB,, | Performed by: INTERNAL MEDICINE

## 2021-06-01 PROCEDURE — 99214 PR OFFICE/OUTPT VISIT, EST, LEVL IV, 30-39 MIN: ICD-10-PCS | Mod: S$GLB,,, | Performed by: INTERNAL MEDICINE

## 2021-06-01 PROCEDURE — 99999 PR PBB SHADOW E&M-EST. PATIENT-LVL IV: ICD-10-PCS | Mod: PBBFAC,,, | Performed by: INTERNAL MEDICINE

## 2021-06-01 PROCEDURE — 3072F LOW RISK FOR RETINOPATHY: CPT | Mod: S$GLB,,, | Performed by: INTERNAL MEDICINE

## 2021-06-01 PROCEDURE — 99499 UNLISTED E&M SERVICE: CPT | Mod: S$GLB,,, | Performed by: INTERNAL MEDICINE

## 2021-06-01 RX ORDER — DICLOFENAC SODIUM 10 MG/G
2 GEL TOPICAL 2 TIMES DAILY
Qty: 1 TUBE | Refills: 1 | Status: SHIPPED | OUTPATIENT
Start: 2021-06-01 | End: 2023-09-12

## 2021-06-01 RX ORDER — KETOROLAC TROMETHAMINE 30 MG/ML
30 INJECTION, SOLUTION INTRAMUSCULAR; INTRAVENOUS ONCE
Qty: 1 ML | Refills: 0 | Status: SHIPPED | OUTPATIENT
Start: 2021-06-01 | End: 2021-06-01

## 2021-06-02 ENCOUNTER — PATIENT OUTREACH (OUTPATIENT)
Dept: ADMINISTRATIVE | Facility: OTHER | Age: 76
End: 2021-06-02

## 2021-06-04 ENCOUNTER — OFFICE VISIT (OUTPATIENT)
Dept: ORTHOPEDICS | Facility: CLINIC | Age: 76
End: 2021-06-04
Payer: MEDICARE

## 2021-06-04 VITALS
DIASTOLIC BLOOD PRESSURE: 77 MMHG | HEIGHT: 62 IN | WEIGHT: 142 LBS | SYSTOLIC BLOOD PRESSURE: 139 MMHG | HEART RATE: 66 BPM | BODY MASS INDEX: 26.13 KG/M2

## 2021-06-04 DIAGNOSIS — S86.911A STRAIN OF RIGHT KNEE, INITIAL ENCOUNTER: ICD-10-CM

## 2021-06-04 DIAGNOSIS — S06.33AA: ICD-10-CM

## 2021-06-04 DIAGNOSIS — S02.0XXA: ICD-10-CM

## 2021-06-04 DIAGNOSIS — M17.11 PRIMARY OSTEOARTHRITIS OF RIGHT KNEE: Primary | ICD-10-CM

## 2021-06-04 PROCEDURE — 3072F LOW RISK FOR RETINOPATHY: CPT | Mod: S$GLB,,, | Performed by: ORTHOPAEDIC SURGERY

## 2021-06-04 PROCEDURE — 1126F AMNT PAIN NOTED NONE PRSNT: CPT | Mod: S$GLB,,, | Performed by: ORTHOPAEDIC SURGERY

## 2021-06-04 PROCEDURE — 1159F PR MEDICATION LIST DOCUMENTED IN MEDICAL RECORD: ICD-10-PCS | Mod: S$GLB,,, | Performed by: ORTHOPAEDIC SURGERY

## 2021-06-04 PROCEDURE — 1101F PR PT FALLS ASSESS DOC 0-1 FALLS W/OUT INJ PAST YR: ICD-10-PCS | Mod: CPTII,S$GLB,, | Performed by: ORTHOPAEDIC SURGERY

## 2021-06-04 PROCEDURE — 1101F PT FALLS ASSESS-DOCD LE1/YR: CPT | Mod: CPTII,S$GLB,, | Performed by: ORTHOPAEDIC SURGERY

## 2021-06-04 PROCEDURE — 3008F PR BODY MASS INDEX (BMI) DOCUMENTED: ICD-10-PCS | Mod: CPTII,S$GLB,, | Performed by: ORTHOPAEDIC SURGERY

## 2021-06-04 PROCEDURE — 1159F MED LIST DOCD IN RCRD: CPT | Mod: S$GLB,,, | Performed by: ORTHOPAEDIC SURGERY

## 2021-06-04 PROCEDURE — 99999 PR PBB SHADOW E&M-EST. PATIENT-LVL IV: ICD-10-PCS | Mod: PBBFAC,,, | Performed by: ORTHOPAEDIC SURGERY

## 2021-06-04 PROCEDURE — 3288F PR FALLS RISK ASSESSMENT DOCUMENTED: ICD-10-PCS | Mod: CPTII,S$GLB,, | Performed by: ORTHOPAEDIC SURGERY

## 2021-06-04 PROCEDURE — 99202 OFFICE O/P NEW SF 15 MIN: CPT | Mod: S$GLB,,, | Performed by: ORTHOPAEDIC SURGERY

## 2021-06-04 PROCEDURE — 3288F FALL RISK ASSESSMENT DOCD: CPT | Mod: CPTII,S$GLB,, | Performed by: ORTHOPAEDIC SURGERY

## 2021-06-04 PROCEDURE — 3008F BODY MASS INDEX DOCD: CPT | Mod: CPTII,S$GLB,, | Performed by: ORTHOPAEDIC SURGERY

## 2021-06-04 PROCEDURE — 99202 PR OFFICE/OUTPT VISIT, NEW, LEVL II, 15-29 MIN: ICD-10-PCS | Mod: S$GLB,,, | Performed by: ORTHOPAEDIC SURGERY

## 2021-06-04 PROCEDURE — 99999 PR PBB SHADOW E&M-EST. PATIENT-LVL IV: CPT | Mod: PBBFAC,,, | Performed by: ORTHOPAEDIC SURGERY

## 2021-06-04 PROCEDURE — 3072F PR LOW RISK FOR RETINOPATHY: ICD-10-PCS | Mod: S$GLB,,, | Performed by: ORTHOPAEDIC SURGERY

## 2021-06-04 PROCEDURE — 1126F PR PAIN SEVERITY QUANTIFIED, NO PAIN PRESENT: ICD-10-PCS | Mod: S$GLB,,, | Performed by: ORTHOPAEDIC SURGERY

## 2021-06-04 RX ORDER — NAPROXEN 500 MG/1
500 TABLET ORAL 2 TIMES DAILY WITH MEALS
Qty: 60 TABLET | Refills: 1 | Status: SHIPPED | OUTPATIENT
Start: 2021-06-04 | End: 2021-07-30

## 2021-06-10 ENCOUNTER — TELEPHONE (OUTPATIENT)
Dept: FAMILY MEDICINE | Facility: CLINIC | Age: 76
End: 2021-06-10

## 2021-06-10 DIAGNOSIS — I15.2 HYPERTENSION COMPLICATING DIABETES: ICD-10-CM

## 2021-06-10 DIAGNOSIS — E78.5 HYPERLIPIDEMIA ASSOCIATED WITH TYPE 2 DIABETES MELLITUS: ICD-10-CM

## 2021-06-10 DIAGNOSIS — E11.59 HYPERTENSION COMPLICATING DIABETES: ICD-10-CM

## 2021-06-10 DIAGNOSIS — K51.20 ULCERATIVE PROCTITIS WITHOUT COMPLICATION: ICD-10-CM

## 2021-06-10 DIAGNOSIS — E11.69 HYPERLIPIDEMIA ASSOCIATED WITH TYPE 2 DIABETES MELLITUS: ICD-10-CM

## 2021-06-10 DIAGNOSIS — E11.65 CONTROLLED TYPE 2 DIABETES MELLITUS WITH HYPERGLYCEMIA, WITHOUT LONG-TERM CURRENT USE OF INSULIN: Primary | ICD-10-CM

## 2021-06-10 DIAGNOSIS — D50.0 IRON DEFICIENCY ANEMIA DUE TO CHRONIC BLOOD LOSS: ICD-10-CM

## 2021-06-10 DIAGNOSIS — Z79.899 MEDICATION MANAGEMENT: ICD-10-CM

## 2021-06-11 ENCOUNTER — LAB VISIT (OUTPATIENT)
Dept: LAB | Facility: HOSPITAL | Age: 76
End: 2021-06-11
Attending: INTERNAL MEDICINE
Payer: MEDICARE

## 2021-06-11 DIAGNOSIS — D50.0 IRON DEFICIENCY ANEMIA DUE TO CHRONIC BLOOD LOSS: ICD-10-CM

## 2021-06-11 DIAGNOSIS — K51.20 ULCERATIVE PROCTITIS WITHOUT COMPLICATION: ICD-10-CM

## 2021-06-11 DIAGNOSIS — E78.5 HYPERLIPIDEMIA ASSOCIATED WITH TYPE 2 DIABETES MELLITUS: ICD-10-CM

## 2021-06-11 DIAGNOSIS — E11.59 HYPERTENSION COMPLICATING DIABETES: ICD-10-CM

## 2021-06-11 DIAGNOSIS — I15.2 HYPERTENSION COMPLICATING DIABETES: ICD-10-CM

## 2021-06-11 DIAGNOSIS — Z79.899 MEDICATION MANAGEMENT: ICD-10-CM

## 2021-06-11 DIAGNOSIS — E11.69 HYPERLIPIDEMIA ASSOCIATED WITH TYPE 2 DIABETES MELLITUS: ICD-10-CM

## 2021-06-11 DIAGNOSIS — E11.65 CONTROLLED TYPE 2 DIABETES MELLITUS WITH HYPERGLYCEMIA, WITHOUT LONG-TERM CURRENT USE OF INSULIN: ICD-10-CM

## 2021-06-11 LAB
25(OH)D3+25(OH)D2 SERPL-MCNC: 44 NG/ML (ref 30–96)
ALBUMIN SERPL BCP-MCNC: 3.9 G/DL (ref 3.5–5.2)
ALP SERPL-CCNC: 77 U/L (ref 55–135)
ALT SERPL W/O P-5'-P-CCNC: 11 U/L (ref 10–44)
ANION GAP SERPL CALC-SCNC: 9 MMOL/L (ref 8–16)
AST SERPL-CCNC: 20 U/L (ref 10–40)
BASOPHILS # BLD AUTO: 0.06 K/UL (ref 0–0.2)
BASOPHILS NFR BLD: 1.1 % (ref 0–1.9)
BILIRUB SERPL-MCNC: 0.5 MG/DL (ref 0.1–1)
BUN SERPL-MCNC: 10 MG/DL (ref 8–23)
CALCIUM SERPL-MCNC: 9.8 MG/DL (ref 8.7–10.5)
CHLORIDE SERPL-SCNC: 105 MMOL/L (ref 95–110)
CHOLEST SERPL-MCNC: 165 MG/DL (ref 120–199)
CHOLEST/HDLC SERPL: 3.9 {RATIO} (ref 2–5)
CO2 SERPL-SCNC: 29 MMOL/L (ref 23–29)
CREAT SERPL-MCNC: 0.7 MG/DL (ref 0.5–1.4)
DIFFERENTIAL METHOD: ABNORMAL
EOSINOPHIL # BLD AUTO: 0.3 K/UL (ref 0–0.5)
EOSINOPHIL NFR BLD: 5.3 % (ref 0–8)
ERYTHROCYTE [DISTWIDTH] IN BLOOD BY AUTOMATED COUNT: 14.6 % (ref 11.5–14.5)
EST. GFR  (AFRICAN AMERICAN): >60 ML/MIN/1.73 M^2
EST. GFR  (NON AFRICAN AMERICAN): >60 ML/MIN/1.73 M^2
ESTIMATED AVG GLUCOSE: 114 MG/DL (ref 68–131)
FERRITIN SERPL-MCNC: 103 NG/ML (ref 20–300)
FOLATE SERPL-MCNC: 14.6 NG/ML (ref 4–24)
GLUCOSE SERPL-MCNC: 92 MG/DL (ref 70–110)
HBA1C MFR BLD: 5.6 % (ref 4–5.6)
HCT VFR BLD AUTO: 38.7 % (ref 37–48.5)
HDLC SERPL-MCNC: 42 MG/DL (ref 40–75)
HDLC SERPL: 25.5 % (ref 20–50)
HGB BLD-MCNC: 12.1 G/DL (ref 12–16)
IMM GRANULOCYTES # BLD AUTO: 0.01 K/UL (ref 0–0.04)
IMM GRANULOCYTES NFR BLD AUTO: 0.2 % (ref 0–0.5)
IRON SERPL-MCNC: 78 UG/DL (ref 30–160)
LDLC SERPL CALC-MCNC: 95.2 MG/DL (ref 63–159)
LYMPHOCYTES # BLD AUTO: 1.6 K/UL (ref 1–4.8)
LYMPHOCYTES NFR BLD: 28.4 % (ref 18–48)
MCH RBC QN AUTO: 27.7 PG (ref 27–31)
MCHC RBC AUTO-ENTMCNC: 31.3 G/DL (ref 32–36)
MCV RBC AUTO: 89 FL (ref 82–98)
MONOCYTES # BLD AUTO: 0.4 K/UL (ref 0.3–1)
MONOCYTES NFR BLD: 7.8 % (ref 4–15)
NEUTROPHILS # BLD AUTO: 3.1 K/UL (ref 1.8–7.7)
NEUTROPHILS NFR BLD: 57.2 % (ref 38–73)
NONHDLC SERPL-MCNC: 123 MG/DL
NRBC BLD-RTO: 0 /100 WBC
PLATELET # BLD AUTO: 306 K/UL (ref 150–450)
PMV BLD AUTO: 10.7 FL (ref 9.2–12.9)
POTASSIUM SERPL-SCNC: 3.8 MMOL/L (ref 3.5–5.1)
PROT SERPL-MCNC: 7.8 G/DL (ref 6–8.4)
RBC # BLD AUTO: 4.37 M/UL (ref 4–5.4)
SATURATED IRON: 22 % (ref 20–50)
SODIUM SERPL-SCNC: 143 MMOL/L (ref 136–145)
TOTAL IRON BINDING CAPACITY: 351 UG/DL (ref 250–450)
TRANSFERRIN SERPL-MCNC: 237 MG/DL (ref 200–375)
TRIGL SERPL-MCNC: 139 MG/DL (ref 30–150)
TSH SERPL DL<=0.005 MIU/L-ACNC: 3.24 UIU/ML (ref 0.4–4)
VIT B12 SERPL-MCNC: <146 PG/ML (ref 210–950)
WBC # BLD AUTO: 5.49 K/UL (ref 3.9–12.7)

## 2021-06-11 PROCEDURE — 82607 VITAMIN B-12: CPT | Performed by: INTERNAL MEDICINE

## 2021-06-11 PROCEDURE — 80053 COMPREHEN METABOLIC PANEL: CPT | Performed by: INTERNAL MEDICINE

## 2021-06-11 PROCEDURE — 82746 ASSAY OF FOLIC ACID SERUM: CPT | Performed by: INTERNAL MEDICINE

## 2021-06-11 PROCEDURE — 82728 ASSAY OF FERRITIN: CPT | Performed by: INTERNAL MEDICINE

## 2021-06-11 PROCEDURE — 84443 ASSAY THYROID STIM HORMONE: CPT | Performed by: INTERNAL MEDICINE

## 2021-06-11 PROCEDURE — 36415 COLL VENOUS BLD VENIPUNCTURE: CPT | Performed by: INTERNAL MEDICINE

## 2021-06-11 PROCEDURE — 85025 COMPLETE CBC W/AUTO DIFF WBC: CPT | Performed by: INTERNAL MEDICINE

## 2021-06-11 PROCEDURE — 83036 HEMOGLOBIN GLYCOSYLATED A1C: CPT | Performed by: INTERNAL MEDICINE

## 2021-06-11 PROCEDURE — 80061 LIPID PANEL: CPT | Performed by: INTERNAL MEDICINE

## 2021-06-11 PROCEDURE — 82306 VITAMIN D 25 HYDROXY: CPT | Performed by: INTERNAL MEDICINE

## 2021-06-11 PROCEDURE — 83540 ASSAY OF IRON: CPT | Performed by: INTERNAL MEDICINE

## 2021-06-14 ENCOUNTER — OFFICE VISIT (OUTPATIENT)
Dept: FAMILY MEDICINE | Facility: CLINIC | Age: 76
End: 2021-06-14
Payer: MEDICARE

## 2021-06-14 VITALS
TEMPERATURE: 98 F | DIASTOLIC BLOOD PRESSURE: 72 MMHG | WEIGHT: 140 LBS | HEART RATE: 71 BPM | BODY MASS INDEX: 25.76 KG/M2 | SYSTOLIC BLOOD PRESSURE: 110 MMHG | OXYGEN SATURATION: 96 % | HEIGHT: 62 IN

## 2021-06-14 DIAGNOSIS — D50.0 IRON DEFICIENCY ANEMIA DUE TO CHRONIC BLOOD LOSS: ICD-10-CM

## 2021-06-14 DIAGNOSIS — E11.69 HYPERLIPIDEMIA ASSOCIATED WITH TYPE 2 DIABETES MELLITUS: ICD-10-CM

## 2021-06-14 DIAGNOSIS — K51.20 ULCERATIVE PROCTITIS WITHOUT COMPLICATION: ICD-10-CM

## 2021-06-14 DIAGNOSIS — E11.65 CONTROLLED TYPE 2 DIABETES MELLITUS WITH HYPERGLYCEMIA, WITHOUT LONG-TERM CURRENT USE OF INSULIN: ICD-10-CM

## 2021-06-14 DIAGNOSIS — E53.8 B12 DEFICIENCY: Primary | ICD-10-CM

## 2021-06-14 DIAGNOSIS — N64.89 POST-MASTECTOMY DEFORMITY OF BREAST: ICD-10-CM

## 2021-06-14 DIAGNOSIS — Z90.10 POST-MASTECTOMY DEFORMITY OF BREAST: ICD-10-CM

## 2021-06-14 DIAGNOSIS — E78.5 HYPERLIPIDEMIA ASSOCIATED WITH TYPE 2 DIABETES MELLITUS: ICD-10-CM

## 2021-06-14 PROCEDURE — 99999 PR PBB SHADOW E&M-EST. PATIENT-LVL IV: ICD-10-PCS | Mod: PBBFAC,,, | Performed by: INTERNAL MEDICINE

## 2021-06-14 PROCEDURE — 3008F PR BODY MASS INDEX (BMI) DOCUMENTED: ICD-10-PCS | Mod: CPTII,S$GLB,, | Performed by: INTERNAL MEDICINE

## 2021-06-14 PROCEDURE — 1159F PR MEDICATION LIST DOCUMENTED IN MEDICAL RECORD: ICD-10-PCS | Mod: S$GLB,,, | Performed by: INTERNAL MEDICINE

## 2021-06-14 PROCEDURE — 99499 RISK ADDL DX/OHS AUDIT: ICD-10-PCS | Mod: S$GLB,,, | Performed by: INTERNAL MEDICINE

## 2021-06-14 PROCEDURE — 1125F AMNT PAIN NOTED PAIN PRSNT: CPT | Mod: S$GLB,,, | Performed by: INTERNAL MEDICINE

## 2021-06-14 PROCEDURE — 1101F PR PT FALLS ASSESS DOC 0-1 FALLS W/OUT INJ PAST YR: ICD-10-PCS | Mod: CPTII,S$GLB,, | Performed by: INTERNAL MEDICINE

## 2021-06-14 PROCEDURE — 99214 PR OFFICE/OUTPT VISIT, EST, LEVL IV, 30-39 MIN: ICD-10-PCS | Mod: S$GLB,,, | Performed by: INTERNAL MEDICINE

## 2021-06-14 PROCEDURE — 1101F PT FALLS ASSESS-DOCD LE1/YR: CPT | Mod: CPTII,S$GLB,, | Performed by: INTERNAL MEDICINE

## 2021-06-14 PROCEDURE — 99214 OFFICE O/P EST MOD 30 MIN: CPT | Mod: S$GLB,,, | Performed by: INTERNAL MEDICINE

## 2021-06-14 PROCEDURE — 99999 PR PBB SHADOW E&M-EST. PATIENT-LVL IV: CPT | Mod: PBBFAC,,, | Performed by: INTERNAL MEDICINE

## 2021-06-14 PROCEDURE — 1125F PR PAIN SEVERITY QUANTIFIED, PAIN PRESENT: ICD-10-PCS | Mod: S$GLB,,, | Performed by: INTERNAL MEDICINE

## 2021-06-14 PROCEDURE — 3288F PR FALLS RISK ASSESSMENT DOCUMENTED: ICD-10-PCS | Mod: CPTII,S$GLB,, | Performed by: INTERNAL MEDICINE

## 2021-06-14 PROCEDURE — 3072F LOW RISK FOR RETINOPATHY: CPT | Mod: S$GLB,,, | Performed by: INTERNAL MEDICINE

## 2021-06-14 PROCEDURE — 3072F PR LOW RISK FOR RETINOPATHY: ICD-10-PCS | Mod: S$GLB,,, | Performed by: INTERNAL MEDICINE

## 2021-06-14 PROCEDURE — 3288F FALL RISK ASSESSMENT DOCD: CPT | Mod: CPTII,S$GLB,, | Performed by: INTERNAL MEDICINE

## 2021-06-14 PROCEDURE — 3008F BODY MASS INDEX DOCD: CPT | Mod: CPTII,S$GLB,, | Performed by: INTERNAL MEDICINE

## 2021-06-14 PROCEDURE — 99499 UNLISTED E&M SERVICE: CPT | Mod: S$GLB,,, | Performed by: INTERNAL MEDICINE

## 2021-06-14 PROCEDURE — 1159F MED LIST DOCD IN RCRD: CPT | Mod: S$GLB,,, | Performed by: INTERNAL MEDICINE

## 2021-06-14 RX ORDER — PNV NO.95/FERROUS FUM/FOLIC AC 28MG-0.8MG
2000 TABLET ORAL DAILY
Qty: 60 TABLET | Refills: 11 | Status: SHIPPED | OUTPATIENT
Start: 2021-06-14 | End: 2021-06-30

## 2021-06-30 DIAGNOSIS — D50.0 IRON DEFICIENCY ANEMIA DUE TO CHRONIC BLOOD LOSS: Primary | ICD-10-CM

## 2021-06-30 DIAGNOSIS — E53.8 B12 DEFICIENCY: ICD-10-CM

## 2021-06-30 RX ORDER — FERROUS SULFATE 325(65) MG
325 TABLET ORAL
Qty: 30 TABLET | Refills: 5 | Status: SHIPPED | OUTPATIENT
Start: 2021-06-30 | End: 2022-01-07

## 2021-06-30 RX ORDER — LANOLIN ALCOHOL/MO/W.PET/CERES
2000 CREAM (GRAM) TOPICAL DAILY
Qty: 60 TABLET | Refills: 6 | Status: SHIPPED | OUTPATIENT
Start: 2021-06-30

## 2021-07-07 ENCOUNTER — PATIENT MESSAGE (OUTPATIENT)
Dept: ADMINISTRATIVE | Facility: HOSPITAL | Age: 76
End: 2021-07-07

## 2021-07-08 ENCOUNTER — OFFICE VISIT (OUTPATIENT)
Dept: ORTHOPEDICS | Facility: CLINIC | Age: 76
End: 2021-07-08
Payer: MEDICARE

## 2021-07-08 VITALS — BODY MASS INDEX: 25.76 KG/M2 | HEIGHT: 62 IN | WEIGHT: 140 LBS

## 2021-07-08 DIAGNOSIS — S86.911D STRAIN OF RIGHT KNEE, SUBSEQUENT ENCOUNTER: Primary | ICD-10-CM

## 2021-07-08 PROCEDURE — 3072F PR LOW RISK FOR RETINOPATHY: ICD-10-PCS | Mod: S$GLB,,, | Performed by: ORTHOPAEDIC SURGERY

## 2021-07-08 PROCEDURE — 3008F PR BODY MASS INDEX (BMI) DOCUMENTED: ICD-10-PCS | Mod: CPTII,S$GLB,, | Performed by: ORTHOPAEDIC SURGERY

## 2021-07-08 PROCEDURE — 3288F FALL RISK ASSESSMENT DOCD: CPT | Mod: CPTII,S$GLB,, | Performed by: ORTHOPAEDIC SURGERY

## 2021-07-08 PROCEDURE — 1159F PR MEDICATION LIST DOCUMENTED IN MEDICAL RECORD: ICD-10-PCS | Mod: S$GLB,,, | Performed by: ORTHOPAEDIC SURGERY

## 2021-07-08 PROCEDURE — 99213 PR OFFICE/OUTPT VISIT, EST, LEVL III, 20-29 MIN: ICD-10-PCS | Mod: S$GLB,,, | Performed by: ORTHOPAEDIC SURGERY

## 2021-07-08 PROCEDURE — 3288F PR FALLS RISK ASSESSMENT DOCUMENTED: ICD-10-PCS | Mod: CPTII,S$GLB,, | Performed by: ORTHOPAEDIC SURGERY

## 2021-07-08 PROCEDURE — 1159F MED LIST DOCD IN RCRD: CPT | Mod: S$GLB,,, | Performed by: ORTHOPAEDIC SURGERY

## 2021-07-08 PROCEDURE — 3008F BODY MASS INDEX DOCD: CPT | Mod: CPTII,S$GLB,, | Performed by: ORTHOPAEDIC SURGERY

## 2021-07-08 PROCEDURE — 1126F PR PAIN SEVERITY QUANTIFIED, NO PAIN PRESENT: ICD-10-PCS | Mod: S$GLB,,, | Performed by: ORTHOPAEDIC SURGERY

## 2021-07-08 PROCEDURE — 99999 PR PBB SHADOW E&M-EST. PATIENT-LVL III: CPT | Mod: PBBFAC,,, | Performed by: ORTHOPAEDIC SURGERY

## 2021-07-08 PROCEDURE — 99999 PR PBB SHADOW E&M-EST. PATIENT-LVL III: ICD-10-PCS | Mod: PBBFAC,,, | Performed by: ORTHOPAEDIC SURGERY

## 2021-07-08 PROCEDURE — 3072F LOW RISK FOR RETINOPATHY: CPT | Mod: S$GLB,,, | Performed by: ORTHOPAEDIC SURGERY

## 2021-07-08 PROCEDURE — 1101F PT FALLS ASSESS-DOCD LE1/YR: CPT | Mod: CPTII,S$GLB,, | Performed by: ORTHOPAEDIC SURGERY

## 2021-07-08 PROCEDURE — 1101F PR PT FALLS ASSESS DOC 0-1 FALLS W/OUT INJ PAST YR: ICD-10-PCS | Mod: CPTII,S$GLB,, | Performed by: ORTHOPAEDIC SURGERY

## 2021-07-08 PROCEDURE — 99213 OFFICE O/P EST LOW 20 MIN: CPT | Mod: S$GLB,,, | Performed by: ORTHOPAEDIC SURGERY

## 2021-07-08 PROCEDURE — 1126F AMNT PAIN NOTED NONE PRSNT: CPT | Mod: S$GLB,,, | Performed by: ORTHOPAEDIC SURGERY

## 2021-07-30 ENCOUNTER — PATIENT MESSAGE (OUTPATIENT)
Dept: FAMILY MEDICINE | Facility: CLINIC | Age: 76
End: 2021-07-30

## 2021-07-30 ENCOUNTER — LAB VISIT (OUTPATIENT)
Dept: LAB | Facility: HOSPITAL | Age: 76
End: 2021-07-30
Attending: INTERNAL MEDICINE
Payer: MEDICARE

## 2021-07-30 ENCOUNTER — TELEPHONE (OUTPATIENT)
Dept: FAMILY MEDICINE | Facility: CLINIC | Age: 76
End: 2021-07-30

## 2021-07-30 DIAGNOSIS — R39.15 URGENCY OF URINATION: Primary | ICD-10-CM

## 2021-07-30 DIAGNOSIS — K51.20 ULCERATIVE PROCTITIS WITHOUT COMPLICATION: ICD-10-CM

## 2021-07-30 DIAGNOSIS — D50.0 IRON DEFICIENCY ANEMIA DUE TO CHRONIC BLOOD LOSS: ICD-10-CM

## 2021-07-30 DIAGNOSIS — E53.8 B12 DEFICIENCY: ICD-10-CM

## 2021-07-30 DIAGNOSIS — N30.00 ACUTE CYSTITIS WITHOUT HEMATURIA: Primary | ICD-10-CM

## 2021-07-30 LAB
BASOPHILS # BLD AUTO: 0.05 K/UL (ref 0–0.2)
BASOPHILS NFR BLD: 0.6 % (ref 0–1.9)
DIFFERENTIAL METHOD: ABNORMAL
EOSINOPHIL # BLD AUTO: 0.3 K/UL (ref 0–0.5)
EOSINOPHIL NFR BLD: 4.4 % (ref 0–8)
ERYTHROCYTE [DISTWIDTH] IN BLOOD BY AUTOMATED COUNT: 14.6 % (ref 11.5–14.5)
HCT VFR BLD AUTO: 35.3 % (ref 37–48.5)
HGB BLD-MCNC: 11.2 G/DL (ref 12–16)
IMM GRANULOCYTES # BLD AUTO: 0.02 K/UL (ref 0–0.04)
IMM GRANULOCYTES NFR BLD AUTO: 0.3 % (ref 0–0.5)
LYMPHOCYTES # BLD AUTO: 1.6 K/UL (ref 1–4.8)
LYMPHOCYTES NFR BLD: 20.3 % (ref 18–48)
MCH RBC QN AUTO: 28.1 PG (ref 27–31)
MCHC RBC AUTO-ENTMCNC: 31.7 G/DL (ref 32–36)
MCV RBC AUTO: 89 FL (ref 82–98)
MONOCYTES # BLD AUTO: 0.7 K/UL (ref 0.3–1)
MONOCYTES NFR BLD: 8.7 % (ref 4–15)
NEUTROPHILS # BLD AUTO: 5.1 K/UL (ref 1.8–7.7)
NEUTROPHILS NFR BLD: 65.7 % (ref 38–73)
NRBC BLD-RTO: 0 /100 WBC
PLATELET # BLD AUTO: 265 K/UL (ref 150–450)
PMV BLD AUTO: 10 FL (ref 9.2–12.9)
RBC # BLD AUTO: 3.98 M/UL (ref 4–5.4)
WBC # BLD AUTO: 7.79 K/UL (ref 3.9–12.7)

## 2021-07-30 PROCEDURE — 85025 COMPLETE CBC W/AUTO DIFF WBC: CPT | Performed by: INTERNAL MEDICINE

## 2021-07-30 PROCEDURE — 82607 VITAMIN B-12: CPT | Performed by: INTERNAL MEDICINE

## 2021-07-30 PROCEDURE — 36415 COLL VENOUS BLD VENIPUNCTURE: CPT | Performed by: INTERNAL MEDICINE

## 2021-07-30 RX ORDER — CEFDINIR 300 MG/1
300 CAPSULE ORAL EVERY 12 HOURS
Qty: 14 CAPSULE | Refills: 0 | Status: SHIPPED | OUTPATIENT
Start: 2021-07-30 | End: 2021-08-06

## 2021-07-31 LAB — VIT B12 SERPL-MCNC: 688 PG/ML (ref 210–950)

## 2021-08-03 ENCOUNTER — PATIENT MESSAGE (OUTPATIENT)
Dept: FAMILY MEDICINE | Facility: CLINIC | Age: 76
End: 2021-08-03

## 2021-08-18 ENCOUNTER — OFFICE VISIT (OUTPATIENT)
Dept: URGENT CARE | Facility: CLINIC | Age: 76
End: 2021-08-18
Payer: MEDICARE

## 2021-08-18 VITALS
HEIGHT: 62 IN | TEMPERATURE: 98 F | HEART RATE: 72 BPM | RESPIRATION RATE: 16 BRPM | DIASTOLIC BLOOD PRESSURE: 76 MMHG | OXYGEN SATURATION: 96 % | SYSTOLIC BLOOD PRESSURE: 123 MMHG | BODY MASS INDEX: 25.58 KG/M2 | WEIGHT: 139 LBS

## 2021-08-18 DIAGNOSIS — R30.0 DYSURIA: Primary | ICD-10-CM

## 2021-08-18 DIAGNOSIS — N89.8 VAGINAL DRYNESS: ICD-10-CM

## 2021-08-18 LAB
BILIRUB UR QL STRIP: POSITIVE
GLUCOSE UR QL STRIP: POSITIVE
KETONES UR QL STRIP: NEGATIVE
LEUKOCYTE ESTERASE UR QL STRIP: NEGATIVE
PH, POC UA: 5.5 (ref 5–8)
POC BLOOD, URINE: NEGATIVE
POC NITRATES, URINE: POSITIVE
PROT UR QL STRIP: POSITIVE
SP GR UR STRIP: 1.01 (ref 1–1.03)
UROBILINOGEN UR STRIP-ACNC: POSITIVE (ref 0.1–1.1)

## 2021-08-18 PROCEDURE — 81003 URINALYSIS AUTO W/O SCOPE: CPT | Mod: QW,S$GLB,, | Performed by: FAMILY MEDICINE

## 2021-08-18 PROCEDURE — 3044F PR MOST RECENT HEMOGLOBIN A1C LEVEL <7.0%: ICD-10-PCS | Mod: CPTII,S$GLB,, | Performed by: FAMILY MEDICINE

## 2021-08-18 PROCEDURE — 3072F PR LOW RISK FOR RETINOPATHY: ICD-10-PCS | Mod: CPTII,S$GLB,, | Performed by: FAMILY MEDICINE

## 2021-08-18 PROCEDURE — 3044F HG A1C LEVEL LT 7.0%: CPT | Mod: CPTII,S$GLB,, | Performed by: FAMILY MEDICINE

## 2021-08-18 PROCEDURE — 3008F BODY MASS INDEX DOCD: CPT | Mod: CPTII,S$GLB,, | Performed by: FAMILY MEDICINE

## 2021-08-18 PROCEDURE — 99204 PR OFFICE/OUTPT VISIT, NEW, LEVL IV, 45-59 MIN: ICD-10-PCS | Mod: 25,S$GLB,, | Performed by: FAMILY MEDICINE

## 2021-08-18 PROCEDURE — 3074F SYST BP LT 130 MM HG: CPT | Mod: CPTII,S$GLB,, | Performed by: FAMILY MEDICINE

## 2021-08-18 PROCEDURE — 1160F RVW MEDS BY RX/DR IN RCRD: CPT | Mod: CPTII,S$GLB,, | Performed by: FAMILY MEDICINE

## 2021-08-18 PROCEDURE — 3078F DIAST BP <80 MM HG: CPT | Mod: CPTII,S$GLB,, | Performed by: FAMILY MEDICINE

## 2021-08-18 PROCEDURE — 1159F PR MEDICATION LIST DOCUMENTED IN MEDICAL RECORD: ICD-10-PCS | Mod: CPTII,S$GLB,, | Performed by: FAMILY MEDICINE

## 2021-08-18 PROCEDURE — 81003 POCT URINALYSIS, DIPSTICK, AUTOMATED, W/O SCOPE: ICD-10-PCS | Mod: QW,S$GLB,, | Performed by: FAMILY MEDICINE

## 2021-08-18 PROCEDURE — 99204 OFFICE O/P NEW MOD 45 MIN: CPT | Mod: 25,S$GLB,, | Performed by: FAMILY MEDICINE

## 2021-08-18 PROCEDURE — 3072F LOW RISK FOR RETINOPATHY: CPT | Mod: CPTII,S$GLB,, | Performed by: FAMILY MEDICINE

## 2021-08-18 PROCEDURE — 1159F MED LIST DOCD IN RCRD: CPT | Mod: CPTII,S$GLB,, | Performed by: FAMILY MEDICINE

## 2021-08-18 PROCEDURE — 3074F PR MOST RECENT SYSTOLIC BLOOD PRESSURE < 130 MM HG: ICD-10-PCS | Mod: CPTII,S$GLB,, | Performed by: FAMILY MEDICINE

## 2021-08-18 PROCEDURE — 1160F PR REVIEW ALL MEDS BY PRESCRIBER/CLIN PHARMACIST DOCUMENTED: ICD-10-PCS | Mod: CPTII,S$GLB,, | Performed by: FAMILY MEDICINE

## 2021-08-18 PROCEDURE — 3078F PR MOST RECENT DIASTOLIC BLOOD PRESSURE < 80 MM HG: ICD-10-PCS | Mod: CPTII,S$GLB,, | Performed by: FAMILY MEDICINE

## 2021-08-18 PROCEDURE — 3008F PR BODY MASS INDEX (BMI) DOCUMENTED: ICD-10-PCS | Mod: CPTII,S$GLB,, | Performed by: FAMILY MEDICINE

## 2021-08-18 RX ORDER — SULFAMETHOXAZOLE AND TRIMETHOPRIM 800; 160 MG/1; MG/1
1 TABLET ORAL 2 TIMES DAILY
Qty: 6 TABLET | Refills: 0 | Status: SHIPPED | OUTPATIENT
Start: 2021-08-18 | End: 2021-08-21

## 2021-09-21 ENCOUNTER — LAB VISIT (OUTPATIENT)
Dept: LAB | Facility: HOSPITAL | Age: 76
End: 2021-09-21
Attending: INTERNAL MEDICINE
Payer: MEDICARE

## 2021-09-21 DIAGNOSIS — D50.0 IRON DEFICIENCY ANEMIA DUE TO CHRONIC BLOOD LOSS: ICD-10-CM

## 2021-09-21 DIAGNOSIS — E11.65 CONTROLLED TYPE 2 DIABETES MELLITUS WITH HYPERGLYCEMIA, WITHOUT LONG-TERM CURRENT USE OF INSULIN: ICD-10-CM

## 2021-09-21 LAB
ALBUMIN SERPL BCP-MCNC: 4 G/DL (ref 3.5–5.2)
ALP SERPL-CCNC: 74 U/L (ref 55–135)
ALT SERPL W/O P-5'-P-CCNC: 7 U/L (ref 10–44)
ANION GAP SERPL CALC-SCNC: 9 MMOL/L (ref 8–16)
AST SERPL-CCNC: 21 U/L (ref 10–40)
BILIRUB SERPL-MCNC: 0.5 MG/DL (ref 0.1–1)
BUN SERPL-MCNC: 12 MG/DL (ref 8–23)
CALCIUM SERPL-MCNC: 10.4 MG/DL (ref 8.7–10.5)
CHLORIDE SERPL-SCNC: 103 MMOL/L (ref 95–110)
CHOLEST SERPL-MCNC: 174 MG/DL (ref 120–199)
CHOLEST/HDLC SERPL: 3.3 {RATIO} (ref 2–5)
CO2 SERPL-SCNC: 28 MMOL/L (ref 23–29)
CREAT SERPL-MCNC: 0.8 MG/DL (ref 0.5–1.4)
EST. GFR  (AFRICAN AMERICAN): >60 ML/MIN/1.73 M^2
EST. GFR  (NON AFRICAN AMERICAN): >60 ML/MIN/1.73 M^2
ESTIMATED AVG GLUCOSE: 120 MG/DL (ref 68–131)
FERRITIN SERPL-MCNC: 83 NG/ML (ref 20–300)
GLUCOSE SERPL-MCNC: 107 MG/DL (ref 70–110)
HBA1C MFR BLD: 5.8 % (ref 4–5.6)
HDLC SERPL-MCNC: 52 MG/DL (ref 40–75)
HDLC SERPL: 29.9 % (ref 20–50)
LDLC SERPL CALC-MCNC: 95.8 MG/DL (ref 63–159)
NONHDLC SERPL-MCNC: 122 MG/DL
POTASSIUM SERPL-SCNC: 4.1 MMOL/L (ref 3.5–5.1)
PROT SERPL-MCNC: 8 G/DL (ref 6–8.4)
SODIUM SERPL-SCNC: 140 MMOL/L (ref 136–145)
TRIGL SERPL-MCNC: 131 MG/DL (ref 30–150)

## 2021-09-21 PROCEDURE — 80061 LIPID PANEL: CPT | Performed by: INTERNAL MEDICINE

## 2021-09-21 PROCEDURE — 80053 COMPREHEN METABOLIC PANEL: CPT | Performed by: INTERNAL MEDICINE

## 2021-09-21 PROCEDURE — 83036 HEMOGLOBIN GLYCOSYLATED A1C: CPT | Performed by: INTERNAL MEDICINE

## 2021-09-21 PROCEDURE — 82728 ASSAY OF FERRITIN: CPT | Performed by: INTERNAL MEDICINE

## 2021-09-21 PROCEDURE — 36415 COLL VENOUS BLD VENIPUNCTURE: CPT | Performed by: INTERNAL MEDICINE

## 2021-09-29 ENCOUNTER — OFFICE VISIT (OUTPATIENT)
Dept: FAMILY MEDICINE | Facility: CLINIC | Age: 76
End: 2021-09-29
Payer: MEDICARE

## 2021-09-29 VITALS
OXYGEN SATURATION: 97 % | TEMPERATURE: 98 F | DIASTOLIC BLOOD PRESSURE: 84 MMHG | HEART RATE: 93 BPM | HEIGHT: 62 IN | BODY MASS INDEX: 25.36 KG/M2 | SYSTOLIC BLOOD PRESSURE: 142 MMHG | WEIGHT: 137.81 LBS

## 2021-09-29 DIAGNOSIS — K51.20 ULCERATIVE PROCTITIS WITHOUT COMPLICATION: Primary | ICD-10-CM

## 2021-09-29 DIAGNOSIS — E53.8 B12 DEFICIENCY: ICD-10-CM

## 2021-09-29 DIAGNOSIS — E11.59 HYPERTENSION COMPLICATING DIABETES: ICD-10-CM

## 2021-09-29 DIAGNOSIS — E78.5 HYPERLIPIDEMIA ASSOCIATED WITH TYPE 2 DIABETES MELLITUS: ICD-10-CM

## 2021-09-29 DIAGNOSIS — D50.0 IRON DEFICIENCY ANEMIA DUE TO CHRONIC BLOOD LOSS: ICD-10-CM

## 2021-09-29 DIAGNOSIS — I15.2 HYPERTENSION COMPLICATING DIABETES: ICD-10-CM

## 2021-09-29 DIAGNOSIS — E11.69 HYPERLIPIDEMIA ASSOCIATED WITH TYPE 2 DIABETES MELLITUS: ICD-10-CM

## 2021-09-29 DIAGNOSIS — E11.65 CONTROLLED TYPE 2 DIABETES MELLITUS WITH HYPERGLYCEMIA, WITHOUT LONG-TERM CURRENT USE OF INSULIN: ICD-10-CM

## 2021-09-29 PROCEDURE — 1126F PR PAIN SEVERITY QUANTIFIED, NO PAIN PRESENT: ICD-10-PCS | Mod: CPTII,S$GLB,, | Performed by: INTERNAL MEDICINE

## 2021-09-29 PROCEDURE — 3072F PR LOW RISK FOR RETINOPATHY: ICD-10-PCS | Mod: CPTII,S$GLB,, | Performed by: INTERNAL MEDICINE

## 2021-09-29 PROCEDURE — 1160F PR REVIEW ALL MEDS BY PRESCRIBER/CLIN PHARMACIST DOCUMENTED: ICD-10-PCS | Mod: CPTII,S$GLB,, | Performed by: INTERNAL MEDICINE

## 2021-09-29 PROCEDURE — 4010F PR ACE/ARB THEARPY RXD/TAKEN: ICD-10-PCS | Mod: CPTII,S$GLB,, | Performed by: INTERNAL MEDICINE

## 2021-09-29 PROCEDURE — 3061F PR NEG MICROALBUMINURIA RESULT DOCUMENTED/REVIEW: ICD-10-PCS | Mod: CPTII,S$GLB,, | Performed by: INTERNAL MEDICINE

## 2021-09-29 PROCEDURE — 1101F PT FALLS ASSESS-DOCD LE1/YR: CPT | Mod: CPTII,S$GLB,, | Performed by: INTERNAL MEDICINE

## 2021-09-29 PROCEDURE — 99999 PR PBB SHADOW E&M-EST. PATIENT-LVL IV: ICD-10-PCS | Mod: PBBFAC,,, | Performed by: INTERNAL MEDICINE

## 2021-09-29 PROCEDURE — 4010F ACE/ARB THERAPY RXD/TAKEN: CPT | Mod: CPTII,S$GLB,, | Performed by: INTERNAL MEDICINE

## 2021-09-29 PROCEDURE — 3079F DIAST BP 80-89 MM HG: CPT | Mod: CPTII,S$GLB,, | Performed by: INTERNAL MEDICINE

## 2021-09-29 PROCEDURE — 99499 RISK ADDL DX/OHS AUDIT: ICD-10-PCS | Mod: S$GLB,,, | Performed by: INTERNAL MEDICINE

## 2021-09-29 PROCEDURE — 3044F HG A1C LEVEL LT 7.0%: CPT | Mod: CPTII,S$GLB,, | Performed by: INTERNAL MEDICINE

## 2021-09-29 PROCEDURE — 3066F NEPHROPATHY DOC TX: CPT | Mod: CPTII,S$GLB,, | Performed by: INTERNAL MEDICINE

## 2021-09-29 PROCEDURE — 3077F SYST BP >= 140 MM HG: CPT | Mod: CPTII,S$GLB,, | Performed by: INTERNAL MEDICINE

## 2021-09-29 PROCEDURE — 1159F PR MEDICATION LIST DOCUMENTED IN MEDICAL RECORD: ICD-10-PCS | Mod: CPTII,S$GLB,, | Performed by: INTERNAL MEDICINE

## 2021-09-29 PROCEDURE — 3066F PR DOCUMENTATION OF TREATMENT FOR NEPHROPATHY: ICD-10-PCS | Mod: CPTII,S$GLB,, | Performed by: INTERNAL MEDICINE

## 2021-09-29 PROCEDURE — 3079F PR MOST RECENT DIASTOLIC BLOOD PRESSURE 80-89 MM HG: ICD-10-PCS | Mod: CPTII,S$GLB,, | Performed by: INTERNAL MEDICINE

## 2021-09-29 PROCEDURE — 99999 PR PBB SHADOW E&M-EST. PATIENT-LVL IV: CPT | Mod: PBBFAC,,, | Performed by: INTERNAL MEDICINE

## 2021-09-29 PROCEDURE — 1160F RVW MEDS BY RX/DR IN RCRD: CPT | Mod: CPTII,S$GLB,, | Performed by: INTERNAL MEDICINE

## 2021-09-29 PROCEDURE — 3288F PR FALLS RISK ASSESSMENT DOCUMENTED: ICD-10-PCS | Mod: CPTII,S$GLB,, | Performed by: INTERNAL MEDICINE

## 2021-09-29 PROCEDURE — 3061F NEG MICROALBUMINURIA REV: CPT | Mod: CPTII,S$GLB,, | Performed by: INTERNAL MEDICINE

## 2021-09-29 PROCEDURE — 3044F PR MOST RECENT HEMOGLOBIN A1C LEVEL <7.0%: ICD-10-PCS | Mod: CPTII,S$GLB,, | Performed by: INTERNAL MEDICINE

## 2021-09-29 PROCEDURE — 99214 PR OFFICE/OUTPT VISIT, EST, LEVL IV, 30-39 MIN: ICD-10-PCS | Mod: S$GLB,,, | Performed by: INTERNAL MEDICINE

## 2021-09-29 PROCEDURE — 3077F PR MOST RECENT SYSTOLIC BLOOD PRESSURE >= 140 MM HG: ICD-10-PCS | Mod: CPTII,S$GLB,, | Performed by: INTERNAL MEDICINE

## 2021-09-29 PROCEDURE — 3288F FALL RISK ASSESSMENT DOCD: CPT | Mod: CPTII,S$GLB,, | Performed by: INTERNAL MEDICINE

## 2021-09-29 PROCEDURE — 99214 OFFICE O/P EST MOD 30 MIN: CPT | Mod: S$GLB,,, | Performed by: INTERNAL MEDICINE

## 2021-09-29 PROCEDURE — 99499 UNLISTED E&M SERVICE: CPT | Mod: S$GLB,,, | Performed by: INTERNAL MEDICINE

## 2021-09-29 PROCEDURE — 1159F MED LIST DOCD IN RCRD: CPT | Mod: CPTII,S$GLB,, | Performed by: INTERNAL MEDICINE

## 2021-09-29 PROCEDURE — 3072F LOW RISK FOR RETINOPATHY: CPT | Mod: CPTII,S$GLB,, | Performed by: INTERNAL MEDICINE

## 2021-09-29 PROCEDURE — 1101F PR PT FALLS ASSESS DOC 0-1 FALLS W/OUT INJ PAST YR: ICD-10-PCS | Mod: CPTII,S$GLB,, | Performed by: INTERNAL MEDICINE

## 2021-09-29 PROCEDURE — 1126F AMNT PAIN NOTED NONE PRSNT: CPT | Mod: CPTII,S$GLB,, | Performed by: INTERNAL MEDICINE

## 2021-09-30 ENCOUNTER — TELEPHONE (OUTPATIENT)
Dept: FAMILY MEDICINE | Facility: CLINIC | Age: 76
End: 2021-09-30

## 2021-10-05 ENCOUNTER — TELEPHONE (OUTPATIENT)
Dept: FAMILY MEDICINE | Facility: CLINIC | Age: 76
End: 2021-10-05

## 2021-10-21 ENCOUNTER — IMMUNIZATION (OUTPATIENT)
Dept: INTERNAL MEDICINE | Facility: CLINIC | Age: 76
End: 2021-10-21
Payer: MEDICARE

## 2021-10-21 DIAGNOSIS — Z23 NEED FOR VACCINATION: Primary | ICD-10-CM

## 2021-10-21 PROCEDURE — 91300 COVID-19, MRNA, LNP-S, PF, 30 MCG/0.3 ML DOSE VACCINE: CPT | Mod: PBBFAC | Performed by: INTERNAL MEDICINE

## 2021-10-21 PROCEDURE — 0003A COVID-19, MRNA, LNP-S, PF, 30 MCG/0.3 ML DOSE VACCINE: CPT | Mod: PBBFAC | Performed by: INTERNAL MEDICINE

## 2021-10-25 ENCOUNTER — TELEPHONE (OUTPATIENT)
Dept: FAMILY MEDICINE | Facility: CLINIC | Age: 76
End: 2021-10-25
Payer: MEDICARE

## 2021-10-25 DIAGNOSIS — Z12.31 ENCOUNTER FOR SCREENING MAMMOGRAM FOR MALIGNANT NEOPLASM OF BREAST: Primary | ICD-10-CM

## 2021-10-25 DIAGNOSIS — Z12.11 COLON CANCER SCREENING: Primary | ICD-10-CM

## 2021-11-18 ENCOUNTER — PATIENT OUTREACH (OUTPATIENT)
Dept: ADMINISTRATIVE | Facility: OTHER | Age: 76
End: 2021-11-18
Payer: MEDICARE

## 2021-11-18 DIAGNOSIS — E11.65 CONTROLLED TYPE 2 DIABETES MELLITUS WITH HYPERGLYCEMIA, WITHOUT LONG-TERM CURRENT USE OF INSULIN: Primary | ICD-10-CM

## 2021-11-19 ENCOUNTER — OFFICE VISIT (OUTPATIENT)
Dept: GASTROENTEROLOGY | Facility: CLINIC | Age: 76
End: 2021-11-19
Payer: MEDICARE

## 2021-11-19 VITALS — WEIGHT: 139.31 LBS | BODY MASS INDEX: 25.64 KG/M2 | HEIGHT: 62 IN

## 2021-11-19 DIAGNOSIS — K63.5 POLYP OF COLON, UNSPECIFIED PART OF COLON, UNSPECIFIED TYPE: Primary | ICD-10-CM

## 2021-11-19 DIAGNOSIS — K51.20 ULCERATIVE PROCTITIS WITHOUT COMPLICATION: ICD-10-CM

## 2021-11-19 PROCEDURE — 99499 UNLISTED E&M SERVICE: CPT | Mod: S$GLB,,, | Performed by: INTERNAL MEDICINE

## 2021-11-19 PROCEDURE — 99499 RISK ADDL DX/OHS AUDIT: ICD-10-PCS | Mod: S$GLB,,, | Performed by: INTERNAL MEDICINE

## 2021-11-19 PROCEDURE — 99999 PR PBB SHADOW E&M-EST. PATIENT-LVL III: CPT | Mod: PBBFAC,,, | Performed by: INTERNAL MEDICINE

## 2021-11-19 PROCEDURE — 99204 OFFICE O/P NEW MOD 45 MIN: CPT | Mod: S$PBB,,, | Performed by: INTERNAL MEDICINE

## 2021-11-19 PROCEDURE — 99204 PR OFFICE/OUTPT VISIT, NEW, LEVL IV, 45-59 MIN: ICD-10-PCS | Mod: S$PBB,,, | Performed by: INTERNAL MEDICINE

## 2021-11-19 PROCEDURE — 99999 PR PBB SHADOW E&M-EST. PATIENT-LVL III: ICD-10-PCS | Mod: PBBFAC,,, | Performed by: INTERNAL MEDICINE

## 2021-11-19 RX ORDER — SODIUM, POTASSIUM,MAG SULFATES 17.5-3.13G
1 SOLUTION, RECONSTITUTED, ORAL ORAL DAILY
Qty: 1 KIT | Refills: 0 | Status: SHIPPED | OUTPATIENT
Start: 2021-11-19 | End: 2021-11-21

## 2021-11-19 RX ORDER — MESALAMINE 1.2 G/1
1.2 TABLET, DELAYED RELEASE ORAL 2 TIMES DAILY
Qty: 60 TABLET | Refills: 3 | Status: ON HOLD | OUTPATIENT
Start: 2021-11-19 | End: 2022-01-18 | Stop reason: HOSPADM

## 2021-11-27 ENCOUNTER — HOSPITAL ENCOUNTER (OUTPATIENT)
Dept: RADIOLOGY | Facility: HOSPITAL | Age: 76
Discharge: HOME | End: 2021-11-27
Attending: INTERNAL MEDICINE
Payer: MEDICARE

## 2021-11-27 DIAGNOSIS — Z12.31 ENCOUNTER FOR SCREENING MAMMOGRAM FOR MALIGNANT NEOPLASM OF BREAST: ICD-10-CM

## 2021-11-27 PROCEDURE — 77063 BREAST TOMOSYNTHESIS BI: CPT | Mod: 26,,, | Performed by: RADIOLOGY

## 2021-11-27 PROCEDURE — 77067 MAMMO DIGITAL SCREENING RIGHT WITH TOMO: ICD-10-PCS | Mod: 26,,, | Performed by: RADIOLOGY

## 2021-11-27 PROCEDURE — 77067 SCR MAMMO BI INCL CAD: CPT | Mod: TC

## 2021-11-27 PROCEDURE — 77067 SCR MAMMO BI INCL CAD: CPT | Mod: 26,,, | Performed by: RADIOLOGY

## 2021-11-27 PROCEDURE — 77063 MAMMO DIGITAL SCREENING RIGHT WITH TOMO: ICD-10-PCS | Mod: 26,,, | Performed by: RADIOLOGY

## 2021-11-29 ENCOUNTER — TELEPHONE (OUTPATIENT)
Dept: ORTHOPEDICS | Facility: CLINIC | Age: 76
End: 2021-11-29
Payer: MEDICARE

## 2021-12-02 ENCOUNTER — OFFICE VISIT (OUTPATIENT)
Dept: URGENT CARE | Facility: CLINIC | Age: 76
End: 2021-12-02
Payer: MEDICARE

## 2021-12-02 VITALS
HEIGHT: 62 IN | WEIGHT: 139 LBS | BODY MASS INDEX: 25.58 KG/M2 | OXYGEN SATURATION: 98 % | DIASTOLIC BLOOD PRESSURE: 83 MMHG | SYSTOLIC BLOOD PRESSURE: 152 MMHG | TEMPERATURE: 99 F | HEART RATE: 72 BPM | RESPIRATION RATE: 19 BRPM

## 2021-12-02 DIAGNOSIS — S86.912A KNEE STRAIN, LEFT, INITIAL ENCOUNTER: Primary | ICD-10-CM

## 2021-12-02 DIAGNOSIS — M25.562 ACUTE PAIN OF LEFT KNEE: ICD-10-CM

## 2021-12-02 PROCEDURE — 3072F PR LOW RISK FOR RETINOPATHY: ICD-10-PCS | Mod: CPTII,S$GLB,, | Performed by: PHYSICIAN ASSISTANT

## 2021-12-02 PROCEDURE — 3066F NEPHROPATHY DOC TX: CPT | Mod: CPTII,S$GLB,, | Performed by: PHYSICIAN ASSISTANT

## 2021-12-02 PROCEDURE — 96372 THER/PROPH/DIAG INJ SC/IM: CPT | Mod: S$GLB,,, | Performed by: PHYSICIAN ASSISTANT

## 2021-12-02 PROCEDURE — 99213 OFFICE O/P EST LOW 20 MIN: CPT | Mod: 25,S$GLB,, | Performed by: PHYSICIAN ASSISTANT

## 2021-12-02 PROCEDURE — 96372 PR INJECTION,THERAP/PROPH/DIAG2ST, IM OR SUBCUT: ICD-10-PCS | Mod: S$GLB,,, | Performed by: PHYSICIAN ASSISTANT

## 2021-12-02 PROCEDURE — 3072F LOW RISK FOR RETINOPATHY: CPT | Mod: CPTII,S$GLB,, | Performed by: PHYSICIAN ASSISTANT

## 2021-12-02 PROCEDURE — 3061F PR NEG MICROALBUMINURIA RESULT DOCUMENTED/REVIEW: ICD-10-PCS | Mod: CPTII,S$GLB,, | Performed by: PHYSICIAN ASSISTANT

## 2021-12-02 PROCEDURE — 4010F PR ACE/ARB THEARPY RXD/TAKEN: ICD-10-PCS | Mod: CPTII,S$GLB,, | Performed by: PHYSICIAN ASSISTANT

## 2021-12-02 PROCEDURE — 3061F NEG MICROALBUMINURIA REV: CPT | Mod: CPTII,S$GLB,, | Performed by: PHYSICIAN ASSISTANT

## 2021-12-02 PROCEDURE — 4010F ACE/ARB THERAPY RXD/TAKEN: CPT | Mod: CPTII,S$GLB,, | Performed by: PHYSICIAN ASSISTANT

## 2021-12-02 PROCEDURE — 99213 PR OFFICE/OUTPT VISIT, EST, LEVL III, 20-29 MIN: ICD-10-PCS | Mod: 25,S$GLB,, | Performed by: PHYSICIAN ASSISTANT

## 2021-12-02 PROCEDURE — 73562 X-RAY EXAM OF KNEE 3: CPT | Mod: FY,LT,S$GLB, | Performed by: RADIOLOGY

## 2021-12-02 PROCEDURE — 73562 XR KNEE 3 VIEW LEFT: ICD-10-PCS | Mod: FY,LT,S$GLB, | Performed by: RADIOLOGY

## 2021-12-02 PROCEDURE — 3066F PR DOCUMENTATION OF TREATMENT FOR NEPHROPATHY: ICD-10-PCS | Mod: CPTII,S$GLB,, | Performed by: PHYSICIAN ASSISTANT

## 2021-12-02 RX ORDER — KETOROLAC TROMETHAMINE 30 MG/ML
30 INJECTION, SOLUTION INTRAMUSCULAR; INTRAVENOUS
Status: COMPLETED | OUTPATIENT
Start: 2021-12-02 | End: 2021-12-02

## 2021-12-02 RX ORDER — DICLOFENAC SODIUM 10 MG/G
2 GEL TOPICAL 4 TIMES DAILY PRN
Qty: 150 G | Refills: 0 | Status: SHIPPED | OUTPATIENT
Start: 2021-12-02 | End: 2021-12-09

## 2021-12-02 RX ADMIN — KETOROLAC TROMETHAMINE 30 MG: 30 INJECTION, SOLUTION INTRAMUSCULAR; INTRAVENOUS at 09:12

## 2021-12-20 ENCOUNTER — TELEPHONE (OUTPATIENT)
Dept: ORTHOPEDICS | Facility: CLINIC | Age: 76
End: 2021-12-20
Payer: MEDICARE

## 2021-12-21 DIAGNOSIS — I10 ESSENTIAL HYPERTENSION: ICD-10-CM

## 2021-12-23 DIAGNOSIS — I10 ESSENTIAL HYPERTENSION: ICD-10-CM

## 2021-12-23 RX ORDER — OLMESARTAN MEDOXOMIL 20 MG/1
20 TABLET ORAL DAILY
Qty: 90 TABLET | Refills: 2 | Status: SHIPPED | OUTPATIENT
Start: 2021-12-23 | End: 2022-09-15

## 2021-12-23 RX ORDER — OLMESARTAN MEDOXOMIL 20 MG/1
TABLET ORAL
Qty: 90 TABLET | Refills: 0 | OUTPATIENT
Start: 2021-12-23

## 2022-01-10 ENCOUNTER — TELEPHONE (OUTPATIENT)
Dept: GASTROENTEROLOGY | Facility: CLINIC | Age: 77
End: 2022-01-10
Payer: MEDICARE

## 2022-01-10 ENCOUNTER — PATIENT MESSAGE (OUTPATIENT)
Dept: ADMINISTRATIVE | Facility: HOSPITAL | Age: 77
End: 2022-01-10
Payer: MEDICARE

## 2022-01-10 NOTE — TELEPHONE ENCOUNTER
Patient came to the office stated that the suprep that they send her to her pharmacy she has reaction to it , she states that last time she did the suprep she didn't  Stop vomiting. She wanted to ask Dr. Garcia if he can send her GOLYTELY instead.

## 2022-01-11 ENCOUNTER — TELEPHONE (OUTPATIENT)
Dept: GASTROENTEROLOGY | Facility: CLINIC | Age: 77
End: 2022-01-11
Payer: MEDICARE

## 2022-01-11 RX ORDER — POLYETHYLENE GLYCOL 3350, SODIUM SULFATE ANHYDROUS, SODIUM BICARBONATE, SODIUM CHLORIDE, POTASSIUM CHLORIDE 236; 22.74; 6.74; 5.86; 2.97 G/4L; G/4L; G/4L; G/4L; G/4L
4 POWDER, FOR SOLUTION ORAL ONCE
Qty: 4000 ML | Refills: 0 | Status: SHIPPED | OUTPATIENT
Start: 2022-01-11 | End: 2022-01-11

## 2022-01-11 NOTE — TELEPHONE ENCOUNTER
Spoke to patient to let her know that Dr. Garcia send the Golytely to her pharmacy, verbal understanding.

## 2022-01-12 ENCOUNTER — TELEPHONE (OUTPATIENT)
Dept: ENDOSCOPY | Facility: HOSPITAL | Age: 77
End: 2022-01-12
Payer: MEDICARE

## 2022-01-12 DIAGNOSIS — Z01.818 PRE-OP TESTING: ICD-10-CM

## 2022-01-12 NOTE — TELEPHONE ENCOUNTER
With assistance of  [facility] [id number] unable to contact Aretha Nguyen to schedule pre procedure covid swab

## 2022-01-13 ENCOUNTER — TELEPHONE (OUTPATIENT)
Dept: ENDOSCOPY | Facility: HOSPITAL | Age: 77
End: 2022-01-13
Payer: MEDICARE

## 2022-01-13 ENCOUNTER — PATIENT OUTREACH (OUTPATIENT)
Dept: ADMINISTRATIVE | Facility: OTHER | Age: 77
End: 2022-01-13
Payer: MEDICARE

## 2022-01-13 NOTE — TELEPHONE ENCOUNTER
Spoke with patient's spouse about arrival time @ 0730.   Covid test = RAPID    Prep instructions reviewed: the day before the procedure, follow a clear liquid diet all day, then start the first 1/2 of prep at 5pm and take 2nd 1/2 of prep @ 0230.  Pt must be completely NPO when prep completed @ 0430.   Instructions sent to portal in Mongolian.           Medications: Do not take Insulin or oral diabetic medications the day of the procedure.  Take as prescribed: heart, seizure and blood pressure medication in the morning with a sip of water (less than an ounce).  Take any breathing medications and bring inhalers to hospital with you Leave all valuables and jewelry at home.     Wear comfortable clothes to procedure to change into hospital gown You cannot drive for 24 hours after your procedure because you will receive sedation for your procedure to make you comfortable.  A ride must be provided at discharge.

## 2022-01-13 NOTE — PROGRESS NOTES
Health Maintenance Due   Topic Date Due    Shingles Vaccine (1 of 2) Never done    Colorectal Cancer Screening  05/04/2020    Eye Exam  11/18/2021    Foot Exam  12/14/2021     Updates were requested from care everywhere.  Chart was reviewed for overdue Proactive [facility] Encounters (MELISA) topics (CRS, Breast Cancer Screening, Eye exam)  Health Maintenance has been updated.  LINKS immunization registry triggered.  Immunizations were reconciled.  Pt scheduled for colonoscopy 01/18/2022.

## 2022-01-13 NOTE — TELEPHONE ENCOUNTER
LL  (Evangelista) ID #[id number]  Unable to leave message via phone, no answer.   Text message sent instructing patient call the endo dept @ [phone] or [phone] between 8am-4pm to receive your arrival time and instructions prior to your appointment.   Arrival time to be given @ 0730  Colon/Golytely  Covid - RAPID    (Message sent via [facility] portal)

## 2022-01-14 ENCOUNTER — OFFICE VISIT (OUTPATIENT)
Dept: ORTHOPEDICS | Facility: CLINIC | Age: 77
End: 2022-01-14
Payer: MEDICARE

## 2022-01-14 VITALS — BODY MASS INDEX: 25.55 KG/M2 | HEIGHT: 62 IN | WEIGHT: 138.88 LBS

## 2022-01-14 DIAGNOSIS — R93.7 BONE MARROW EDEMA: Primary | ICD-10-CM

## 2022-01-14 DIAGNOSIS — M17.12 PRIMARY OSTEOARTHRITIS OF LEFT KNEE: ICD-10-CM

## 2022-01-14 PROCEDURE — 1101F PT FALLS ASSESS-DOCD LE1/YR: CPT | Mod: CPTII,S$GLB,, | Performed by: ORTHOPAEDIC SURGERY

## 2022-01-14 PROCEDURE — 1125F AMNT PAIN NOTED PAIN PRSNT: CPT | Mod: CPTII,S$GLB,, | Performed by: ORTHOPAEDIC SURGERY

## 2022-01-14 PROCEDURE — 99999 PR PBB SHADOW E&M-EST. PATIENT-LVL III: CPT | Mod: PBBFAC,,, | Performed by: ORTHOPAEDIC SURGERY

## 2022-01-14 PROCEDURE — 20610 LARGE JOINT ASPIRATION/INJECTION: L KNEE: ICD-10-PCS | Mod: LT,S$GLB,, | Performed by: ORTHOPAEDIC SURGERY

## 2022-01-14 PROCEDURE — 99213 OFFICE O/P EST LOW 20 MIN: CPT | Mod: 25,S$GLB,, | Performed by: ORTHOPAEDIC SURGERY

## 2022-01-14 PROCEDURE — 99999 PR PBB SHADOW E&M-EST. PATIENT-LVL III: ICD-10-PCS | Mod: PBBFAC,,, | Performed by: ORTHOPAEDIC SURGERY

## 2022-01-14 PROCEDURE — 99213 PR OFFICE/OUTPT VISIT, EST, LEVL III, 20-29 MIN: ICD-10-PCS | Mod: 25,S$GLB,, | Performed by: ORTHOPAEDIC SURGERY

## 2022-01-14 PROCEDURE — 3288F PR FALLS RISK ASSESSMENT DOCUMENTED: ICD-10-PCS | Mod: CPTII,S$GLB,, | Performed by: ORTHOPAEDIC SURGERY

## 2022-01-14 PROCEDURE — 1101F PR PT FALLS ASSESS DOC 0-1 FALLS W/OUT INJ PAST YR: ICD-10-PCS | Mod: CPTII,S$GLB,, | Performed by: ORTHOPAEDIC SURGERY

## 2022-01-14 PROCEDURE — 20610 DRAIN/INJ JOINT/BURSA W/O US: CPT | Mod: LT,S$GLB,, | Performed by: ORTHOPAEDIC SURGERY

## 2022-01-14 PROCEDURE — 3288F FALL RISK ASSESSMENT DOCD: CPT | Mod: CPTII,S$GLB,, | Performed by: ORTHOPAEDIC SURGERY

## 2022-01-14 PROCEDURE — 1159F PR MEDICATION LIST DOCUMENTED IN MEDICAL RECORD: ICD-10-PCS | Mod: CPTII,S$GLB,, | Performed by: ORTHOPAEDIC SURGERY

## 2022-01-14 PROCEDURE — 1160F RVW MEDS BY RX/DR IN RCRD: CPT | Mod: CPTII,S$GLB,, | Performed by: ORTHOPAEDIC SURGERY

## 2022-01-14 PROCEDURE — 1125F PR PAIN SEVERITY QUANTIFIED, PAIN PRESENT: ICD-10-PCS | Mod: CPTII,S$GLB,, | Performed by: ORTHOPAEDIC SURGERY

## 2022-01-14 PROCEDURE — 1159F MED LIST DOCD IN RCRD: CPT | Mod: CPTII,S$GLB,, | Performed by: ORTHOPAEDIC SURGERY

## 2022-01-14 PROCEDURE — 1160F PR REVIEW ALL MEDS BY PRESCRIBER/CLIN PHARMACIST DOCUMENTED: ICD-10-PCS | Mod: CPTII,S$GLB,, | Performed by: ORTHOPAEDIC SURGERY

## 2022-01-14 RX ORDER — TRIAMCINOLONE ACETONIDE 40 MG/ML
40 INJECTION, SUSPENSION INTRA-ARTICULAR; INTRAMUSCULAR
Status: DISCONTINUED | OUTPATIENT
Start: 2022-01-14 | End: 2022-01-14 | Stop reason: HOSPADM

## 2022-01-14 RX ADMIN — TRIAMCINOLONE ACETONIDE 40 MG: 40 INJECTION, SUSPENSION INTRA-ARTICULAR; INTRAMUSCULAR at 09:01

## 2022-01-14 NOTE — PROCEDURES
Large Joint Aspiration/Injection: L knee    Date/Time: 1/14/2022 9:00 AM  Performed by: Dannie Cordoba MD  Authorized by: Dannie Cordoba MD     Location:  Knee  Site:  L knee  Medications:  40 mg triamcinolone acetonide 40 mg/mL     After obtaining verbal informed consent the patient's left knee was prepped aseptically and injected through an inferior lateral approach using 40 mg of triamcinolone and 1 cc of 1% plain Xylocaine.  The patient was warned about postinjection flare and how to manage it with ice, rest and over-the-counter analgesics.  They're advised to contact me for any severe, uncontrolled pain.

## 2022-01-14 NOTE — PROGRESS NOTES
Subjective:      Patient ID: Aretha Nguyen is a 76 y.o. female.    Chief Complaint: Pain of the Left Knee    HPI     They have experienced problems with their left knee over the past 6 weeks. The patient denies relevant history of injury/aggravation.  Patient reports that symptoms started spontaneously while walking through the hospital.  Pain is located medially Associated symptoms include NA.  Symptoms are aggravated by walking. They have been treated with knee brace, cane and Advil.   Symptoms have recently improved. Ambulation reportedly has been impaired. Self care ADLs are not painful.  The patient reports that the experience is extremely similar to the episode which occurred in her right knee 6 months ago that is now resolved.    Review of Systems   Constitutional: Negative for fever and weight loss.   HENT: Negative for congestion.    Eyes: Negative for visual disturbance.   Cardiovascular: Negative for chest pain.   Respiratory: Negative for shortness of breath.    Hematologic/Lymphatic: Negative for bleeding problem. Does not bruise/bleed easily.   Skin: Negative for poor wound healing.   Musculoskeletal: Positive for joint pain.   Gastrointestinal: Negative for abdominal pain.   Genitourinary: Negative for dysuria.   Neurological: Negative for seizures.   Psychiatric/Behavioral: Negative for altered mental status.   Allergic/Immunologic: Negative for persistent infections.         Objective:      Ortho/SPM Exam      Left knee    The patient is not in acute distress.   Body habitus is normal.   Sclera appear normal  No respiratory distress  The patient walks with a mild limp.  Resisted SLR negative.   The skin over the knee is intact.  Knee effusion 0  Tendernes is located medially  Range of motion- Flexion full, Extension full.   Ligament exam:   MCL 1+   Lachman intact              Post sag intact    LCL intact  Patellar apprehension negative.  Popliteal cyst negative  Patellar crepitation  absent.  Flexion/pinch negative.  Pulses DP present, PT present.  Motor normal 5/5 strength in all tested muscle groups.   Sensory normal.    I reviewed the relevant imaging for the patient's condition:  Left knee radiographs show slight medial narrowing.  No focal bony lesions      Assessment:       Encounter Diagnoses   Name Primary?    Bone marrow edema Yes    Primary osteoarthritis of left knee           The presentation strongly suggests acute bone marrow edema syndrome in the setting of structurally mild osteoarthritis the medial compartment.      Plan:       Aretha was seen today for pain.    Diagnoses and all orders for this visit:    Bone marrow edema    Primary osteoarthritis of left knee        I explained my diagnostic impression and the reasoning behind it in detail, using layman's terms.  Models and/or pictures were used to help in the explanation.    Injection recommended and consent given    Continue Advil, bracing cane.  Rest and icing recommended    I strongly expect symptoms to resolve over the next several weeks.  MRI may be considered if improvement does not occur as expected.

## 2022-01-18 ENCOUNTER — ANESTHESIA EVENT (OUTPATIENT)
Dept: ENDOSCOPY | Facility: HOSPITAL | Age: 77
End: 2022-01-18
Payer: MEDICARE

## 2022-01-18 ENCOUNTER — ANESTHESIA (OUTPATIENT)
Dept: ENDOSCOPY | Facility: HOSPITAL | Age: 77
End: 2022-01-18
Payer: MEDICARE

## 2022-01-18 ENCOUNTER — HOSPITAL ENCOUNTER (OUTPATIENT)
Facility: HOSPITAL | Age: 77
Discharge: HOME | End: 2022-01-18
Attending: INTERNAL MEDICINE | Admitting: INTERNAL MEDICINE
Payer: MEDICARE

## 2022-01-18 VITALS
HEIGHT: 62 IN | TEMPERATURE: 98 F | OXYGEN SATURATION: 98 % | HEART RATE: 72 BPM | BODY MASS INDEX: 25.76 KG/M2 | DIASTOLIC BLOOD PRESSURE: 74 MMHG | SYSTOLIC BLOOD PRESSURE: 119 MMHG | RESPIRATION RATE: 18 BRPM | WEIGHT: 140 LBS

## 2022-01-18 DIAGNOSIS — K63.5 COLON POLYP: ICD-10-CM

## 2022-01-18 LAB
CTP QC/QA: YES
GLUCOSE SERPL-MCNC: 91 MG/DL (ref 70–110)
SARS-COV-2 AG RESP QL IA.RAPID: NEGATIVE

## 2022-01-18 PROCEDURE — G0105 COLORECTAL SCRN; HI RISK IND: HCPCS | Performed by: INTERNAL MEDICINE

## 2022-01-18 PROCEDURE — 82962 GLUCOSE BLOOD TEST: CPT | Performed by: INTERNAL MEDICINE

## 2022-01-18 PROCEDURE — G0105 COLORECTAL SCRN; HI RISK IND: ICD-10-PCS | Mod: ,,, | Performed by: INTERNAL MEDICINE

## 2022-01-18 PROCEDURE — 37000008 HC ANESTHESIA 1ST 15 MINUTES: Performed by: INTERNAL MEDICINE

## 2022-01-18 PROCEDURE — 25000003 PHARM REV CODE 250: Performed by: INTERNAL MEDICINE

## 2022-01-18 PROCEDURE — 63600175 PHARM REV CODE 636 W HCPCS: Performed by: NURSE ANESTHETIST, CERTIFIED REGISTERED

## 2022-01-18 PROCEDURE — 25000003 PHARM REV CODE 250: Performed by: NURSE ANESTHETIST, CERTIFIED REGISTERED

## 2022-01-18 PROCEDURE — 37000009 HC ANESTHESIA EA ADD 15 MINS: Performed by: INTERNAL MEDICINE

## 2022-01-18 PROCEDURE — G0105 COLORECTAL SCRN; HI RISK IND: HCPCS | Mod: ,,, | Performed by: INTERNAL MEDICINE

## 2022-01-18 RX ORDER — BALSALAZIDE DISODIUM 750 MG/1
2250 CAPSULE ORAL 3 TIMES DAILY
Qty: 270 CAPSULE | Refills: 11 | Status: SHIPPED | OUTPATIENT
Start: 2022-01-18 | End: 2023-06-02

## 2022-01-18 RX ORDER — SODIUM CHLORIDE 9 MG/ML
INJECTION, SOLUTION INTRAVENOUS CONTINUOUS
Status: DISCONTINUED | OUTPATIENT
Start: 2022-01-18 | End: 2022-01-18 | Stop reason: HOSPADM

## 2022-01-18 RX ORDER — LIDOCAINE HYDROCHLORIDE 20 MG/ML
INJECTION INTRAVENOUS
Status: DISCONTINUED | OUTPATIENT
Start: 2022-01-18 | End: 2022-01-18

## 2022-01-18 RX ORDER — LOSARTAN POTASSIUM 25 MG/1
25 TABLET ORAL DAILY
COMMUNITY
End: 2022-08-01

## 2022-01-18 RX ORDER — SODIUM CHLORIDE 0.9 % (FLUSH) 0.9 %
10 SYRINGE (ML) INJECTION
Status: DISCONTINUED | OUTPATIENT
Start: 2022-01-18 | End: 2022-01-18 | Stop reason: HOSPADM

## 2022-01-18 RX ORDER — PROPOFOL 10 MG/ML
VIAL (ML) INTRAVENOUS CONTINUOUS PRN
Status: DISCONTINUED | OUTPATIENT
Start: 2022-01-18 | End: 2022-01-18

## 2022-01-18 RX ORDER — PROPOFOL 10 MG/ML
VIAL (ML) INTRAVENOUS
Status: DISCONTINUED | OUTPATIENT
Start: 2022-01-18 | End: 2022-01-18

## 2022-01-18 RX ADMIN — SODIUM CHLORIDE 20 ML/HR: 0.9 INJECTION, SOLUTION INTRAVENOUS at 08:01

## 2022-01-18 RX ADMIN — PROPOFOL 70 MG: 10 INJECTION, EMULSION INTRAVENOUS at 08:01

## 2022-01-18 RX ADMIN — LIDOCAINE HYDROCHLORIDE 50 MG: 20 INJECTION, SOLUTION INTRAVENOUS at 08:01

## 2022-01-18 RX ADMIN — PROPOFOL 150 MCG/KG/MIN: 10 INJECTION, EMULSION INTRAVENOUS at 08:01

## 2022-01-18 NOTE — TRANSFER OF CARE
"Anesthesia Transfer of Care Note    Patient: Aretha Nguyen    Procedure(s) Performed: Procedure(s) (LRB):  COLONOSCOPY suprep (N/A)    Patient location: GI    Anesthesia Type: MAC    Transport from OR: Transported from OR on room air with adequate spontaneous ventilation    Post pain: adequate analgesia    Post assessment: no apparent anesthetic complications and tolerated procedure well    Post vital signs: stable    Level of consciousness: awake, alert and oriented    Nausea/Vomiting: no nausea/vomiting    Complications: none    Transfer of care protocol was followed      Last vitals:   Visit Vitals  BP (!) 121/58 (BP Location: Right arm, Patient Position: Lying)   Pulse 70   Temp 36.6 °C (97.9 °F) (Temporal)   Resp 18   Ht 5' 2" (1.575 m)   Wt 63.5 kg (140 lb)   LMP  (LMP Unknown)   SpO2 97%   Breastfeeding No   BMI 25.61 kg/m²     "

## 2022-01-18 NOTE — ANESTHESIA PREPROCEDURE EVALUATION
01/18/2022  Aretha Nguyen is a 76 y.o., female for colonoscopy under MAC    Past Medical History:   Diagnosis Date    Breast cancer, left     Cataract     Controlled type 2 diabetes mellitus with hyperglycemia, without long-term current use of insulin 3/22/2017    Hyperlipidemia associated with type 2 diabetes mellitus 3/22/2017    Hypertension complicating diabetes 3/22/2017    Inflammatory bowel disease 3/22/2017     Past Surgical History:   Procedure Laterality Date    HYSTERECTOMY      MASTECTOMY Left     for benign recurrent growth. Dr. Stephan Brown MD - [place], [place] - General Surgery & Surgery    TOTAL ABDOMINAL HYSTERECTOMY W/ BILATERAL SALPINGOOPHORECTOMY  2009    for benign cysts with concern for future malginancy. Fibromoid uterus for AUB. Bengin results           Anesthesia Evaluation    I have reviewed the Patient Summary Reports.   I have reviewed the NPO Status.   I have reviewed the Medications.     Review of Systems  Social:  Non-Smoker        Physical Exam  General:  Well nourished    Airway/Jaw/Neck:  Airway Findings: Mallampati: II      Chest/Lungs:  Chest/Lungs Clear    Heart/Vascular:  Heart Findings: Normal            Anesthesia Plan  Type of Anesthesia, risks & benefits discussed:  Anesthesia Type:  MAC    Patient's Preference:   Plan Factors:          Intra-op Monitoring Plan: standard ASA monitors  Intra-op Monitoring Plan Comments:   Post Op Pain Control Plan:   Post Op Pain Control Plan Comments:     Induction:    Beta Blocker:  Patient is not currently on a Beta-Blocker (No further documentation required).       Informed Consent:  Anesthesia consent signed with patient.  ASA Score: 2     Day of Surgery Review of History & Physical:            Ready For Surgery From Anesthesia Perspective.

## 2022-01-18 NOTE — PLAN OF CARE
Pt procedure completed with no co,plications. Vital signs are stable. Pt to remain in recovery for 30 minuets and stable. No distress noted at this time. Will continue to monitor.

## 2022-01-18 NOTE — ANESTHESIA POSTPROCEDURE EVALUATION
Anesthesia Post Evaluation    Patient: Aretha Nguyen    Procedure(s) Performed: Procedure(s) (LRB):  COLONOSCOPY suprep (N/A)    Final Anesthesia Type: MAC      Patient location during evaluation: GI PACU  Patient participation: Yes- Able to Participate  Level of consciousness: awake and alert and oriented  Post-procedure vital signs: reviewed and stable  Pain management: adequate  Airway patency: patent    PONV status at discharge: No PONV  Anesthetic complications: no      Cardiovascular status: blood pressure returned to baseline and hemodynamically stable  Respiratory status: unassisted, spontaneous ventilation and room air  Hydration status: euvolemic  Follow-up not needed.          Vitals Value Taken Time   BP  01/18/22 0909   Temp  01/18/22 0909   Pulse  01/18/22 0909   Resp  01/18/22 0909   SpO2  01/18/22 0909         No case tracking events are documented in the log.      Pain/Jacinto Score: No data recorded

## 2022-01-18 NOTE — PROVATION PATIENT INSTRUCTIONS
Discharge Summary/Instructions after an Endoscopic Procedure  Patient Name: Aretha Nguyen  Patient MRN: [id number]  Patient YOB: 1945 Tuesday, January 18, 2022  Lawrence Garcia MD  Dear patient,  As a result of recent federal legislation (The Federal Cures Act), you may   receive lab or pathology results from your procedure in your [facility]   account before your physician is able to contact you. Your physician or   their representative will relay the results to you with their   recommendations at their soonest availability.  Thank you,  Your health is very important to us during the Covid Crisis. Following your   procedure today, you will receive a daily text for 2 weeks asking about   signs or symptoms of Covid 19.  Please respond to this text when you   receive it so we can follow up and keep you as safe as possible.   RESTRICTIONS:  During your procedure today, you received medications for sedation.  These   medications may affect your judgment, balance and coordination.  Therefore,   for 24 hours, you have the following restrictions:   - DO NOT drive a car, operate machinery, make legal/financial decisions,   sign important papers or drink alcohol.    ACTIVITY:  Today: no heavy lifting, straining or running due to procedural   sedation/anesthesia.  The following day: return to full activity including work.  DIET:  Eat and drink normally unless instructed otherwise.     TREATMENT FOR COMMON SIDE EFFECTS:  - Mild abdominal pain, nausea, belching, bloating or excessive gas:  rest,   eat lightly and use a heating pad.  - Sore Throat: treat with throat lozenges and/or gargle with warm salt   water.  - Because air was used during the procedure, expelling large amounts of air   from your rectum or belching is normal.  - If a bowel prep was taken, you may not have a bowel movement for 1-3 days.    This is normal.  SYMPTOMS TO WATCH FOR AND REPORT TO YOUR PHYSICIAN:  1. Abdominal pain or bloating,  other than gas cramps.  2. Chest pain.  3. Back pain.  4. Signs of infection such as: chills or fever occurring within 24 hours   after the procedure.  5. Rectal bleeding, which would show as bright red, maroon, or black stools.   (A tablespoon of blood from the rectum is not serious, especially if   hemorrhoids are present.)  6. Vomiting.  7. Weakness or dizziness.  GO DIRECTLY TO THE NEAREST EMERGENCY ROOM IF YOU HAVE ANY OF THE FOLLOWING:      Difficulty breathing              Chills and/or fever over 101 F   Persistent vomiting and/or vomiting blood   Severe abdominal pain   Severe chest pain   Black, tarry stools   Bleeding- more than one tablespoon   Any other symptom or condition that you feel may need urgent attention  Your doctor recommends these additional instructions:  If any biopsies were taken, your doctors clinic will contact you in 1 to 2   weeks with any results.  - Discharge patient to home.   - Resume previous diet.   - Continue present medications.   - Repeat colonoscopy in 5 years for surveillance.   - Patient has a contact number available for emergencies.  The signs and   symptoms of potential delayed complications were discussed with the   patient.  Return to normal activities tomorrow.  Written discharge   instructions were provided to the patient.  For questions, problems or results please call your physician - Lawrence Garcia MD.  EMERGENCY PHONE NUMBER: [phone],  LAB RESULTS: [phone]  IF A COMPLICATION OR EMERGENCY SITUATION ARISES AND YOU ARE UNABLE TO REACH   YOUR PHYSICIAN - GO DIRECTLY TO THE EMERGENCY ROOM.  Lawrence Garcia MD  1/18/2022 9:11:00 AM  This report has been verified and signed electronically.  Dear patient,  As a result of recent federal legislation (The Federal Cures Act), you may   receive lab or pathology results from your procedure in your [facility]   account before your physician is able to contact you. Your physician or   their  representative will relay the results to you with their   recommendations at their soonest availability.  Thank you,  PROVATION

## 2022-01-18 NOTE — H&P
Short Stay Endoscopy History and Physical    PCP - Brittany Floyd MD    Procedure - Colonoscopy  ASA - III  Mallampati - per anesthesia  History of Anesthesia problems - no  Family history Anesthesia problems - no     HPI:  This is a 76 y.o. female here for evaluation of : UP. Rectal bleeding. Colon polyps    ROS:  Constitutional: No fevers, chills, No weight loss  ENT: No allergies  CV: No chest pain  Pulm: No shortness of breath  GI: see HPI  Derm: No rash    Medical History:  has a past medical history of Breast cancer, left, Cataract, Controlled type 2 diabetes mellitus with hyperglycemia, without long-term current use of insulin (3/22/2017), Hyperlipidemia associated with type 2 diabetes mellitus (3/22/2017), Hypertension complicating diabetes (3/22/2017), and Inflammatory bowel disease (3/22/2017).    Surgical History:  has a past surgical history that includes Total abdominal hysterectomy w/ bilateral salpingoophorectomy (2009); Mastectomy (Left); and Hysterectomy.    Family History: family history is not on file.. Otherwise no colon cancer, inflammatory bowel disease, or GI malignancies.    Social History:  reports that she has never smoked. She has never used smokeless tobacco. She reports current alcohol use. She reports that she does not use drugs.    Review of patient's allergies indicates:   Allergen Reactions    Ciprofloxacin hcl Rash       Medications:   Medications Prior to Admission   Medication Sig Dispense Refill Last Dose    atorvastatin (LIPITOR) 20 MG tablet TAKE 1 TABLET BY MOUTH EVERY DAY 90 tablet 1     cholecalciferol, vitamin D3, 2,000 unit Cap Take 1 capsule (2,000 Units total) by mouth once daily.       cyanocobalamin (VITAMIN B-12) 1000 MCG tablet Take 2 tablets (2,000 mcg total) by mouth once daily. 60 tablet 6     diclofenac sodium (VOLTAREN ARTHRITIS PAIN) 1 % Gel Apply 2 g topically 2 (two) times daily. 1 Tube 1     EScitalopram oxalate (LEXAPRO) 5 MG Tab TAKE 1 TABLET BY MOUTH  EVERY DAY 90 tablet 1     ferrous sulfate (FEOSOL) 325 mg (65 mg iron) Tab tablet TAKE 1 TABLET BY MOUTH EVERY DAY WITH BREAKFAST 90 tablet 1     FLUAD 9452-1432, 65 YR UP,,PF, 45 mcg (15 mcg x 3)/0.5 mL Syrg        hydroCHLOROthiazide (MICROZIDE) 12.5 mg capsule TAKE 1 CAPSULE BY MOUTH ONCE DAILY 90 capsule 1     mesalamine (LIALDA) 1.2 gram TbEC Take 1 tablet (1.2 g total) by mouth 2 (two) times a day. 60 tablet 3     metFORMIN (GLUCOPHAGE-XR) 500 MG ER 24hr tablet TAKE 1 TABLET BY MOUTH EVERY DAY 90 tablet 1     multivit-min-iron-FA-lutein (CENTRUM SILVER WOMEN) 8 mg iron-400 mcg-300 mcg Tab Take 1 tablet by mouth once daily at 6am.       olmesartan (BENICAR) 20 MG tablet Take 1 tablet (20 mg total) by mouth once daily. 90 tablet 2          Objective Findings:    Vital Signs: see nursing notes  Physical Exam:  General Appearance: Well appearing in no acute distress  Eyes:    No scleral icterus  ENT: Neck supple  Lungs: CTA anteriorly  Heart:  S1, S2 normal, no murmurs heard  Abdomen: Soft, non tender, non distended with positive bowel sounds. No hepatosplenomegaly, ascites, or mass  Extremities: no edema  Skin: No rash      Labs:  Lab Results   Component Value Date    WBC 7.79 07/30/2021    HGB 11.2 (L) 07/30/2021    HCT 35.3 (L) 07/30/2021     07/30/2021    CHOL 174 09/21/2021    TRIG 131 09/21/2021    HDL 52 09/21/2021    ALT 7 (L) 09/21/2021    AST 21 09/21/2021     09/21/2021    K 4.1 09/21/2021     09/21/2021    CREATININE 0.8 09/21/2021    BUN 12 09/21/2021    CO2 28 09/21/2021    TSH 3.239 06/11/2021    HGBA1C 5.8 (H) 09/21/2021       I have explained the risks and benefits of endoscopy procedures to the patient including but not limited to bleeding, perforation, infection, and death.    Lawrence Garcia MD

## 2022-01-19 ENCOUNTER — TELEPHONE (OUTPATIENT)
Dept: ENDOSCOPY | Facility: HOSPITAL | Age: 77
End: 2022-01-19
Payer: MEDICARE

## 2022-01-28 ENCOUNTER — OFFICE VISIT (OUTPATIENT)
Dept: FAMILY MEDICINE | Facility: CLINIC | Age: 77
End: 2022-01-28
Payer: MEDICARE

## 2022-01-28 VITALS
DIASTOLIC BLOOD PRESSURE: 78 MMHG | BODY MASS INDEX: 26.17 KG/M2 | OXYGEN SATURATION: 98 % | HEIGHT: 62 IN | SYSTOLIC BLOOD PRESSURE: 110 MMHG | WEIGHT: 142.19 LBS | HEART RATE: 79 BPM

## 2022-01-28 DIAGNOSIS — E53.8 B12 DEFICIENCY: ICD-10-CM

## 2022-01-28 DIAGNOSIS — B35.1 ONYCHOMYCOSIS: ICD-10-CM

## 2022-01-28 DIAGNOSIS — D50.0 IRON DEFICIENCY ANEMIA DUE TO CHRONIC BLOOD LOSS: ICD-10-CM

## 2022-01-28 DIAGNOSIS — I10 ESSENTIAL HYPERTENSION: Primary | ICD-10-CM

## 2022-01-28 DIAGNOSIS — K51.20 ULCERATIVE PROCTITIS WITHOUT COMPLICATION: ICD-10-CM

## 2022-01-28 DIAGNOSIS — E11.65 CONTROLLED TYPE 2 DIABETES MELLITUS WITH HYPERGLYCEMIA, WITHOUT LONG-TERM CURRENT USE OF INSULIN: ICD-10-CM

## 2022-01-28 PROCEDURE — 1159F MED LIST DOCD IN RCRD: CPT | Mod: CPTII,S$GLB,, | Performed by: INTERNAL MEDICINE

## 2022-01-28 PROCEDURE — 3288F FALL RISK ASSESSMENT DOCD: CPT | Mod: CPTII,S$GLB,, | Performed by: INTERNAL MEDICINE

## 2022-01-28 PROCEDURE — 3074F SYST BP LT 130 MM HG: CPT | Mod: CPTII,S$GLB,, | Performed by: INTERNAL MEDICINE

## 2022-01-28 PROCEDURE — 3074F PR MOST RECENT SYSTOLIC BLOOD PRESSURE < 130 MM HG: ICD-10-PCS | Mod: CPTII,S$GLB,, | Performed by: INTERNAL MEDICINE

## 2022-01-28 PROCEDURE — 99499 UNLISTED E&M SERVICE: CPT | Mod: S$GLB,,, | Performed by: INTERNAL MEDICINE

## 2022-01-28 PROCEDURE — 99397 PR PREVENTIVE VISIT,EST,65 & OVER: ICD-10-PCS | Mod: S$GLB,,, | Performed by: INTERNAL MEDICINE

## 2022-01-28 PROCEDURE — 99499 RISK ADDL DX/OHS AUDIT: ICD-10-PCS | Mod: S$GLB,,, | Performed by: INTERNAL MEDICINE

## 2022-01-28 PROCEDURE — 1160F PR REVIEW ALL MEDS BY PRESCRIBER/CLIN PHARMACIST DOCUMENTED: ICD-10-PCS | Mod: CPTII,S$GLB,, | Performed by: INTERNAL MEDICINE

## 2022-01-28 PROCEDURE — 99999 PR PBB SHADOW E&M-EST. PATIENT-LVL IV: CPT | Mod: PBBFAC,,, | Performed by: INTERNAL MEDICINE

## 2022-01-28 PROCEDURE — 1159F PR MEDICATION LIST DOCUMENTED IN MEDICAL RECORD: ICD-10-PCS | Mod: CPTII,S$GLB,, | Performed by: INTERNAL MEDICINE

## 2022-01-28 PROCEDURE — 1101F PR PT FALLS ASSESS DOC 0-1 FALLS W/OUT INJ PAST YR: ICD-10-PCS | Mod: CPTII,S$GLB,, | Performed by: INTERNAL MEDICINE

## 2022-01-28 PROCEDURE — 1101F PT FALLS ASSESS-DOCD LE1/YR: CPT | Mod: CPTII,S$GLB,, | Performed by: INTERNAL MEDICINE

## 2022-01-28 PROCEDURE — 1160F RVW MEDS BY RX/DR IN RCRD: CPT | Mod: CPTII,S$GLB,, | Performed by: INTERNAL MEDICINE

## 2022-01-28 PROCEDURE — 3078F PR MOST RECENT DIASTOLIC BLOOD PRESSURE < 80 MM HG: ICD-10-PCS | Mod: CPTII,S$GLB,, | Performed by: INTERNAL MEDICINE

## 2022-01-28 PROCEDURE — 1126F PR PAIN SEVERITY QUANTIFIED, NO PAIN PRESENT: ICD-10-PCS | Mod: CPTII,S$GLB,, | Performed by: INTERNAL MEDICINE

## 2022-01-28 PROCEDURE — 99999 PR PBB SHADOW E&M-EST. PATIENT-LVL IV: ICD-10-PCS | Mod: PBBFAC,,, | Performed by: INTERNAL MEDICINE

## 2022-01-28 PROCEDURE — 3288F PR FALLS RISK ASSESSMENT DOCUMENTED: ICD-10-PCS | Mod: CPTII,S$GLB,, | Performed by: INTERNAL MEDICINE

## 2022-01-28 PROCEDURE — 99397 PER PM REEVAL EST PAT 65+ YR: CPT | Mod: S$GLB,,, | Performed by: INTERNAL MEDICINE

## 2022-01-28 PROCEDURE — 3078F DIAST BP <80 MM HG: CPT | Mod: CPTII,S$GLB,, | Performed by: INTERNAL MEDICINE

## 2022-01-28 PROCEDURE — 1126F AMNT PAIN NOTED NONE PRSNT: CPT | Mod: CPTII,S$GLB,, | Performed by: INTERNAL MEDICINE

## 2022-01-28 RX ORDER — CICLOPIROX 80 MG/ML
SOLUTION TOPICAL NIGHTLY
Qty: 6 ML | Refills: 3 | Status: SHIPPED | OUTPATIENT
Start: 2022-01-28 | End: 2023-09-12 | Stop reason: SDUPTHER

## 2022-01-28 NOTE — PROGRESS NOTES
Subjective:       Patient ID: Aretha Nguyen is a 76 y.o. female.    Chief Complaint: Diabetes (4 month )      HPI  Aretha Nguyen is a 76 y.o. female with chronic conditions of DM type 2, HLD, HTN, IBS, Breast cancer who presents today for routine follow-up.      Reports overall she feels good.  Her left knee started hurting and went to see the orthopedic surgeon.  Noted with arthritic changes.  Probably putting more weight on it to protect the right knee.  She had a steroid injection with improvement.  Now worse an immobilizer or brace and is doing much better.  Usually does not take medications but will have occasional Advil 400 mg with improvement.    With occasional loose stool.  Watches her diet and tries to stay active..    She has a GI service for her Ulcerative colitis.  Had colonoscopy and found with UC on remission.  She continues day treatment for ulcerative colitis but only takes a smaller dose.    Has no toxic habits.  She is up-to-date with vaccines.    Up-to-date with DEXA scan, mammogram.    Health Maintenance:  Health Maintenance   Topic Date Due    Eye Exam  11/18/2021    Foot Exam  12/14/2021    DEXA SCAN  03/29/2022    Lipid Panel  09/21/2022    Hemoglobin A1c  09/21/2022    Low Dose Statin  01/28/2023    TETANUS VACCINE  10/26/2027    Hepatitis C Screening  Completed       Review of Systems   Constitutional: Negative for appetite change, chills, fatigue and fever.   HENT: Negative for nasal congestion, hearing loss, rhinorrhea and sore throat.    Eyes: Negative for redness and visual disturbance.   Respiratory: Negative for cough and shortness of breath.    Cardiovascular: Negative for chest pain, palpitations and leg swelling.   Gastrointestinal: Negative for abdominal pain, change in bowel habit, constipation, diarrhea, nausea, vomiting and change in bowel habit.   Genitourinary: Negative for bladder incontinence, difficulty urinating and dysuria.   Musculoskeletal: Positive for  arthralgias (Knee pain).   Neurological: Negative for dizziness, weakness, light-headedness, numbness and headaches.   Hematological: Does not bruise/bleed easily.   Psychiatric/Behavioral: Negative for decreased concentration and sleep disturbance. The patient is not nervous/anxious.       Past Medical History:   Diagnosis Date    Breast cancer, left     Cataract     Controlled type 2 diabetes mellitus with hyperglycemia, without long-term current use of insulin 3/22/2017    Hyperlipidemia associated with type 2 diabetes mellitus 3/22/2017    Hypertension complicating diabetes 3/22/2017    Inflammatory bowel disease 3/22/2017       Past Surgical History:   Procedure Laterality Date    BREAST SURGERY      COLONOSCOPY N/A 1/18/2022    Procedure: COLONOSCOPY suprep;  Surgeon: Lawrence Garcia MD;  [facility];  Service: Endoscopy;  Laterality: N/A;    HYSTERECTOMY      MASTECTOMY Left     for benign recurrent growth. Dr. Stephan Brown MD - [facility] - General Surgery [facility]    SIMPLE MASTECTOMY Left     TOTAL ABDOMINAL HYSTERECTOMY W/ BILATERAL SALPINGOOPHORECTOMY  2009    for benign cysts with concern for future malginancy. Fibromoid uterus for AUB. Bengin results       Family History   Problem Relation Age of Onset    Breast cancer Neg Hx        Social History     Socioeconomic History    Marital status:    Tobacco Use    Smoking status: Never Smoker    Smokeless tobacco: Never Used   Substance and Sexual Activity    Alcohol use: Yes     Comment: not often    Drug use: No    Sexual activity: Yes     Partners: Male   Social History Narrative    MICHA Hall - [facility], [facility]        Dr. Stephan Brown MD - [facility]  General Surgery & Surgery - mammograms        Current Outpatient Medications   Medication Sig Dispense Refill    atorvastatin (LIPITOR) 20 MG tablet TAKE 1 TABLET BY MOUTH EVERY DAY 90 tablet 1    balsalazide (COLAZAL)  750 mg capsule Take 3 capsules (2,250 mg total) by mouth 3 (three) times daily. 270 capsule 11    cholecalciferol, vitamin D3, 2,000 unit Cap Take 1 capsule (2,000 Units total) by mouth once daily.      cyanocobalamin (VITAMIN B-12) 1000 MCG tablet Take 2 tablets (2,000 mcg total) by mouth once daily. 60 tablet 6    diclofenac sodium (VOLTAREN ARTHRITIS PAIN) 1 % Gel Apply 2 g topically 2 (two) times daily. 1 Tube 1    EScitalopram oxalate (LEXAPRO) 5 MG Tab TAKE 1 TABLET BY MOUTH EVERY DAY 90 tablet 1    ferrous sulfate (FEOSOL) 325 mg (65 mg iron) Tab tablet TAKE 1 TABLET BY MOUTH EVERY DAY WITH BREAKFAST 90 tablet 1    hydroCHLOROthiazide (MICROZIDE) 12.5 mg capsule TAKE 1 CAPSULE BY MOUTH ONCE DAILY 90 capsule 1    losartan (COZAAR) 25 MG tablet Take 25 mg by mouth once daily.      metFORMIN (GLUCOPHAGE-XR) 500 MG ER 24hr tablet TAKE 1 TABLET BY MOUTH EVERY DAY 90 tablet 1    multivit-min-iron-FA-lutein (CENTRUM SILVER WOMEN) 8 mg iron-400 mcg-300 mcg Tab Take 1 tablet by mouth once daily at 6am.      olmesartan (BENICAR) 20 MG tablet Take 1 tablet (20 mg total) by mouth once daily. 90 tablet 2    ciclopirox (PENLAC) 8 % Soln Apply topically nightly. 6 mL 3    FLUAD 0117-4391, 65 YR UP,,PF, 45 mcg (15 mcg x 3)/0.5 mL Syrg        No current facility-administered medications for this visit.       Review of patient's allergies indicates:   Allergen Reactions    Ciprofloxacin hcl Rash         Objective:       Last 3 sets of Vitals    Vitals - 1 value per visit 1/18/2022 1/28/2022 1/28/2022   SYSTOLIC 119 - 110   DIASTOLIC 74 - 78   Pulse 72 - 79   Temp - - -   Resp 18 - -   SPO2 98 - 98   Weight (lb) - - 142.2   Weight (kg) - - 64.5   Height - - 62   BMI (Calculated) - - 26   VISIT REPORT - - -   Pain Score  - 0 -   Some recent data might be hidden   Physical Exam  Constitutional:       General: She is not in acute distress.     Appearance: Normal appearance.   HENT:      Head: Normocephalic.      Right  Ear: Tympanic membrane, ear canal and external ear normal.      Left Ear: Tympanic membrane, ear canal and external ear normal.      Nose: Nose normal.      Mouth/Throat:      Mouth: Mucous membranes are moist.   Eyes:      General: No scleral icterus.     Extraocular Movements: Extraocular movements intact.      Conjunctiva/sclera: Conjunctivae normal.      Pupils: Pupils are equal, round, and reactive to light.   Neck:      Vascular: No carotid bruit.      Comments: No goiter.  Cardiovascular:      Rate and Rhythm: Normal rate and regular rhythm.      Pulses: Normal pulses.      Heart sounds: Normal heart sounds.   Pulmonary:      Effort: Pulmonary effort is normal.      Breath sounds: Normal breath sounds.   Abdominal:      General: Bowel sounds are normal. There is no distension.      Palpations: Abdomen is soft. There is no mass.      Tenderness: There is no abdominal tenderness.   Musculoskeletal:         General: No swelling. Normal range of motion.      Cervical back: Neck supple.      Comments: Left knee with knee brace.  Mild swelling noted.   Lymphadenopathy:      Cervical: No cervical adenopathy.   Skin:     General: Skin is warm and dry.   Neurological:      General: No focal deficit present.      Mental Status: She is alert and oriented to person, place, and time.   Psychiatric:         Mood and Affect: Mood normal.         Behavior: Behavior normal.       Protective Sensation (w/ 10 gram monofilament):  Right: Intact  Left: Intact    Visual Inspection:  Blister -  Bilateral open superficial blister from friction on the 5th toe.  Also noted mild onychomycosis of the 1st toe and 2nd.    Pedal Pulses:   Right: Present  Left: Present    Posterior tibialis:   Right:Present  Left: Present        CBC:  Recent Labs   Lab 12/04/20  0900 12/04/20  0900 06/11/21  0913 06/11/21  0913 07/30/21  1032   WBC 5.11   < > 5.49   < > 7.79   RBC 4.13   < > 4.37   < > 3.98 L   Hemoglobin 11.6 L   < > 12.1   < > 11.2 L    Hematocrit 37.6   < > 38.7   < > 35.3 L   Platelets 269   < > 306   < > 265   MCV 91   < > 89   < > 89   MCH 28.1   < > 27.7   < > 28.1   MCHC 30.9 L  --  31.3 L  --  31.7 L    < > = values in this interval not displayed.     CMP:  Recent Labs   Lab 12/04/20  0900 12/04/20  0900 06/11/21  0913 06/11/21  0913 09/21/21  1058   Glucose 106   < > 92   < > 107   Calcium 9.3   < > 9.8   < > 10.4   Albumin 3.7   < > 3.9   < > 4.0   Total Protein 7.5   < > 7.8   < > 8.0   Sodium 140   < > 143   < > 140   Potassium 3.8   < > 3.8   < > 4.1   CO2 31 H   < > 29   < > 28   Chloride 102   < > 105   < > 103   BUN 11   < > 10   < > 12   Creatinine 0.8   < > 0.7   < > 0.8   Alkaline Phosphatase 88   < > 77   < > 74   ALT 10   < > 11   < > 7 L   AST 20   < > 20   < > 21   Total Bilirubin 0.5  --  0.5  --  0.5    < > = values in this interval not displayed.     URINALYSIS:  Recent Labs   Lab 07/30/21  1159 08/18/21  1634   Color, UA Colorless A  --    Specific [place], UA 1.005  --    pH, UA 7.0 5.5   Protein, UA Negative  --    Bacteria Moderate A  --    Nitrite, UA Negative  --    Leukocytes, UA 2+ A  --    Urobilinogen, UA Negative  --       LIPIDS:  Recent Labs   Lab 12/04/20  0900 06/11/21  0913 06/11/21  0924 09/21/21  1058   TSH  --   --  3.239  --    HDL 60 42  --  52   Cholesterol 175 165  --  174   Triglycerides 151 H 139  --  131   LDL Cholesterol 84.8 95.2  --  95.8   HDL/Cholesterol Ratio 34.3 25.5  --  29.9   Non-HDL Cholesterol 115 123  --  122   Total Cholesterol/HDL Ratio 2.9 3.9  --  3.3     TSH:  Recent Labs   Lab 06/11/21  0924   TSH 3.239       A1C:  Recent Labs   Lab 06/24/19  0726 01/09/20  1007 12/04/20  0900 06/11/21  0913 09/21/21  1058   Hemoglobin A1C 5.9 H 6.2 H 6.0 H 5.6 5.8 H       Imaging:  XR KNEE 3 VIEW LEFT  EXAMINATION:  XR KNEE 3 VIEW LEFT    CLINICAL HISTORY:  left lateral knee pain;  Pain in left knee    FINDINGS:  Knee three views left: No fracture dislocation bone destruction or OCD  seen.    Electronically signed by: Eliel Fox MD  Date:    12/02/2021  Time:    09:35      Assessment:       1. Essential hypertension    2. Controlled type 2 diabetes mellitus with hyperglycemia, without long-term current use of insulin    3. Ulcerative proctitis without complication    4. Iron deficiency anemia due to chronic blood loss    5. B12 deficiency          Chronic conditions status updated as per HPI. Other than changes above, cont current medications and maintain follow up with specialist. Return to clinic in 6 months.  Plan:       Aretha was seen today for diabetes.    Diagnoses and all orders for this visit:    Essential hypertension   - at target.  Continue same treatment with losartan.    Controlled type 2 diabetes mellitus with hyperglycemia, without long-term current use of insulin  -     Comprehensive Metabolic Panel; Future  -     Hemoglobin A1C; Future  - last A1c was improving.  Same treatment.    Ulcerative proctitis without complication   - on remission.  Same treatment.    Iron deficiency anemia due to chronic blood loss  -     CBC Auto Differential; Future  -     Ferritin; Future  -     Iron and TIBC; Future    B12 deficiency  -     CBC Auto Differential; Future  -     Vitamin B12; Future    Onychomycosis  -     ciclopirox (PENLAC) 8 % Soln; Apply topically nightly.      Health Maintenance Due   Topic Date Due    Shingles Vaccine (1 of 2) Never done    Eye Exam  11/18/2021    Foot Exam  12/14/2021        Brittany Floyd MD  [facility]  Disclaimer:  This note has been generated using voice-recognition software. There may be grammatical or spelling errors that have been missed during proof-reading

## 2022-01-29 DIAGNOSIS — E11.65 CONTROLLED TYPE 2 DIABETES MELLITUS WITH HYPERGLYCEMIA, WITHOUT LONG-TERM CURRENT USE OF INSULIN: ICD-10-CM

## 2022-01-29 NOTE — TELEPHONE ENCOUNTER
No new care gaps identified.  Powered by [employer]. Reference number: [id number].   1/29/2022 9:35:10 AM CST

## 2022-02-07 RX ORDER — METFORMIN HYDROCHLORIDE 500 MG/1
500 TABLET, EXTENDED RELEASE ORAL DAILY
Qty: 90 TABLET | Refills: 0 | Status: SHIPPED | OUTPATIENT
Start: 2022-02-07 | End: 2022-05-04

## 2022-02-07 NOTE — TELEPHONE ENCOUNTER
Refill Authorization Note   Aretha Nguyen  is requesting a refill authorization.  Brief Assessment and Rationale for Refill:  Approve     Medication Therapy Plan:       Medication Reconciliation Completed: No   Comments:   --->Care Gap information included below if applicable.       Requested Prescriptions   Pending Prescriptions Disp Refills    metFORMIN (GLUCOPHAGE-XR) 500 MG ER 24hr tablet [Pharmacy Med Name: METFORMIN HCL  MG TABLET] 90 tablet 0     Sig: Take 1 tablet (500 mg total) by mouth once daily.       Endocrinology:  Diabetes - Biguanides Passed - 2/7/2022 10:54 AM        Passed - Patient is at least 18 years old        Passed - Valid encounter within last 15 months     Recent Visits  Date Type Provider Dept   01/28/22 Office Visit Brittany Floyd MD [facility]   09/29/21 Office Visit Brittany Floyd MD [facility]   06/14/21 Office Visit Brittany Floyd MD [facility]   06/01/21 Office Visit Brittany Floyd MD [facility]   12/14/20 Office Visit Brittany Floyd MD [facility]   09/11/20 Office Visit Brittany Floyd MD [facility]   06/09/20 Office Visit Lawrence Logan MD [facility]   Showing recent visits within past 720 days and meeting all other requirements  Future Appointments  No visits were found meeting these conditions.  Showing future appointments within next 150 days and meeting all other requirements      Future Appointments              In 2 weeks MD Car[facility] Burger - [facility]oped[facility]    In 5 months APPOINTMENT LAB, [facility] - [facility]    In 5 months Brittany Floyd MD [facility] - [facility]                Passed - Cr is 1.39 or below and within 360 days     Lab Results   Component Value Date    CREATININE 0.8 09/21/2021    CREATININE 0.7 06/11/2021    CREATININE 0.8 12/04/2020              Passed - HBA1C within 180 days     Lab Results   Component Value Date    HGBA1C 5.8 (H) 09/21/2021     HGBA1C 5.6 06/11/2021    HGBA1C 6.0 (H) 12/04/2020              Passed - eGFR is 45 or above and within 360 days     Lab Results   Component Value Date    EGFRNONAA >60 09/21/2021    EGFRNONAA >60 06/11/2021    EGFRNONAA >60.0 12/04/2020                    Appointments  past 12m or future 3m with PCP    Date Provider   Last Visit   1/28/2022 Brittany Floyd MD   Next Visit   Visit date not found Brittany Floyd MD   ED visits in past 90 days: 0     Note composed:10:57 AM 02/07/2022

## 2022-02-18 ENCOUNTER — LAB VISIT (OUTPATIENT)
Dept: LAB | Facility: HOSPITAL | Age: 77
End: 2022-02-18
Attending: INTERNAL MEDICINE
Payer: MEDICARE

## 2022-02-18 DIAGNOSIS — R30.0 BURNING WITH URINATION: ICD-10-CM

## 2022-02-18 DIAGNOSIS — R30.0 BURNING WITH URINATION: Primary | ICD-10-CM

## 2022-02-18 PROCEDURE — 87086 URINE CULTURE/COLONY COUNT: CPT | Performed by: INTERNAL MEDICINE

## 2022-02-18 PROCEDURE — 87088 URINE BACTERIA CULTURE: CPT | Performed by: INTERNAL MEDICINE

## 2022-02-18 PROCEDURE — 87186 SC STD MICRODIL/AGAR DIL: CPT | Performed by: INTERNAL MEDICINE

## 2022-02-18 PROCEDURE — 87077 CULTURE AEROBIC IDENTIFY: CPT | Performed by: INTERNAL MEDICINE

## 2022-02-20 DIAGNOSIS — I10 ESSENTIAL HYPERTENSION: ICD-10-CM

## 2022-02-20 NOTE — TELEPHONE ENCOUNTER
Care Due:                  Date            Visit Type   Department     Provider  --------------------------------------------------------------------------------                                EP -                              [facility]  Last Visit: 01-      CARE ([facility])   MEDICINE       Brittany Floyd                               -                              [facility]  Next Visit: 07-      CARE ([facility])   MEDICINE       Brittany Floyd                                                            Last  Test          Frequency    Reason                     Performed    Due Date  --------------------------------------------------------------------------------    HBA1C.......  6 months...  metFORMIN................  09- 03-    Powered by [employer]. Reference number: [id number].   2/20/2022 7:57:50 AM CST

## 2022-02-21 ENCOUNTER — TELEPHONE (OUTPATIENT)
Dept: FAMILY MEDICINE | Facility: CLINIC | Age: 77
End: 2022-02-21
Payer: MEDICARE

## 2022-02-21 LAB — BACTERIA UR CULT: ABNORMAL

## 2022-02-21 RX ORDER — CEFUROXIME AXETIL 500 MG/1
500 TABLET ORAL 2 TIMES DAILY
Qty: 10 TABLET | Refills: 0 | Status: SHIPPED | OUTPATIENT
Start: 2022-02-21 | End: 2022-02-26

## 2022-02-21 RX ORDER — CEPHALEXIN 500 MG/1
500 CAPSULE ORAL EVERY 12 HOURS
Qty: 10 CAPSULE | Refills: 0 | Status: SHIPPED | OUTPATIENT
Start: 2022-02-21 | End: 2022-02-21

## 2022-02-21 RX ORDER — HYDROCHLOROTHIAZIDE 12.5 MG/1
12.5 CAPSULE ORAL DAILY
Qty: 90 CAPSULE | Refills: 2 | Status: SHIPPED | OUTPATIENT
Start: 2022-02-21 | End: 2022-11-21

## 2022-02-21 NOTE — TELEPHONE ENCOUNTER
----- Message from Marlee Doll sent at 2/21/2022 10:01 AM CST -----  Regarding: Results  Type:  Test Results    Who Called: pt  Name of Test (Lab/Mammo/Etc): Urine  Date of Test: 2-18-22  Ordering Provider:  Mariel  Where the test was performed: [place]  Would the patient rather a call back or a response via [facility]? call  Best Call Back Number: [phone]

## 2022-02-21 NOTE — TELEPHONE ENCOUNTER
Patient informed a message has been sent to  regarding urine results . Patient voiced understanding

## 2022-02-21 NOTE — TELEPHONE ENCOUNTER
----- Message from Marlee Doll sent at 2/21/2022  3:20 PM CST -----  Regarding: Call Back  Type:  Patient Returning Call    Who Called:pt    Would the patient rather a call back or a response via [facility]? Call    Best Call Back Number:[phone]

## 2022-02-22 ENCOUNTER — TELEPHONE (OUTPATIENT)
Dept: FAMILY MEDICINE | Facility: CLINIC | Age: 77
End: 2022-02-22
Payer: MEDICARE

## 2022-02-22 ENCOUNTER — HOSPITAL ENCOUNTER (EMERGENCY)
Facility: HOSPITAL | Age: 77
Discharge: HOME | End: 2022-02-22
Attending: EMERGENCY MEDICINE
Payer: MEDICARE

## 2022-02-22 VITALS
OXYGEN SATURATION: 97 % | SYSTOLIC BLOOD PRESSURE: 142 MMHG | BODY MASS INDEX: 25.97 KG/M2 | HEART RATE: 83 BPM | RESPIRATION RATE: 18 BRPM | WEIGHT: 142 LBS | TEMPERATURE: 98 F | DIASTOLIC BLOOD PRESSURE: 72 MMHG

## 2022-02-22 DIAGNOSIS — R31.9 HEMATURIA, UNSPECIFIED TYPE: ICD-10-CM

## 2022-02-22 DIAGNOSIS — N39.0 URINARY TRACT INFECTION WITH HEMATURIA, SITE UNSPECIFIED: Primary | ICD-10-CM

## 2022-02-22 DIAGNOSIS — N22 CALCULUS OF URINARY TRACT IN DISEASES CLASSIFIED ELSEWHERE: ICD-10-CM

## 2022-02-22 DIAGNOSIS — N30.01 ACUTE CYSTITIS WITH HEMATURIA: Primary | ICD-10-CM

## 2022-02-22 DIAGNOSIS — R31.9 URINARY TRACT INFECTION WITH HEMATURIA, SITE UNSPECIFIED: Primary | ICD-10-CM

## 2022-02-22 LAB
ALBUMIN SERPL BCP-MCNC: 4 G/DL (ref 3.5–5.2)
ALP SERPL-CCNC: 95 U/L (ref 55–135)
ALT SERPL W/O P-5'-P-CCNC: 12 U/L (ref 10–44)
ANION GAP SERPL CALC-SCNC: 9 MMOL/L (ref 8–16)
AST SERPL-CCNC: 26 U/L (ref 10–40)
BACTERIA #/AREA URNS HPF: ABNORMAL /HPF
BASOPHILS # BLD AUTO: 0.05 K/UL (ref 0–0.2)
BASOPHILS NFR BLD: 0.6 % (ref 0–1.9)
BILIRUB SERPL-MCNC: 0.4 MG/DL (ref 0.1–1)
BILIRUB UR QL STRIP: NEGATIVE
BUN SERPL-MCNC: 10 MG/DL (ref 8–23)
CALCIUM SERPL-MCNC: 9.3 MG/DL (ref 8.7–10.5)
CHLORIDE SERPL-SCNC: 97 MMOL/L (ref 95–110)
CLARITY UR: CLEAR
CO2 SERPL-SCNC: 31 MMOL/L (ref 23–29)
COLOR UR: COLORLESS
CREAT SERPL-MCNC: 0.7 MG/DL (ref 0.5–1.4)
DIFFERENTIAL METHOD: ABNORMAL
EOSINOPHIL # BLD AUTO: 0.1 K/UL (ref 0–0.5)
EOSINOPHIL NFR BLD: 1.7 % (ref 0–8)
ERYTHROCYTE [DISTWIDTH] IN BLOOD BY AUTOMATED COUNT: 14.5 % (ref 11.5–14.5)
EST. GFR  (AFRICAN AMERICAN): >60 ML/MIN/1.73 M^2
EST. GFR  (NON AFRICAN AMERICAN): >60 ML/MIN/1.73 M^2
GLUCOSE SERPL-MCNC: 102 MG/DL (ref 70–110)
GLUCOSE UR QL STRIP: NEGATIVE
HCT VFR BLD AUTO: 34.3 % (ref 37–48.5)
HGB BLD-MCNC: 11 G/DL (ref 12–16)
HGB UR QL STRIP: ABNORMAL
IMM GRANULOCYTES # BLD AUTO: 0.03 K/UL (ref 0–0.04)
IMM GRANULOCYTES NFR BLD AUTO: 0.4 % (ref 0–0.5)
INR PPP: 1.1 (ref 0.8–1.2)
KETONES UR QL STRIP: NEGATIVE
LEUKOCYTE ESTERASE UR QL STRIP: ABNORMAL
LYMPHOCYTES # BLD AUTO: 1.7 K/UL (ref 1–4.8)
LYMPHOCYTES NFR BLD: 20.9 % (ref 18–48)
MCH RBC QN AUTO: 27.8 PG (ref 27–31)
MCHC RBC AUTO-ENTMCNC: 32.1 G/DL (ref 32–36)
MCV RBC AUTO: 87 FL (ref 82–98)
MICROSCOPIC COMMENT: ABNORMAL
MONOCYTES # BLD AUTO: 0.7 K/UL (ref 0.3–1)
MONOCYTES NFR BLD: 8.2 % (ref 4–15)
NEUTROPHILS # BLD AUTO: 5.5 K/UL (ref 1.8–7.7)
NEUTROPHILS NFR BLD: 68.2 % (ref 38–73)
NITRITE UR QL STRIP: NEGATIVE
NRBC BLD-RTO: 0 /100 WBC
PH UR STRIP: 7 [PH] (ref 5–8)
PLATELET # BLD AUTO: 310 K/UL (ref 150–450)
PMV BLD AUTO: 10.1 FL (ref 9.2–12.9)
POTASSIUM SERPL-SCNC: 4.1 MMOL/L (ref 3.5–5.1)
PROT SERPL-MCNC: 7.8 G/DL (ref 6–8.4)
PROT UR QL STRIP: NEGATIVE
PROTHROMBIN TIME: 10.9 SEC (ref 9–12.5)
RBC # BLD AUTO: 3.95 M/UL (ref 4–5.4)
RBC #/AREA URNS HPF: >100 /HPF (ref 0–4)
SODIUM SERPL-SCNC: 137 MMOL/L (ref 136–145)
SP GR UR STRIP: 1 (ref 1–1.03)
SQUAMOUS #/AREA URNS HPF: 4 /HPF
UNIDENT CRYS URNS QL MICRO: 22
URN SPEC COLLECT METH UR: ABNORMAL
UROBILINOGEN UR STRIP-ACNC: NEGATIVE EU/DL
WBC # BLD AUTO: 8.09 K/UL (ref 3.9–12.7)
WBC #/AREA URNS HPF: 48 /HPF (ref 0–5)

## 2022-02-22 PROCEDURE — 81000 URINALYSIS NONAUTO W/SCOPE: CPT | Performed by: EMERGENCY MEDICINE

## 2022-02-22 PROCEDURE — 80053 COMPREHEN METABOLIC PANEL: CPT | Performed by: EMERGENCY MEDICINE

## 2022-02-22 PROCEDURE — 87086 URINE CULTURE/COLONY COUNT: CPT | Performed by: EMERGENCY MEDICINE

## 2022-02-22 PROCEDURE — 99284 EMERGENCY DEPT VISIT MOD MDM: CPT | Mod: 25

## 2022-02-22 PROCEDURE — 85610 PROTHROMBIN TIME: CPT | Performed by: EMERGENCY MEDICINE

## 2022-02-22 PROCEDURE — 36000 PLACE NEEDLE IN VEIN: CPT

## 2022-02-22 PROCEDURE — 85025 COMPLETE CBC W/AUTO DIFF WBC: CPT | Performed by: EMERGENCY MEDICINE

## 2022-02-22 NOTE — TELEPHONE ENCOUNTER
Refill Authorization Note   Aretha Nguyen  is requesting a refill authorization.  Brief Assessment and Rationale for Refill:  Approve    -Medication-Related Problems Identified: Requires labs  Medication Therapy Plan:  Labs (a1c) scheduled for July 2022    Medication Reconciliation Completed: No   Comments:   --->Care Gap information included below if applicable.   Orders Placed This Encounter    hydroCHLOROthiazide (MICROZIDE) 12.5 mg capsule      Requested Prescriptions   Signed Prescriptions Disp Refills    hydroCHLOROthiazide (MICROZIDE) 12.5 mg capsule 90 capsule 2     Sig: Take 1 capsule (12.5 mg total) by mouth once daily.       Cardiovascular: Diuretics - Thiazide Passed - 2/20/2022  7:57 AM        Passed - Patient is at least 18 years old        Passed - Last BP in normal range within 360 days     BP Readings from Last 1 Encounters:   01/28/22 110/78               Passed - Valid encounter within last 15 months     Recent Visits  Date Type Provider Dept   01/28/22 Office Visit Brittany Floyd MD [facility]   09/29/21 Office Visit Brittany Floyd MD [facility]   06/14/21 Office Visit Brittany Floyd MD [facility]   06/01/21 Office Visit Brittany Floyd MD [facility]   12/14/20 Office Visit Brittany Floyd MD [facility]   09/11/20 Office Visit Brittany Floyd MD [facility]   06/09/20 Office Visit Lawrence Logan MD [facility]   Showing recent visits within past 720 days and meeting all other requirements  Future Appointments  No visits were found meeting these conditions.  Showing future appointments within next 150 days and meeting all other requirements      Future Appointments              In 4 days MD [facility] Burger - [facility]oped[facility]    In 1 month Andre Macdonald, [facility] - [address] [address], [facility]    In 5 months [facility]POINTMENT [facility] - [facility]    In 5 months MD [facility]                 Passed - Cr is 1.39 or below and within 360 days     Lab Results   Component Value Date    CREATININE 0.8 09/21/2021    CREATININE 0.7 06/11/2021    CREATININE 0.8 12/04/2020              Passed - K in normal range and within 360 days     Potassium   Date Value Ref Range Status   09/21/2021 4.1 3.5 - 5.1 mmol/L Final   06/11/2021 3.8 3.5 - 5.1 mmol/L Final   12/04/2020 3.8 3.5 - 5.1 mmol/L Final              Passed - Na is between 130 and 148 and within 360 days     Sodium   Date Value Ref Range Status   09/21/2021 140 136 - 145 mmol/L Final   06/11/2021 143 136 - 145 mmol/L Final   12/04/2020 140 136 - 145 mmol/L Final              Passed - eGFR within 360 days     Lab Results   Component Value Date    EGFRNONAA >60 09/21/2021    EGFRNONAA >60 06/11/2021    EGFRNONAA >60.0 12/04/2020                    Appointments  past 12m or future 3m with PCP    Date Provider   Last Visit   1/28/2022 Brittany Floyd MD   Next Visit   7/29/2022 Brittany Floyd MD   ED visits in past 90 days: 0     Note composed:6:28 PM 02/21/2022

## 2022-02-22 NOTE — ED PROVIDER NOTES
Encounter Date: 2/22/2022       History     Chief Complaint   Patient presents with    Hematuria     Pt reports recent UTI dx started taking cefuroxine yesterday, pt reports blood in urine,  + back pain, denies n/v       Patient is a 76-year-old female with a past medical history of type 2 diabetes, hyperlipidemia, hypertension, inflammatory bowel disease who presents to the ED with complaint of hematuria.  Patient states that she was started on Ceftin by her primary care physician for presumed urinary tract infection.  Patient states he took 1 dose of this medication at 6:00 p.m. yesterday.  She reports one episode of gross hematuria,  urinary incontinence, and mild bilateral  lower back pain that began this morning.  She denies any abdominal pain, pelvic pain, fatigue, lightheadedness, presyncope or syncope, nausea, vomiting, or fever.  Patient denies use of anti-platelet or anticoagulation type medication.  Patient denies any history of hematuria in the past.  Furthermore, she denies any current back pain or  type complaints. Previous surgical hx significant for total abdominal hysterectomy.         Review of patient's allergies indicates:   Allergen Reactions    Ciprofloxacin hcl Rash     Past Medical History:   Diagnosis Date    Breast cancer, left     Cataract     Controlled type 2 diabetes mellitus with hyperglycemia, without long-term current use of insulin 3/22/2017    Hyperlipidemia associated with type 2 diabetes mellitus 3/22/2017    Hypertension complicating diabetes 3/22/2017    Inflammatory bowel disease 3/22/2017     Past Surgical History:   Procedure Laterality Date    BREAST SURGERY      COLONOSCOPY N/A 1/18/2022    Procedure: COLONOSCOPY suprep;  Surgeon: Lawrence Garcia MD;  Location: [facility];  Service: Endoscopy;  Laterality: N/A;    HYSTERECTOMY      MASTECTOMY Left     for benign recurrent growth. Dr. Stephan Brown MD - [facility]     SIMPLE MASTECTOMY Left     TOTAL ABDOMINAL HYSTERECTOMY W/ BILATERAL SALPINGOOPHORECTOMY  2009    for benign cysts with concern for future malginancy. Fibromoid uterus for AUB. Bengin results     Family History   Problem Relation Age of Onset    Breast cancer Neg Hx      Social History     Tobacco Use    Smoking status: Never Smoker    Smokeless tobacco: Never Used   Substance Use Topics    Alcohol use: Yes     Comment: not often    Drug use: No     Review of Systems   Constitutional: Negative for chills and fever.   HENT: Negative for congestion.    Eyes: Negative for photophobia and visual disturbance.   Respiratory: Negative for apnea, cough, choking, chest tightness, shortness of breath, wheezing and stridor.    Cardiovascular: Negative for chest pain, palpitations and leg swelling.   Gastrointestinal: Negative for abdominal pain, blood in stool, nausea and vomiting.   Genitourinary: Positive for hematuria. Negative for dyspareunia, dysuria, flank pain, frequency, pelvic pain, urgency, vaginal bleeding, vaginal discharge and vaginal pain.   Musculoskeletal: Positive for back pain (Resolved). Negative for gait problem, joint swelling, myalgias, neck pain and neck stiffness.   Allergic/Immunologic: Negative for immunocompromised state.   Neurological: Negative for dizziness, tremors, seizures, syncope, facial asymmetry, speech difficulty, light-headedness, numbness and headaches.   Psychiatric/Behavioral: Negative for agitation, behavioral problems and confusion.       Physical Exam     Initial Vitals [02/22/22 1559]   BP Pulse Resp Temp SpO2   (!) 142/72 83 18 97.9 °F (36.6 °C) 97 %      MAP       --         Physical Exam    Nursing note and vitals reviewed.  Constitutional: She appears well-developed and well-nourished. No distress.   Well-appearing   Non toxic   No acute distress    HENT:   Head: Normocephalic and atraumatic.   Right Ear: External ear normal.   Left Ear: External ear normal.   Eyes: EOM  are normal. Pupils are equal, round, and reactive to light.   Neck: Neck supple.   Normal range of motion.  Cardiovascular: Normal rate, regular rhythm, normal heart sounds and intact distal pulses.   Pulmonary/Chest: Breath sounds normal.   Abdominal: Abdomen is soft. Bowel sounds are normal.   Abdomen is soft. There is no rebound or guarding. Normal bowel sounds in all four quadrants. Negative Gonzalez's sign. Negative McBurney's point tenderness;  No CVA tenderness bilaterally    Musculoskeletal:         General: Normal range of motion.      Cervical back: Normal range of motion and neck supple.     Neurological: She is alert and oriented to person, place, and time. She has normal strength. GCS score is 15. GCS eye subscore is 4. GCS verbal subscore is 5. GCS motor subscore is 6.   Skin: Skin is warm. Capillary refill takes less than 2 seconds.   Psychiatric: She has a normal mood and affect. Thought content normal.         ED Course   Procedures  Labs Reviewed   CBC W/ AUTO DIFFERENTIAL - Abnormal; Notable for the following components:       Result Value    RBC 3.95 (*)     Hemoglobin 11.0 (*)     Hematocrit 34.3 (*)     All other components within normal limits   COMPREHENSIVE METABOLIC PANEL - Abnormal; Notable for the following components:    CO2 31 (*)     All other components within normal limits   URINALYSIS, REFLEX TO URINE CULTURE - Abnormal; Notable for the following components:    Color, UA Colorless (*)     Occult Blood UA 3+ (*)     Leukocytes, UA 1+ (*)     All other components within normal limits    Narrative:     Specimen Source->Urine   URINALYSIS MICROSCOPIC - Abnormal; Notable for the following components:    RBC, UA >100 (*)     WBC, UA 48 (*)     Bacteria Few (*)     All other components within normal limits    Narrative:     Specimen Source->Urine   CULTURE, URINE   PROTIME-INR          Imaging Results          CT Renal Stone Study ABD Pelvis WO (Final result)  Result time 02/22/22 18:14:54     Final result by Nabil Alvarado DO (02/22/22 18:14:54)                 Impression:      No acute abnormality of the abdomen or pelvis.    Colonic diverticulosis without evidence of acute diverticulitis.      Electronically signed by: Nabil Alvarado  Date:    02/22/2022  Time:    18:14             Narrative:    EXAMINATION:  CT RENAL STONE STUDY ABD PELVIS WO    CLINICAL HISTORY:  Flank pain, kidney stone suspected;gross hematuria;    TECHNIQUE:  Multiplanar images were obtained of the abdomen and pelvis from the hemidiaphragms through the symphysis pubis without intravenous contrast.    COMPARISON:  None    FINDINGS:  Lung Bases: Mild bandlike atelectasis or scarring in the left lower lobe.  Bases are otherwise clear    Heart: Heart size is normal.  No pericardial effusion.    Liver: Normal in size and attenuation without focal hepatic lesion.    Biliary tract: No intrahepatic or extrahepatic biliary ductal dilatation.    Gallbladder: No radiodense gallstone. No wall thickening or pericholecystic fluid.    Pancreas: Normal. No pancreatic ductal dilatation.    Spleen: Normal size without focal lesion.    Adrenals: Normal.    Kidneys and urinary collecting systems: There is a simple cyst in the left kidney superior pole.  There is a small parenchymal calcification in the right kidney.  No hydronephrosis or urolithiasis.    Lymph nodes: None enlarged.    Stomach and bowel: There is a moderate hiatal hernia.  Loops of small and large bowel are normal in caliber without evidence for inflammation or obstruction.  There is colonic diverticulosis without evidence of acute diverticulitis.    Peritoneum and mesentery: No ascites or free intraperitoneal air. No abdominal fluid collection.    Vasculature: Normal.    Urinary bladder: Normal.    Reproductive organs: The uterus is surgically absent    Body wall: No abnormality.    Musculoskeletal: No aggressive osseous lesion.  There is mild disc height loss at L5-S1.                                  Medications - No data to display  Medical Decision Making:   Clinical Tests:   Lab Tests: Ordered and Reviewed  Radiological Study: Ordered and Reviewed  ED Management:  - VSS; pt HDS; pt in no acute distress   - UA with findings consistent of infection   - remainder of labs unremarkable - H/H stable; PT-INR WNL   - CT renal stone study demonstrates no abnormality of the urinary bladder; no acute abnormality of the abdomen or pelvis; colonic diverticulosis without evidence of acute diverticulitis per final radiology read  - will instruct patient to continue taking Ceftin as initially prescribed; pt given strict return precautions for any new or worsening symptoms; pt instructed to f/u with her PCP in the next 2-3 days for recheck of today's complaints; will also make ambulatory referral to urology; pt voiced understanding and expressed a willingness to comply with my recommendations   - No further intervention is indicated at this time after having taken into account the patient's history, physical exam findings, and empirical and objective data obtained during the patient's emergency department workup.   - The patient is at low risk for an emergent medical condition at this time, and I am of the belief that that it is safe to discharge the patient from the emergency department.   - The patient is instructed to follow up as outpatient as indicated on the discharge paperwork.    - I have discussed the specifics of the workup with the patient and the patient has verbalized understanding of the details of the workup, the diagnosis, the treatment plan, and the need for outpatient follow-up.    - Although the patient has no emergent etiology today this does not preclude the development of an emergent condition so, in addition, I have advised the patient that they can return to the ED and/or activate EMS at any time with worsening of their symptoms, change of their symptoms, or with any other  medical complaint.    - The patient remained comfortable and stable during their visit in the ED.    - Discharge and follow-up instructions discussed with the patient who expressed understanding and willingness to comply with my recommendations.  - Results of all emergency department tests  discussed thoroughly with patient; all patient questions answered; pt in agreement with plan  - Pt instructed to follow up with PCP in 2-3 days for recheck of today's complaints  - Pt given strict emergency department return precautions for any new or worsening of symptoms  - Pt discharged from the emergency department in stable condition, in no acute distress                        Clinical Impression:   Final diagnoses:  [R31.9] Hematuria, unspecified type  [N39.0, R31.9] Urinary tract infection with hematuria, site unspecified (Primary)          ED Disposition Condition    Discharge Stable        ED Prescriptions     None        Follow-up Information     Follow up With Specialties Details Why Contact Info    Brittany Floyd MD Family Medicine Schedule an appointment as soon as possible for a visit   [address]  [facility] [address]  [phone]             Felipe England MD  02/22/22 3484

## 2022-02-22 NOTE — TELEPHONE ENCOUNTER
The patient was called in regards her urine culture.  Reports has been having frequency and some discomfort when urinating.  Has been taking azo and drinking a lot of fluids.  Urine culture showing up to 49,000 colonies of E coli. Ceftin for 5 days ordered.

## 2022-02-22 NOTE — TELEPHONE ENCOUNTER
----- Message from Radha Caraballo sent at 2/22/2022  2:50 PM CST -----  Regarding: call back  Contact: [phone]  Who Called: PT     Type:  Needs Medical Advice    Who Called:  PT   Symptoms (please be specific): bloody Urine with blood clots   How long has patient had these symptoms:  yesterday   Would the patient rather a call back or a response via [facility]? Call back   Best Call Back Number:  [phone]  Additional Information: no

## 2022-02-22 NOTE — TELEPHONE ENCOUNTER
Patient called reporting blood in urine.  Reports water is red and when she wipes can see blood clots.  Does not think is vaginal bleed.  Last night was having back pain.  Denies any other evidence of bleeding.  Started antibiotics but only took 1 pill.  We will repeat UA and a CT scan for kidney stone will be ordered.  Advise if the bleeding increases or starts having pain to go to the ER for further management.   Pt called to schedule an appointment with a Dermatology. She has a STAT referral for Rash [R21].     The rash began has been present for more than 2-3 days now, she estimated almost a month now, and she believes it has spread quickly. It appeared first on her scalp, then spread to side of her neck, into her back. From there, it spread to the outside of her elbows, developed scar like rash on her arm, and then also spread to her hip bone and stomach. Lastly, it did spread to her breast as well as of recent days.     The rash is very itchy- when she itches the rash, that area begins to develop raised little bumps (tiny scabs).     It does keep her awake at night; she has tried cold showers and ice packs to help assist at night.     No open sores/blisters , and no oozing.     No burning, but she is in a lot of pain all over. There were two medications prescribed to her, and both did not help.    Lastly, she does not feel ill, or have a fever.     Please reach out to further discuss, pt call back #[phone].

## 2022-02-23 ENCOUNTER — TELEPHONE (OUTPATIENT)
Dept: FAMILY MEDICINE | Facility: CLINIC | Age: 77
End: 2022-02-23
Payer: MEDICARE

## 2022-02-23 LAB — BACTERIA UR CULT: NO GROWTH

## 2022-02-23 NOTE — ED TRIAGE NOTES
Pt presents to ED and reports she was diagnosed with  UTI and was placed on cefurovine yesterday. Pt states she is having blood in her urine with blood clots and is having concerns. Pt also C/O back pain.

## 2022-02-23 NOTE — DISCHARGE INSTRUCTIONS
Please follow up with your primary care physician in the next 2-3 days for recheck of today's complaints.     Return to the emergency department immediately for any new or worsening of symptoms.

## 2022-02-24 ENCOUNTER — TELEPHONE (OUTPATIENT)
Dept: FAMILY MEDICINE | Facility: CLINIC | Age: 77
End: 2022-02-24
Payer: MEDICARE

## 2022-02-24 ENCOUNTER — PATIENT OUTREACH (OUTPATIENT)
Dept: ADMINISTRATIVE | Facility: OTHER | Age: 77
End: 2022-02-24
Payer: MEDICARE

## 2022-02-24 NOTE — PROGRESS NOTES
Health Maintenance Due   Topic Date Due    Shingles Vaccine (1 of 2) Never done    Eye Exam  11/18/2021     Updates were requested from care everywhere.  Chart was reviewed for overdue [facility]active [facility] Encounters (MELISA) topics (CRS, Breast Cancer Screening, Eye exam)  Health Maintenance has been updated.  LINKS immunization registry triggered.  Immunizations were reconciled.

## 2022-02-25 ENCOUNTER — OFFICE VISIT (OUTPATIENT)
Dept: ORTHOPEDICS | Facility: CLINIC | Age: 77
End: 2022-02-25
Payer: MEDICARE

## 2022-02-25 VITALS — HEIGHT: 62 IN | WEIGHT: 142 LBS | BODY MASS INDEX: 26.13 KG/M2

## 2022-02-25 DIAGNOSIS — R93.7 BONE MARROW EDEMA: ICD-10-CM

## 2022-02-25 DIAGNOSIS — M17.12 PRIMARY OSTEOARTHRITIS OF LEFT KNEE: Primary | ICD-10-CM

## 2022-02-25 PROCEDURE — 3288F FALL RISK ASSESSMENT DOCD: CPT | Mod: CPTII,S$GLB,, | Performed by: ORTHOPAEDIC SURGERY

## 2022-02-25 PROCEDURE — 99213 OFFICE O/P EST LOW 20 MIN: CPT | Mod: S$GLB,,, | Performed by: ORTHOPAEDIC SURGERY

## 2022-02-25 PROCEDURE — 99999 PR PBB SHADOW E&M-EST. PATIENT-LVL III: ICD-10-PCS | Mod: PBBFAC,,, | Performed by: ORTHOPAEDIC SURGERY

## 2022-02-25 PROCEDURE — 1159F PR MEDICATION LIST DOCUMENTED IN MEDICAL RECORD: ICD-10-PCS | Mod: CPTII,S$GLB,, | Performed by: ORTHOPAEDIC SURGERY

## 2022-02-25 PROCEDURE — 1101F PT FALLS ASSESS-DOCD LE1/YR: CPT | Mod: CPTII,S$GLB,, | Performed by: ORTHOPAEDIC SURGERY

## 2022-02-25 PROCEDURE — 1159F MED LIST DOCD IN RCRD: CPT | Mod: CPTII,S$GLB,, | Performed by: ORTHOPAEDIC SURGERY

## 2022-02-25 PROCEDURE — 3288F PR FALLS RISK ASSESSMENT DOCUMENTED: ICD-10-PCS | Mod: CPTII,S$GLB,, | Performed by: ORTHOPAEDIC SURGERY

## 2022-02-25 PROCEDURE — 1160F RVW MEDS BY RX/DR IN RCRD: CPT | Mod: CPTII,S$GLB,, | Performed by: ORTHOPAEDIC SURGERY

## 2022-02-25 PROCEDURE — 99999 PR PBB SHADOW E&M-EST. PATIENT-LVL III: CPT | Mod: PBBFAC,,, | Performed by: ORTHOPAEDIC SURGERY

## 2022-02-25 PROCEDURE — 1125F AMNT PAIN NOTED PAIN PRSNT: CPT | Mod: CPTII,S$GLB,, | Performed by: ORTHOPAEDIC SURGERY

## 2022-02-25 PROCEDURE — 99213 PR OFFICE/OUTPT VISIT, EST, LEVL III, 20-29 MIN: ICD-10-PCS | Mod: S$GLB,,, | Performed by: ORTHOPAEDIC SURGERY

## 2022-02-25 PROCEDURE — 1125F PR PAIN SEVERITY QUANTIFIED, PAIN PRESENT: ICD-10-PCS | Mod: CPTII,S$GLB,, | Performed by: ORTHOPAEDIC SURGERY

## 2022-02-25 PROCEDURE — 1160F PR REVIEW ALL MEDS BY PRESCRIBER/CLIN PHARMACIST DOCUMENTED: ICD-10-PCS | Mod: CPTII,S$GLB,, | Performed by: ORTHOPAEDIC SURGERY

## 2022-02-25 PROCEDURE — 1101F PR PT FALLS ASSESS DOC 0-1 FALLS W/OUT INJ PAST YR: ICD-10-PCS | Mod: CPTII,S$GLB,, | Performed by: ORTHOPAEDIC SURGERY

## 2022-02-25 NOTE — PROGRESS NOTES
Subjective:      Patient ID: Aretha Nguyen is a 76 y.o. female.    Chief Complaint: Follow-up (6 wk f/up- left knee )    HPI    Follow-up for osteoarthritis with suspected bone marrow edema.  Patient has been treated with injection and bracing.  She notes moderate improvement.  She has gelling but feels that once she starts walking she can move comfortably in without much limitation or pain.          Review of Systems   Constitutional: Negative for fever and weight loss.   HENT: Negative for congestion.    Eyes: Negative for visual disturbance.   Cardiovascular: Negative for chest pain.   Respiratory: Negative for shortness of breath.    Hematologic/Lymphatic: Negative for bleeding problem. Does not bruise/bleed easily.   Skin: Negative for poor wound healing.   Gastrointestinal: Negative for abdominal pain.   Genitourinary: Negative for dysuria.   Neurological: Negative for seizures.   Psychiatric/Behavioral: Negative for altered mental status.   Allergic/Immunologic: Negative for persistent infections.         Objective:      Ortho/SPM Exam      The patient is not in acute distress.   Body habitus is normal.   Sclera appear normal  No respiratory distress  The patient walks with a mild limp-extinguishes after a few steps  Resisted SLR negative.   The skin over the knee is intact.  Knee effusion 0  Tendernes is located medially  Range of motion- Flexion full, Extension full.   Ligament exam:              MCL 1+              Lachman intact              Post sag intact              LCL intact  Patellar apprehension negative.  Popliteal cyst negative  Patellar crepitation absent.  Flexion/pinch negative.  Pulses DP present, PT present.  Motor normal 5/5 strength in all tested muscle groups.   Sensory normal.              Assessment:       Encounter Diagnoses   Name Primary?    Primary osteoarthritis of left knee Yes    Bone marrow edema         The clinical course strongly suggests that my initial impression is  confirmed.  Patient appears to be responding to current treatment.          Plan:       Aretha was seen today for follow-up.    Diagnoses and all orders for this visit:    Primary osteoarthritis of left knee    Bone marrow edema          I explained my diagnostic impression and the reasoning behind it in detail, using layman's terms.  Models and/or pictures were used to help in the explanation.    Continue present care    If symptoms persist or worsen, arthroplasty can be considered    X-ray next visit

## 2022-03-09 ENCOUNTER — TELEPHONE (OUTPATIENT)
Dept: OPTOMETRY | Facility: CLINIC | Age: 77
End: 2022-03-09
Payer: MEDICARE

## 2022-03-09 ENCOUNTER — OFFICE VISIT (OUTPATIENT)
Dept: UROLOGY | Facility: CLINIC | Age: 77
End: 2022-03-09
Payer: MEDICARE

## 2022-03-09 VITALS — DIASTOLIC BLOOD PRESSURE: 69 MMHG | SYSTOLIC BLOOD PRESSURE: 123 MMHG | HEART RATE: 82 BPM

## 2022-03-09 DIAGNOSIS — R31.9 HEMATURIA, UNSPECIFIED TYPE: Primary | ICD-10-CM

## 2022-03-09 DIAGNOSIS — R31.9 URINARY TRACT INFECTION WITH HEMATURIA, SITE UNSPECIFIED: ICD-10-CM

## 2022-03-09 DIAGNOSIS — R31.0 GROSS HEMATURIA: ICD-10-CM

## 2022-03-09 DIAGNOSIS — N39.0 URINARY TRACT INFECTION WITH HEMATURIA, SITE UNSPECIFIED: ICD-10-CM

## 2022-03-09 LAB
BILIRUB SERPL-MCNC: ABNORMAL MG/DL
BLOOD URINE, POC: ABNORMAL
CLARITY, POC UA: ABNORMAL
COLOR, POC UA: YELLOW
GLUCOSE UR QL STRIP: ABNORMAL
KETONES UR QL STRIP: ABNORMAL
LEUKOCYTE ESTERASE URINE, POC: ABNORMAL
NITRITE, POC UA: ABNORMAL
PH, POC UA: 7
PROTEIN, POC: ABNORMAL
SPECIFIC GRAVITY, POC UA: 1.01
UROBILINOGEN, POC UA: ABNORMAL

## 2022-03-09 PROCEDURE — 1160F PR REVIEW ALL MEDS BY PRESCRIBER/CLIN PHARMACIST DOCUMENTED: ICD-10-PCS | Mod: CPTII,S$GLB,, | Performed by: UROLOGY

## 2022-03-09 PROCEDURE — 88112 PR  CYTOPATH, CELL ENHANCE TECH: ICD-10-PCS | Mod: 26,,, | Performed by: PATHOLOGY

## 2022-03-09 PROCEDURE — 87086 URINE CULTURE/COLONY COUNT: CPT | Performed by: UROLOGY

## 2022-03-09 PROCEDURE — 88112 CYTOPATH CELL ENHANCE TECH: CPT | Mod: 26,,, | Performed by: PATHOLOGY

## 2022-03-09 PROCEDURE — 1126F PR PAIN SEVERITY QUANTIFIED, NO PAIN PRESENT: ICD-10-PCS | Mod: CPTII,S$GLB,, | Performed by: UROLOGY

## 2022-03-09 PROCEDURE — 1126F AMNT PAIN NOTED NONE PRSNT: CPT | Mod: CPTII,S$GLB,, | Performed by: UROLOGY

## 2022-03-09 PROCEDURE — 1160F RVW MEDS BY RX/DR IN RCRD: CPT | Mod: CPTII,S$GLB,, | Performed by: UROLOGY

## 2022-03-09 PROCEDURE — 3074F SYST BP LT 130 MM HG: CPT | Mod: CPTII,S$GLB,, | Performed by: UROLOGY

## 2022-03-09 PROCEDURE — 1159F MED LIST DOCD IN RCRD: CPT | Mod: CPTII,S$GLB,, | Performed by: UROLOGY

## 2022-03-09 PROCEDURE — 99999 PR PBB SHADOW E&M-EST. PATIENT-LVL III: CPT | Mod: PBBFAC,,, | Performed by: UROLOGY

## 2022-03-09 PROCEDURE — 3078F DIAST BP <80 MM HG: CPT | Mod: CPTII,S$GLB,, | Performed by: UROLOGY

## 2022-03-09 PROCEDURE — 3078F PR MOST RECENT DIASTOLIC BLOOD PRESSURE < 80 MM HG: ICD-10-PCS | Mod: CPTII,S$GLB,, | Performed by: UROLOGY

## 2022-03-09 PROCEDURE — 88112 CYTOPATH CELL ENHANCE TECH: CPT | Performed by: PATHOLOGY

## 2022-03-09 PROCEDURE — 3074F PR MOST RECENT SYSTOLIC BLOOD PRESSURE < 130 MM HG: ICD-10-PCS | Mod: CPTII,S$GLB,, | Performed by: UROLOGY

## 2022-03-09 PROCEDURE — 81002 URINALYSIS NONAUTO W/O SCOPE: CPT | Mod: S$GLB,,, | Performed by: UROLOGY

## 2022-03-09 PROCEDURE — 99203 OFFICE O/P NEW LOW 30 MIN: CPT | Mod: S$GLB,,, | Performed by: UROLOGY

## 2022-03-09 PROCEDURE — 1159F PR MEDICATION LIST DOCUMENTED IN MEDICAL RECORD: ICD-10-PCS | Mod: CPTII,S$GLB,, | Performed by: UROLOGY

## 2022-03-09 PROCEDURE — 99203 PR OFFICE/OUTPT VISIT, NEW, LEVL III, 30-44 MIN: ICD-10-PCS | Mod: S$GLB,,, | Performed by: UROLOGY

## 2022-03-09 PROCEDURE — 81002 POCT URINE DIPSTICK WITHOUT MICROSCOPE: ICD-10-PCS | Mod: S$GLB,,, | Performed by: UROLOGY

## 2022-03-09 PROCEDURE — 99999 PR PBB SHADOW E&M-EST. PATIENT-LVL III: ICD-10-PCS | Mod: PBBFAC,,, | Performed by: UROLOGY

## 2022-03-09 NOTE — PROGRESS NOTES
Subjective:       Patient ID: Aretha Nguyen is a 76 y.o. female.    Chief Complaint: Hematuria    This is a 76 y.o.  female patient that is new to me.  she is referred to me by ED/PCP for gross hematuria.  The patient did experience gross hematuria. The patient does not take blood thinners. The patient does not have a history of stones. The patient does not have a history of smoking. Never had workup for hematuria before.   Associated symptoms - none  Flank pain - none  UTI - h/o recurrent UTI in past, E coli 2/2022 and treated with abx  UA dip today- negative  Went to ED for gross hematuria with clots, CT w/o contrast was negative.  Urine culture from previous week was positive and did not get antibiotic, Urine culture in ED was negative 2/22/22.       I personally reviewed the images: CT wo contrast 2/2022  No results found in the last 24 hours.        Lab Results   Component Value Date    CREATININE 0.7 02/22/2022       ---  Past Medical History:   Diagnosis Date    Allergy     Breast cancer, left     Cataract     Controlled type 2 diabetes mellitus with hyperglycemia, without long-term current use of insulin 03/22/2017    Hyperlipidemia associated with type 2 diabetes mellitus 03/22/2017    Hypertension complicating diabetes 03/22/2017    Inflammatory bowel disease 03/22/2017    Urinary tract infection        Past Surgical History:   Procedure Laterality Date    BREAST SURGERY      COLONOSCOPY N/A 1/18/2022    Procedure: COLONOSCOPY suprep;  Surgeon: Lawrence Garcia MD;  Location: [facility];  Service: Endoscopy;  Laterality: N/A;    HYSTERECTOMY      MASTECTOMY Left     for benign recurrent growth. Dr. Stephan Brown MD - [facility] - [facility]    SIMPLE MASTECTOMY Left     TOTAL ABDOMINAL HYSTERECTOMY W/ BILATERAL SALPINGOOPHORECTOMY  2009    for benign cysts with concern for future malginancy. Fibromoid uterus for AUB. Bengin results       Family History   Problem  Relation Age of Onset    Breast cancer Neg Hx        Social History     Tobacco Use    Smoking status: Never Smoker    Smokeless tobacco: Never Used   Substance Use Topics    Alcohol use: Yes     Comment: not often    Drug use: No       Current Outpatient Medications on File Prior to Visit   Medication Sig Dispense Refill    atorvastatin (LIPITOR) 20 MG tablet TAKE 1 TABLET BY MOUTH EVERY DAY 90 tablet 1    balsalazide (COLAZAL) 750 mg capsule Take 3 capsules (2,250 mg total) by mouth 3 (three) times daily. 270 capsule 11    cholecalciferol, vitamin D3, 2,000 unit Cap Take 1 capsule (2,000 Units total) by mouth once daily.      ciclopirox (PENLAC) 8 % Soln Apply topically nightly. 6 mL 3    cyanocobalamin (VITAMIN B-12) 1000 MCG tablet Take 2 tablets (2,000 mcg total) by mouth once daily. 60 tablet 6    diclofenac sodium (VOLTAREN ARTHRITIS PAIN) 1 % Gel Apply 2 g topically 2 (two) times daily. 1 Tube 1    EScitalopram oxalate (LEXAPRO) 5 MG Tab TAKE 1 TABLET BY MOUTH EVERY DAY 90 tablet 1    ferrous sulfate (FEOSOL) 325 mg (65 mg iron) Tab tablet TAKE 1 TABLET BY MOUTH EVERY DAY WITH BREAKFAST 90 tablet 1    hydroCHLOROthiazide (MICROZIDE) 12.5 mg capsule Take 1 capsule (12.5 mg total) by mouth once daily. 90 capsule 2    losartan (COZAAR) 25 MG tablet Take 25 mg by mouth once daily.      metFORMIN (GLUCOPHAGE-XR) 500 MG ER 24hr tablet Take 1 tablet (500 mg total) by mouth once daily. 90 tablet 0    multivit-min-iron-FA-lutein (CENTRUM SILVER WOMEN) 8 mg iron-400 mcg-300 mcg Tab Take 1 tablet by mouth once daily at 6am.      olmesartan (BENICAR) 20 MG tablet Take 1 tablet (20 mg total) by mouth once daily. 90 tablet 2     No current facility-administered medications on file prior to visit.       Review of patient's allergies indicates:   Allergen Reactions    Ciprofloxacin hcl Rash       Review of Systems   Constitutional: Negative for activity change, chills, fatigue and fever.   Respiratory:  Negative for cough, chest tightness and shortness of breath.    Cardiovascular: Negative for chest pain.   Gastrointestinal: Negative for abdominal distention, abdominal pain, nausea and vomiting.   Genitourinary: Positive for hematuria. Negative for difficulty urinating, flank pain and pelvic pain.   Musculoskeletal: Negative for back pain and gait problem.       Objective:      Physical Exam  HENT:      Head: Normocephalic.   Cardiovascular:      Pulses: Normal pulses.   Pulmonary:      Effort: Pulmonary effort is normal.   Abdominal:      General: Abdomen is flat. There is no distension.      Palpations: Abdomen is soft.      Tenderness: There is no abdominal tenderness. There is no right CVA tenderness, left CVA tenderness or guarding.   Musculoskeletal:      Cervical back: Normal range of motion.   Skin:     General: Skin is warm.   Neurological:      General: No focal deficit present.      Mental Status: She is alert.         Assessment:     Problem Noted   Gross Hematuria 3/9/2022    75 yo F with gross hematuria.    --UTI 2/2022, gross hematuria after with clots, UCx at time of hematuria in ED negative  --CT w/o contrast 2/2022: left cyst, no acute findings     Uti (Urinary Tract Infection) 3/9/2022    E coli 2/22/22, treated, h/o recurrent UTIs years ago        Plan:   1. CT urogram and cytoscopy given gross hematuria, high risk, negative urine culture at time of hematuria with clots  2. Discussed rationale for CT urogram in addition to recently done CT w/o contrast  3. Follow up after to discuss    Kehinde Paige MD

## 2022-03-10 LAB — BACTERIA UR CULT: NO GROWTH

## 2022-03-12 LAB
FINAL PATHOLOGIC DIAGNOSIS: NORMAL
Lab: NORMAL

## 2022-03-14 ENCOUNTER — HOSPITAL ENCOUNTER (OUTPATIENT)
Dept: RADIOLOGY | Facility: HOSPITAL | Age: 77
Discharge: HOME | End: 2022-03-14
Attending: UROLOGY
Payer: MEDICARE

## 2022-03-14 DIAGNOSIS — R31.0 GROSS HEMATURIA: ICD-10-CM

## 2022-03-14 PROCEDURE — 74178 CT ABD&PLV WO CNTR FLWD CNTR: CPT | Mod: 26,,, | Performed by: RADIOLOGY

## 2022-03-14 PROCEDURE — 74178 CT ABD&PLV WO CNTR FLWD CNTR: CPT | Mod: TC

## 2022-03-14 PROCEDURE — 74178 CT UROGRAM ABD PELVIS W WO: ICD-10-PCS | Mod: 26,,, | Performed by: RADIOLOGY

## 2022-03-14 PROCEDURE — 25500020 PHARM REV CODE 255: Performed by: UROLOGY

## 2022-03-14 RX ADMIN — IOHEXOL 150 ML: 350 INJECTION, SOLUTION INTRAVENOUS at 02:03

## 2022-03-16 ENCOUNTER — PROCEDURE VISIT (OUTPATIENT)
Dept: UROLOGY | Facility: CLINIC | Age: 77
End: 2022-03-16
Payer: MEDICARE

## 2022-03-16 VITALS
HEIGHT: 62 IN | HEART RATE: 67 BPM | SYSTOLIC BLOOD PRESSURE: 134 MMHG | BODY MASS INDEX: 25.97 KG/M2 | DIASTOLIC BLOOD PRESSURE: 85 MMHG

## 2022-03-16 DIAGNOSIS — R31.0 GROSS HEMATURIA: Primary | ICD-10-CM

## 2022-03-16 DIAGNOSIS — N36.2 URETHRAL CARUNCLE: ICD-10-CM

## 2022-03-16 PROCEDURE — 88112 PR  CYTOPATH, CELL ENHANCE TECH: ICD-10-PCS | Mod: 26,,, | Performed by: PATHOLOGY

## 2022-03-16 PROCEDURE — 99212 OFFICE O/P EST SF 10 MIN: CPT | Mod: 25,S$GLB,, | Performed by: UROLOGY

## 2022-03-16 PROCEDURE — 99212 PR OFFICE/OUTPT VISIT, EST, LEVL II, 10-19 MIN: ICD-10-PCS | Mod: 25,S$GLB,, | Performed by: UROLOGY

## 2022-03-16 PROCEDURE — 88112 CYTOPATH CELL ENHANCE TECH: CPT | Mod: 26,,, | Performed by: PATHOLOGY

## 2022-03-16 PROCEDURE — 88112 CYTOPATH CELL ENHANCE TECH: CPT | Performed by: PATHOLOGY

## 2022-03-16 PROCEDURE — 52000 CYSTOURETHROSCOPY: CPT | Mod: S$GLB,,, | Performed by: UROLOGY

## 2022-03-16 PROCEDURE — 52000 CYSTOSCOPY: ICD-10-PCS | Mod: S$GLB,,, | Performed by: UROLOGY

## 2022-03-16 RX ORDER — ESTRADIOL 0.1 MG/G
1 CREAM VAGINAL
Qty: 42.5 G | Refills: 3 | Status: SHIPPED | OUTPATIENT
Start: 2022-03-17 | End: 2023-06-02

## 2022-03-16 NOTE — PROGRESS NOTES
Subjective:       Patient ID: Aretha Nguyen is a 76 y.o. female.    Chief Complaint: Procedure (Cysto)    This is a 76 y.o.  female patient that is new to me.  she is referred to me by ED/PCP for gross hematuria.  The patient did experience gross hematuria. The patient does not take blood thinners. The patient does not have a history of stones. The patient does not have a history of smoking. Never had workup for hematuria before.   Associated symptoms - none  Flank pain - none  UTI - h/o recurrent UTI in past, E coli 2/2022 and treated with abx  UA dip today- negative  Went to ED for gross hematuria with clots, CT w/o contrast was negative.  Urine culture from previous week was positive and did not get antibiotic, Urine culture in ED was negative 2/22/22.     3/16/22: follow up for cystosocpy, no further hematuria.     I personally reviewed the images: CT wo contrast 2/2022  No results found in the last 24 hours.        Lab Results   Component Value Date    CREATININE 0.7 02/22/2022       ---  Past Medical History:   Diagnosis Date    Allergy     Breast cancer, left     Cataract     Controlled type 2 diabetes mellitus with hyperglycemia, without long-term current use of insulin 03/22/2017    Hyperlipidemia associated with type 2 diabetes mellitus 03/22/2017    Hypertension complicating diabetes 03/22/2017    Inflammatory bowel disease 03/22/2017    Urinary tract infection        Past Surgical History:   Procedure Laterality Date    BREAST SURGERY      COLONOSCOPY N/A 1/18/2022    Procedure: COLONOSCOPY suprep;  Surgeon: Lawrence Garcia MD;  Location: [facility];  Service: Endoscopy;  Laterality: N/A;    HYSTERECTOMY      MASTECTOMY Left     for benign recurrent growth. Dr. Stephan Brown MD - [facility]    SIMPLE MASTECTOMY Left     TOTAL ABDOMINAL HYSTERECTOMY W/ BILATERAL SALPINGOOPHORECTOMY  2009    for benign cysts with concern for future malginancy.  Fibromoid uterus for AUB. Bengin results       Family History   Problem Relation Age of Onset    Breast cancer Neg Hx        Social History     Tobacco Use    Smoking status: Never Smoker    Smokeless tobacco: Never Used   Substance Use Topics    Alcohol use: Yes     Comment: not often    Drug use: No       Current Outpatient Medications on File Prior to Visit   Medication Sig Dispense Refill    atorvastatin (LIPITOR) 20 MG tablet TAKE 1 TABLET BY MOUTH EVERY DAY 90 tablet 1    balsalazide (COLAZAL) 750 mg capsule Take 3 capsules (2,250 mg total) by mouth 3 (three) times daily. 270 capsule 11    cholecalciferol, vitamin D3, 2,000 unit Cap Take 1 capsule (2,000 Units total) by mouth once daily.      ciclopirox (PENLAC) 8 % Soln Apply topically nightly. 6 mL 3    cyanocobalamin (VITAMIN B-12) 1000 MCG tablet Take 2 tablets (2,000 mcg total) by mouth once daily. 60 tablet 6    diclofenac sodium (VOLTAREN ARTHRITIS PAIN) 1 % Gel Apply 2 g topically 2 (two) times daily. 1 Tube 1    EScitalopram oxalate (LEXAPRO) 5 MG Tab TAKE 1 TABLET BY MOUTH EVERY DAY 90 tablet 1    ferrous sulfate (FEOSOL) 325 mg (65 mg iron) Tab tablet TAKE 1 TABLET BY MOUTH EVERY DAY WITH BREAKFAST 90 tablet 1    hydroCHLOROthiazide (MICROZIDE) 12.5 mg capsule Take 1 capsule (12.5 mg total) by mouth once daily. 90 capsule 2    losartan (COZAAR) 25 MG tablet Take 25 mg by mouth once daily.      metFORMIN (GLUCOPHAGE-XR) 500 MG ER 24hr tablet Take 1 tablet (500 mg total) by mouth once daily. 90 tablet 0    multivit-min-iron-FA-lutein (CENTRUM SILVER WOMEN) 8 mg iron-400 mcg-300 mcg Tab Take 1 tablet by mouth once daily at 6am.      olmesartan (BENICAR) 20 MG tablet Take 1 tablet (20 mg total) by mouth once daily. 90 tablet 2     No current facility-administered medications on file prior to visit.       Review of patient's allergies indicates:   Allergen Reactions    Ciprofloxacin hcl Rash       Review of Systems   Constitutional:  Negative for activity change, chills, fatigue and fever.   Respiratory: Negative for cough, chest tightness and shortness of breath.    Cardiovascular: Negative for chest pain.   Gastrointestinal: Negative for abdominal distention, abdominal pain, nausea and vomiting.   Genitourinary: Positive for hematuria. Negative for difficulty urinating, flank pain and pelvic pain.   Musculoskeletal: Negative for back pain and gait problem.   Psychiatric/Behavioral: Positive for behavioral problems.       Objective:      Physical Exam  HENT:      Head: Normocephalic.   Cardiovascular:      Pulses: Normal pulses.   Pulmonary:      Effort: Pulmonary effort is normal.   Abdominal:      General: Abdomen is flat. There is no distension.      Palpations: Abdomen is soft.      Tenderness: There is no abdominal tenderness. There is no right CVA tenderness, left CVA tenderness or guarding.   Genitourinary:     Comments: Urethral inflamed caruncle  Musculoskeletal:      Cervical back: Normal range of motion.   Skin:     General: Skin is warm.   Neurological:      General: No focal deficit present.      Mental Status: She is alert.         Assessment:     Problem Noted   Gross Hematuria 3/9/2022    75 yo F with gross hematuria.    --UTI 2/2022, gross hematuria after with clots, UCx at time of hematuria in ED negative  --CT w/o contrast 2/2022: left cyst, no acute findings     Uti (Urinary Tract Infection) 3/9/2022    E coli 2/22/22, treated, h/o recurrent UTIs years ago        Plan:   1. CT urogram  reviewed  2. Cystoscopy reviewed  3. Separate from procedure discussed caruncle and gross hematuria separately, only source found for hematuria is possible inflamed caruncle will start vaginal estrogen.  Side effects, risks, benefits and alternatives of the medication discussed.    4. Follow up in 6 months, will follow up cytology and call if any concern.     Kehinde Paige MD

## 2022-03-16 NOTE — PROCEDURES
"Cystoscopy    Date/Time: 3/16/2022 11:00 AM  Performed by: Kehinde Paige MD  Authorized by: Kehinde Paige MD     Consent Done?:  Yes (Written)  Time out: Immediately prior to procedure a "time out" was called to verify the correct patient, procedure, equipment, support staff and site/side marked as required.    Indications: hematuria    Position:  Dorsal lithotomy  Anesthesia:  Intraurethral instillation  Patient sedated?: No    Preparation: Patient was prepped and draped in usual sterile fashion      Scope type:  Flexible cystoscope  Stent inserted: No    Stent removed: No    External exam normal: No    Urethral Meatus Normal: No (Urethral caruncle, irrigated, bleeds easily)    Digital exam performed: No    Urethra normal: No (as above)  Bladder neck normal: Bladder neck normal   Bladder normal: Yes      Patient tolerance:  Patient tolerated the procedure well with no immediate complications      "

## 2022-03-18 LAB
FINAL PATHOLOGIC DIAGNOSIS: NORMAL
Lab: NORMAL

## 2022-04-07 ENCOUNTER — PATIENT OUTREACH (OUTPATIENT)
Dept: ADMINISTRATIVE | Facility: OTHER | Age: 77
End: 2022-04-07
Payer: MEDICARE

## 2022-04-07 NOTE — PROGRESS NOTES
Health Maintenance Due   Topic Date Due    Shingles Vaccine (1 of 2) Never done    Eye Exam  11/18/2021    DEXA Scan  03/29/2022     Updates were requested from care everywhere.  Chart was reviewed for overdue Proactive [facility] Encounters ([facility]) topics (CRS, Breast Cancer Screening, Eye exam)  Health Maintenance has been updated.  LINKS immunization registry triggered.  Immunizations were reconciled.

## 2022-04-08 ENCOUNTER — HOSPITAL ENCOUNTER (OUTPATIENT)
Dept: RADIOLOGY | Facility: HOSPITAL | Age: 77
Discharge: HOME | End: 2022-04-08
Attending: ORTHOPAEDIC SURGERY
Payer: MEDICARE

## 2022-04-08 ENCOUNTER — OFFICE VISIT (OUTPATIENT)
Dept: ORTHOPEDICS | Facility: CLINIC | Age: 77
End: 2022-04-08
Payer: MEDICARE

## 2022-04-08 VITALS — HEIGHT: 62 IN | WEIGHT: 141 LBS | BODY MASS INDEX: 25.95 KG/M2

## 2022-04-08 DIAGNOSIS — R93.7 BONE MARROW EDEMA: ICD-10-CM

## 2022-04-08 DIAGNOSIS — M17.12 PRIMARY OSTEOARTHRITIS OF LEFT KNEE: Primary | ICD-10-CM

## 2022-04-08 DIAGNOSIS — M17.12 PRIMARY OSTEOARTHRITIS OF LEFT KNEE: ICD-10-CM

## 2022-04-08 PROCEDURE — 99213 PR OFFICE/OUTPT VISIT, EST, LEVL III, 20-29 MIN: ICD-10-PCS | Mod: S$GLB,,, | Performed by: ORTHOPAEDIC SURGERY

## 2022-04-08 PROCEDURE — 99999 PR PBB SHADOW E&M-EST. PATIENT-LVL III: ICD-10-PCS | Mod: PBBFAC,,, | Performed by: ORTHOPAEDIC SURGERY

## 2022-04-08 PROCEDURE — 73562 XR KNEE ORTHO LEFT: ICD-10-PCS | Mod: 26,LT,, | Performed by: RADIOLOGY

## 2022-04-08 PROCEDURE — 3288F FALL RISK ASSESSMENT DOCD: CPT | Mod: CPTII,S$GLB,, | Performed by: ORTHOPAEDIC SURGERY

## 2022-04-08 PROCEDURE — 73562 X-RAY EXAM OF KNEE 3: CPT | Mod: TC,PN,LT

## 2022-04-08 PROCEDURE — 73560 X-RAY EXAM OF KNEE 1 OR 2: CPT | Mod: 26,RT,, | Performed by: RADIOLOGY

## 2022-04-08 PROCEDURE — 1159F PR MEDICATION LIST DOCUMENTED IN MEDICAL RECORD: ICD-10-PCS | Mod: CPTII,S$GLB,, | Performed by: ORTHOPAEDIC SURGERY

## 2022-04-08 PROCEDURE — 1126F PR PAIN SEVERITY QUANTIFIED, NO PAIN PRESENT: ICD-10-PCS | Mod: CPTII,S$GLB,, | Performed by: ORTHOPAEDIC SURGERY

## 2022-04-08 PROCEDURE — 73562 X-RAY EXAM OF KNEE 3: CPT | Mod: 26,LT,, | Performed by: RADIOLOGY

## 2022-04-08 PROCEDURE — 1101F PR PT FALLS ASSESS DOC 0-1 FALLS W/OUT INJ PAST YR: ICD-10-PCS | Mod: CPTII,S$GLB,, | Performed by: ORTHOPAEDIC SURGERY

## 2022-04-08 PROCEDURE — 1159F MED LIST DOCD IN RCRD: CPT | Mod: CPTII,S$GLB,, | Performed by: ORTHOPAEDIC SURGERY

## 2022-04-08 PROCEDURE — 99213 OFFICE O/P EST LOW 20 MIN: CPT | Mod: S$GLB,,, | Performed by: ORTHOPAEDIC SURGERY

## 2022-04-08 PROCEDURE — 99999 PR PBB SHADOW E&M-EST. PATIENT-LVL III: CPT | Mod: PBBFAC,,, | Performed by: ORTHOPAEDIC SURGERY

## 2022-04-08 PROCEDURE — 1126F AMNT PAIN NOTED NONE PRSNT: CPT | Mod: CPTII,S$GLB,, | Performed by: ORTHOPAEDIC SURGERY

## 2022-04-08 PROCEDURE — 73560 X-RAY EXAM OF KNEE 1 OR 2: CPT | Mod: TC,PN,RT

## 2022-04-08 PROCEDURE — 1160F PR REVIEW ALL MEDS BY PRESCRIBER/CLIN PHARMACIST DOCUMENTED: ICD-10-PCS | Mod: CPTII,S$GLB,, | Performed by: ORTHOPAEDIC SURGERY

## 2022-04-08 PROCEDURE — 1160F RVW MEDS BY RX/DR IN RCRD: CPT | Mod: CPTII,S$GLB,, | Performed by: ORTHOPAEDIC SURGERY

## 2022-04-08 PROCEDURE — 73560 XR KNEE ORTHO LEFT: ICD-10-PCS | Mod: 26,RT,, | Performed by: RADIOLOGY

## 2022-04-08 PROCEDURE — 3288F PR FALLS RISK ASSESSMENT DOCUMENTED: ICD-10-PCS | Mod: CPTII,S$GLB,, | Performed by: ORTHOPAEDIC SURGERY

## 2022-04-08 PROCEDURE — 1101F PT FALLS ASSESS-DOCD LE1/YR: CPT | Mod: CPTII,S$GLB,, | Performed by: ORTHOPAEDIC SURGERY

## 2022-04-08 NOTE — PROGRESS NOTES
Subjective:      Patient ID: Aretha Nguyen is a 76 y.o. female.    Chief Complaint: Follow-up of the Left Knee    HPI    Follow-up for osteoarthritis and bone marrow edema.  The patient reports that her left knee is now very comfortable.  She reports walking without discomfort and has no limitation of ADLs at this time.          Review of Systems   Constitutional: Negative for fever and weight loss.   HENT: Negative for congestion.    Eyes: Negative for visual disturbance.   Cardiovascular: Negative for chest pain.   Respiratory: Negative for shortness of breath.    Hematologic/Lymphatic: Negative for bleeding problem. Does not bruise/bleed easily.   Skin: Negative for poor wound healing.   Gastrointestinal: Negative for abdominal pain.   Genitourinary: Negative for dysuria.   Neurological: Negative for seizures.   Psychiatric/Behavioral: Negative for altered mental status.   Allergic/Immunologic: Negative for persistent infections.         Objective:      Ortho/SPM Exam      Left knee    The patient is not in acute distress.   Body habitus is normal.   Sclera appear normal  No respiratory distress  The patient walks without a limp.  Resisted SLR negative.   The skin over the knee is intact.  Knee effusion 0  Tendernes is located absent.  Range of motion- Flexion full, Extension full.   Ligament exam:   MCL trace valgus laxity   Lachman intact              Post sag intact    LCL intact  Patellar apprehension negative.  Popliteal cyst negative  Patellar crepitation absent.  Flexion/pinch negative.  Pulses DP present, PT present.  Motor normal 5/5 strength in all tested muscle groups.   Sensory normal.    I reviewed the relevant imaging for the patient's condition:  Left knee films show 50% medial narrowing        Assessment:       Encounter Diagnosis   Name Primary?    Primary osteoarthritis of left knee Yes        The osteoarthritic process is structurally mild to moderate.  The bone marrow edema is  resolved.    Progression may occur in the future          Plan:       Aretha was seen today for follow-up.    Diagnoses and all orders for this visit:    Primary osteoarthritis of left knee          I explained my diagnostic impression and the reasoning behind it in detail, using layman's terms.  Models and/or pictures were used to help in the explanation.    Gradual resumption of normal activity recommended an explained    If the arthritic condition progresses, arthroplasty could be considered in the future    X-ray in 6 months

## 2022-04-25 DIAGNOSIS — F33.9 RECURRENT DEPRESSION: ICD-10-CM

## 2022-04-25 RX ORDER — ESCITALOPRAM OXALATE 5 MG/1
5 TABLET ORAL DAILY
Qty: 90 TABLET | Refills: 1 | Status: SHIPPED | OUTPATIENT
Start: 2022-04-25 | End: 2022-10-12

## 2022-04-25 NOTE — TELEPHONE ENCOUNTER
----- Message from Renata Zurita sent at 4/25/2022  9:43 AM CDT -----  Type:  RX Refill Request    Who Called: pt  Refill or New Rx:refill  RX Name and Strength:EScitalopram oxalate (LEXAPRO) 5 MG Tab  How is the patient currently taking it? (ex. 1XDay):TAKE 1 TABLET BY MOUTH EVERY DAY  Is this a 30 day or 90 day RX:90  Preferred Pharmacy with phone number:[facility]/pharm[facility] #[id number] - [facility] - [address] AT [facility]   Phone: [phone]  Fax:  [phone]        Local or Mail Order:local  Ordering Provider:Dr Floyd  Would the patient rather a call back or a response via [facility]? call  Best Call Back Number:[phone]  Additional Information:

## 2022-04-25 NOTE — TELEPHONE ENCOUNTER
Care Due:                  Date            Visit Type   Department     Provider  --------------------------------------------------------------------------------                                EP -                              [facility]  Last Visit: 01-      CARE ([facility])   MEDICINE       Brittany Floyd                               -                              [facility]  Next Visit: 07-      CARE ([facility])   MEDICINE       Brittany  Mariel                                                            Last  Test          Frequency    Reason                     Performed    Due Date  --------------------------------------------------------------------------------    HBA1C.......  6 months...  metFORMIN................  09- 03-    Powered by [employer] by [employer]. Reference number: [id number].   4/25/2022 10:30:30 AM CDT

## 2022-04-28 DIAGNOSIS — E11.65 CONTROLLED TYPE 2 DIABETES MELLITUS WITH HYPERGLYCEMIA, WITHOUT LONG-TERM CURRENT USE OF INSULIN: ICD-10-CM

## 2022-04-28 NOTE — TELEPHONE ENCOUNTER
Refill Routing Note   Medication(s) are not appropriate for processing by [facility] for the following reason(s):      - Patient has been seen in the ED/Hospital since the last PCP visit    [facility] action(s):  Defer          Medication reconciliation completed: No     Appointments  past 12m or future 3m with PCP    Date Provider   Last Visit   1/28/2022 Brittany Floyd MD   Next Visit   7/29/2022 Brittany Floyd MD   ED visits in past 90 days: 1        Note composed:9:57 AM 04/28/2022

## 2022-04-28 NOTE — TELEPHONE ENCOUNTER
No new care gaps identified.  Powered by [employer]. Reference number: [id number].   4/28/2022 12:18:47 AM CDT

## 2022-04-29 RX ORDER — ATORVASTATIN CALCIUM 20 MG/1
TABLET, FILM COATED ORAL
Qty: 90 TABLET | Refills: 1 | Status: SHIPPED | OUTPATIENT
Start: 2022-04-29 | End: 2022-10-26

## 2022-05-04 DIAGNOSIS — E11.65 CONTROLLED TYPE 2 DIABETES MELLITUS WITH HYPERGLYCEMIA, WITHOUT LONG-TERM CURRENT USE OF INSULIN: ICD-10-CM

## 2022-05-04 RX ORDER — METFORMIN HYDROCHLORIDE 500 MG/1
TABLET, EXTENDED RELEASE ORAL
Qty: 90 TABLET | Refills: 0 | Status: SHIPPED | OUTPATIENT
Start: 2022-05-04 | End: 2022-07-29

## 2022-05-04 NOTE — TELEPHONE ENCOUNTER
Refill Routing Note   Medication(s) are not appropriate for processing by [facility] for the following reason(s):      - Required laboratory values are outdated  - Patient has been seen in the ED/Hospital since the last PCP visit    [facility] action(s):  Defer          Medication reconciliation completed: No     Appointments  past 12m or future 3m with PCP    Date Provider   Last Visit   1/28/2022 Brittany Floyd MD   Next Visit   7/29/2022 Brittany Floyd MD   ED visits in past 90 days: 1        Note composed:7:22 AM 05/04/2022

## 2022-05-04 NOTE — TELEPHONE ENCOUNTER
No new care gaps identified.  [facility] Embedded Care Gaps. Reference number: [id number]. 5/04/2022   12:19:19 AM TET

## 2022-05-31 ENCOUNTER — PATIENT MESSAGE (OUTPATIENT)
Dept: ADMINISTRATIVE | Facility: HOSPITAL | Age: 77
End: 2022-05-31
Payer: MEDICARE

## 2022-07-19 ENCOUNTER — PATIENT MESSAGE (OUTPATIENT)
Dept: RESEARCH | Facility: CLINIC | Age: 77
End: 2022-07-19
Payer: MEDICARE

## 2022-07-22 ENCOUNTER — LAB VISIT (OUTPATIENT)
Dept: LAB | Facility: HOSPITAL | Age: 77
End: 2022-07-22
Attending: INTERNAL MEDICINE
Payer: MEDICARE

## 2022-07-22 DIAGNOSIS — D50.0 IRON DEFICIENCY ANEMIA DUE TO CHRONIC BLOOD LOSS: ICD-10-CM

## 2022-07-22 DIAGNOSIS — E11.65 CONTROLLED TYPE 2 DIABETES MELLITUS WITH HYPERGLYCEMIA, WITHOUT LONG-TERM CURRENT USE OF INSULIN: ICD-10-CM

## 2022-07-22 DIAGNOSIS — E53.8 B12 DEFICIENCY: ICD-10-CM

## 2022-07-22 LAB
ALBUMIN SERPL BCP-MCNC: 3.7 G/DL (ref 3.5–5.2)
ALP SERPL-CCNC: 76 U/L (ref 55–135)
ALT SERPL W/O P-5'-P-CCNC: 7 U/L (ref 10–44)
ANION GAP SERPL CALC-SCNC: 10 MMOL/L (ref 8–16)
AST SERPL-CCNC: 19 U/L (ref 10–40)
BASOPHILS # BLD AUTO: 0.05 K/UL (ref 0–0.2)
BASOPHILS NFR BLD: 0.9 % (ref 0–1.9)
BILIRUB SERPL-MCNC: 0.7 MG/DL (ref 0.1–1)
BUN SERPL-MCNC: 11 MG/DL (ref 8–23)
CALCIUM SERPL-MCNC: 9.9 MG/DL (ref 8.7–10.5)
CHLORIDE SERPL-SCNC: 101 MMOL/L (ref 95–110)
CO2 SERPL-SCNC: 28 MMOL/L (ref 23–29)
CREAT SERPL-MCNC: 0.8 MG/DL (ref 0.5–1.4)
DIFFERENTIAL METHOD: ABNORMAL
EOSINOPHIL # BLD AUTO: 0.3 K/UL (ref 0–0.5)
EOSINOPHIL NFR BLD: 5 % (ref 0–8)
ERYTHROCYTE [DISTWIDTH] IN BLOOD BY AUTOMATED COUNT: 14.9 % (ref 11.5–14.5)
EST. GFR  (AFRICAN AMERICAN): >60 ML/MIN/1.73 M^2
EST. GFR  (NON AFRICAN AMERICAN): >60 ML/MIN/1.73 M^2
ESTIMATED AVG GLUCOSE: 146 MG/DL (ref 68–131)
FERRITIN SERPL-MCNC: 83 NG/ML (ref 20–300)
GLUCOSE SERPL-MCNC: 100 MG/DL (ref 70–110)
HBA1C MFR BLD: 6.7 % (ref 4–5.6)
HCT VFR BLD AUTO: 35.6 % (ref 37–48.5)
HGB BLD-MCNC: 11.2 G/DL (ref 12–16)
IMM GRANULOCYTES # BLD AUTO: 0.01 K/UL (ref 0–0.04)
IMM GRANULOCYTES NFR BLD AUTO: 0.2 % (ref 0–0.5)
IRON SERPL-MCNC: 82 UG/DL (ref 30–160)
LYMPHOCYTES # BLD AUTO: 1.5 K/UL (ref 1–4.8)
LYMPHOCYTES NFR BLD: 26.7 % (ref 18–48)
MCH RBC QN AUTO: 27.3 PG (ref 27–31)
MCHC RBC AUTO-ENTMCNC: 31.5 G/DL (ref 32–36)
MCV RBC AUTO: 87 FL (ref 82–98)
MONOCYTES # BLD AUTO: 0.6 K/UL (ref 0.3–1)
MONOCYTES NFR BLD: 9.7 % (ref 4–15)
NEUTROPHILS # BLD AUTO: 3.3 K/UL (ref 1.8–7.7)
NEUTROPHILS NFR BLD: 57.5 % (ref 38–73)
NRBC BLD-RTO: 0 /100 WBC
PLATELET # BLD AUTO: 277 K/UL (ref 150–450)
PMV BLD AUTO: 10.3 FL (ref 9.2–12.9)
POTASSIUM SERPL-SCNC: 3.7 MMOL/L (ref 3.5–5.1)
PROT SERPL-MCNC: 7.7 G/DL (ref 6–8.4)
RBC # BLD AUTO: 4.1 M/UL (ref 4–5.4)
SATURATED IRON: 23 % (ref 20–50)
SODIUM SERPL-SCNC: 139 MMOL/L (ref 136–145)
TOTAL IRON BINDING CAPACITY: 355 UG/DL (ref 250–450)
TRANSFERRIN SERPL-MCNC: 240 MG/DL (ref 200–375)
VIT B12 SERPL-MCNC: 725 PG/ML (ref 210–950)
WBC # BLD AUTO: 5.65 K/UL (ref 3.9–12.7)

## 2022-07-22 PROCEDURE — 80053 COMPREHEN METABOLIC PANEL: CPT | Performed by: INTERNAL MEDICINE

## 2022-07-22 PROCEDURE — 82607 VITAMIN B-12: CPT | Performed by: INTERNAL MEDICINE

## 2022-07-22 PROCEDURE — 85025 COMPLETE CBC W/AUTO DIFF WBC: CPT | Performed by: INTERNAL MEDICINE

## 2022-07-22 PROCEDURE — 82728 ASSAY OF FERRITIN: CPT | Performed by: INTERNAL MEDICINE

## 2022-07-22 PROCEDURE — 83036 HEMOGLOBIN GLYCOSYLATED A1C: CPT | Performed by: INTERNAL MEDICINE

## 2022-07-22 PROCEDURE — 36415 COLL VENOUS BLD VENIPUNCTURE: CPT | Performed by: INTERNAL MEDICINE

## 2022-07-22 PROCEDURE — 84466 ASSAY OF TRANSFERRIN: CPT | Performed by: INTERNAL MEDICINE

## 2022-07-29 ENCOUNTER — NURSE TRIAGE (OUTPATIENT)
Dept: ADMINISTRATIVE | Facility: CLINIC | Age: 77
End: 2022-07-29
Payer: MEDICARE

## 2022-07-29 ENCOUNTER — OFFICE VISIT (OUTPATIENT)
Dept: FAMILY MEDICINE | Facility: CLINIC | Age: 77
End: 2022-07-29
Payer: MEDICARE

## 2022-07-29 VITALS
BODY MASS INDEX: 26.37 KG/M2 | WEIGHT: 143.31 LBS | HEIGHT: 62 IN | DIASTOLIC BLOOD PRESSURE: 82 MMHG | SYSTOLIC BLOOD PRESSURE: 126 MMHG | OXYGEN SATURATION: 97 % | HEART RATE: 68 BPM

## 2022-07-29 DIAGNOSIS — I15.2 HYPERTENSION COMPLICATING DIABETES: ICD-10-CM

## 2022-07-29 DIAGNOSIS — E78.5 HYPERLIPIDEMIA ASSOCIATED WITH TYPE 2 DIABETES MELLITUS: ICD-10-CM

## 2022-07-29 DIAGNOSIS — E11.59 HYPERTENSION COMPLICATING DIABETES: ICD-10-CM

## 2022-07-29 DIAGNOSIS — D50.0 IRON DEFICIENCY ANEMIA DUE TO CHRONIC BLOOD LOSS: ICD-10-CM

## 2022-07-29 DIAGNOSIS — E11.65 CONTROLLED TYPE 2 DIABETES MELLITUS WITH HYPERGLYCEMIA, WITHOUT LONG-TERM CURRENT USE OF INSULIN: Primary | ICD-10-CM

## 2022-07-29 DIAGNOSIS — Z78.0 POSTMENOPAUSE: ICD-10-CM

## 2022-07-29 DIAGNOSIS — E11.69 HYPERLIPIDEMIA ASSOCIATED WITH TYPE 2 DIABETES MELLITUS: ICD-10-CM

## 2022-07-29 PROCEDURE — 99214 PR OFFICE/OUTPT VISIT, EST, LEVL IV, 30-39 MIN: ICD-10-PCS | Mod: S$GLB,,, | Performed by: INTERNAL MEDICINE

## 2022-07-29 PROCEDURE — 1159F PR MEDICATION LIST DOCUMENTED IN MEDICAL RECORD: ICD-10-PCS | Mod: CPTII,S$GLB,, | Performed by: INTERNAL MEDICINE

## 2022-07-29 PROCEDURE — 1160F PR REVIEW ALL MEDS BY PRESCRIBER/CLIN PHARMACIST DOCUMENTED: ICD-10-PCS | Mod: CPTII,S$GLB,, | Performed by: INTERNAL MEDICINE

## 2022-07-29 PROCEDURE — 3288F PR FALLS RISK ASSESSMENT DOCUMENTED: ICD-10-PCS | Mod: CPTII,S$GLB,, | Performed by: INTERNAL MEDICINE

## 2022-07-29 PROCEDURE — 1160F RVW MEDS BY RX/DR IN RCRD: CPT | Mod: CPTII,S$GLB,, | Performed by: INTERNAL MEDICINE

## 2022-07-29 PROCEDURE — 99499 RISK ADDL DX/OHS AUDIT: ICD-10-PCS | Mod: S$GLB,,, | Performed by: INTERNAL MEDICINE

## 2022-07-29 PROCEDURE — 3288F FALL RISK ASSESSMENT DOCD: CPT | Mod: CPTII,S$GLB,, | Performed by: INTERNAL MEDICINE

## 2022-07-29 PROCEDURE — 99999 PR PBB SHADOW E&M-EST. PATIENT-LVL V: CPT | Mod: PBBFAC,,, | Performed by: INTERNAL MEDICINE

## 2022-07-29 PROCEDURE — 99499 UNLISTED E&M SERVICE: CPT | Mod: S$GLB,,, | Performed by: INTERNAL MEDICINE

## 2022-07-29 PROCEDURE — 1101F PR PT FALLS ASSESS DOC 0-1 FALLS W/OUT INJ PAST YR: ICD-10-PCS | Mod: CPTII,S$GLB,, | Performed by: INTERNAL MEDICINE

## 2022-07-29 PROCEDURE — 1126F PR PAIN SEVERITY QUANTIFIED, NO PAIN PRESENT: ICD-10-PCS | Mod: CPTII,S$GLB,, | Performed by: INTERNAL MEDICINE

## 2022-07-29 PROCEDURE — 1126F AMNT PAIN NOTED NONE PRSNT: CPT | Mod: CPTII,S$GLB,, | Performed by: INTERNAL MEDICINE

## 2022-07-29 PROCEDURE — 3074F PR MOST RECENT SYSTOLIC BLOOD PRESSURE < 130 MM HG: ICD-10-PCS | Mod: CPTII,S$GLB,, | Performed by: INTERNAL MEDICINE

## 2022-07-29 PROCEDURE — 3074F SYST BP LT 130 MM HG: CPT | Mod: CPTII,S$GLB,, | Performed by: INTERNAL MEDICINE

## 2022-07-29 PROCEDURE — 1159F MED LIST DOCD IN RCRD: CPT | Mod: CPTII,S$GLB,, | Performed by: INTERNAL MEDICINE

## 2022-07-29 PROCEDURE — 3044F PR MOST RECENT HEMOGLOBIN A1C LEVEL <7.0%: ICD-10-PCS | Mod: CPTII,S$GLB,, | Performed by: INTERNAL MEDICINE

## 2022-07-29 PROCEDURE — 99214 OFFICE O/P EST MOD 30 MIN: CPT | Mod: S$GLB,,, | Performed by: INTERNAL MEDICINE

## 2022-07-29 PROCEDURE — 3044F HG A1C LEVEL LT 7.0%: CPT | Mod: CPTII,S$GLB,, | Performed by: INTERNAL MEDICINE

## 2022-07-29 PROCEDURE — 99999 PR PBB SHADOW E&M-EST. PATIENT-LVL V: ICD-10-PCS | Mod: PBBFAC,,, | Performed by: INTERNAL MEDICINE

## 2022-07-29 PROCEDURE — 1101F PT FALLS ASSESS-DOCD LE1/YR: CPT | Mod: CPTII,S$GLB,, | Performed by: INTERNAL MEDICINE

## 2022-07-29 PROCEDURE — 3079F DIAST BP 80-89 MM HG: CPT | Mod: CPTII,S$GLB,, | Performed by: INTERNAL MEDICINE

## 2022-07-29 PROCEDURE — 3079F PR MOST RECENT DIASTOLIC BLOOD PRESSURE 80-89 MM HG: ICD-10-PCS | Mod: CPTII,S$GLB,, | Performed by: INTERNAL MEDICINE

## 2022-07-29 RX ORDER — METFORMIN HYDROCHLORIDE 500 MG/1
1000 TABLET, EXTENDED RELEASE ORAL DAILY
Qty: 90 TABLET | Refills: 0 | Status: SHIPPED | OUTPATIENT
Start: 2022-07-29 | End: 2022-09-07

## 2022-07-29 RX ORDER — FERROUS SULFATE 325(65) MG
TABLET ORAL
Qty: 90 TABLET | Refills: 2 | Status: SHIPPED | OUTPATIENT
Start: 2022-07-29 | End: 2023-04-27

## 2022-07-29 NOTE — TELEPHONE ENCOUNTER
Patient called to schedule second Phizer Covid-19 Booster. Appointment scheduled via [facility] for 8/04/22 @ 9:15 AM at the [facility], [address] [place] [address] [facility].    Patient advised of appointment for second Covid-19 Phizer Booster and stated understanding of date/time/location of appointment for second Covid-19 Booster.      Reason for Disposition   Information only question and nurse able to answer    Additional Information   Negative: Nursing judgment   Negative: Nursing judgment   Negative: Nursing judgment   Negative: Nursing judgment    Protocols used: INFORMATION ONLY CALL - NO TRIAGE-A-[place]

## 2022-07-29 NOTE — PROGRESS NOTES
Subjective:       Patient ID: Aretha Nguyen is a 76 y.o. female.    Chief Complaint: Hypertension (6 month )      HPI  Aretha Nguyen is a 76 y.o. female with chronic conditions of DM type 2, HLD, HTN, IBS, Breast cancer who presents today for routine health maintenance.    Patient reports she feels okay.  Has recover from previous joint pains without limitations with mobility.     Compliant with medications with stable blood pressure and no headaches, chest pains, palpitations, or leg swelling.    Had labs showing stable anemia and normal iron.  Metabolic panel within normal limits.  B12 levels are normal.    Her A1c has increased to 6.7.  Has not been strict with her diet.  She takes metformin daily.  Weight has been stable.    Has no toxic habits.    Has COVID vaccines x3.    Has eye exam scheduled.    Health Maintenance:  Health Maintenance   Topic Date Due    Eye Exam  11/18/2021    DEXA Scan  03/29/2022    Lipid Panel  09/21/2022    Hemoglobin A1c  01/22/2023    Foot Exam  01/28/2023    Low Dose Statin  07/29/2023    TETANUS VACCINE  10/26/2027    Hepatitis C Screening  Completed       Review of Systems   Constitutional: Negative.  Negative for fever and unexpected weight change.   HENT: Negative.    Respiratory: Negative.    Cardiovascular: Negative.    Gastrointestinal: Negative.    Genitourinary: Negative for difficulty urinating.   Musculoskeletal: Negative.    Integumentary:  Negative.   Neurological: Negative.    Psychiatric/Behavioral: Negative.       Past Medical History:   Diagnosis Date    Allergy     Breast cancer, left     Cataract     Controlled type 2 diabetes mellitus with hyperglycemia, without long-term current use of insulin 03/22/2017    Hyperlipidemia associated with type 2 diabetes mellitus 03/22/2017    Hypertension complicating diabetes 03/22/2017    Inflammatory bowel disease 03/22/2017    Urinary tract infection        Past Surgical History:   Procedure Laterality Date     BREAST SURGERY      COLONOSCOPY N/A 1/18/2022    Procedure: COLONOSCOPY suprep;  Surgeon: Lawrence Garcia MD;  Location[facility];  Service: Endoscopy;  Laterality: N/A;    HYSTERECTOMY      MASTECTOMY Left     for benign recurrent growth. Dr. Stephan Brown MD - [place], [place] - General Surgery & Surgery    SIMPLE MASTECTOMY Left     TOTAL ABDOMINAL HYSTERECTOMY W/ BILATERAL SALPINGOOPHORECTOMY  2009    for benign cysts with concern for future malginancy. Fibromoid uterus for AUB. Bengin results       Family History   Problem Relation Age of Onset    Breast cancer Neg Hx        Social History     Socioeconomic History    Marital status:    Tobacco Use    Smoking status: Never Smoker    Smokeless tobacco: Never Used   Substance and Sexual Activity    Alcohol use: Yes     Comment: not often    Drug use: No    Sexual activity: Yes     Partners: Male   Social History Narrative    [facility] - [facility]        Dr. Stephan Brown MD - [place], [place] - General Surgery & Surgery - mammograms        Current Outpatient Medications   Medication Sig Dispense Refill    atorvastatin (LIPITOR) 20 MG tablet TAKE 1 TABLET BY MOUTH EVERY DAY 90 tablet 1    balsalazide (COLAZAL) 750 mg capsule Take 3 capsules (2,250 mg total) by mouth 3 (three) times daily. 270 capsule 11    cholecalciferol, vitamin D3, 2,000 unit Cap Take 1 capsule (2,000 Units total) by mouth once daily.      ciclopirox (PENLAC) 8 % Soln Apply topically nightly. 6 mL 3    cyanocobalamin (VITAMIN B-12) 1000 MCG tablet Take 2 tablets (2,000 mcg total) by mouth once daily. 60 tablet 6    diclofenac sodium (VOLTAREN ARTHRITIS PAIN) 1 % Gel Apply 2 g topically 2 (two) times daily. 1 Tube 1    EScitalopram oxalate (LEXAPRO) 5 MG Tab Take 1 tablet (5 mg total) by mouth once daily. 90 tablet 1    estradioL (ESTRACE) 0.01 % (0.1 mg/gram) vaginal cream Place 1 g vaginally twice a week. 42.5 g 3     hydroCHLOROthiazide (MICROZIDE) 12.5 mg capsule Take 1 capsule (12.5 mg total) by mouth once daily. 90 capsule 2    losartan (COZAAR) 25 MG tablet Take 25 mg by mouth once daily.      multivit-min-iron-FA-lutein (CENTRUM SILVER WOMEN) 8 mg iron-400 mcg-300 mcg Tab Take 1 tablet by mouth once daily at 6am.      olmesartan (BENICAR) 20 MG tablet Take 1 tablet (20 mg total) by mouth once daily. 90 tablet 2    ferrous sulfate (FEOSOL) 325 mg (65 mg iron) Tab tablet TAKE 1 TABLET BY MOUTH EVERY DAY WITH BREAKFAST 90 tablet 2    metFORMIN (GLUCOPHAGE-XR) 500 MG ER 24hr tablet Take 2 tablets (1,000 mg total) by mouth once daily. 90 tablet 0     No current facility-administered medications for this visit.       Review of patient's allergies indicates:   Allergen Reactions    Ciprofloxacin hcl Rash         Objective:       Last 3 sets of Vitals    Vitals - 1 value per visit 4/8/2022 7/29/2022 7/29/2022   SYSTOLIC - - 126   DIASTOLIC - - 82   Pulse - - 68   Temp - - -   Resp - - -   SPO2 - - 97   Weight (lb) 141 - 143.3   Weight (kg) 63.957 - 65   Height 62 - 62   BMI (Calculated) 25.8 - 26.2   VISIT REPORT - - -   Pain Score  - 0 -   Some recent data might be hidden   Physical Exam  Constitutional:       General: She is not in acute distress.  HENT:      Head: Normocephalic.      Right Ear: Tympanic membrane, ear canal and external ear normal.      Left Ear: Tympanic membrane, ear canal and external ear normal.      Nose: Nose normal.      Mouth/Throat:      Mouth: Mucous membranes are moist.   Eyes:      General: No scleral icterus.     Extraocular Movements: Extraocular movements intact.      Conjunctiva/sclera: Conjunctivae normal.   Neck:      Vascular: No carotid bruit.      Comments: No goiter.  Cardiovascular:      Rate and Rhythm: Normal rate and regular rhythm.      Pulses: Normal pulses.      Heart sounds: Normal heart sounds.   Pulmonary:      Effort: Pulmonary effort is normal.      Breath sounds: Normal  breath sounds.   Abdominal:      General: Bowel sounds are normal. There is no distension.      Palpations: Abdomen is soft. There is no mass.      Tenderness: There is no abdominal tenderness.   Musculoskeletal:         General: No swelling.   Lymphadenopathy:      Cervical: No cervical adenopathy.   Skin:     General: Skin is warm and dry.   Neurological:      General: No focal deficit present.      Mental Status: She is alert and oriented to person, place, and time.   Psychiatric:         Mood and Affect: Mood normal.         Behavior: Behavior normal.       Protective Sensation (w/ 10 gram monofilament):  Right: Intact  Left: Intact    Visual Inspection:  Onychomycosis -  Bilateral    Pedal Pulses:   Right: Present  Left: Present    Posterior tibialis:   Right:Present  Left: Present      CBC:  Recent Labs   Lab 07/30/21  1032 02/22/22  1704 07/22/22  0807   WBC 7.79 8.09 5.65   RBC 3.98 L 3.95 L 4.10   Hemoglobin 11.2 L 11.0 L 11.2 L   Hematocrit 35.3 L 34.3 L 35.6 L   Platelets 265 310 277   MCV 89 87 87   MCH 28.1 27.8 27.3   MCHC 31.7 L 32.1 31.5 L     CMP:  Recent Labs   Lab 09/21/21  1058 02/22/22  1704 07/22/22  0807   Glucose 107 102 100   Calcium 10.4 9.3 9.9   Albumin 4.0 4.0 3.7   Total Protein 8.0 7.8 7.7   Sodium 140 137 139   Potassium 4.1 4.1 3.7   CO2 28 31 H 28   Chloride 103 97 101   BUN 12 10 11   Creatinine 0.8 0.7 0.8   Alkaline Phosphatase 74 95 76   ALT 7 L 12 7 L   AST 21 26 19   Total Bilirubin 0.5 0.4 0.7     URINALYSIS:  Recent Labs   Lab 02/22/22  1640 03/09/22  1502   Color, UA Colorless A Yellow   Specific Gravity, UA 1.005  --    Spec Grav UA  --  1.015   pH, UA 7.0 7   Protein, UA Negative  --    Bacteria Few A  --    Nitrite, UA Negative neg   Leukocytes, UA 1+ A  --    Urobilinogen, UA Negative norm      LIPIDS:  Recent Labs   Lab 12/04/20  0900 06/11/21  0913 06/11/21  0924 09/21/21  1058   TSH  --   --  3.239  --    HDL 60 42  --  52   Cholesterol 175 165  --  174    Triglycerides 151 H 139  --  131   LDL Cholesterol 84.8 95.2  --  95.8   HDL/Cholesterol Ratio 34.3 25.5  --  29.9   Non-HDL Cholesterol 115 123  --  122   Total Cholesterol/HDL Ratio 2.9 3.9  --  3.3     TSH:  Recent Labs   Lab 06/11/21  0924   TSH 3.239       A1C:  Recent Labs   Lab 01/09/20  1007 12/04/20  0900 06/11/21  0913 09/21/21  1058 07/22/22  0807   Hemoglobin A1C 6.2 H 6.0 H 5.6 5.8 H 6.7 H       Imaging:  X-ray Knee Ortho Left  Narrative: EXAMINATION:  XR KNEE ORTHO LEFT    CLINICAL HISTORY:  Unilateral primary osteoarthritis, left knee    TECHNIQUE:  AP and lateral view and patellar views    COMPARISON:  12/02/2021    FINDINGS:  The knee joint space is preserved.  There is no fracture, no osseous lesions.  No significant osteophyte formation.  No sclerosis or geodes.    The femoral patellar joint space is preserved, no osteophyte formation at the femoral patellar joint.  Small joint effusion.    The soft tissues appear normal.  Impression: Small joint effusion.    No significant degenerative change.    Electronically signed by: Elsa Rodríguez MD  Date:    04/08/2022  Time:    11:25      Assessment:       1. Controlled type 2 diabetes mellitus with hyperglycemia, without long-term current use of insulin    2. Hypertension complicating diabetes    3. Iron deficiency anemia due to chronic blood loss    4. Hyperlipidemia associated with type 2 diabetes mellitus    5. Postmenopause            Plan:       Aretha was seen today for hypertension.    Diagnoses and all orders for this visit:    Controlled type 2 diabetes mellitus with hyperglycemia, without long-term current use of insulin  -    increasing A1c.  Will increase metformin to twice a day.  - Ambulatory referral/consult to Diabetes Education; Future  -     metFORMIN (GLUCOPHAGE-XR) 500 MG ER 24hr tablet; Take 2 tablets (1,000 mg total) by mouth once daily.  -     Microalbumin/Creatinine Ratio, Urine  -     Hemoglobin A1C; Future  -      Ambulatory referral/consult to Ophthalmology; Future  - Lifestyle modification with exercise, weight monitoring, and diet.  Advised to plan his meals, journal, and have others join plan.      Hypertension complicating diabetes   - controlled on HCTZ and losartan (need to clarify Benicar which is still on her list).    Iron deficiency anemia due to chronic blood loss  -     ferrous sulfate (FEOSOL) 325 mg (65 mg iron) Tab tablet; TAKE 1 TABLET BY MOUTH EVERY DAY WITH BREAKFAST  - Stable.    Hyperlipidemia associated with type 2 diabetes mellitus  -     Lipid Panel; Future  - on Lipitor    Postmenopause  -     DXA Bone Density Spine And Hip; Future      Health Maintenance Due   Topic Date Due    Shingles Vaccine (1 of 2) Never done    Eye Exam  11/18/2021    COVID-19 Vaccine (4 - Booster for Pfizer series) 02/21/2022    DEXA Scan  03/29/2022    Diabetes Urine Screening  09/21/2022        Return to clinic in 3 months.    Brittany Floyd MD  [facility]  Disclaimer:  This note has been generated using voice-recognition software. There may be grammatical or spelling errors that have been missed during proof-reading

## 2022-08-01 ENCOUNTER — HOSPITAL ENCOUNTER (OUTPATIENT)
Dept: RADIOLOGY | Facility: HOSPITAL | Age: 77
Discharge: HOME | End: 2022-08-01
Attending: INTERNAL MEDICINE
Payer: MEDICARE

## 2022-08-01 ENCOUNTER — TELEPHONE (OUTPATIENT)
Dept: FAMILY MEDICINE | Facility: CLINIC | Age: 77
End: 2022-08-01
Payer: MEDICARE

## 2022-08-01 DIAGNOSIS — Z78.0 POSTMENOPAUSE: ICD-10-CM

## 2022-08-01 PROCEDURE — 77080 DXA BONE DENSITY AXIAL: CPT | Mod: 26,,, | Performed by: RADIOLOGY

## 2022-08-01 PROCEDURE — 77080 DXA BONE DENSITY AXIAL: CPT | Mod: TC

## 2022-08-01 PROCEDURE — 77080 DEXA BONE DENSITY SPINE HIP: ICD-10-PCS | Mod: 26,,, | Performed by: RADIOLOGY

## 2022-08-04 ENCOUNTER — IMMUNIZATION (OUTPATIENT)
Dept: INTERNAL MEDICINE | Facility: CLINIC | Age: 77
End: 2022-08-04
Payer: MEDICARE

## 2022-08-04 DIAGNOSIS — Z23 NEED FOR VACCINATION: Primary | ICD-10-CM

## 2022-08-04 PROCEDURE — 91305 COVID-19, MRNA, LNP-S, PF, 30 MCG/0.3 ML DOSE VACCINE (PFIZER): CPT | Mod: PBBFAC | Performed by: FAMILY MEDICINE

## 2022-08-13 ENCOUNTER — PATIENT MESSAGE (OUTPATIENT)
Dept: FAMILY MEDICINE | Facility: CLINIC | Age: 77
End: 2022-08-13
Payer: MEDICARE

## 2022-08-24 ENCOUNTER — PATIENT MESSAGE (OUTPATIENT)
Dept: ADMINISTRATIVE | Facility: HOSPITAL | Age: 77
End: 2022-08-24
Payer: MEDICARE

## 2022-09-06 ENCOUNTER — PES CALL (OUTPATIENT)
Dept: ADMINISTRATIVE | Facility: OTHER | Age: 77
End: 2022-09-06
Payer: MEDICARE

## 2022-09-15 ENCOUNTER — TELEPHONE (OUTPATIENT)
Dept: FAMILY MEDICINE | Facility: CLINIC | Age: 77
End: 2022-09-15
Payer: MEDICARE

## 2022-09-15 DIAGNOSIS — Z90.10 POST-MASTECTOMY DEFORMITY OF BREAST: Primary | ICD-10-CM

## 2022-09-15 DIAGNOSIS — I10 ESSENTIAL HYPERTENSION: ICD-10-CM

## 2022-09-15 DIAGNOSIS — N64.89 POST-MASTECTOMY DEFORMITY OF BREAST: Primary | ICD-10-CM

## 2022-09-15 RX ORDER — OLMESARTAN MEDOXOMIL 20 MG/1
TABLET ORAL
Qty: 90 TABLET | Refills: 3 | Status: SHIPPED | OUTPATIENT
Start: 2022-09-15 | End: 2023-08-31

## 2022-09-15 NOTE — TELEPHONE ENCOUNTER
----- Message from Maryann Holland sent at 9/15/2022 12:20 PM CDT -----  Regarding: prescription  Contact: Pt  Pt requesting Prescription for Mastectomy Bra    Please call and advise    Phone [phone]

## 2022-09-15 NOTE — TELEPHONE ENCOUNTER
Spoke with patient who requested a Prescription for Mastectomy Bra . Patient states she gets 3 normally and just just needs a new rx

## 2022-09-28 ENCOUNTER — TELEPHONE (OUTPATIENT)
Dept: FAMILY MEDICINE | Facility: CLINIC | Age: 77
End: 2022-09-28
Payer: MEDICARE

## 2022-09-28 NOTE — TELEPHONE ENCOUNTER
----- Message from Sandip Mccabe sent at 9/28/2022 10:32 AM CDT -----  Contact: [phone]  Who Called: PT  Regarding: speak with  nurse regarding prescription  that needs to be sent to [facility]   Would the patient rather a call back or a response via [facility]? Call back  Best Call Back Number: [phone]  Additional Information: she called over  a week ago she says

## 2022-10-05 ENCOUNTER — CLINICAL SUPPORT (OUTPATIENT)
Dept: DIABETES | Facility: CLINIC | Age: 77
End: 2022-10-05
Payer: MEDICARE

## 2022-10-05 DIAGNOSIS — E11.65 CONTROLLED TYPE 2 DIABETES MELLITUS WITH HYPERGLYCEMIA, WITHOUT LONG-TERM CURRENT USE OF INSULIN: ICD-10-CM

## 2022-10-05 PROCEDURE — G0108 DIAB MANAGE TRN  PER INDIV: HCPCS | Mod: S$GLB,,, | Performed by: DIETITIAN, REGISTERED

## 2022-10-05 PROCEDURE — G0108 PR DIAB MANAGE TRN  PER INDIV: ICD-10-PCS | Mod: S$GLB,,, | Performed by: DIETITIAN, REGISTERED

## 2022-10-05 NOTE — PROGRESS NOTES
Diabetes Care Specialist Progress Note  Author: Cherri Yeh RD, CDE  Date: 10/5/2022    Program Intake  Reason for Diabetes Program Visit:: Initial Diabetes Assessment  Current diabetes risk level:: low  In the last 12 months, have you:: used emergency room services  Was the ER or hospital admission related to diabetes?: No    Lab Results   Component Value Date    HGBA1C 6.7 (H) 07/22/2022       Clinical    Problem Review  Reviewed Problem List with Patient: yes  Active comorbidities affecting diabetes self-care.: yes  Comorbidities: Hypertension  Reviewed health maintenance: yes    Clinical Assessment  Current Diabetes Treatment: Oral Medication  Have you ever experienced hypoglycemia (low blood sugar)?: no  Have you ever experienced hyperglycemia (high blood sugar)?: no    Medication Information  How do you obtain your medications?: Patient drives  How many days a week do you miss your medications?: Never    Labs  Do you have regular lab work to monitor your medications?: Yes  Type of Regular Lab Work: A1c    Nutritional Status  Diet: Regular (3 meals daily plus snacks; drinks coffee - black, water, tea, milk, juice)    Additional Social History    Support  Does anyone support you with your diabetes care?: yes  Who supports you?: spouse, self  Who takes you to your medical appointments?: spouse, self  Does the current support meet the patient's needs?: Yes  Is Support an area impacting ability to self-manage diabetes?: No    Access to Mass Media & Technology  Does the patient have access to any of the following devices or technologies?: Smart phone  Media or technology needs impacting ability to self-manage diabetes?: No    Cognitive/Behavioral Health  Alert and Oriented: Yes  Difficulty Thinking: No  Requires Prompting: No  Requires assistance for routine expression?: No  Cognitive or behavioral barriers impacting ability to self-manage diabetes?: No    Culture/Religious  Culture or Restorationism beliefs that may  impact ability to access healthcare: No    Communication  Language preference: English  Hearing Problems: No  Vision Problems: No  Communication needs impacting ability to self-manage diabetes?: No    Health Literacy  Preferred Learning Method: Face to Face  How often do you need to have someone help you read instructions, pamphlets, or written material from your doctor or pharmacy?: Never  Health literacy needs impacting ability to self-manage diabetes?: No      Diabetes Self-Management Skills Assessment    Diabetes Disease Process/Treatment Options  Patient/caregiver able to state what happens when someone has diabetes.: somewhat  Patient/caregiver knows what type of diabetes they have.: yes  Diabetes Type : Type II  Patient/caregiver able to identify at least three signs and symptoms of diabetes.: no  Patient able to identify at least three risk factors for diabetes.: no  Diabetes Disease Process/Treatment Options: Skills Assessment Completed: Yes  Assessment indicates:: Instruction Needed  Area of need?: Yes    Nutrition/Healthy Eating  Method of carbohydrate measurement:: no method  Patient can identify foods that impact blood sugar.: yes  Patient-identified foods:: sweets, soda, starches (bread, pasta, rice, cereal)  Nutrition/Healthy Eating Skills Assessment Completed:: Yes  Assessment indicates:: Instruction Needed  Area of need?: Yes    Physical Activity/Exercise  Patient's daily activity level:: sedentary  Patient formally exercises outside of work.: no  Patient can identify forms of physical activity.: yes  Stated forms of physical activity:: any movement performed by muscles that uses energy  Patient can identify reasons why exercise/physical activity is important in diabetes management.: no  Physical Activity/Exercise Skills Assessment Completed: : Yes  Assessment indicates:: Instruction Needed  Area of need?: Yes    Medications  Patient is able to describe current diabetes management routine.:  yes  Diabetes management routine:: oral medications  Patient is able to identify current diabetes medications, dosages, and appropriate timing of medications.: yes  Patient understands the purpose of the medications taken for diabetes.: no  Patient reports problems or concerns with current medication regimen.: no  Medication Skills Assessment Completed:: Yes  Assessment indicates:: Instruction Needed  Area of need?: Yes    Home Blood Glucose Monitoring  Patient states that blood sugar is checked at home daily.: no  Reasons for not monitoring:: other (see comments) (Has meter/supplies - used to check)  Home Blood Glucose Monitoring Skills Assessment Completed: : Yes  Assessment indicates:: Instruction Needed  Area of need?: Yes    Acute Complications  Patient is able to identify types of acute complications: No  Acute Complications Skills Assessment Completed: : Yes  Assessment indicates:: Instruction Needed  Area of need?: Yes    Chronic Complications  Patient can identify major chronic complications of diabetes.: no  Patient is taking statin?: Yes  Chronic Complications Skills Assessment Completed: : Yes  Assessment indicates:: Instruction Needed  Area of need?: Yes    Psychosocial/Coping  Patient can identify ways of coping with chronic disease.: yes  Patient-stated ways of coping with chronic disease:: support from loved ones  Psychosocial/Coping Skills Assessment Completed: : Yes  Assessment indicates:: Adequate understanding  Area of need?: No      Assessment Summary and Plan    Based on today's diabetes care assessment, the following areas of need were identified:      Social 10/5/2022   Support No   Access to Mass Media/Tech No   Cognitive/Behavioral Health No   Culture/Synagogue No   Communication No   Health Literacy No              Diabetes Self-Management Skills 10/5/2022   Diabetes Disease Process/Treatment Options Yes - reviewed diabetes disease process and treatment options   Nutrition/Healthy Eating  Yes - reviewed CHO counting, label reading and addt'l resources to assist w/ CHO counting; plate method reviewed; alternatives to sugary beverages discussed   Physical Activity/Exercise Yes - reviewed goals and benefits   Medication Yes - reviewed MOA of medication and regimen   Home Blood Glucose Monitoring Yes - reviewed SMBG schedule and BG goals   Acute Complications Yes - reviewed s/s and treatment of hypo/hyperglycemia   Chronic Complications Yes - reviewed standards of care   Psychosocial/Coping No      Today's interventions were provided through individual discussion, instruction, and written materials were provided.      Patient verbalized understanding of instruction and written materials.  Pt was able to return back demonstration of instructions today. Patient understood key points, needs reinforcement and further instruction.     Diabetes Self-Management Care Plan:    Today's Diabetes Self-Management Care Plan was developed with Aretha's input. Aretha has agreed to work toward the following goal(s) to improve his/her overall diabetes control.      Care Plan: Diabetes Management   Updates made since 9/5/2022 12:00 AM        Problem: Medications         Long-Range Goal: Patient Agrees to take Diabetes Medication(s) as prescribed.    Start Date: 10/5/2022   Expected End Date: 4/6/2023   Priority: High   Barriers: No Barriers Identified        Task: Reviewed with patient all current diabetes medications and provided basic review of the purpose, dosage, frequency, side effects, and storage of both oral and injectable diabetes medications. Completed 10/5/2022            Follow Up Plan     Follow up in about 6 months (around 4/5/2023).    Today's care plan and follow up schedule was discussed with patient.  Aretha verbalized understanding of the care plan, goals, and agrees to follow up plan.        The patient was encouraged to communicate with his/her health care provider/physician and care team regarding  his/her condition(s) and treatment.  I provided the patient with my contact information today and encouraged to contact me via phone or [facility] as needed.     Length of Visit   Total Time: 60 Minutes

## 2022-10-12 DIAGNOSIS — F33.9 RECURRENT DEPRESSION: ICD-10-CM

## 2022-10-12 RX ORDER — ESCITALOPRAM OXALATE 5 MG/1
TABLET ORAL
Qty: 90 TABLET | Refills: 3 | Status: SHIPPED | OUTPATIENT
Start: 2022-10-12 | End: 2023-10-07

## 2022-10-12 NOTE — TELEPHONE ENCOUNTER
No new care gaps identified.  [facility] Embedded Care Gaps. Reference number: [id number]. 10/12/2022   12:17:07 AM TET

## 2022-10-12 NOTE — TELEPHONE ENCOUNTER
Refill Decision Note   Aretha Nguyen  is requesting a refill authorization.  Brief Assessment and Rationale for Refill:  Approve     Medication Therapy Plan:       Medication Reconciliation Completed: No   Comments:     No Care Gaps recommended.     Note composed:2:39 AM 10/12/2022

## 2022-10-28 ENCOUNTER — PATIENT MESSAGE (OUTPATIENT)
Dept: FAMILY MEDICINE | Facility: CLINIC | Age: 77
End: 2022-10-28
Payer: MEDICARE

## 2022-11-07 ENCOUNTER — PATIENT MESSAGE (OUTPATIENT)
Dept: FAMILY MEDICINE | Facility: CLINIC | Age: 77
End: 2022-11-07
Payer: MEDICARE

## 2022-11-17 ENCOUNTER — OFFICE VISIT (OUTPATIENT)
Dept: OPTOMETRY | Facility: CLINIC | Age: 77
End: 2022-11-17
Payer: MEDICARE

## 2022-11-17 DIAGNOSIS — Z13.5 GLAUCOMA SCREENING: ICD-10-CM

## 2022-11-17 DIAGNOSIS — H52.4 PRESBYOPIA: ICD-10-CM

## 2022-11-17 DIAGNOSIS — H25.13 NUCLEAR SCLEROSIS, BILATERAL: ICD-10-CM

## 2022-11-17 DIAGNOSIS — E11.65 CONTROLLED TYPE 2 DIABETES MELLITUS WITH HYPERGLYCEMIA, WITHOUT LONG-TERM CURRENT USE OF INSULIN: ICD-10-CM

## 2022-11-17 DIAGNOSIS — E11.9 TYPE 2 DIABETES MELLITUS WITHOUT RETINOPATHY: Primary | ICD-10-CM

## 2022-11-17 DIAGNOSIS — H40.013 OAG (OPEN ANGLE GLAUCOMA) SUSPECT, LOW RISK, BILATERAL: ICD-10-CM

## 2022-11-17 PROCEDURE — 1160F RVW MEDS BY RX/DR IN RCRD: CPT | Mod: CPTII,S$GLB,, | Performed by: OPTOMETRIST

## 2022-11-17 PROCEDURE — 92015 PR REFRACTION: ICD-10-PCS | Mod: S$GLB,,, | Performed by: OPTOMETRIST

## 2022-11-17 PROCEDURE — 2023F DILAT RTA XM W/O RTNOPTHY: CPT | Mod: CPTII,S$GLB,, | Performed by: OPTOMETRIST

## 2022-11-17 PROCEDURE — 1160F PR REVIEW ALL MEDS BY PRESCRIBER/CLIN PHARMACIST DOCUMENTED: ICD-10-PCS | Mod: CPTII,S$GLB,, | Performed by: OPTOMETRIST

## 2022-11-17 PROCEDURE — 1126F AMNT PAIN NOTED NONE PRSNT: CPT | Mod: CPTII,S$GLB,, | Performed by: OPTOMETRIST

## 2022-11-17 PROCEDURE — 99999 PR PBB SHADOW E&M-EST. PATIENT-LVL III: ICD-10-PCS | Mod: PBBFAC,,, | Performed by: OPTOMETRIST

## 2022-11-17 PROCEDURE — 3288F PR FALLS RISK ASSESSMENT DOCUMENTED: ICD-10-PCS | Mod: CPTII,S$GLB,, | Performed by: OPTOMETRIST

## 2022-11-17 PROCEDURE — 1101F PT FALLS ASSESS-DOCD LE1/YR: CPT | Mod: CPTII,S$GLB,, | Performed by: OPTOMETRIST

## 2022-11-17 PROCEDURE — 1101F PR PT FALLS ASSESS DOC 0-1 FALLS W/OUT INJ PAST YR: ICD-10-PCS | Mod: CPTII,S$GLB,, | Performed by: OPTOMETRIST

## 2022-11-17 PROCEDURE — 1159F PR MEDICATION LIST DOCUMENTED IN MEDICAL RECORD: ICD-10-PCS | Mod: CPTII,S$GLB,, | Performed by: OPTOMETRIST

## 2022-11-17 PROCEDURE — 92014 COMPRE OPH EXAM EST PT 1/>: CPT | Mod: S$GLB,,, | Performed by: OPTOMETRIST

## 2022-11-17 PROCEDURE — 92015 DETERMINE REFRACTIVE STATE: CPT | Mod: S$GLB,,, | Performed by: OPTOMETRIST

## 2022-11-17 PROCEDURE — 99999 PR PBB SHADOW E&M-EST. PATIENT-LVL III: CPT | Mod: PBBFAC,,, | Performed by: OPTOMETRIST

## 2022-11-17 PROCEDURE — 92014 PR EYE EXAM, EST PATIENT,COMPREHESV: ICD-10-PCS | Mod: S$GLB,,, | Performed by: OPTOMETRIST

## 2022-11-17 PROCEDURE — 1159F MED LIST DOCD IN RCRD: CPT | Mod: CPTII,S$GLB,, | Performed by: OPTOMETRIST

## 2022-11-17 PROCEDURE — 1126F PR PAIN SEVERITY QUANTIFIED, NO PAIN PRESENT: ICD-10-PCS | Mod: CPTII,S$GLB,, | Performed by: OPTOMETRIST

## 2022-11-17 PROCEDURE — 3288F FALL RISK ASSESSMENT DOCD: CPT | Mod: CPTII,S$GLB,, | Performed by: OPTOMETRIST

## 2022-11-17 PROCEDURE — 2023F PR DILATED RETINAL EXAM W/O EVID OF RETINOPATHY: ICD-10-PCS | Mod: CPTII,S$GLB,, | Performed by: OPTOMETRIST

## 2022-11-17 NOTE — PROGRESS NOTES
NAHEED DOBBS 11/20 with Dr. Brannon.  Patient has noticed more trouble with small   print but distance still seems fine.  Frames are uncomfortable and she   would like new glasses.  Not using any drops.    Diabetic BS usually about 110  Hemoglobin A1C       Date                     Value               Ref Range             Status                07/22/2022               6.7 (H)             4.0 - 5.6 %           Final                 09/21/2021               5.8 (H)             4.0 - 5.6 %           Final                 06/11/2021               5.6                 4.0 - 5.6 %           Final                Last edited by Nikki Fuentes on 11/17/2022  9:49 AM.        ROS    Positive for: Endocrine (DM), Eyes (glauc susp by CDs)  Negative for: Constitutional, Gastrointestinal, Neurological, Skin,   Genitourinary, Musculoskeletal, HENT, Cardiovascular, Respiratory,   Psychiatric, Allergic/Imm, Heme/Lymph  Last edited by Andre Macdonald, OD on 11/17/2022 10:06 AM.        Assessment /Plan     For exam results, see Encounter Report.    Type 2 diabetes mellitus without retinopathy    Controlled type 2 diabetes mellitus with hyperglycemia, without long-term current use of insulin  -     Ambulatory referral/consult to Ophthalmology    Nuclear sclerosis, bilateral    OAG (open angle glaucoma) suspect, low risk, bilateral    Glaucoma screening    Presbyopia      Cat OD>OS--discussed surgery--pt wishes to think about it  DM- WITHOUT RETINOPATHY.  Advised yearly DFE   Glauc susp by CDS (see prev notes from Dr Fregoso/[place])--iop wnl without meds.  Prev VF/OCT wnl per notes.  Doubt glauc now--will monitor    PLAN:    Rtc 1 yr, or pt will call sooner if wishes cat christian Gonsales

## 2022-11-28 ENCOUNTER — LAB VISIT (OUTPATIENT)
Dept: LAB | Facility: HOSPITAL | Age: 77
End: 2022-11-28
Attending: INTERNAL MEDICINE
Payer: MEDICARE

## 2022-11-28 ENCOUNTER — PATIENT MESSAGE (OUTPATIENT)
Dept: FAMILY MEDICINE | Facility: CLINIC | Age: 77
End: 2022-11-28
Payer: MEDICARE

## 2022-11-28 DIAGNOSIS — E11.65 CONTROLLED TYPE 2 DIABETES MELLITUS WITH HYPERGLYCEMIA, WITHOUT LONG-TERM CURRENT USE OF INSULIN: ICD-10-CM

## 2022-11-28 LAB
ESTIMATED AVG GLUCOSE: 128 MG/DL (ref 68–131)
HBA1C MFR BLD: 6.1 % (ref 4–5.6)

## 2022-11-28 PROCEDURE — 83036 HEMOGLOBIN GLYCOSYLATED A1C: CPT | Performed by: INTERNAL MEDICINE

## 2022-11-28 PROCEDURE — 36415 COLL VENOUS BLD VENIPUNCTURE: CPT | Performed by: INTERNAL MEDICINE

## 2022-12-13 ENCOUNTER — OFFICE VISIT (OUTPATIENT)
Dept: FAMILY MEDICINE | Facility: CLINIC | Age: 77
End: 2022-12-13
Payer: MEDICARE

## 2022-12-13 VITALS
SYSTOLIC BLOOD PRESSURE: 110 MMHG | HEART RATE: 76 BPM | HEIGHT: 62 IN | BODY MASS INDEX: 25.84 KG/M2 | OXYGEN SATURATION: 97 % | WEIGHT: 140.44 LBS | DIASTOLIC BLOOD PRESSURE: 82 MMHG

## 2022-12-13 DIAGNOSIS — Z12.31 ENCOUNTER FOR SCREENING MAMMOGRAM FOR MALIGNANT NEOPLASM OF BREAST: ICD-10-CM

## 2022-12-13 DIAGNOSIS — I15.2 HYPERTENSION COMPLICATING DIABETES: ICD-10-CM

## 2022-12-13 DIAGNOSIS — E11.59 HYPERTENSION COMPLICATING DIABETES: ICD-10-CM

## 2022-12-13 DIAGNOSIS — E11.69 HYPERLIPIDEMIA ASSOCIATED WITH TYPE 2 DIABETES MELLITUS: ICD-10-CM

## 2022-12-13 DIAGNOSIS — E11.65 CONTROLLED TYPE 2 DIABETES MELLITUS WITH HYPERGLYCEMIA, WITHOUT LONG-TERM CURRENT USE OF INSULIN: Primary | ICD-10-CM

## 2022-12-13 DIAGNOSIS — E78.5 HYPERLIPIDEMIA ASSOCIATED WITH TYPE 2 DIABETES MELLITUS: ICD-10-CM

## 2022-12-13 DIAGNOSIS — D50.0 IRON DEFICIENCY ANEMIA DUE TO CHRONIC BLOOD LOSS: ICD-10-CM

## 2022-12-13 PROCEDURE — 1126F AMNT PAIN NOTED NONE PRSNT: CPT | Mod: CPTII,S$GLB,, | Performed by: INTERNAL MEDICINE

## 2022-12-13 PROCEDURE — 3079F DIAST BP 80-89 MM HG: CPT | Mod: CPTII,S$GLB,, | Performed by: INTERNAL MEDICINE

## 2022-12-13 PROCEDURE — 99999 PR PBB SHADOW E&M-EST. PATIENT-LVL IV: CPT | Mod: PBBFAC,,, | Performed by: INTERNAL MEDICINE

## 2022-12-13 PROCEDURE — 99999 PR PBB SHADOW E&M-EST. PATIENT-LVL IV: ICD-10-PCS | Mod: PBBFAC,,, | Performed by: INTERNAL MEDICINE

## 2022-12-13 PROCEDURE — 99499 UNLISTED E&M SERVICE: CPT | Mod: S$GLB,,, | Performed by: INTERNAL MEDICINE

## 2022-12-13 PROCEDURE — 99214 PR OFFICE/OUTPT VISIT, EST, LEVL IV, 30-39 MIN: ICD-10-PCS | Mod: S$GLB,,, | Performed by: INTERNAL MEDICINE

## 2022-12-13 PROCEDURE — 3288F FALL RISK ASSESSMENT DOCD: CPT | Mod: CPTII,S$GLB,, | Performed by: INTERNAL MEDICINE

## 2022-12-13 PROCEDURE — 3079F PR MOST RECENT DIASTOLIC BLOOD PRESSURE 80-89 MM HG: ICD-10-PCS | Mod: CPTII,S$GLB,, | Performed by: INTERNAL MEDICINE

## 2022-12-13 PROCEDURE — 1126F PR PAIN SEVERITY QUANTIFIED, NO PAIN PRESENT: ICD-10-PCS | Mod: CPTII,S$GLB,, | Performed by: INTERNAL MEDICINE

## 2022-12-13 PROCEDURE — 3074F PR MOST RECENT SYSTOLIC BLOOD PRESSURE < 130 MM HG: ICD-10-PCS | Mod: CPTII,S$GLB,, | Performed by: INTERNAL MEDICINE

## 2022-12-13 PROCEDURE — 1159F MED LIST DOCD IN RCRD: CPT | Mod: CPTII,S$GLB,, | Performed by: INTERNAL MEDICINE

## 2022-12-13 PROCEDURE — 1160F RVW MEDS BY RX/DR IN RCRD: CPT | Mod: CPTII,S$GLB,, | Performed by: INTERNAL MEDICINE

## 2022-12-13 PROCEDURE — 1160F PR REVIEW ALL MEDS BY PRESCRIBER/CLIN PHARMACIST DOCUMENTED: ICD-10-PCS | Mod: CPTII,S$GLB,, | Performed by: INTERNAL MEDICINE

## 2022-12-13 PROCEDURE — 1101F PT FALLS ASSESS-DOCD LE1/YR: CPT | Mod: CPTII,S$GLB,, | Performed by: INTERNAL MEDICINE

## 2022-12-13 PROCEDURE — 3074F SYST BP LT 130 MM HG: CPT | Mod: CPTII,S$GLB,, | Performed by: INTERNAL MEDICINE

## 2022-12-13 PROCEDURE — 3288F PR FALLS RISK ASSESSMENT DOCUMENTED: ICD-10-PCS | Mod: CPTII,S$GLB,, | Performed by: INTERNAL MEDICINE

## 2022-12-13 PROCEDURE — 99499 RISK ADDL DX/OHS AUDIT: ICD-10-PCS | Mod: S$GLB,,, | Performed by: INTERNAL MEDICINE

## 2022-12-13 PROCEDURE — 1159F PR MEDICATION LIST DOCUMENTED IN MEDICAL RECORD: ICD-10-PCS | Mod: CPTII,S$GLB,, | Performed by: INTERNAL MEDICINE

## 2022-12-13 PROCEDURE — 1101F PR PT FALLS ASSESS DOC 0-1 FALLS W/OUT INJ PAST YR: ICD-10-PCS | Mod: CPTII,S$GLB,, | Performed by: INTERNAL MEDICINE

## 2022-12-13 PROCEDURE — 99214 OFFICE O/P EST MOD 30 MIN: CPT | Mod: S$GLB,,, | Performed by: INTERNAL MEDICINE

## 2022-12-13 NOTE — PROGRESS NOTES
Subjective:       Patient ID: Aretha Nguyen is a 77 y.o. female.    Chief Complaint: No chief complaint on file.      HPI  Aretha Nguyen is a 77 y.o. female with chronic conditions of DM type 2, HLD, HTN, IBS, Breast cancer  who presents today for routine follow up.    Pains adjusting her diet, decreasing breads, white flour, and little rice.  Not exercising much but thinking of joining a gym.  Tolerating increase metformin.    Blood pressure has been stable.  Compliant with treatment.    Up-to-date with flu and COVID shots.    Health Maintenance:  Health Maintenance   Topic Date Due    Lipid Panel  09/21/2022    Hemoglobin A1c  05/28/2023    Eye Exam  11/17/2023    DEXA Scan  08/01/2025    TETANUS VACCINE  10/26/2027    Hepatitis C Screening  Completed       Review of Systems   Constitutional: Negative.  Negative for fever and unexpected weight change.   HENT: Negative.     Respiratory: Negative.     Cardiovascular: Negative.    Gastrointestinal: Negative.    Genitourinary:  Negative for difficulty urinating.   Musculoskeletal: Negative.    Integumentary:  Negative.   Neurological: Negative.    Psychiatric/Behavioral: Negative.      Past Medical History:   Diagnosis Date    Allergy     Breast cancer, left     Cataract     Controlled type 2 diabetes mellitus with hyperglycemia, without long-term current use of insulin 03/22/2017    Hyperlipidemia associated with type 2 diabetes mellitus 03/22/2017    Hypertension complicating diabetes 03/22/2017    Inflammatory bowel disease 03/22/2017    Urinary tract infection        Past Surgical History:   Procedure Laterality Date    BREAST SURGERY      COLONOSCOPY N/A 1/18/2022    Procedure: COLONOSCOPY suprep;  Surgeon: Lawrence Garcia MD;  Location: [facility];  Service: Endoscopy;  Laterality: N/A;    HYSTERECTOMY      MASTECTOMY Left     for benign recurrent growth. Dr. Stephan Brown MD - [facility] - [facility]    SIMPLE MASTECTOMY Left     TOTAL  ABDOMINAL HYSTERECTOMY W/ BILATERAL SALPINGOOPHORECTOMY  2009    for benign cysts with concern for future malginancy. Fibromoid uterus for AUB. Bengin results       Family History   Problem Relation Age of Onset    Breast cancer Neg Hx        Social History     Socioeconomic History    Marital status:    Tobacco Use    Smoking status: Never    Smokeless tobacco: Never   Substance and Sexual Activity    Alcohol use: Yes     Comment: not often    Drug use: No    Sexual activity: Yes     Partners: Male   Social History Narrative    MICHA Hall - [facility]        Dr. Stephan Brown MD - [facility] - General Surgery & Surgery - mammograms        Current Outpatient Medications   Medication Sig Dispense Refill    atorvastatin (LIPITOR) 20 MG tablet TAKE 1 TABLET BY MOUTH EVERY DAY 90 tablet 1    balsalazide (COLAZAL) 750 mg capsule Take 3 capsules (2,250 mg total) by mouth 3 (three) times daily. 270 capsule 11    cholecalciferol, vitamin D3, 2,000 unit Cap Take 1 capsule (2,000 Units total) by mouth once daily.      ciclopirox (PENLAC) 8 % Soln Apply topically nightly. 6 mL 3    cyanocobalamin (VITAMIN B-12) 1000 MCG tablet Take 2 tablets (2,000 mcg total) by mouth once daily. 60 tablet 6    diclofenac sodium (VOLTAREN ARTHRITIS PAIN) 1 % Gel Apply 2 g topically 2 (two) times daily. 1 Tube 1    EScitalopram oxalate (LEXAPRO) 5 MG Tab TAKE 1 TABLET BY MOUTH EVERY DAY 90 tablet 3    estradioL (ESTRACE) 0.01 % (0.1 mg/gram) vaginal cream Place 1 g vaginally twice a week. 42.5 g 3    ferrous sulfate (FEOSOL) 325 mg (65 mg iron) Tab tablet TAKE 1 TABLET BY MOUTH EVERY DAY WITH BREAKFAST 90 tablet 2    hydroCHLOROthiazide (MICROZIDE) 12.5 mg capsule TAKE 1 CAPSULE BY MOUTH ONCE DAILY 90 capsule 2    metFORMIN (GLUCOPHAGE-XR) 500 MG ER 24hr tablet TAKE 2 TABLETS (1,000 MG TOTAL) BY MOUTH ONCE DAILY. 90 tablet 1    multivit-min-iron-FA-lutein (CENTRUM SILVER WOMEN) 8 mg iron-400  mcg-300 mcg Tab Take 1 tablet by mouth once daily at 6am.      olmesartan (BENICAR) 20 MG tablet TAKE 1 TABLET BY MOUTH EVERY DAY 90 tablet 3     No current facility-administered medications for this visit.       Review of patient's allergies indicates:   Allergen Reactions    Ciprofloxacin hcl Rash         Objective:       Last 3 sets of Vitals    Vitals - 1 value per visit 7/29/2022 7/29/2022 11/17/2022   SYSTOLIC - 126 -   DIASTOLIC - 82 -   Pulse - 68 -   Temp - - -   Resp - - -   SPO2 - 97 -   Weight (lb) - 143.3 -   Weight (kg) - 65 -   Height - 62 -   BMI (Calculated) - 26.2 -   VISIT REPORT - - -   Pain Score  0 - 0   Some recent data might be hidden   Physical Exam  Constitutional:       General: She is not in acute distress.  HENT:      Head: Normocephalic.      Right Ear: Tympanic membrane, ear canal and external ear normal.      Left Ear: Tympanic membrane, ear canal and external ear normal.      Nose: Nose normal.      Mouth/Throat:      Mouth: Mucous membranes are moist.   Eyes:      General: No scleral icterus.     Extraocular Movements: Extraocular movements intact.      Conjunctiva/sclera: Conjunctivae normal.   Neck:      Vascular: No carotid bruit.      Comments: No goiter.  Cardiovascular:      Rate and Rhythm: Normal rate and regular rhythm.      Pulses: Normal pulses.      Heart sounds: Normal heart sounds.   Pulmonary:      Effort: Pulmonary effort is normal.      Breath sounds: Normal breath sounds.   Abdominal:      General: Bowel sounds are normal. There is no distension.      Palpations: Abdomen is soft. There is no mass.      Tenderness: There is no abdominal tenderness.   Musculoskeletal:         General: No swelling.   Lymphadenopathy:      Cervical: No cervical adenopathy.   Skin:     General: Skin is warm and dry.   Neurological:      General: No focal deficit present.      Mental Status: She is alert and oriented to person, place, and time.   Psychiatric:         Mood and Affect:  Mood normal.         Behavior: Behavior normal.         CBC:  Recent Labs   Lab 07/30/21  1032 02/22/22  1704 07/22/22  0807   WBC 7.79 8.09 5.65   RBC 3.98 L 3.95 L 4.10   Hemoglobin 11.2 L 11.0 L 11.2 L   Hematocrit 35.3 L 34.3 L 35.6 L   Platelets 265 310 277   MCV 89 87 87   MCH 28.1 27.8 27.3   MCHC 31.7 L 32.1 31.5 L     CMP:  Recent Labs   Lab 09/21/21  1058 02/22/22  1704 07/22/22  0807   Glucose 107 102 100   Calcium 10.4 9.3 9.9   Albumin 4.0 4.0 3.7   Total Protein 8.0 7.8 7.7   Sodium 140 137 139   Potassium 4.1 4.1 3.7   CO2 28 31 H 28   Chloride 103 97 101   BUN 12 10 11   Creatinine 0.8 0.7 0.8   Alkaline Phosphatase 74 95 76   ALT 7 L 12 7 L   AST 21 26 19   Total Bilirubin 0.5 0.4 0.7     URINALYSIS:  Recent Labs   Lab 02/22/22  1640 03/09/22  1502   Color, UA Colorless A Yellow   Specific Gravity, UA 1.005  --    Spec Grav UA  --  1.015   pH, UA 7.0 7   Protein, UA Negative  --    Bacteria Few A  --    Nitrite, UA Negative neg   Leukocytes, UA 1+ A  --    Urobilinogen, UA Negative norm      LIPIDS:  Recent Labs   Lab 12/04/20  0900 06/11/21  0913 06/11/21  0924 09/21/21  1058   TSH  --   --  3.239  --    HDL 60 42  --  52   Cholesterol 175 165  --  174   Triglycerides 151 H 139  --  131   LDL Cholesterol 84.8 95.2  --  95.8   HDL/Cholesterol Ratio 34.3 25.5  --  29.9   Non-HDL Cholesterol 115 123  --  122   Total Cholesterol/HDL Ratio 2.9 3.9  --  3.3     TSH:  Recent Labs   Lab 06/11/21  0924   TSH 3.239       A1C:  Recent Labs   Lab 01/09/20  1007 12/04/20  0900 06/11/21  0913 09/21/21  1058 07/22/22  0807 11/28/22  0825   Hemoglobin A1C 6.2 H 6.0 H 5.6 5.8 H 6.7 H 6.1 H       Imaging:  DXA Bone Density Spine And Hip  Narrative: EXAMINATION:  DEXA BONE DENSITY SPINE HIP    CLINICAL HISTORY:  Asymptomatic menopausal state    TECHNIQUE:  DEXA scanning was performed over both hips and lumbar spine.  Review of the images confirms satisfactory positioning and  technique.    COMPARISON:  03/29/2019.    FINDINGS:  The bone mineral density within the lumbar spine measured from L1 through L4 is equal to 0.934 g/cm squared with a T score of -2.1 and a Z score of -0.3.  In comparison to the prior examination dated 03/29/2019, there has been a 3.3% decrease in the bone mineral density calculated within the lumbar spine from L1 through L4.  This interval decrease in bone mineral density within the lumbar spine is statistically significant.    The left femoral neck bone mineral density is equal to 0.900 g/cm squared with a T score of -1.0 and a Z score of 1.0.    The right femoral neck bone mineral density is equal to 0.830 g/cm squared with a T score of -1.5 and a Z score of 0.5.    In comparison to the prior examination dated 03/29/2019, there has been a 3.9% decrease in the mean bone mineral density calculated within the femoral necks.  This interval decrease in mean bone mineral density within the femoral necks is statistically significant.    The left total hip bone mineral density is equal to 1.009 g/cm squared with a T score of 0.0 and a Z score of 1.8.    The right total hip bone mineral density is equal to 0.986 g/cm squared with a T score of -0.2 and a Z score of 1.6.    There is a 16.2% risk of a major osteoporotic fracture and a 3.5% risk of hip fracture in the next 10 years (FRAX).  Impression: Osteopenia.    Consider FDA approved medical therapies in postmenopausal women and men aged 50 years and older, based on the following:    *A hip or vertebral (clinical or morphometric) fracture  *T score less than or equal to -2.5 at the femoral neck or spine after appropriate evaluation to exclude secondary causes.  *Low bone mass -- also known as osteopenia (T score between -1.0 and -2.5 at the femoral neck or spine) and a 10 year probability of hip fracture greater than or equal to 3% or a 10 year probability of major osteoporosis-related fracture greater than or equal to  20% based on the [place]-adapted WHO algorithm.  *Clinicians judgment and/or patient preference may indicate treatment for people with 10 year fracture probabilities is above or below these levels.    Electronically signed by: Kaiden Calvillo MD  Date:    08/01/2022  Time:    16:07      Assessment:       1. Controlled type 2 diabetes mellitus with hyperglycemia, without long-term current use of insulin    2. Hypertension complicating diabetes    3. Iron deficiency anemia due to chronic blood loss    4. Hyperlipidemia associated with type 2 diabetes mellitus    5. Encounter for screening mammogram for malignant neoplasm of breast            Plan:       1. Controlled type 2 diabetes mellitus with hyperglycemia, without long-term current use of insulin  -     Comprehensive Metabolic Panel; Future; Expected date: 12/13/2022  -     Microalbumin/Creatinine Ratio, Urine  -     Hemoglobin A1C; Future; Expected date: 12/13/2022  - stable with improved A1c less than 7%    2. Hypertension complicating diabetes  -     Comprehensive Metabolic Panel; Future; Expected date: 12/13/2022  -     TSH; Future; Expected date: 12/13/2022  - stable.    3. Iron deficiency anemia due to chronic blood loss  -     CBC Auto Differential; Future; Expected date: 12/13/2022  -     Ferritin; Future; Expected date: 12/13/2022  -     Iron and TIBC; Future; Expected date: 12/13/2022  - on iron supplements.    4. Hyperlipidemia associated with type 2 diabetes mellitus  -     Lipid Panel; Future; Expected date: 12/13/2022  - on atorvastatin    5. Encounter for screening mammogram for malignant neoplasm of breast  -     Mammo Digital Screening Bilat w/ Ricky; Future; Expected date: 12/13/2022       Health Maintenance Due   Topic Date Due    Shingles Vaccine (1 of 2) Never done    Diabetes Urine Screening  09/21/2022    Lipid Panel  09/21/2022    COVID-19 Vaccine (5 - Booster for Pfizer series) 09/29/2022            Return to clinic in 6 months.    Brittany Floyd  MD [facility]  Disclaimer:  This note has been generated using voice-recognition software. There may be grammatical or spelling errors that have been missed during proof-reading

## 2023-01-05 ENCOUNTER — HOSPITAL ENCOUNTER (OUTPATIENT)
Dept: RADIOLOGY | Facility: HOSPITAL | Age: 78
Discharge: HOME | End: 2023-01-05
Attending: INTERNAL MEDICINE
Payer: MEDICARE

## 2023-01-05 DIAGNOSIS — Z12.31 ENCOUNTER FOR SCREENING MAMMOGRAM FOR MALIGNANT NEOPLASM OF BREAST: ICD-10-CM

## 2023-01-05 PROCEDURE — 77063 BREAST TOMOSYNTHESIS BI: CPT | Mod: 26,52,, | Performed by: RADIOLOGY

## 2023-01-05 PROCEDURE — 77063 MAMMO DIGITAL SCREENING RIGHT WITH TOMO: ICD-10-PCS | Mod: 26,52,, | Performed by: RADIOLOGY

## 2023-01-05 PROCEDURE — 77067 MAMMO DIGITAL SCREENING RIGHT WITH TOMO: ICD-10-PCS | Mod: 26,52,, | Performed by: RADIOLOGY

## 2023-01-05 PROCEDURE — 77067 SCR MAMMO BI INCL CAD: CPT | Mod: TC,52

## 2023-01-05 PROCEDURE — 77067 SCR MAMMO BI INCL CAD: CPT | Mod: 26,52,, | Performed by: RADIOLOGY

## 2023-01-18 ENCOUNTER — PES CALL (OUTPATIENT)
Dept: ADMINISTRATIVE | Facility: OTHER | Age: 78
End: 2023-01-18
Payer: MEDICARE

## 2023-02-07 ENCOUNTER — PES CALL (OUTPATIENT)
Dept: ADMINISTRATIVE | Facility: CLINIC | Age: 78
End: 2023-02-07
Payer: MEDICARE

## 2023-04-27 DIAGNOSIS — D50.0 IRON DEFICIENCY ANEMIA DUE TO CHRONIC BLOOD LOSS: ICD-10-CM

## 2023-04-27 RX ORDER — FERROUS SULFATE 325(65) MG
TABLET ORAL
Qty: 90 TABLET | Refills: 2 | Status: SHIPPED | OUTPATIENT
Start: 2023-04-27 | End: 2024-01-22

## 2023-05-14 NOTE — ADDENDUM NOTE
Addended by: REJI NARAYAN on: 1/11/2022 09:34 AM     Modules accepted: Orders     [facility]

## 2023-05-26 ENCOUNTER — LAB VISIT (OUTPATIENT)
Dept: LAB | Facility: HOSPITAL | Age: 78
End: 2023-05-26
Attending: INTERNAL MEDICINE
Payer: MEDICARE

## 2023-05-26 DIAGNOSIS — E78.5 HYPERLIPIDEMIA ASSOCIATED WITH TYPE 2 DIABETES MELLITUS: ICD-10-CM

## 2023-05-26 DIAGNOSIS — E11.65 CONTROLLED TYPE 2 DIABETES MELLITUS WITH HYPERGLYCEMIA, WITHOUT LONG-TERM CURRENT USE OF INSULIN: ICD-10-CM

## 2023-05-26 DIAGNOSIS — I15.2 HYPERTENSION COMPLICATING DIABETES: ICD-10-CM

## 2023-05-26 DIAGNOSIS — E11.69 HYPERLIPIDEMIA ASSOCIATED WITH TYPE 2 DIABETES MELLITUS: ICD-10-CM

## 2023-05-26 DIAGNOSIS — D50.0 IRON DEFICIENCY ANEMIA DUE TO CHRONIC BLOOD LOSS: ICD-10-CM

## 2023-05-26 DIAGNOSIS — E11.59 HYPERTENSION COMPLICATING DIABETES: ICD-10-CM

## 2023-05-26 LAB
ALBUMIN SERPL BCP-MCNC: 3.8 G/DL (ref 3.5–5.2)
ALP SERPL-CCNC: 77 U/L (ref 55–135)
ALT SERPL W/O P-5'-P-CCNC: 14 U/L (ref 10–44)
ANION GAP SERPL CALC-SCNC: 11 MMOL/L (ref 8–16)
AST SERPL-CCNC: 21 U/L (ref 10–40)
BASOPHILS # BLD AUTO: 0.05 K/UL (ref 0–0.2)
BASOPHILS NFR BLD: 0.8 % (ref 0–1.9)
BILIRUB SERPL-MCNC: 0.3 MG/DL (ref 0.1–1)
BUN SERPL-MCNC: 11 MG/DL (ref 8–23)
CALCIUM SERPL-MCNC: 9.8 MG/DL (ref 8.7–10.5)
CHLORIDE SERPL-SCNC: 101 MMOL/L (ref 95–110)
CHOLEST SERPL-MCNC: 157 MG/DL (ref 120–199)
CHOLEST/HDLC SERPL: 3.3 {RATIO} (ref 2–5)
CO2 SERPL-SCNC: 26 MMOL/L (ref 23–29)
CREAT SERPL-MCNC: 0.8 MG/DL (ref 0.5–1.4)
DIFFERENTIAL METHOD: ABNORMAL
EOSINOPHIL # BLD AUTO: 0.2 K/UL (ref 0–0.5)
EOSINOPHIL NFR BLD: 2.9 % (ref 0–8)
ERYTHROCYTE [DISTWIDTH] IN BLOOD BY AUTOMATED COUNT: 14.1 % (ref 11.5–14.5)
EST. GFR  (NO RACE VARIABLE): >60 ML/MIN/1.73 M^2
ESTIMATED AVG GLUCOSE: 126 MG/DL (ref 68–131)
FERRITIN SERPL-MCNC: 106 NG/ML (ref 20–300)
GLUCOSE SERPL-MCNC: 89 MG/DL (ref 70–110)
HBA1C MFR BLD: 6 % (ref 4–5.6)
HCT VFR BLD AUTO: 34.5 % (ref 37–48.5)
HDLC SERPL-MCNC: 48 MG/DL (ref 40–75)
HDLC SERPL: 30.6 % (ref 20–50)
HGB BLD-MCNC: 11.1 G/DL (ref 12–16)
IMM GRANULOCYTES # BLD AUTO: 0.01 K/UL (ref 0–0.04)
IMM GRANULOCYTES NFR BLD AUTO: 0.2 % (ref 0–0.5)
IRON SERPL-MCNC: 43 UG/DL (ref 30–160)
LDLC SERPL CALC-MCNC: 83.6 MG/DL (ref 63–159)
LYMPHOCYTES # BLD AUTO: 1.6 K/UL (ref 1–4.8)
LYMPHOCYTES NFR BLD: 26.8 % (ref 18–48)
MCH RBC QN AUTO: 28 PG (ref 27–31)
MCHC RBC AUTO-ENTMCNC: 32.2 G/DL (ref 32–36)
MCV RBC AUTO: 87 FL (ref 82–98)
MONOCYTES # BLD AUTO: 0.7 K/UL (ref 0.3–1)
MONOCYTES NFR BLD: 10.9 % (ref 4–15)
NEUTROPHILS # BLD AUTO: 3.6 K/UL (ref 1.8–7.7)
NEUTROPHILS NFR BLD: 58.4 % (ref 38–73)
NONHDLC SERPL-MCNC: 109 MG/DL
NRBC BLD-RTO: 0 /100 WBC
PLATELET # BLD AUTO: 272 K/UL (ref 150–450)
PMV BLD AUTO: 10.4 FL (ref 9.2–12.9)
POTASSIUM SERPL-SCNC: 4 MMOL/L (ref 3.5–5.1)
PROT SERPL-MCNC: 7.6 G/DL (ref 6–8.4)
RBC # BLD AUTO: 3.96 M/UL (ref 4–5.4)
SATURATED IRON: 13 % (ref 20–50)
SODIUM SERPL-SCNC: 138 MMOL/L (ref 136–145)
TOTAL IRON BINDING CAPACITY: 321 UG/DL (ref 250–450)
TRANSFERRIN SERPL-MCNC: 217 MG/DL (ref 200–375)
TRIGL SERPL-MCNC: 127 MG/DL (ref 30–150)
TSH SERPL DL<=0.005 MIU/L-ACNC: 3.33 UIU/ML (ref 0.4–4)
WBC # BLD AUTO: 6.13 K/UL (ref 3.9–12.7)

## 2023-05-26 PROCEDURE — 80061 LIPID PANEL: CPT | Performed by: INTERNAL MEDICINE

## 2023-05-26 PROCEDURE — 85025 COMPLETE CBC W/AUTO DIFF WBC: CPT | Performed by: INTERNAL MEDICINE

## 2023-05-26 PROCEDURE — 80053 COMPREHEN METABOLIC PANEL: CPT | Performed by: INTERNAL MEDICINE

## 2023-05-26 PROCEDURE — 84443 ASSAY THYROID STIM HORMONE: CPT | Performed by: INTERNAL MEDICINE

## 2023-05-26 PROCEDURE — 84466 ASSAY OF TRANSFERRIN: CPT | Performed by: INTERNAL MEDICINE

## 2023-05-26 PROCEDURE — 82728 ASSAY OF FERRITIN: CPT | Performed by: INTERNAL MEDICINE

## 2023-05-26 PROCEDURE — 36415 COLL VENOUS BLD VENIPUNCTURE: CPT | Performed by: INTERNAL MEDICINE

## 2023-05-26 PROCEDURE — 83036 HEMOGLOBIN GLYCOSYLATED A1C: CPT | Performed by: INTERNAL MEDICINE

## 2023-06-02 ENCOUNTER — OFFICE VISIT (OUTPATIENT)
Dept: FAMILY MEDICINE | Facility: CLINIC | Age: 78
End: 2023-06-02
Payer: MEDICARE

## 2023-06-02 VITALS
WEIGHT: 138.88 LBS | OXYGEN SATURATION: 98 % | HEIGHT: 62 IN | SYSTOLIC BLOOD PRESSURE: 116 MMHG | HEART RATE: 87 BPM | DIASTOLIC BLOOD PRESSURE: 78 MMHG | BODY MASS INDEX: 25.55 KG/M2

## 2023-06-02 DIAGNOSIS — E11.59 HYPERTENSION COMPLICATING DIABETES: ICD-10-CM

## 2023-06-02 DIAGNOSIS — E11.69 HYPERLIPIDEMIA ASSOCIATED WITH TYPE 2 DIABETES MELLITUS: ICD-10-CM

## 2023-06-02 DIAGNOSIS — E78.5 HYPERLIPIDEMIA ASSOCIATED WITH TYPE 2 DIABETES MELLITUS: ICD-10-CM

## 2023-06-02 DIAGNOSIS — D50.0 IRON DEFICIENCY ANEMIA DUE TO CHRONIC BLOOD LOSS: ICD-10-CM

## 2023-06-02 DIAGNOSIS — K51.20 ULCERATIVE PROCTITIS WITHOUT COMPLICATION: ICD-10-CM

## 2023-06-02 DIAGNOSIS — I15.2 HYPERTENSION COMPLICATING DIABETES: ICD-10-CM

## 2023-06-02 DIAGNOSIS — E11.65 CONTROLLED TYPE 2 DIABETES MELLITUS WITH HYPERGLYCEMIA, WITHOUT LONG-TERM CURRENT USE OF INSULIN: Primary | ICD-10-CM

## 2023-06-02 PROCEDURE — 1159F PR MEDICATION LIST DOCUMENTED IN MEDICAL RECORD: ICD-10-PCS | Mod: CPTII,S$GLB,, | Performed by: INTERNAL MEDICINE

## 2023-06-02 PROCEDURE — 1160F PR REVIEW ALL MEDS BY PRESCRIBER/CLIN PHARMACIST DOCUMENTED: ICD-10-PCS | Mod: CPTII,S$GLB,, | Performed by: INTERNAL MEDICINE

## 2023-06-02 PROCEDURE — 1159F MED LIST DOCD IN RCRD: CPT | Mod: CPTII,S$GLB,, | Performed by: INTERNAL MEDICINE

## 2023-06-02 PROCEDURE — 3078F PR MOST RECENT DIASTOLIC BLOOD PRESSURE < 80 MM HG: ICD-10-PCS | Mod: CPTII,S$GLB,, | Performed by: INTERNAL MEDICINE

## 2023-06-02 PROCEDURE — 3072F LOW RISK FOR RETINOPATHY: CPT | Mod: CPTII,S$GLB,, | Performed by: INTERNAL MEDICINE

## 2023-06-02 PROCEDURE — 3078F DIAST BP <80 MM HG: CPT | Mod: CPTII,S$GLB,, | Performed by: INTERNAL MEDICINE

## 2023-06-02 PROCEDURE — 3288F FALL RISK ASSESSMENT DOCD: CPT | Mod: CPTII,S$GLB,, | Performed by: INTERNAL MEDICINE

## 2023-06-02 PROCEDURE — 99215 OFFICE O/P EST HI 40 MIN: CPT | Mod: S$GLB,,, | Performed by: INTERNAL MEDICINE

## 2023-06-02 PROCEDURE — 3074F SYST BP LT 130 MM HG: CPT | Mod: CPTII,S$GLB,, | Performed by: INTERNAL MEDICINE

## 2023-06-02 PROCEDURE — 3074F PR MOST RECENT SYSTOLIC BLOOD PRESSURE < 130 MM HG: ICD-10-PCS | Mod: CPTII,S$GLB,, | Performed by: INTERNAL MEDICINE

## 2023-06-02 PROCEDURE — 1126F PR PAIN SEVERITY QUANTIFIED, NO PAIN PRESENT: ICD-10-PCS | Mod: CPTII,S$GLB,, | Performed by: INTERNAL MEDICINE

## 2023-06-02 PROCEDURE — 99999 PR PBB SHADOW E&M-EST. PATIENT-LVL IV: CPT | Mod: PBBFAC,,, | Performed by: INTERNAL MEDICINE

## 2023-06-02 PROCEDURE — 1126F AMNT PAIN NOTED NONE PRSNT: CPT | Mod: CPTII,S$GLB,, | Performed by: INTERNAL MEDICINE

## 2023-06-02 PROCEDURE — 3288F PR FALLS RISK ASSESSMENT DOCUMENTED: ICD-10-PCS | Mod: CPTII,S$GLB,, | Performed by: INTERNAL MEDICINE

## 2023-06-02 PROCEDURE — 1160F RVW MEDS BY RX/DR IN RCRD: CPT | Mod: CPTII,S$GLB,, | Performed by: INTERNAL MEDICINE

## 2023-06-02 PROCEDURE — 99999 PR PBB SHADOW E&M-EST. PATIENT-LVL IV: ICD-10-PCS | Mod: PBBFAC,,, | Performed by: INTERNAL MEDICINE

## 2023-06-02 PROCEDURE — 1101F PR PT FALLS ASSESS DOC 0-1 FALLS W/OUT INJ PAST YR: ICD-10-PCS | Mod: CPTII,S$GLB,, | Performed by: INTERNAL MEDICINE

## 2023-06-02 PROCEDURE — 3072F PR LOW RISK FOR RETINOPATHY: ICD-10-PCS | Mod: CPTII,S$GLB,, | Performed by: INTERNAL MEDICINE

## 2023-06-02 PROCEDURE — 99215 PR OFFICE/OUTPT VISIT, EST, LEVL V, 40-54 MIN: ICD-10-PCS | Mod: S$GLB,,, | Performed by: INTERNAL MEDICINE

## 2023-06-02 PROCEDURE — 1101F PT FALLS ASSESS-DOCD LE1/YR: CPT | Mod: CPTII,S$GLB,, | Performed by: INTERNAL MEDICINE

## 2023-06-02 RX ORDER — ZOSTER VACCINE RECOMBINANT, ADJUVANTED 50 MCG/0.5
0.5 KIT INTRAMUSCULAR ONCE
Qty: 1 EACH | Refills: 1 | Status: SHIPPED | OUTPATIENT
Start: 2023-06-02 | End: 2023-08-03

## 2023-06-02 NOTE — PROGRESS NOTES
Subjective:       Patient ID: Aretha Nguyen is a 77 y.o. female.    Chief Complaint: Diabetes (6 month )      HPI  Aretha Nguyen is a 77 y.o. female with history of  DM type 2, HLD, HTN, IBS, Breast cancer who presents today for Diabetes (6 month )    Feels well.  Has been more active without any problems.  Her family got her a treadmill and starting to walk 20 minutes that she will try to use 3 times a week.    Compliant with medications, has reduced portions in diet, weight has been stable.  Blood pressure has been controlled.    Reports no GI bleed, bowels have been moving okay with increase fiber in diet.  No longer on medication for ulcerative proctitis.    Labs with her known anemia which has been stable.  Iron on the low side and will continue supplements.  Metabolic panel was normal, lipid profile was satisfactory, and thyroid was normal.  A1c less than 7 a 6%.    Has no toxic habits.    Interested in the shingles vaccine.    Health Maintenance:  Health Maintenance   Topic Date Due    Eye Exam  11/17/2023    Hemoglobin A1c  11/26/2023    Lipid Panel  05/26/2024    DEXA Scan  08/01/2025    TETANUS VACCINE  10/26/2027    Hepatitis C Screening  Completed       Review of Systems   Constitutional: Negative.  Negative for fever and unexpected weight change.   HENT: Negative.     Respiratory: Negative.     Cardiovascular: Negative.    Gastrointestinal: Negative.  Negative for blood in stool, change in bowel habit and change in bowel habit.   Genitourinary:  Negative for difficulty urinating.   Musculoskeletal: Negative.    Integumentary:  Negative.   Neurological: Negative.    Psychiatric/Behavioral: Negative.      Past Medical History:   Diagnosis Date    Allergy     Breast cancer, left     Cataract     Controlled type 2 diabetes mellitus with hyperglycemia, without long-term current use of insulin 03/22/2017    Hyperlipidemia associated with type 2 diabetes mellitus 03/22/2017    Hypertension complicating diabetes  03/22/2017    Inflammatory bowel disease 03/22/2017    Urinary tract infection        Past Surgical History:   Procedure Laterality Date    BREAST SURGERY      COLONOSCOPY N/A 1/18/2022    Procedure: COLONOSCOPY suprep;  Surgeon: Lawrence Garcia MD;  Location: [facility];  Service: Endoscopy;  Laterality: N/A;    HYSTERECTOMY      MASTECTOMY Left     for benign recurrent growth. Dr. Stephan Brown MD - [facility] - [facility]    SIMPLE MASTECTOMY Left     TOTAL ABDOMINAL HYSTERECTOMY W/ BILATERAL SALPINGOOPHORECTOMY  2009    for benign cysts with concern for future malginancy. Fibromoid uterus for AUB. Bengin results       Family History   Problem Relation Age of Onset    Breast cancer Neg Hx        Social History     Socioeconomic History    Marital status:    Tobacco Use    Smoking status: Never    Smokeless tobacco: Never   Substance and Sexual Activity    Alcohol use: Yes     Comment: not often    Drug use: No    Sexual activity: Yes     Partners: Male   Social History Narrative    [facility]HA Hall - [facility]        Dr. Stephan Brown MD - [place] - General Surgery & Surgery - mammograms        Current Outpatient Medications   Medication Sig Dispense Refill    atorvastatin (LIPITOR) 20 MG tablet TAKE 1 TABLET BY MOUTH EVERY DAY 90 tablet 1    cholecalciferol, vitamin D3, 2,000 unit Cap Take 1 capsule (2,000 Units total) by mouth once daily.      ciclopirox (PENLAC) 8 % Soln Apply topically nightly. 6 mL 3    cyanocobalamin (VITAMIN B-12) 1000 MCG tablet Take 2 tablets (2,000 mcg total) by mouth once daily. 60 tablet 6    diclofenac sodium (VOLTAREN ARTHRITIS PAIN) 1 % Gel Apply 2 g topically 2 (two) times daily. 1 Tube 1    EScitalopram oxalate (LEXAPRO) 5 MG Tab TAKE 1 TABLET BY MOUTH EVERY DAY 90 tablet 3    ferrous sulfate (FEOSOL) 325 mg (65 mg iron) Tab tablet TAKE 1 TABLET BY MOUTH EVERY DAY WITH BREAKFAST 90 tablet 2     hydroCHLOROthiazide (MICROZIDE) 12.5 mg capsule TAKE 1 CAPSULE BY MOUTH ONCE DAILY 90 capsule 2    metFORMIN (GLUCOPHAGE-XR) 500 MG ER 24hr tablet TAKE 2 TABLETS (1,000 MG TOTAL) BY MOUTH ONCE DAILY. 180 tablet 0    multivit-min-iron-FA-lutein (CENTRUM SILVER WOMEN) 8 mg iron-400 mcg-300 mcg Tab Take 1 tablet by mouth once daily at 6am.      olmesartan (BENICAR) 20 MG tablet TAKE 1 TABLET BY MOUTH EVERY DAY 90 tablet 3    varicella-zoster [id number], PF, (SHINGRIX, PF,) 50 mcg/0.5 mL injection Inject 0.5 mLs into the muscle once. for 1 dose 1 each 1     No current facility-administered medications for this visit.       Review of patient's allergies indicates:   Allergen Reactions    Ciprofloxacin hcl Rash         Objective:       Last 3 sets of Vitals    Vitals - 1 value per visit 12/13/2022 6/2/2023 6/2/2023   SYSTOLIC 110 - 116   DIASTOLIC 82 - 78   Pulse 76 - 87   Temp - - -   Resp - - -   SPO2 97 - 98   Weight (lb) 140.43 - 138.89   Weight (kg) 63.7 - 63   Height 62 - 62   BMI (Calculated) 25.7 - 25.4   VISIT REPORT - - -   Pain Score  - 0 -   Some recent data might be hidden   Physical Exam  Constitutional:       General: She is not in acute distress.  HENT:      Head: Normocephalic.      Right Ear: Tympanic membrane, ear canal and external ear normal.      Left Ear: Tympanic membrane, ear canal and external ear normal.      Nose: Nose normal.      Mouth/Throat:      Mouth: Mucous membranes are moist.   Eyes:      General: No scleral icterus.     Extraocular Movements: Extraocular movements intact.      Conjunctiva/sclera: Conjunctivae normal.   Neck:      Vascular: No carotid bruit.      Comments: No goiter.  Cardiovascular:      Rate and Rhythm: Normal rate and regular rhythm.      Pulses: Normal pulses.      Heart sounds: Normal heart sounds.   Pulmonary:      Effort: Pulmonary effort is normal.      Breath sounds: Normal breath sounds.   Abdominal:      General: Bowel sounds are normal. There is no  distension.      Palpations: Abdomen is soft. There is no mass.      Tenderness: There is no abdominal tenderness.   Musculoskeletal:         General: No swelling.   Lymphadenopathy:      Cervical: No cervical adenopathy.   Skin:     General: Skin is warm and dry.   Neurological:      General: No focal deficit present.      Mental Status: She is alert and oriented to person, place, and time.   Psychiatric:         Mood and Affect: Mood normal.         Behavior: Behavior normal.         CBC:  Recent Labs   Lab 02/22/22  1704 07/22/22  0807 05/26/23  0807   WBC 8.09 5.65 6.13   RBC 3.95 L 4.10 3.96 L   Hemoglobin 11.0 L 11.2 L 11.1 L   Hematocrit 34.3 L 35.6 L 34.5 L   Platelets 310 277 272   MCV 87 87 87   MCH 27.8 27.3 28.0   MCHC 32.1 31.5 L 32.2     CMP:  Recent Labs   Lab 02/22/22  1704 07/22/22  0807 05/26/23  0807   Glucose 102 100 89   Calcium 9.3 9.9 9.8   Albumin 4.0 3.7 3.8   Total Protein 7.8 7.7 7.6   Sodium 137 139 138   Potassium 4.1 3.7 4.0   CO2 31 H 28 26   Chloride 97 101 101   BUN 10 11 11   Creatinine 0.7 0.8 0.8   Alkaline Phosphatase 95 76 77   ALT 12 7 L 14   AST 26 19 21   Total Bilirubin 0.4 0.7 0.3     URINALYSIS:  Recent Labs   Lab 02/22/22  1640 03/09/22  1502   Color, UA Colorless A Yellow   Specific Gravity, UA 1.005  --    Spec Grav UA  --  1.015   pH, UA 7.0 7   Protein, UA Negative  --    Bacteria Few A  --    Nitrite, UA Negative neg   Leukocytes, UA 1+ A  --    Urobilinogen, UA Negative norm      LIPIDS:  Recent Labs   Lab 06/11/21  0913 06/11/21  0924 09/21/21  1058 05/26/23  0807   TSH  --  3.239  --  3.331   HDL 42  --  52 48   Cholesterol 165  --  174 157   Triglycerides 139  --  131 127   LDL Cholesterol 95.2  --  95.8 83.6   HDL/Cholesterol Ratio 25.5  --  29.9 30.6   Non-HDL Cholesterol 123  --  122 109   Total Cholesterol/HDL Ratio 3.9  --  3.3 3.3     TSH:  Recent Labs   Lab 06/11/21  0924 05/26/23  0807   TSH 3.239 3.331       A1C:  Recent Labs   Lab 12/04/20  0900  06/11/21  0913 09/21/21  1058 07/22/22  0807 11/28/22  0825 05/26/23  0807   Hemoglobin A1C 6.0 H 5.6 5.8 H 6.7 H 6.1 H 6.0 H       Imaging:  Mammo Digital Screening Right with Ricky  Narrative: Result:  Mammo Digital Screening Right with Ricky    History:  Patient is 77 y.o. and is seen for a screening mammogram.    Films Compared:  Compared to: 11/27/2021 Mammo Digital Screening Right with Ricky (No   Change)     Findings:   This procedure was performed using tomosynthesis.   Computer-aided detection was utilized in the interpretation of this   examination.    The right breast is heterogeneously dense, which may obscure small masses.   There is no evidence of suspicious masses, microcalcifications or   architectural distortion.  Impression:    No mammographic evidence of malignancy.    BI-RADS Category 1: Negative    Recommendation:  Routine screening mammogram in 1 year is recommended.      Assessment:       1. Controlled type 2 diabetes mellitus with hyperglycemia, without long-term current use of insulin    2. Hypertension complicating diabetes    3. Hyperlipidemia associated with type 2 diabetes mellitus    4. Iron deficiency anemia due to chronic blood loss    5. Ulcerative proctitis without complication            Plan:       1. Controlled type 2 diabetes mellitus with hyperglycemia, without long-term current use of insulin  -     Hemoglobin A1C; Future; Expected date: 06/02/2023  -     Comprehensive Metabolic Panel; Future; Expected date: 06/02/2023  -     Microalbumin/Creatinine Ratio, Urine; Future; Expected date: 06/02/2023  - controlled diabetes, continue same treatment.  - encourage lifestyle modifications    2. Hypertension complicating diabetes   - controlled, same treatment.   - follow CMP    3. Hyperlipidemia associated with type 2 diabetes mellitus   - labs satisfactory, continue a atorvastatin    4. Iron deficiency anemia due to chronic blood loss   - Stable, no bleeding    5. Ulcerative proctitis  without complication   - follows with GI service.  Stable off medication.    Other orders  -     varicella-zoster [id number], PF, (SHINGRIX, PF,) 50 mcg/0.5 mL injection; Inject 0.5 mLs into the muscle once. for 1 dose  Dispense: 1 each; Refill: 1       Health Maintenance Due   Topic Date Due    Diabetes Urine Screening  09/21/2022    COVID-19 Vaccine (5 - Pfizer series) 09/29/2022            Return to clinic in 6 months.    Brittany Floyd MD  [facility]  Disclaimer:  This note has been generated using voice-recognition software. There may be grammatical or spelling errors that have been missed during proof-reading

## 2023-06-06 ENCOUNTER — OFFICE VISIT (OUTPATIENT)
Dept: URGENT CARE | Facility: CLINIC | Age: 78
End: 2023-06-06
Payer: MEDICARE

## 2023-06-06 VITALS
WEIGHT: 138 LBS | TEMPERATURE: 98 F | OXYGEN SATURATION: 95 % | HEART RATE: 75 BPM | SYSTOLIC BLOOD PRESSURE: 137 MMHG | HEIGHT: 62 IN | DIASTOLIC BLOOD PRESSURE: 82 MMHG | BODY MASS INDEX: 25.4 KG/M2 | RESPIRATION RATE: 18 BRPM

## 2023-06-06 DIAGNOSIS — J02.0 STREP PHARYNGITIS: Primary | ICD-10-CM

## 2023-06-06 LAB
CTP QC/QA: YES
MOLECULAR STREP A: POSITIVE

## 2023-06-06 PROCEDURE — 99213 OFFICE O/P EST LOW 20 MIN: CPT | Mod: S$GLB,,, | Performed by: FAMILY MEDICINE

## 2023-06-06 PROCEDURE — 87651 POCT STREP A MOLECULAR: ICD-10-PCS | Mod: QW,S$GLB,, | Performed by: FAMILY MEDICINE

## 2023-06-06 PROCEDURE — 99213 PR OFFICE/OUTPT VISIT, EST, LEVL III, 20-29 MIN: ICD-10-PCS | Mod: S$GLB,,, | Performed by: FAMILY MEDICINE

## 2023-06-06 PROCEDURE — 87651 STREP A DNA AMP PROBE: CPT | Mod: QW,S$GLB,, | Performed by: FAMILY MEDICINE

## 2023-06-06 RX ORDER — AZITHROMYCIN 250 MG/1
TABLET, FILM COATED ORAL
Qty: 6 TABLET | Refills: 0 | Status: SHIPPED | OUTPATIENT
Start: 2023-06-06 | End: 2023-06-11

## 2023-06-06 NOTE — PROGRESS NOTES
"Subjective:      Patient ID: Aretha Nguyen is a 77 y.o. female.    Vitals:  height is 5' 2" (1.575 m) and weight is 62.6 kg (138 lb). Her temperature is 98.3 °F (36.8 °C). Her blood pressure is 137/82 and her pulse is 75. Her respiration is 18 and oxygen saturation is 95%.     Chief Complaint: Sore Throat    77 yr female present with sore throat hurt to swallow. symptoms started Friday. Treatments at home include tylenol Advil. Pt states that one of her grandchildren had sore throat        Sore Throat   This is a new problem. The current episode started in the past 7 days. The problem has been gradually worsening. There has been no fever. The pain is at a severity of 9/10. The pain is severe. Associated symptoms include trouble swallowing. Pertinent negatives include no congestion, ear pain, plugged ear sensation or neck pain. She has had no exposure to strep or mono. She has tried nothing for the symptoms. The treatment provided no relief.     HENT:  Positive for sore throat and trouble swallowing. Negative for ear pain and congestion.    Neck: Negative for neck pain.    Objective:     Physical Exam   Constitutional: She is oriented to person, place, and time. She appears well-developed. She is cooperative.   HENT:   Head: Normocephalic and atraumatic.      Comments: +submandibular LAD  Ears:   Right Ear: Hearing and external ear normal.   Left Ear: Hearing and external ear normal.   Nose: Nose normal. No mucosal edema or nasal deformity. No epistaxis. Right sinus exhibits no maxillary sinus tenderness and no frontal sinus tenderness. Left sinus exhibits no maxillary sinus tenderness and no frontal sinus tenderness.   Mouth/Throat: Uvula is midline, oropharynx is clear and moist and mucous membranes are normal. No trismus in the jaw. Normal dentition. No uvula swelling.   Eyes: Conjunctivae and lids are normal.   Neck: Trachea normal and phonation normal. Neck supple.   Cardiovascular: Normal rate and normal pulses. "   Pulmonary/Chest: Effort normal.   Abdominal: Normal appearance and bowel sounds are normal. Soft.   Musculoskeletal: Normal range of motion.         General: Normal range of motion.   Neurological: She is alert and oriented to person, place, and time. She exhibits normal muscle tone.   Skin: Skin is warm, dry and intact.   Psychiatric: Her speech is normal and behavior is normal. Judgment and thought content normal.   Nursing note and vitals reviewed.    Assessment:     1. Strep pharyngitis      Results for orders placed or performed in visit on 06/06/23   POCT Strep A, Molecular   Result Value Ref Range    Molecular Strep A, POC Positive (A) Negative     Acceptable Yes        Plan:       Strep pharyngitis  -     POCT Strep A, Molecular  -     azithromycin (Z-EDMUND) 250 MG tablet; Take 2 tablets by mouth on day 1; Take 1 tablet by mouth on days 2-5  Dispense: 6 tablet; Refill: 0    States allergy to amoxicillin. Chart says cipro. Will give azithro just in case.   Cepacol PRN OTC  Tea w honey  RTC PRN

## 2023-06-09 ENCOUNTER — PES CALL (OUTPATIENT)
Dept: ADMINISTRATIVE | Facility: CLINIC | Age: 78
End: 2023-06-09
Payer: MEDICARE

## 2023-06-22 ENCOUNTER — HOSPITAL ENCOUNTER (EMERGENCY)
Facility: HOSPITAL | Age: 78
Discharge: HOME | End: 2023-06-22
Attending: EMERGENCY MEDICINE
Payer: MEDICARE

## 2023-06-22 VITALS
SYSTOLIC BLOOD PRESSURE: 127 MMHG | BODY MASS INDEX: 25.79 KG/M2 | RESPIRATION RATE: 18 BRPM | OXYGEN SATURATION: 99 % | TEMPERATURE: 98 F | HEART RATE: 80 BPM | DIASTOLIC BLOOD PRESSURE: 60 MMHG | WEIGHT: 141 LBS

## 2023-06-22 DIAGNOSIS — M25.569 KNEE PAIN: ICD-10-CM

## 2023-06-22 DIAGNOSIS — M25.461 EFFUSION OF RIGHT KNEE: Primary | ICD-10-CM

## 2023-06-22 PROCEDURE — 99283 EMERGENCY DEPT VISIT LOW MDM: CPT

## 2023-06-22 PROCEDURE — 25000003 PHARM REV CODE 250: Performed by: NURSE PRACTITIONER

## 2023-06-22 RX ORDER — IBUPROFEN 400 MG/1
400 TABLET ORAL EVERY 6 HOURS PRN
Qty: 20 TABLET | Refills: 0 | Status: SHIPPED | OUTPATIENT
Start: 2023-06-22 | End: 2023-09-12

## 2023-06-22 RX ORDER — IBUPROFEN 400 MG/1
400 TABLET ORAL
Status: COMPLETED | OUTPATIENT
Start: 2023-06-22 | End: 2023-06-22

## 2023-06-22 RX ADMIN — IBUPROFEN 400 MG: 400 TABLET ORAL at 10:06

## 2023-06-22 NOTE — DISCHARGE INSTRUCTIONS

## 2023-06-22 NOTE — ED PROVIDER NOTES
Encounter Date: 6/22/2023       History     Chief Complaint   Patient presents with    Knee Pain     Pt reports pov from home , with right knee pain that is non traumatic. Pt believes that her knee dislocated itself. Pt states that she has increased pain and is unable to bear weight on it.      77-year-old female presents emergency room with reports of right knee pain.  Patient reports the pain started last night however worsened into this morning.  She reports pain with weight-bearing.  Patient denies any known injury however states she does have a history of arthritis.  No other joints are involved at this time.  She denies fever.  She is a past medical history of left breast cancer, seasonal allergies, cataracts, diabetes, hyperlipidemia, hypertension, irritable bowel disease, UTIs.    The history is provided by the patient. No  was used.   Review of patient's allergies indicates:   Allergen Reactions    Ciprofloxacin hcl Rash     Past Medical History:   Diagnosis Date    Allergy     Breast cancer, left     Cataract     Controlled type 2 diabetes mellitus with hyperglycemia, without long-term current use of insulin 03/22/2017    Hyperlipidemia associated with type 2 diabetes mellitus 03/22/2017    Hypertension complicating diabetes 03/22/2017    Inflammatory bowel disease 03/22/2017    Urinary tract infection      Past Surgical History:   Procedure Laterality Date    BREAST SURGERY      COLONOSCOPY N/A 1/18/2022    Procedure: COLONOSCOPY suprep;  Surgeon: Lawrence Garcia MD;  Location: [facility];  Service: Endoscopy;  Laterality: N/A;    HYSTERECTOMY      MASTECTOMY Left     for benign recurrent growth. Dr. Stephan Brown MD - [facility] - [facility]    SIMPLE MASTECTOMY Left     TOTAL ABDOMINAL HYSTERECTOMY W/ BILATERAL SALPINGOOPHORECTOMY  2009    for benign cysts with concern for future malginancy. Fibromoid uterus for AUB. Bengin results     Family History    Problem Relation Age of Onset    Breast cancer Neg Hx      Social History     Tobacco Use    Smoking status: Never    Smokeless tobacco: Never   Substance Use Topics    Alcohol use: Yes     Comment: not often    Drug use: No     Review of Systems   Musculoskeletal:         Right knee pain.   All other systems reviewed and are negative.    Physical Exam     Initial Vitals   BP Pulse Resp Temp SpO2   06/22/23 0922 06/22/23 0922 06/22/23 0922 06/22/23 0924 06/22/23 0922   132/66 76 14 97.8 °F (36.6 °C) 96 %      MAP       --                Physical Exam    Constitutional: She appears well-developed and well-nourished.   HENT:   Head: Normocephalic.   Right Ear: Hearing and tympanic membrane normal.   Left Ear: Hearing and tympanic membrane normal.   Nose: Nose normal.   Mouth/Throat: Oropharynx is clear and moist.   Eyes: Lids are normal. Pupils are equal, round, and reactive to light.   Neck:   Normal range of motion.  Cardiovascular:  Normal rate.           Pulmonary/Chest: Breath sounds normal. No respiratory distress. She has no wheezes. She has no rhonchi.   Abdominal: Abdomen is soft. There is no abdominal tenderness.   Musculoskeletal:         General: Normal range of motion.      Cervical back: Normal range of motion. No rigidity.      Right knee: Bony tenderness present. No swelling, erythema, ecchymosis or lacerations. Normal pulse.      Comments: Tenderness with palpation to the anterior aspect of the right knee.  There is no surrounding erythema.  There is no break in the skin noted.     Neurological: She is alert and oriented to person, place, and time.   Skin: Skin is warm and dry. No rash noted.   Psychiatric: She has a normal mood and affect. Her behavior is normal. Judgment and thought content normal.       ED Course   Procedures  Labs Reviewed - No data to display       Imaging Results              X-Ray Knee 3 View Right (Final result)  Result time 06/22/23 12:24:49      Final result by Nikos VILLARREAL  MD Monisha (06/22/23 12:24:49)                   Impression:      1. No acute displaced fracture or dislocation of the knee noting small suprapatellar effusion.      Electronically signed by: Nikos Bearden MD  Date:    06/22/2023  Time:    12:24               Narrative:    EXAMINATION:  XR KNEE 3 VIEW RIGHT    CLINICAL HISTORY:  Pain in unspecified knee    TECHNIQUE:  AP, lateral, and Merchant views of the right knee were performed.    COMPARISON:  05/31/2021    FINDINGS:  Three views right knee.    No acute displaced fracture or dislocation of the knee.  No radiopaque foreign body.  There is osteopenia.  There may be a small suprapatellar effusion.                                       Medications   ibuprofen tablet 400 mg (400 mg Oral Given 6/22/23 1013)     Medical Decision Making:   Differential Diagnosis:   Differential Diagnosis includes, but is not limited to:  Fracture, dislocation, compartment syndrome, nerve injury/palsy, vascular injury, DVT, rhabdomyolysis, hemarthrosis, septic joint, cellulitis, bursitis, muscle strain, ligament tear/sprain, laceration, foreign body, abrasion, soft tissue contusion, osteoarthritis.     Clinical Tests:   Radiological Study: Ordered and Reviewed           ED Course as of 06/22/23 1631   Thu Jun 22, 2023   1229 FINDINGS:  Three views right knee.     No acute displaced fracture or dislocation of the knee.  No radiopaque foreign body.  There is osteopenia.  There may be a small suprapatellar effusion.     Impression:     1. No acute displaced fracture or dislocation of the knee noting small suprapatellar effusion [DT]   1300 Pt was notified of the need for follow up care with ortho and the meds prescribed. Reports improvement in her condition after meds given here in the ER. There is no evidence of septic joint on this exam,. STRICT return precautions given. She is not toxic in appearance and otherwise stable for dc.  [DT]      ED Course User Index  [DT] Marcie MUSE  FRANKLIN Campbell                 Clinical Impression:   Final diagnoses:  [M25.569] Knee pain  [M25.461] Effusion of right knee (Primary)        ED Disposition Condition    Discharge Stable          ED Prescriptions       Medication Sig Dispense Start Date End Date Auth. Provider    ibuprofen (ADVIL,MOTRIN) 400 MG tablet Take 1 tablet (400 mg total) by mouth every 6 (six) hours as needed. 20 tablet 6/22/2023 -- Marcie Campbell NP          Follow-up Information       Follow up With Specialties Details Why Contact Info    Brittany Floyd MD Family Medicine Schedule an appointment as soon as possible for a visit in 2 days  [address]  [facility] [address]  [phone]               Marcie Campbell NP  06/22/23 [id number]

## 2023-06-23 ENCOUNTER — TELEPHONE (OUTPATIENT)
Dept: EMERGENCY MEDICINE | Facility: HOSPITAL | Age: 78
End: 2023-06-23
Payer: MEDICARE

## 2023-08-14 DIAGNOSIS — E11.65 CONTROLLED TYPE 2 DIABETES MELLITUS WITH HYPERGLYCEMIA, WITHOUT LONG-TERM CURRENT USE OF INSULIN: ICD-10-CM

## 2023-08-14 RX ORDER — METFORMIN HYDROCHLORIDE 500 MG/1
1000 TABLET, EXTENDED RELEASE ORAL DAILY
Qty: 180 TABLET | Refills: 0 | Status: SHIPPED | OUTPATIENT
Start: 2023-08-14 | End: 2023-11-14

## 2023-08-14 NOTE — TELEPHONE ENCOUNTER
No care due was identified.  [facility] Embedded Care Due Messages. Reference number: [id number].   8/14/2023 12:27:45 AM CDT

## 2023-08-17 ENCOUNTER — OFFICE VISIT (OUTPATIENT)
Dept: ORTHOPEDICS | Facility: CLINIC | Age: 78
End: 2023-08-17
Payer: MEDICARE

## 2023-08-17 VITALS — WEIGHT: 140 LBS | HEIGHT: 62 IN | BODY MASS INDEX: 25.76 KG/M2

## 2023-08-17 DIAGNOSIS — M25.461 EFFUSION OF RIGHT KNEE: ICD-10-CM

## 2023-08-17 DIAGNOSIS — R93.7 BONE MARROW EDEMA: ICD-10-CM

## 2023-08-17 DIAGNOSIS — M17.11 PRIMARY OSTEOARTHRITIS OF RIGHT KNEE: Primary | ICD-10-CM

## 2023-08-17 PROCEDURE — 1125F PR PAIN SEVERITY QUANTIFIED, PAIN PRESENT: ICD-10-PCS | Mod: CPTII,S$GLB,, | Performed by: ORTHOPAEDIC SURGERY

## 2023-08-17 PROCEDURE — 3288F FALL RISK ASSESSMENT DOCD: CPT | Mod: CPTII,S$GLB,, | Performed by: ORTHOPAEDIC SURGERY

## 2023-08-17 PROCEDURE — 3072F PR LOW RISK FOR RETINOPATHY: ICD-10-PCS | Mod: CPTII,S$GLB,, | Performed by: ORTHOPAEDIC SURGERY

## 2023-08-17 PROCEDURE — 99999 PR PBB SHADOW E&M-EST. PATIENT-LVL IV: ICD-10-PCS | Mod: PBBFAC,,, | Performed by: ORTHOPAEDIC SURGERY

## 2023-08-17 PROCEDURE — 1160F PR REVIEW ALL MEDS BY PRESCRIBER/CLIN PHARMACIST DOCUMENTED: ICD-10-PCS | Mod: CPTII,S$GLB,, | Performed by: ORTHOPAEDIC SURGERY

## 2023-08-17 PROCEDURE — 99999 PR PBB SHADOW E&M-EST. PATIENT-LVL IV: CPT | Mod: PBBFAC,,, | Performed by: ORTHOPAEDIC SURGERY

## 2023-08-17 PROCEDURE — 3072F LOW RISK FOR RETINOPATHY: CPT | Mod: CPTII,S$GLB,, | Performed by: ORTHOPAEDIC SURGERY

## 2023-08-17 PROCEDURE — 1159F PR MEDICATION LIST DOCUMENTED IN MEDICAL RECORD: ICD-10-PCS | Mod: CPTII,S$GLB,, | Performed by: ORTHOPAEDIC SURGERY

## 2023-08-17 PROCEDURE — 1101F PR PT FALLS ASSESS DOC 0-1 FALLS W/OUT INJ PAST YR: ICD-10-PCS | Mod: CPTII,S$GLB,, | Performed by: ORTHOPAEDIC SURGERY

## 2023-08-17 PROCEDURE — 1101F PT FALLS ASSESS-DOCD LE1/YR: CPT | Mod: CPTII,S$GLB,, | Performed by: ORTHOPAEDIC SURGERY

## 2023-08-17 PROCEDURE — 99213 OFFICE O/P EST LOW 20 MIN: CPT | Mod: S$GLB,,, | Performed by: ORTHOPAEDIC SURGERY

## 2023-08-17 PROCEDURE — 3288F PR FALLS RISK ASSESSMENT DOCUMENTED: ICD-10-PCS | Mod: CPTII,S$GLB,, | Performed by: ORTHOPAEDIC SURGERY

## 2023-08-17 PROCEDURE — 1160F RVW MEDS BY RX/DR IN RCRD: CPT | Mod: CPTII,S$GLB,, | Performed by: ORTHOPAEDIC SURGERY

## 2023-08-17 PROCEDURE — 99213 PR OFFICE/OUTPT VISIT, EST, LEVL III, 20-29 MIN: ICD-10-PCS | Mod: S$GLB,,, | Performed by: ORTHOPAEDIC SURGERY

## 2023-08-17 PROCEDURE — 1125F AMNT PAIN NOTED PAIN PRSNT: CPT | Mod: CPTII,S$GLB,, | Performed by: ORTHOPAEDIC SURGERY

## 2023-08-17 PROCEDURE — 1159F MED LIST DOCD IN RCRD: CPT | Mod: CPTII,S$GLB,, | Performed by: ORTHOPAEDIC SURGERY

## 2023-08-17 NOTE — PROGRESS NOTES
Subjective:      Patient ID: Aretha Nguyen is a 77 y.o. female.    Chief Complaint: Knee Pain    HPI    Follow-up for osteoarthritis.  The patient complains of an episode of right medial knee pain about 2 months ago.  She reports that it was intense and disabling for a while, but is gradually resolved with over-the-counter medication and the knee sleeve.  She had a similar episode with her left knee last year.  She reports that her left knee is comfortable now.          Review of Systems   Constitutional: Negative for fever and weight loss.   HENT:  Negative for congestion.    Eyes:  Negative for visual disturbance.   Cardiovascular:  Negative for chest pain.   Respiratory:  Negative for shortness of breath.    Hematologic/Lymphatic: Negative for bleeding problem. Does not bruise/bleed easily.   Skin:  Negative for poor wound healing.   Musculoskeletal:  Positive for joint pain.   Gastrointestinal:  Negative for abdominal pain.   Genitourinary:  Negative for dysuria.   Neurological:  Negative for seizures.   Psychiatric/Behavioral:  Negative for altered mental status.    Allergic/Immunologic: Negative for persistent infections.         Objective:      Ortho/SPM Exam      Right knee    The patient is not in acute distress.   Body habitus is normal.   Sclera appear normal  No respiratory distress  The patient walks without a limp.  Resisted SLR negative.   The skin over the knee is intact.  Knee effusion 0  Tendernes is located absent.  Range of motion- Flexion full, Extension full.   Ligament exam:   MCL trace laxity   Lachman intact              Post sag intact    LCL intact  Patellar apprehension negative.  Popliteal cyst negative  Patellar crepitation absent.  Flexion/pinch negative.  Pulses DP present, PT present.  Motor normal 5/5 strength in all tested muscle groups.   Sensory normal.    I reviewed the relevant imaging for the patient's condition:  Knee radiographs show very slight medial narrowing, Kellgren  stage I.            Assessment:       Encounter Diagnoses   Name Primary?    Effusion of right knee     Primary osteoarthritis of right knee Yes    Bone marrow edema         Based on the clinical course, the most likely explanation is that the patient has mild osteoarthritis the right knee with resolving episode of localized periarticular bone marrow edema.    I expect gradual improvement in this condition with rest and over-the-counter analgesics.          Plan:       Aretha was seen today for knee pain.    Diagnoses and all orders for this visit:    Primary osteoarthritis of right knee    Effusion of right knee  -     Ambulatory referral/consult to Orthopedics    Bone marrow edema          I explained my diagnostic impression and the reasoning behind it in detail, using layman's terms.  Models and/or pictures were used to help in the explanation.      We discussed the pros and cons of injection.  The patient declines for now.      Continue present treatment modalities

## 2023-08-18 ENCOUNTER — PES CALL (OUTPATIENT)
Dept: ADMINISTRATIVE | Facility: CLINIC | Age: 78
End: 2023-08-18
Payer: MEDICARE

## 2023-08-31 DIAGNOSIS — I10 ESSENTIAL HYPERTENSION: ICD-10-CM

## 2023-08-31 RX ORDER — OLMESARTAN MEDOXOMIL 20 MG/1
TABLET ORAL
Qty: 90 TABLET | Refills: 3 | Status: SHIPPED | OUTPATIENT
Start: 2023-08-31

## 2023-09-02 DIAGNOSIS — I10 ESSENTIAL HYPERTENSION: ICD-10-CM

## 2023-09-02 NOTE — TELEPHONE ENCOUNTER
No care due was identified.  [facility] Embedded Care Due Messages. Reference number: [id number].   9/02/2023 7:23:25 AM CDT

## 2023-09-05 RX ORDER — HYDROCHLOROTHIAZIDE 12.5 MG/1
CAPSULE ORAL
Qty: 90 CAPSULE | Refills: 2 | Status: SHIPPED | OUTPATIENT
Start: 2023-09-05 | End: 2023-11-06

## 2023-09-06 ENCOUNTER — LAB VISIT (OUTPATIENT)
Dept: LAB | Facility: HOSPITAL | Age: 78
End: 2023-09-06
Attending: INTERNAL MEDICINE
Payer: MEDICARE

## 2023-09-06 DIAGNOSIS — E11.65 CONTROLLED TYPE 2 DIABETES MELLITUS WITH HYPERGLYCEMIA, WITHOUT LONG-TERM CURRENT USE OF INSULIN: ICD-10-CM

## 2023-09-06 LAB
ALBUMIN SERPL BCP-MCNC: 3.9 G/DL (ref 3.5–5.2)
ALBUMIN/CREAT UR: NORMAL UG/MG (ref 0–30)
ALP SERPL-CCNC: 86 U/L (ref 55–135)
ALT SERPL W/O P-5'-P-CCNC: 12 U/L (ref 10–44)
ANION GAP SERPL CALC-SCNC: 12 MMOL/L (ref 8–16)
AST SERPL-CCNC: 21 U/L (ref 10–40)
BILIRUB SERPL-MCNC: 0.4 MG/DL (ref 0.1–1)
BUN SERPL-MCNC: 10 MG/DL (ref 8–23)
CALCIUM SERPL-MCNC: 10.1 MG/DL (ref 8.7–10.5)
CHLORIDE SERPL-SCNC: 100 MMOL/L (ref 95–110)
CO2 SERPL-SCNC: 25 MMOL/L (ref 23–29)
CREAT SERPL-MCNC: 0.8 MG/DL (ref 0.5–1.4)
CREAT UR-MCNC: 66 MG/DL (ref 15–325)
EST. GFR  (NO RACE VARIABLE): >60 ML/MIN/1.73 M^2
ESTIMATED AVG GLUCOSE: 126 MG/DL (ref 68–131)
GLUCOSE SERPL-MCNC: 95 MG/DL (ref 70–110)
HBA1C MFR BLD: 6 % (ref 4–5.6)
MICROALBUMIN UR DL<=1MG/L-MCNC: <5 UG/ML
POTASSIUM SERPL-SCNC: 3.8 MMOL/L (ref 3.5–5.1)
PROT SERPL-MCNC: 8 G/DL (ref 6–8.4)
SODIUM SERPL-SCNC: 137 MMOL/L (ref 136–145)

## 2023-09-06 PROCEDURE — 83036 HEMOGLOBIN GLYCOSYLATED A1C: CPT | Performed by: INTERNAL MEDICINE

## 2023-09-06 PROCEDURE — 36415 COLL VENOUS BLD VENIPUNCTURE: CPT | Performed by: INTERNAL MEDICINE

## 2023-09-06 PROCEDURE — 80053 COMPREHEN METABOLIC PANEL: CPT | Performed by: INTERNAL MEDICINE

## 2023-09-06 PROCEDURE — 82043 UR ALBUMIN QUANTITATIVE: CPT | Performed by: INTERNAL MEDICINE

## 2023-09-12 ENCOUNTER — OFFICE VISIT (OUTPATIENT)
Dept: FAMILY MEDICINE | Facility: CLINIC | Age: 78
End: 2023-09-12
Payer: MEDICARE

## 2023-09-12 VITALS
WEIGHT: 138.25 LBS | BODY MASS INDEX: 25.44 KG/M2 | HEIGHT: 62 IN | OXYGEN SATURATION: 95 % | DIASTOLIC BLOOD PRESSURE: 68 MMHG | HEART RATE: 73 BPM | SYSTOLIC BLOOD PRESSURE: 114 MMHG

## 2023-09-12 DIAGNOSIS — D50.0 IRON DEFICIENCY ANEMIA DUE TO CHRONIC BLOOD LOSS: ICD-10-CM

## 2023-09-12 DIAGNOSIS — E53.8 B12 DEFICIENCY: ICD-10-CM

## 2023-09-12 DIAGNOSIS — E11.59 HYPERTENSION COMPLICATING DIABETES: Primary | ICD-10-CM

## 2023-09-12 DIAGNOSIS — F33.9 RECURRENT DEPRESSION: ICD-10-CM

## 2023-09-12 DIAGNOSIS — Z90.10 POST-MASTECTOMY DEFORMITY OF BREAST: ICD-10-CM

## 2023-09-12 DIAGNOSIS — K51.20 ULCERATIVE PROCTITIS WITHOUT COMPLICATION: ICD-10-CM

## 2023-09-12 DIAGNOSIS — E11.65 CONTROLLED TYPE 2 DIABETES MELLITUS WITH HYPERGLYCEMIA, WITHOUT LONG-TERM CURRENT USE OF INSULIN: ICD-10-CM

## 2023-09-12 DIAGNOSIS — N64.89 POST-MASTECTOMY DEFORMITY OF BREAST: ICD-10-CM

## 2023-09-12 DIAGNOSIS — E78.5 HYPERLIPIDEMIA ASSOCIATED WITH TYPE 2 DIABETES MELLITUS: ICD-10-CM

## 2023-09-12 DIAGNOSIS — I15.2 HYPERTENSION COMPLICATING DIABETES: Primary | ICD-10-CM

## 2023-09-12 DIAGNOSIS — B35.1 ONYCHOMYCOSIS: ICD-10-CM

## 2023-09-12 DIAGNOSIS — E11.69 HYPERLIPIDEMIA ASSOCIATED WITH TYPE 2 DIABETES MELLITUS: ICD-10-CM

## 2023-09-12 DIAGNOSIS — K90.89 OTHER INTESTINAL MALABSORPTION: ICD-10-CM

## 2023-09-12 PROCEDURE — 3072F LOW RISK FOR RETINOPATHY: CPT | Mod: CPTII,S$GLB,, | Performed by: INTERNAL MEDICINE

## 2023-09-12 PROCEDURE — 99999 PR PBB SHADOW E&M-EST. PATIENT-LVL IV: ICD-10-PCS | Mod: PBBFAC,,, | Performed by: INTERNAL MEDICINE

## 2023-09-12 PROCEDURE — 3074F SYST BP LT 130 MM HG: CPT | Mod: CPTII,S$GLB,, | Performed by: INTERNAL MEDICINE

## 2023-09-12 PROCEDURE — 3288F FALL RISK ASSESSMENT DOCD: CPT | Mod: CPTII,S$GLB,, | Performed by: INTERNAL MEDICINE

## 2023-09-12 PROCEDURE — 3074F PR MOST RECENT SYSTOLIC BLOOD PRESSURE < 130 MM HG: ICD-10-PCS | Mod: CPTII,S$GLB,, | Performed by: INTERNAL MEDICINE

## 2023-09-12 PROCEDURE — 3078F PR MOST RECENT DIASTOLIC BLOOD PRESSURE < 80 MM HG: ICD-10-PCS | Mod: CPTII,S$GLB,, | Performed by: INTERNAL MEDICINE

## 2023-09-12 PROCEDURE — 1126F AMNT PAIN NOTED NONE PRSNT: CPT | Mod: CPTII,S$GLB,, | Performed by: INTERNAL MEDICINE

## 2023-09-12 PROCEDURE — 3072F PR LOW RISK FOR RETINOPATHY: ICD-10-PCS | Mod: CPTII,S$GLB,, | Performed by: INTERNAL MEDICINE

## 2023-09-12 PROCEDURE — 99499 UNLISTED E&M SERVICE: CPT | Mod: S$GLB,,, | Performed by: INTERNAL MEDICINE

## 2023-09-12 PROCEDURE — 90694 VACC AIIV4 NO PRSRV 0.5ML IM: CPT | Mod: S$GLB,,, | Performed by: INTERNAL MEDICINE

## 2023-09-12 PROCEDURE — 1159F PR MEDICATION LIST DOCUMENTED IN MEDICAL RECORD: ICD-10-PCS | Mod: CPTII,S$GLB,, | Performed by: INTERNAL MEDICINE

## 2023-09-12 PROCEDURE — 90694 FLU VACCINE - QUADRIVALENT - ADJUVANTED: ICD-10-PCS | Mod: S$GLB,,, | Performed by: INTERNAL MEDICINE

## 2023-09-12 PROCEDURE — G0008 FLU VACCINE - QUADRIVALENT - ADJUVANTED: ICD-10-PCS | Mod: S$GLB,,, | Performed by: INTERNAL MEDICINE

## 2023-09-12 PROCEDURE — 99214 PR OFFICE/OUTPT VISIT, EST, LEVL IV, 30-39 MIN: ICD-10-PCS | Mod: 25,S$GLB,, | Performed by: INTERNAL MEDICINE

## 2023-09-12 PROCEDURE — 99999 PR PBB SHADOW E&M-EST. PATIENT-LVL IV: CPT | Mod: PBBFAC,,, | Performed by: INTERNAL MEDICINE

## 2023-09-12 PROCEDURE — 1126F PR PAIN SEVERITY QUANTIFIED, NO PAIN PRESENT: ICD-10-PCS | Mod: CPTII,S$GLB,, | Performed by: INTERNAL MEDICINE

## 2023-09-12 PROCEDURE — 3078F DIAST BP <80 MM HG: CPT | Mod: CPTII,S$GLB,, | Performed by: INTERNAL MEDICINE

## 2023-09-12 PROCEDURE — 1159F MED LIST DOCD IN RCRD: CPT | Mod: CPTII,S$GLB,, | Performed by: INTERNAL MEDICINE

## 2023-09-12 PROCEDURE — 3288F PR FALLS RISK ASSESSMENT DOCUMENTED: ICD-10-PCS | Mod: CPTII,S$GLB,, | Performed by: INTERNAL MEDICINE

## 2023-09-12 PROCEDURE — 1101F PT FALLS ASSESS-DOCD LE1/YR: CPT | Mod: CPTII,S$GLB,, | Performed by: INTERNAL MEDICINE

## 2023-09-12 PROCEDURE — G0008 ADMIN INFLUENZA VIRUS VAC: HCPCS | Mod: S$GLB,,, | Performed by: INTERNAL MEDICINE

## 2023-09-12 PROCEDURE — 1160F RVW MEDS BY RX/DR IN RCRD: CPT | Mod: CPTII,S$GLB,, | Performed by: INTERNAL MEDICINE

## 2023-09-12 PROCEDURE — 1160F PR REVIEW ALL MEDS BY PRESCRIBER/CLIN PHARMACIST DOCUMENTED: ICD-10-PCS | Mod: CPTII,S$GLB,, | Performed by: INTERNAL MEDICINE

## 2023-09-12 PROCEDURE — 99214 OFFICE O/P EST MOD 30 MIN: CPT | Mod: 25,S$GLB,, | Performed by: INTERNAL MEDICINE

## 2023-09-12 PROCEDURE — 1101F PR PT FALLS ASSESS DOC 0-1 FALLS W/OUT INJ PAST YR: ICD-10-PCS | Mod: CPTII,S$GLB,, | Performed by: INTERNAL MEDICINE

## 2023-09-12 RX ORDER — CICLOPIROX 80 MG/ML
SOLUTION TOPICAL NIGHTLY
Qty: 6 ML | Refills: 3 | Status: SHIPPED | OUTPATIENT
Start: 2023-09-12 | End: 2023-12-04 | Stop reason: SINTOL

## 2023-09-12 NOTE — PROGRESS NOTES
Subjective:       Patient ID: Aretha Nguyen is a 77 y.o. female.    Chief Complaint: Diabetes (3 month)      HPI  Aretha Nguyen is a 77 y.o. female with DM type 2, HLD, HTN, IBS, Breast cancer  who presents today for Diabetes (3 month)    Patient reports she has been feeling well.  Has occasional right knee pain and continues to use her brace.  Most of the time some take medication but occasionally will take an Advil.    Does not normally check her Accu-Cheks control.  Does not follow a specific diet but watches her portions.  Weight has been stable.  Blood pressure has been well controlled.    Exercises on a treadmill for 15-30 minutes but limited due to knee pain.    Has no toxic habits.    She is up-to-date with bone density, eye exam, and mammogram.    She follows with Gastroenterology for UC and had colonoscopy in 2022.    Labs with stable metabolic panel and A1c of 6.0 %.  Urine for microalbumin was normal.    Health Maintenance:  Health Maintenance   Topic Date Due    Eye Exam  11/17/2023    Hemoglobin A1c  03/06/2024    Lipid Panel  05/26/2024    DEXA Scan  08/01/2025    TETANUS VACCINE  10/26/2027    Hepatitis C Screening  Completed    Shingles Vaccine  Completed       Review of Systems   Constitutional: Negative.  Negative for fever and unexpected weight change.   HENT: Negative.     Respiratory: Negative.     Cardiovascular: Negative.    Gastrointestinal: Negative.    Genitourinary:  Negative for difficulty urinating.   Musculoskeletal:  Positive for arthralgias (knee).   Integumentary:  Negative.   Neurological: Negative.    Psychiatric/Behavioral: Negative.        Past Medical History:   Diagnosis Date    Allergy     Breast cancer, left     Cataract     Controlled type 2 diabetes mellitus with hyperglycemia, without long-term current use of insulin 03/22/2017    Hyperlipidemia associated with type 2 diabetes mellitus 03/22/2017    Hypertension complicating diabetes 03/22/2017    Inflammatory bowel disease  03/22/2017    Urinary tract infection        Past Surgical History:   Procedure Laterality Date    BREAST SURGERY      COLONOSCOPY N/A 1/18/2022    Procedure: COLONOSCOPY suprep;  Surgeon: Lawrence Garcia MD;  Location: [facility];  Service: Endoscopy;  Laterality: N/A;    HYSTERECTOMY      MASTECTOMY Left     for benign recurrent growth. Dr. Stephan Brown MD - [facility] - [facility]    SIMPLE MASTECTOMY Left     TOTAL ABDOMINAL HYSTERECTOMY W/ BILATERAL SALPINGOOPHORECTOMY  2009    for benign cysts with concern for future malginancy. Fibromoid uterus for AUB. Bengin results       Family History   Problem Relation Age of Onset    Breast cancer Neg Hx        Social History     Socioeconomic History    Marital status:    Tobacco Use    Smoking status: Never    Smokeless tobacco: Never   Substance and Sexual Activity    Alcohol use: Yes     Comment: not often    Drug use: No    Sexual activity: Yes     Partners: Male   Social History Narrative    [facility] - [facility]        Dr. Stephan Brown MD - [place] - General Surgery & Surgery - mammograms        Current Outpatient Medications   Medication Sig Dispense Refill    atorvastatin (LIPITOR) 20 MG tablet TAKE 1 TABLET BY MOUTH EVERY DAY 90 tablet 1    cholecalciferol, vitamin D3, 2,000 unit Cap Take 1 capsule (2,000 Units total) by mouth once daily.      cyanocobalamin (VITAMIN B-12) 1000 MCG tablet Take 2 tablets (2,000 mcg total) by mouth once daily. 60 tablet 6    EScitalopram oxalate (LEXAPRO) 5 MG Tab TAKE 1 TABLET BY MOUTH EVERY DAY 90 tablet 3    ferrous sulfate (FEOSOL) 325 mg (65 mg iron) Tab tablet TAKE 1 TABLET BY MOUTH EVERY DAY WITH BREAKFAST 90 tablet 2    hydroCHLOROthiazide (MICROZIDE) 12.5 mg capsule TAKE 1 CAPSULE BY MOUTH ONCE DAILY 90 capsule 2    metFORMIN (GLUCOPHAGE-XR) 500 MG ER 24hr tablet TAKE 2 TABLETS (1,000 MG TOTAL) BY MOUTH ONCE DAILY. 180 tablet 0     "multivit-min-iron-FA-lutein (CENTRUM SILVER WOMEN) 8 mg iron-400 mcg-300 mcg Tab Take 1 tablet by mouth once daily at 6am.      olmesartan (BENICAR) 20 MG tablet TAKE 1 TABLET BY MOUTH EVERY DAY 90 tablet 3    ciclopirox (PENLAC) 8 % Soln Apply topically nightly. 6 mL 3     No current facility-administered medications for this visit.       Review of patient's allergies indicates:   Allergen Reactions    Ciprofloxacin hcl Rash         Objective:       Last 3 sets of Vitals        6/22/2023     9:24 AM 8/17/2023     8:32 AM 9/12/2023    10:40 AM   Vitals - 1 value per visit   SYSTOLIC   114   DIASTOLIC   68   Pulse   73   Temp 97.8 °F (36.6 °C)     SPO2   95 %   Weight (lb)  140 138.23   Weight (kg)  63.504 62.7   Height  5' 2" (1.575 m) 5' 2" (1.575 m)   BMI (Calculated)  25.6 25.3   Pain Score  Four Zero   Physical Exam  Constitutional:       General: She is not in acute distress.  HENT:      Head: Normocephalic.      Right Ear: Tympanic membrane, ear canal and external ear normal.      Left Ear: Tympanic membrane, ear canal and external ear normal.      Nose: Nose normal.      Mouth/Throat:      Mouth: Mucous membranes are moist.   Eyes:      General: No scleral icterus.     Extraocular Movements: Extraocular movements intact.      Conjunctiva/sclera: Conjunctivae normal.   Neck:      Vascular: No carotid bruit.      Comments: No goiter.  Cardiovascular:      Rate and Rhythm: Normal rate and regular rhythm.      Pulses: Normal pulses.      Heart sounds: Normal heart sounds.   Pulmonary:      Effort: Pulmonary effort is normal.      Breath sounds: Normal breath sounds.   Abdominal:      General: Bowel sounds are normal. There is no distension.      Palpations: Abdomen is soft. There is no mass.      Tenderness: There is no abdominal tenderness.   Musculoskeletal:         General: No swelling or tenderness.   Lymphadenopathy:      Cervical: No cervical adenopathy.   Skin:     General: Skin is warm and dry.      " Comments: Thickened toenail   Neurological:      General: No focal deficit present.      Mental Status: She is alert and oriented to person, place, and time.   Psychiatric:         Mood and Affect: Mood normal.         Behavior: Behavior normal.           CBC:  Recent Labs   Lab 02/22/22  1704 07/22/22  0807 05/26/23  0807   WBC 8.09 5.65 6.13   RBC 3.95 L 4.10 3.96 L   Hemoglobin 11.0 L 11.2 L 11.1 L   Hematocrit 34.3 L 35.6 L 34.5 L   Platelets 310 277 272   MCV 87 87 87   MCH 27.8 27.3 28.0   MCHC 32.1 31.5 L 32.2     CMP:  Recent Labs   Lab 07/22/22  0807 05/26/23  0807 09/06/23  0821   Glucose 100 89 95   Calcium 9.9 9.8 10.1   Albumin 3.7 3.8 3.9   Total Protein 7.7 7.6 8.0   Sodium 139 138 137   Potassium 3.7 4.0 3.8   CO2 28 26 25   Chloride 101 101 100   BUN 11 11 10   Creatinine 0.8 0.8 0.8   Alkaline Phosphatase 76 77 86   ALT 7 L 14 12   AST 19 21 21   Total Bilirubin 0.7 0.3 0.4     URINALYSIS:  Recent Labs   Lab 02/22/22  1640 03/09/22  1502   Color, UA Colorless A Yellow   Specific Gravity, UA 1.005  --    Spec Grav UA  --  1.015   pH, UA 7.0 7   Protein, UA Negative  --    Bacteria Few A  --    Nitrite, UA Negative neg   Leukocytes, UA 1+ A  --    Urobilinogen, UA Negative norm      LIPIDS:  Recent Labs   Lab 06/11/21  0913 06/11/21  0924 09/21/21  1058 05/26/23  0807   TSH  --  3.239  --  3.331   HDL 42  --  52 48   Cholesterol 165  --  174 157   Triglycerides 139  --  131 127   LDL Cholesterol 95.2  --  95.8 83.6   HDL/Cholesterol Ratio 25.5  --  29.9 30.6   Non-HDL Cholesterol 123  --  122 109   Total Cholesterol/HDL Ratio 3.9  --  3.3 3.3     TSH:  Recent Labs   Lab 06/11/21  0924 05/26/23  0807   TSH 3.239 3.331       A1C:  Recent Labs   Lab 12/04/20  0900 06/11/21  0913 09/21/21  1058 07/22/22  0807 11/28/22  0825 05/26/23  0807 09/06/23  0821   Hemoglobin A1C 6.0 H 5.6 5.8 H 6.7 H 6.1 H 6.0 H 6.0 H       Imaging:  X-Ray Knee 3 View Right  Narrative: EXAMINATION:  XR KNEE 3 VIEW  RIGHT    CLINICAL HISTORY:  Pain in unspecified knee    TECHNIQUE:  AP, lateral, and Merchant views of the right knee were performed.    COMPARISON:  05/31/2021    FINDINGS:  Three views right knee.    No acute displaced fracture or dislocation of the knee.  No radiopaque foreign body.  There is osteopenia.  There may be a small suprapatellar effusion.  Impression: 1. No acute displaced fracture or dislocation of the knee noting small suprapatellar effusion.    Electronically signed by: Nikos Bearden MD  Date:    06/22/2023  Time:    12:24      Assessment:       1. Hypertension complicating diabetes    2. Controlled type 2 diabetes mellitus with hyperglycemia, without long-term current use of insulin    3. Hyperlipidemia associated with type 2 diabetes mellitus    4. Ulcerative proctitis without complication    5. Iron deficiency anemia due to chronic blood loss    6. Recurrent depression    7. Post-mastectomy deformity of breast    8. Onychomycosis    9. B12 deficiency    10. Other intestinal malabsorption            Plan:       1. Hypertension complicating diabetes  -     Hypertension Digital Medicine (HDMP) Enrollment Order  -     Hypertension Digital Medicine (HDMP): Assign Onboarding Questionnaires  -     Lipid Panel; Future; Expected date: 09/12/2023  - controlled blood pressure with olmesartan and HCTZ    2. Controlled type 2 diabetes mellitus with hyperglycemia, without long-term current use of insulin  -     Diabetes Digital Medicine (DDMP) Enrollment Order  -     Diabetes Digital Medicine (DDMP): Assign Onboarding Questionnaires  -     Comprehensive Metabolic Panel; Future; Expected date: 09/12/2023  -     Hemoglobin A1C; Future; Expected date: 09/12/2023  -     Lipid Panel; Future; Expected date: 09/12/2023  -     TSH; Future; Expected date: 09/12/2023  -     Vitamin D; Future; Expected date: 09/12/2023  - A1c was 6.0 on metformin.  - continue lifestyle modifications.    3. Hyperlipidemia associated  with type 2 diabetes mellitus   - last lipid profile with LDL mildly elevated, will increase atorvastatin.   - Follow-up lipid profile    4. Ulcerative proctitis without complication   - Stable follow with Gastroenterology.    5. Iron deficiency anemia due to chronic blood loss  -     CBC Auto Differential; Future; Expected date: 09/12/2023  -     Ferritin; Future; Expected date: 09/12/2023  -     Iron and TIBC; Future; Expected date: 09/12/2023  - no bleeding reported.  Has chronic anemia.    6. Recurrent depression   - Stable on Lexapro    7. Post-mastectomy deformity of breast  -     BREAST PROSTHESIS FOR HOME USE  -     POST-MASTECTOMY BRA FOR HOME USE  - follow yearly mammograms, last was negative.    8. Onychomycosis  -     ciclopirox (PENLAC) 8 % Soln; Apply topically nightly.  Dispense: 6 mL; Refill: 3    9. B12 deficiency  -     CBC Auto Differential; Future; Expected date: 09/12/2023  -     Vitamin B12; Future; Expected date: 09/12/2023    10. Other intestinal malabsorption  -     Vitamin D; Future; Expected date: 09/12/2023    Other orders  -     NURSING COMMUNICATION: Create [facility] Account  -     NURSING COMMUNICATION: Create [facility] Account  -     Influenza (FLUAD) - Quadrivalent (Adjuvanted) *Preferred* (65+) (PF)       Health Maintenance Due   Topic Date Due    COVID-19 Vaccine (5 - Pfizer series) 09/29/2022    Eye Exam  11/17/2023            Return to clinic in 6 months.    Brittany Floyd MD  [facility]  Disclaimer:  This note has been generated using voice-recognition software. There may be grammatical or spelling errors that have been missed during proof-reading

## 2023-09-21 ENCOUNTER — TELEPHONE (OUTPATIENT)
Dept: FAMILY MEDICINE | Facility: CLINIC | Age: 78
End: 2023-09-21
Payer: MEDICARE

## 2023-09-21 NOTE — TELEPHONE ENCOUNTER
Pt. Is requesting an updated order of her post mastectomy bra  which has the wrong Qt. On it. I informed her I will send a message to  to place a new order .

## 2023-09-21 NOTE — TELEPHONE ENCOUNTER
----- Message from Raghav Pablo sent at 9/21/2023  9:58 AM CDT -----  Pt Requesting Call Back    Who called: pt  Who called for pt:  Best call back #: [phone]  Add notes: pt requests a call back from the nurse Elizabeth regarding a correction needed on rx

## 2023-10-07 DIAGNOSIS — F33.9 RECURRENT DEPRESSION: ICD-10-CM

## 2023-10-07 RX ORDER — ESCITALOPRAM OXALATE 5 MG/1
TABLET ORAL
Qty: 90 TABLET | Refills: 3 | Status: SHIPPED | OUTPATIENT
Start: 2023-10-07

## 2023-10-07 NOTE — TELEPHONE ENCOUNTER
No care due was identified.  [facility] Embedded Care Due Messages. Reference number: [id number].   10/07/2023 7:20:45 AM CDT

## 2023-10-07 NOTE — TELEPHONE ENCOUNTER
Refill Decision Note   Aretha Nguyen  is requesting a refill authorization.  Brief Assessment and Rationale for Refill:  Approve     Medication Therapy Plan:         Comments:     Note composed:3:30 PM 10/07/2023

## 2023-10-13 ENCOUNTER — OFFICE VISIT (OUTPATIENT)
Dept: URGENT CARE | Facility: CLINIC | Age: 78
End: 2023-10-13
Payer: MEDICARE

## 2023-10-13 VITALS
SYSTOLIC BLOOD PRESSURE: 102 MMHG | HEART RATE: 82 BPM | WEIGHT: 136.69 LBS | HEIGHT: 62 IN | DIASTOLIC BLOOD PRESSURE: 69 MMHG | TEMPERATURE: 98 F | RESPIRATION RATE: 20 BRPM | OXYGEN SATURATION: 95 % | BODY MASS INDEX: 25.15 KG/M2

## 2023-10-13 DIAGNOSIS — S00.11XA PERIORBITAL ECCHYMOSIS OF RIGHT EYE, INITIAL ENCOUNTER: Primary | ICD-10-CM

## 2023-10-13 DIAGNOSIS — H11.31 SUBCONJUNCTIVAL HEMORRHAGE OF RIGHT EYE: ICD-10-CM

## 2023-10-13 PROCEDURE — 99213 OFFICE O/P EST LOW 20 MIN: CPT | Mod: S$GLB,,, | Performed by: FAMILY MEDICINE

## 2023-10-13 PROCEDURE — 99213 PR OFFICE/OUTPT VISIT, EST, LEVL III, 20-29 MIN: ICD-10-PCS | Mod: S$GLB,,, | Performed by: FAMILY MEDICINE

## 2023-10-13 RX ORDER — NAPROXEN 500 MG/1
500 TABLET ORAL 2 TIMES DAILY WITH MEALS
Qty: 30 TABLET | Refills: 0 | Status: SHIPPED | OUTPATIENT
Start: 2023-10-13

## 2023-10-13 NOTE — PROGRESS NOTES
"Subjective:      Patient ID: Arteha Nguyen is a 78 y.o. female.    Vitals:  height is 5' 2" (1.575 m) and weight is 62 kg (136 lb 11 oz). Her oral temperature is 98 °F (36.7 °C). Her blood pressure is 102/69 and her pulse is 82. Her respiration is 20 and oxygen saturation is 95%.     Chief Complaint: Eye Injury    Pt present to clinic with right foot and right eye injury happened with fall 3 days ago. Stated she tripped and fell and hit her face to the board of bed. Treatment include ice, tylenol with mild relief.    Eye Injury   The right eye is affected. This is a new problem. The current episode started in the past 7 days. The problem occurs constantly. The problem has been gradually worsening. There was no injury mechanism. The pain is at a severity of 0/10. The patient is experiencing no pain. There is No known exposure to pink eye. She Does not wear contacts. Associated symptoms include eye redness. Pertinent negatives include no blurred vision, eye discharge, double vision, fever, foreign body sensation, itching, nausea, photophobia, recent URI or vomiting. Treatments tried: ice, tylenol. The treatment provided mild relief.       Constitution: Negative for fever.   Eyes:  Positive for eye redness. Negative for eye discharge, eye itching, photophobia, double vision and blurred vision.   Gastrointestinal:  Negative for nausea and vomiting.      Objective:     Physical Exam   Constitutional: She is oriented to person, place, and time. She appears well-developed.   HENT:   Head: Normocephalic and atraumatic.   Ears:   Right Ear: External ear normal.   Left Ear: External ear normal.   Nose: Nose normal.   Mouth/Throat: Oropharynx is clear and moist.   Eyes: EOM and lids are normal. Pupils are equal, round, and reactive to light. Right conjunctiva has a hemorrhage. Right eye exhibits normal extraocular motion. Left eye exhibits normal extraocular motion.   Neck: Trachea normal and phonation normal. Neck supple. "   Musculoskeletal: Normal range of motion.         General: Normal range of motion.   Neurological: She is alert and oriented to person, place, and time.   Skin: Skin is warm, dry and intact.   Psychiatric: Her speech is normal and behavior is normal. Judgment and thought content normal.   Nursing note and vitals reviewed.      Assessment:     1. Periorbital ecchymosis of right eye, initial encounter    2. Subconjunctival hemorrhage of right eye        Plan:       Periorbital ecchymosis of right eye, initial encounter  -Cold compress to decrease swelling   - naproxen 500 mg po prn     Subconjunctival hemorrhage of right eye  -Pt given reassurance that the blood will go away on here

## 2023-10-19 DIAGNOSIS — E11.65 CONTROLLED TYPE 2 DIABETES MELLITUS WITH HYPERGLYCEMIA, WITHOUT LONG-TERM CURRENT USE OF INSULIN: ICD-10-CM

## 2023-10-19 RX ORDER — ATORVASTATIN CALCIUM 20 MG/1
TABLET, FILM COATED ORAL
Qty: 90 TABLET | Refills: 2 | Status: SHIPPED | OUTPATIENT
Start: 2023-10-19

## 2023-10-19 NOTE — TELEPHONE ENCOUNTER
Refill Decision Note   Aretha Nguyen  is requesting a refill authorization.  Brief Assessment and Rationale for Refill:  Approve     Medication Therapy Plan:         Comments:     Note composed:1:21 PM 10/19/2023

## 2023-10-19 NOTE — TELEPHONE ENCOUNTER
No care due was identified.  [facility] Embedded Care Due Messages. Reference number: [id number].   10/19/2023 3:18:38 AM CDT

## 2023-10-27 ENCOUNTER — OFFICE VISIT (OUTPATIENT)
Dept: URGENT CARE | Facility: CLINIC | Age: 78
End: 2023-10-27
Payer: MEDICARE

## 2023-10-27 VITALS
DIASTOLIC BLOOD PRESSURE: 80 MMHG | OXYGEN SATURATION: 97 % | BODY MASS INDEX: 25.03 KG/M2 | HEART RATE: 77 BPM | RESPIRATION RATE: 19 BRPM | HEIGHT: 62 IN | TEMPERATURE: 98 F | WEIGHT: 136 LBS | SYSTOLIC BLOOD PRESSURE: 148 MMHG

## 2023-10-27 DIAGNOSIS — R30.0 DYSURIA: ICD-10-CM

## 2023-10-27 DIAGNOSIS — M79.674 PAIN IN TOE OF RIGHT FOOT: ICD-10-CM

## 2023-10-27 DIAGNOSIS — I10 ELEVATED BLOOD PRESSURE READING WITH DIAGNOSIS OF HYPERTENSION: ICD-10-CM

## 2023-10-27 DIAGNOSIS — S09.90XA HEAD INJURIES, INITIAL ENCOUNTER: ICD-10-CM

## 2023-10-27 DIAGNOSIS — N30.00 ACUTE CYSTITIS WITHOUT HEMATURIA: Primary | ICD-10-CM

## 2023-10-27 LAB
BILIRUB UR QL STRIP: NEGATIVE
GLUCOSE UR QL STRIP: NEGATIVE
KETONES UR QL STRIP: NEGATIVE
LEUKOCYTE ESTERASE UR QL STRIP: NEGATIVE
PH, POC UA: 7
POC BLOOD, URINE: POSITIVE
POC NITRATES, URINE: NEGATIVE
PROT UR QL STRIP: NEGATIVE
SP GR UR STRIP: 1.01 (ref 1–1.03)
UROBILINOGEN UR STRIP-ACNC: NORMAL (ref 0.1–1.1)

## 2023-10-27 PROCEDURE — 87186 SC STD MICRODIL/AGAR DIL: CPT | Performed by: PHYSICIAN ASSISTANT

## 2023-10-27 PROCEDURE — 99214 OFFICE O/P EST MOD 30 MIN: CPT | Mod: S$GLB,,, | Performed by: PHYSICIAN ASSISTANT

## 2023-10-27 PROCEDURE — 87086 URINE CULTURE/COLONY COUNT: CPT | Performed by: PHYSICIAN ASSISTANT

## 2023-10-27 PROCEDURE — 87077 CULTURE AEROBIC IDENTIFY: CPT | Performed by: PHYSICIAN ASSISTANT

## 2023-10-27 PROCEDURE — 81003 POCT URINALYSIS, DIPSTICK, AUTOMATED, W/O SCOPE: ICD-10-PCS | Mod: QW,S$GLB,, | Performed by: PHYSICIAN ASSISTANT

## 2023-10-27 PROCEDURE — 99214 PR OFFICE/OUTPT VISIT, EST, LEVL IV, 30-39 MIN: ICD-10-PCS | Mod: S$GLB,,, | Performed by: PHYSICIAN ASSISTANT

## 2023-10-27 PROCEDURE — 81003 URINALYSIS AUTO W/O SCOPE: CPT | Mod: QW,S$GLB,, | Performed by: PHYSICIAN ASSISTANT

## 2023-10-27 PROCEDURE — 87088 URINE BACTERIA CULTURE: CPT | Performed by: PHYSICIAN ASSISTANT

## 2023-10-27 RX ORDER — NITROFURANTOIN 25; 75 MG/1; MG/1
100 CAPSULE ORAL 2 TIMES DAILY
Qty: 14 CAPSULE | Refills: 0 | Status: SHIPPED | OUTPATIENT
Start: 2023-10-27 | End: 2023-10-30 | Stop reason: CLARIF

## 2023-10-27 RX ORDER — PHENAZOPYRIDINE HYDROCHLORIDE 100 MG/1
100 TABLET, FILM COATED ORAL 3 TIMES DAILY PRN
Qty: 6 TABLET | Refills: 0 | Status: SHIPPED | OUTPATIENT
Start: 2023-10-27 | End: 2023-10-29

## 2023-10-27 NOTE — PATIENT INSTRUCTIONS
Urine culture was ordered if you have a history of recurrent UTIs, pediatrics/elderly population.  If you were prescribed antibiotics, please take them to completion.  If you were prescribed a narcotic medication, do not drive or operate heavy equipment or machinery while taking these medications.  Please drink plenty of fluids.  Please get plenty of rest.  If you were prescribed Pyridium (phenazopyridine), please be aware that if you wear contact lens that this medication may stain your contacts.  While taking this medication it is recommended that you do not wear your contacts until 24 hours after your last dose.  OTC AZO can be purchased at store.  If you  smoke, please stop smoking.  If not allergic, please take over the counter Tylenol (Acetaminophen) and/or Motrin (Ibuprofen) as directed for control of pain and/or fever.    Please remember that you have received care at an urgent care today. Urgent cares are not emergency rooms and are not equipped to handle life threatening emergencies and cannot rule in or out certain medical conditions and you may be released before all of your medical problems are known or treated. Please arrange follow up with your primary care physician or speciality clinic  within 2-5 days if your signs and symptoms have not resolved or worsen. Patient can call our Referral Hotline at [phone] to make an appointment.    Please return here or go to the Emergency Department for any concerns or worsening of condition.Patient was educated on signs/symptoms that would warrant emergent medical attention.   Signs of infection. These include a fever of 100.4°F (38°C) or higher, chills, back pain, nausea, throwing up, or bloody urine.  Signs come back after treatment ends  You notice more blood in your urine.  Your signs get worse or do not improve within 24 hours of starting treatment.  You are not able to urinate for more than 8 hours.  Your signs come back after treatment has  stopped.      Problems with your brain like:  More confusion, drowsiness, or any change in being aware  Not able to remember things  Very sleepy (more than expected) or hard to wake up  Behavior changes like angry outbursts or thoughts of hurting yourself or others  Headache gets worse or feels different  Seizures  Problems with your eyes, ears, or mouth like:  Trouble speaking or slurred speech  A change in the size of one pupil (black part of your eye) as compared to the other eye  Blurry eyesight, double vision, or other problems with your eyesight  Bleeding or clear liquid drainage from your ears or nose  Problems with how you move or feel like:  Upset stomach and throwing up that won't go away  Dizziness or fainting  Staggering or trouble walking  Weakness or numbness of an arm or leg  Stiff neck  You are not feeling better in 2 to 3 days or you are feeling worse

## 2023-10-27 NOTE — PROGRESS NOTES
"Subjective:      Patient ID: Aretha Nguyen is a 78 y.o. female.    Vitals:  height is 5' 2" (1.575 m) and weight is 61.7 kg (136 lb). Her temperature is 98.2 °F (36.8 °C). Her blood pressure is 148/80 (abnormal) and her pulse is 77. Her respiration is 19 and oxygen saturation is 97%.     Chief Complaint: Urinary Tract Infection    Aretha Nguyen is a 78 y.o. female who complains of urinary frequency, urgency, and dysuria x <3-6 hours without flank pain, fever, chills, or abnormal vaginal discharge or bleeding.  Patient reports burning during urination and states that she doesn't feel like she is emptying her bladder. Symptoms started this morning. Pt also has head trauma from falling out of bed and hitting her nightstand last night. Patient reports recent fall 3 weeks ago; Stated she tripped and fell and hit her face to the board of bed. She has an old bruise to her right cheek and bump to forehead. She reports she hit her right 4th toe that day; still have pain but able to ambulate. She has an appointment with orthopedics on 11/16/23.           Urinary Tract Infection   This is a new problem. The current episode started today. The problem occurs every urination. The problem has been gradually worsening. The quality of the pain is described as aching and burning. The pain is at a severity of 8/10. The pain is moderate. There has been no fever. She is Sexually active. There is No history of pyelonephritis. Associated symptoms include frequency, hesitancy and withholding. Pertinent negatives include no behavior changes, chills, discharge, flank pain, hematuria, nausea, possible pregnancy, sweats, urgency, vomiting, weight loss, bubble bath use, constipation or rash. She has tried increased fluids (Water) for the symptoms. The treatment provided mild relief. Her past medical history is significant for diabetes mellitus and hypertension. There is no history of catheterization, genitourinary reflux, kidney stones, recurrent " UTIs, a single kidney, STD, urinary stasis or a urological procedure.   Head Injury   The incident occurred 6 to 12 hours ago. The injury mechanism was a fall. There was no loss of consciousness. The volume of blood lost was minimal. The pain is at a severity of 0/10. The patient is experiencing no pain. Pertinent negatives include no blurred vision, disorientation, headaches, memory loss, numbness, tinnitus, vomiting or weakness. She has tried nothing for the symptoms. The treatment provided mild relief.       Constitution: Negative for activity change, appetite change, chills and fever.   HENT:  Negative for tinnitus, congestion, postnasal drip, sinus pain, sinus pressure, sore throat and trouble swallowing.    Neck: Negative for neck stiffness and neck swelling.   Cardiovascular:  Negative for chest pain, leg swelling, palpitations and sob on exertion.   Eyes:  Negative for eye discharge, eye redness and blurred vision.   Respiratory:  Negative for cough and shortness of breath.    Gastrointestinal:  Negative for abdominal pain, nausea, vomiting and constipation.   Genitourinary:  Positive for dysuria and frequency. Negative for urgency, urine decreased, flank pain, bladder incontinence, hematuria, history of kidney stones, vaginal pain, vaginal discharge and vaginal bleeding.   Skin:  Negative for rash.   Neurological:  Negative for dizziness, light-headedness, facial drooping, coordination disturbances, headaches, disorientation, altered mental status, loss of consciousness and numbness.   Psychiatric/Behavioral:  Negative for altered mental status and disorientation.       Objective:     Physical Exam   Constitutional: She is oriented to person, place, and time. She appears well-developed. She is cooperative.      Comments:Patient is awake and alert, sitting up in exam chair, speaking and answering in complete sentences, in no signs of acute distress, no accessory muscle usage     normalawake  HENT:   Head:  Normocephalic. Head is with abrasion.       Ears:   Right Ear: Hearing, tympanic membrane, external ear and ear canal normal.   Left Ear: Hearing, tympanic membrane, external ear and ear canal normal.   Nose: Nose normal. No mucosal edema or nasal deformity. No epistaxis. Right sinus exhibits no maxillary sinus tenderness and no frontal sinus tenderness. Left sinus exhibits no maxillary sinus tenderness and no frontal sinus tenderness.   Mouth/Throat: Uvula is midline, oropharynx is clear and moist and mucous membranes are normal. Mucous membranes are moist. No trismus in the jaw. Normal dentition. No uvula swelling. No oropharyngeal exudate or posterior oropharyngeal erythema. Oropharynx is clear.   Eyes: Conjunctivae and lids are normal. Pupils are equal, round, and reactive to light. Extraocular movement intact   Neck: Trachea normal and phonation normal. Neck supple.   Cardiovascular: Normal rate, regular rhythm, normal heart sounds and normal pulses.   Pulmonary/Chest: Effort normal and breath sounds normal.   Abdominal: Normal appearance. There is no abdominal tenderness. There is no left CVA tenderness and no right CVA tenderness.   Musculoskeletal: Normal range of motion.         General: Tenderness present. No swelling or deformity. Normal range of motion.      Cervical back: She exhibits no tenderness.      Comments: Tenderness to right 4th toe; no swelling present; able to flex toe   Lymphadenopathy:     She has no cervical adenopathy.   Neurological: no focal deficit. She is alert and oriented to person, place, and time. She has normal motor skills, normal sensation and intact cranial nerves (2-12). She exhibits normal muscle tone. Gait and coordination normal. GCS eye subscore is 4. GCS verbal subscore is 5. GCS motor subscore is 6.   Skin: Skin is warm, dry and intact. Capillary refill takes less than 2 seconds. bruising         Comments: Healing ecchymosis to right malar area   Psychiatric: Her speech  is normal and behavior is normal. Mood, judgment and thought content normal.   Nursing note and vitals reviewed.              Results for orders placed or performed in visit on 10/27/23   POCT Urinalysis, Dipstick, Automated, W/O Scope   Result Value Ref Range    POC Blood, Urine Positive (A) Negative    POC Bilirubin, Urine Negative Negative    POC Urobilinogen, Urine NORMAL 0.1 - 1.1    POC Ketones, Urine Negative Negative    POC Protein, Urine Negative Negative    POC Nitrates, Urine Negative Negative    POC Glucose, Urine Negative Negative    pH, UA 7.0     POC Specific Gravity, Urine 1.010 1.003 - 1.029    POC Leukocytes, Urine Negative Negative         Assessment:     1. Acute cystitis without hematuria    2. Dysuria    3. Pain in toe of right foot    4. Head injuries, initial encounter    5. Elevated blood pressure reading with diagnosis of hypertension      Patient presents with clinical exam findings and history consistent with above.      On exam, patient is nontoxic appearing and vitals are stable.      Diagnostic testing results were reviewed and discussed with patient/guardian.   Tests ordered in clinic:  POCT Urinalysis, Dipstick, Automated, W/O Scope: positive for RBC's (50 RBC/UL)  Urine culture: pending    Previous progress notes/admissions/labs and medications were reviewed.  Reviewed CMP from 9/6/23: GFR >60.  Patient with hx of hematuria: UTI 2/2022, gross hematuria after with clots, UCx at time of hematuria in ED negative, CT w/o contrast 2/2022: left cyst, no acute findings    Plan:       Acute cystitis without hematuria  -     CULTURE, URINE  -     Ambulatory referral/consult to Urology  -     nitrofurantoin, macrocrystal-monohydrate, (MACROBID) 100 MG capsule; Take 1 capsule (100 mg total) by mouth 2 (two) times daily. for 7 days  Dispense: 14 capsule; Refill: 0  -     phenazopyridine (PYRIDIUM) 100 MG tablet; Take 1 tablet (100 mg total) by mouth 3 (three) times daily as needed for Pain.   "Dispense: 6 tablet; Refill: 0  -     Ambulatory referral/consult to Family Practice    Dysuria  -     Cancel: POCT URINALYSIS  -     POCT Urinalysis, Dipstick, Automated, W/O Scope    Pain in toe of right foot        -  Will see orthopedics on 11/16/23. Continue ibuprofen/acetaminophen/naproxen for pain; ice area for pain.  Head injuries, initial encounter  -     Ambulatory referral/consult to Family Practice    Elevated blood pressure reading with diagnosis of hypertension  -     Ambulatory referral/consult to Family Practice                    1) See orders for this visit as documented in the electronic medical record.  2) Symptomatic therapy suggested: use acetaminophen/ibuprofen prn, push fluids, may use Pyridium OTC prn.   3) Call or return to clinic prn if these symptoms worsen or fail to improve as anticipated.    Discussed results/diagnosis/plan with patient in clinic.  We had shared decision making for patient's treatment. Patient verbalized understanding and in agreement with current treatment plan.     Patient was instructed to return for re-evaluation with urgent care or PCP for continued outpatient workup and management if symptoms do not improve/worsening symptoms. Strict ED versus clinic precautions given in depth.    Discharge and follow-up instructions given verbally/printed with the patient who expressed understanding. The instructions and results are also [facility] on [facility].              Abbie "Lisseth Orr PA-C          Patient Instructions   Urine culture was ordered if you have a history of recurrent UTIs, pediatrics/elderly population.  If you were prescribed antibiotics, please take them to completion.  If you were prescribed a narcotic medication, do not drive or operate heavy equipment or machinery while taking these medications.  Please drink plenty of fluids.  Please get plenty of rest.  If you were prescribed Pyridium (phenazopyridine), please be aware that if you wear contact lens " that this medication may stain your contacts.  While taking this medication it is recommended that you do not wear your contacts until 24 hours after your last dose.  OTC AZO can be purchased at store.  If you  smoke, please stop smoking.  If not allergic, please take over the counter Tylenol (Acetaminophen) and/or Motrin (Ibuprofen) as directed for control of pain and/or fever.    Please remember that you have received care at an urgent care today. Urgent cares are not emergency rooms and are not equipped to handle life threatening emergencies and cannot rule in or out certain medical conditions and you may be released before all of your medical problems are known or treated. Please arrange follow up with your primary care physician or speciality clinic  within 2-5 days if your signs and symptoms have not resolved or worsen. Patient can call our Referral Hotline at [phone] to make an appointment.    Please return here or go to the Emergency Department for any concerns or worsening of condition.Patient was educated on signs/symptoms that would warrant emergent medical attention.   Signs of infection. These include a fever of 100.4°F (38°C) or higher, chills, back pain, nausea, throwing up, or bloody urine.  Signs come back after treatment ends  You notice more blood in your urine.  Your signs get worse or do not improve within 24 hours of starting treatment.  You are not able to urinate for more than 8 hours.  Your signs come back after treatment has stopped.      Problems with your brain like:  More confusion, drowsiness, or any change in being aware  Not able to remember things  Very sleepy (more than expected) or hard to wake up  Behavior changes like angry outbursts or thoughts of hurting yourself or others  Headache gets worse or feels different  Seizures  Problems with your eyes, ears, or mouth like:  Trouble speaking or slurred speech  A change in the size of one pupil (black part of your eye) as  compared to the other eye  Blurry eyesight, double vision, or other problems with your eyesight  Bleeding or clear liquid drainage from your ears or nose  Problems with how you move or feel like:  Upset stomach and throwing up that won't go away  Dizziness or fainting  Staggering or trouble walking  Weakness or numbness of an arm or leg  Stiff neck  You are not feeling better in 2 to 3 days or you are feeling worse

## 2023-10-30 ENCOUNTER — TELEPHONE (OUTPATIENT)
Dept: URGENT CARE | Facility: CLINIC | Age: 78
End: 2023-10-30
Payer: MEDICARE

## 2023-10-30 DIAGNOSIS — N30.01 ACUTE CYSTITIS WITH HEMATURIA: Primary | ICD-10-CM

## 2023-10-30 LAB — BACTERIA UR CULT: ABNORMAL

## 2023-10-30 RX ORDER — SULFAMETHOXAZOLE AND TRIMETHOPRIM 800; 160 MG/1; MG/1
1 TABLET ORAL 2 TIMES DAILY
Qty: 10 TABLET | Refills: 0 | Status: SHIPPED | OUTPATIENT
Start: 2023-10-30 | End: 2023-11-06

## 2023-10-30 NOTE — TELEPHONE ENCOUNTER
Pt presented to clinic requesting a change in abx due to weakness and diarrhea.  Urine cx shows PROTEUS MIRABILIS  Sensitive to all meds  Stop macrobid  Bactrim DS BID x 5 days sent to rx on file

## 2023-11-06 ENCOUNTER — OFFICE VISIT (OUTPATIENT)
Dept: FAMILY MEDICINE | Facility: CLINIC | Age: 78
End: 2023-11-06
Attending: FAMILY MEDICINE
Payer: MEDICARE

## 2023-11-06 ENCOUNTER — HOSPITAL ENCOUNTER (OUTPATIENT)
Dept: RADIOLOGY | Facility: HOSPITAL | Age: 78
Discharge: HOME | End: 2023-11-06
Attending: FAMILY MEDICINE
Payer: MEDICARE

## 2023-11-06 VITALS
BODY MASS INDEX: 24.99 KG/M2 | WEIGHT: 135.81 LBS | HEIGHT: 62 IN | OXYGEN SATURATION: 99 % | DIASTOLIC BLOOD PRESSURE: 64 MMHG | HEART RATE: 84 BPM | SYSTOLIC BLOOD PRESSURE: 104 MMHG

## 2023-11-06 DIAGNOSIS — E11.59 HYPERTENSION COMPLICATING DIABETES: ICD-10-CM

## 2023-11-06 DIAGNOSIS — M79.674 TOE PAIN, RIGHT: ICD-10-CM

## 2023-11-06 DIAGNOSIS — I15.2 HYPERTENSION COMPLICATING DIABETES: ICD-10-CM

## 2023-11-06 DIAGNOSIS — M79.675 TOE PAIN, LEFT: ICD-10-CM

## 2023-11-06 DIAGNOSIS — R29.6 FREQUENT FALLS: Primary | ICD-10-CM

## 2023-11-06 PROCEDURE — 3074F PR MOST RECENT SYSTOLIC BLOOD PRESSURE < 130 MM HG: ICD-10-PCS | Mod: CPTII,S$GLB,, | Performed by: FAMILY MEDICINE

## 2023-11-06 PROCEDURE — 1159F PR MEDICATION LIST DOCUMENTED IN MEDICAL RECORD: ICD-10-PCS | Mod: CPTII,S$GLB,, | Performed by: FAMILY MEDICINE

## 2023-11-06 PROCEDURE — 1125F PR PAIN SEVERITY QUANTIFIED, PAIN PRESENT: ICD-10-PCS | Mod: CPTII,S$GLB,, | Performed by: FAMILY MEDICINE

## 2023-11-06 PROCEDURE — 99214 PR OFFICE/OUTPT VISIT, EST, LEVL IV, 30-39 MIN: ICD-10-PCS | Mod: S$GLB,,, | Performed by: FAMILY MEDICINE

## 2023-11-06 PROCEDURE — 1125F AMNT PAIN NOTED PAIN PRSNT: CPT | Mod: CPTII,S$GLB,, | Performed by: FAMILY MEDICINE

## 2023-11-06 PROCEDURE — 99214 OFFICE O/P EST MOD 30 MIN: CPT | Mod: S$GLB,,, | Performed by: FAMILY MEDICINE

## 2023-11-06 PROCEDURE — 73660 XR TOE 2 OR MORE VIEWS RIGHT: ICD-10-PCS | Mod: 26,RT,, | Performed by: RADIOLOGY

## 2023-11-06 PROCEDURE — 73660 X-RAY EXAM OF TOE(S): CPT | Mod: 26,RT,, | Performed by: RADIOLOGY

## 2023-11-06 PROCEDURE — 3288F FALL RISK ASSESSMENT DOCD: CPT | Mod: CPTII,S$GLB,, | Performed by: FAMILY MEDICINE

## 2023-11-06 PROCEDURE — 1100F PR PT FALLS ASSESS DOC 2+ FALLS/FALL W/INJURY/YR: ICD-10-PCS | Mod: CPTII,S$GLB,, | Performed by: FAMILY MEDICINE

## 2023-11-06 PROCEDURE — 1160F RVW MEDS BY RX/DR IN RCRD: CPT | Mod: CPTII,S$GLB,, | Performed by: FAMILY MEDICINE

## 2023-11-06 PROCEDURE — 3072F LOW RISK FOR RETINOPATHY: CPT | Mod: CPTII,S$GLB,, | Performed by: FAMILY MEDICINE

## 2023-11-06 PROCEDURE — 99999 PR PBB SHADOW E&M-EST. PATIENT-LVL IV: CPT | Mod: PBBFAC,,, | Performed by: FAMILY MEDICINE

## 2023-11-06 PROCEDURE — 3074F SYST BP LT 130 MM HG: CPT | Mod: CPTII,S$GLB,, | Performed by: FAMILY MEDICINE

## 2023-11-06 PROCEDURE — 1159F MED LIST DOCD IN RCRD: CPT | Mod: CPTII,S$GLB,, | Performed by: FAMILY MEDICINE

## 2023-11-06 PROCEDURE — 3078F DIAST BP <80 MM HG: CPT | Mod: CPTII,S$GLB,, | Performed by: FAMILY MEDICINE

## 2023-11-06 PROCEDURE — 1160F PR REVIEW ALL MEDS BY PRESCRIBER/CLIN PHARMACIST DOCUMENTED: ICD-10-PCS | Mod: CPTII,S$GLB,, | Performed by: FAMILY MEDICINE

## 2023-11-06 PROCEDURE — 3288F PR FALLS RISK ASSESSMENT DOCUMENTED: ICD-10-PCS | Mod: CPTII,S$GLB,, | Performed by: FAMILY MEDICINE

## 2023-11-06 PROCEDURE — 1100F PTFALLS ASSESS-DOCD GE2>/YR: CPT | Mod: CPTII,S$GLB,, | Performed by: FAMILY MEDICINE

## 2023-11-06 PROCEDURE — 73660 X-RAY EXAM OF TOE(S): CPT | Mod: TC,FY,RT

## 2023-11-06 PROCEDURE — 3072F PR LOW RISK FOR RETINOPATHY: ICD-10-PCS | Mod: CPTII,S$GLB,, | Performed by: FAMILY MEDICINE

## 2023-11-06 PROCEDURE — 99999 PR PBB SHADOW E&M-EST. PATIENT-LVL IV: ICD-10-PCS | Mod: PBBFAC,,, | Performed by: FAMILY MEDICINE

## 2023-11-06 PROCEDURE — 3078F PR MOST RECENT DIASTOLIC BLOOD PRESSURE < 80 MM HG: ICD-10-PCS | Mod: CPTII,S$GLB,, | Performed by: FAMILY MEDICINE

## 2023-11-06 NOTE — PROGRESS NOTES
Subjective     Patient ID: Aretha Nguyen is a 78 y.o. female.    Chief Complaint: Follow-up (2 visits to Urgent care for falls)    78 yr old pleasant female with DM II, HTN< and other co morbidities presents today for follow up after fall and toe injury. She fell twice at home and once she tripped on a sofa and next one she fell while she wa sleeping and had a nightmare. She was getting up to go bathroom. She is on HCTZ. She recently also had UTI. She injured her right toe 2 weeks ago and still has pain. Never had imaging. Details as follows -    HTN - controlled - on benicar and HCTZ - compliant - c/o increased night time urination    Follow-up  Associated symptoms include myalgias. Pertinent negatives include no arthralgias, chest pain, congestion, diaphoresis, headaches, nausea or sore throat.   Hypertension  This is a chronic problem. The current episode started more than 1 year ago. The problem has been gradually improving since onset. The problem is controlled. Pertinent negatives include no chest pain, headaches or shortness of breath. There are no known risk factors for coronary artery disease. Past treatments include angiotensin blockers and diuretics. The current treatment provides significant improvement. Compliance problems include medication side effects.  There is no history of angina, CAD/MI or left ventricular hypertrophy. There is no history of chronic renal disease, hypercortisolism, a hypertension causing med, renovascular disease or a thyroid problem.   Toe Injury  This is a new problem. The current episode started 1 to 4 weeks ago. The problem occurs constantly. The problem has been unchanged. Associated symptoms include myalgias. Pertinent negatives include no arthralgias, chest pain, congestion, diaphoresis, headaches, nausea or sore throat. Nothing aggravates the symptoms. She has tried nothing for the symptoms. The treatment provided no relief.     Review of Systems   Constitutional:  Negative.  Negative for activity change, diaphoresis and unexpected weight change.   HENT: Negative.  Negative for nasal congestion, ear discharge, hearing loss, rhinorrhea, sore throat and voice change.    Eyes: Negative.  Negative for pain, discharge and visual disturbance.   Respiratory: Negative.  Negative for chest tightness, shortness of breath and wheezing.    Cardiovascular: Negative.  Negative for chest pain.   Gastrointestinal: Negative.  Negative for abdominal distention, anal bleeding, constipation and nausea.   Endocrine: Negative.  Negative for cold intolerance, polydipsia and polyuria.   Genitourinary: Negative.  Negative for decreased urine volume, difficulty urinating, dysuria, frequency, menstrual problem and vaginal pain.   Musculoskeletal:  Positive for leg pain and myalgias. Negative for arthralgias and gait problem.   Integumentary:  Negative for color change, pallor and wound. Negative.   Allergic/Immunologic: Negative.  Negative for environmental allergies and immunocompromised state.   Neurological: Negative.  Negative for dizziness, tremors, seizures, speech difficulty and headaches.   Hematological: Negative.  Negative for adenopathy. Does not bruise/bleed easily.   Psychiatric/Behavioral: Negative.  Negative for agitation, confusion, decreased concentration, hallucinations, self-injury and suicidal ideas. The patient is not nervous/anxious.      Past Medical History:   Diagnosis Date    Allergy     Breast cancer, left     Cataract     Controlled type 2 diabetes mellitus with hyperglycemia, without long-term current use of insulin 03/22/2017    Hyperlipidemia associated with type 2 diabetes mellitus 03/22/2017    Hypertension complicating diabetes 03/22/2017    Inflammatory bowel disease 03/22/2017    Urinary tract infection        Past Surgical History:   Procedure Laterality Date    BREAST SURGERY      COLONOSCOPY N/A 1/18/2022    Procedure: COLONOSCOPY suprep;  Surgeon: Lawrence PETE  MD Radha;  Location: [facility];  Service: Endoscopy;  Laterality: N/A;    HYSTERECTOMY      MASTECTOMY Left     for benign recurrent growth. Dr. Stephan Brown MD - [facility] - General [facility]    SIMPLE MASTECTOMY Left     TOTAL ABDOMINAL HYSTERECTOMY W/ BILATERAL SALPINGOOPHORECTOMY  2009    for benign cysts with concern for future malginancy. Fibromoid uterus for AUB. Bengin results       Family History   Problem Relation Age of Onset    Breast cancer Neg Hx        Social History     Socioeconomic History    Marital status:    Tobacco Use    Smoking status: Never    Smokeless tobacco: Never   Substance and Sexual Activity    Alcohol use: Yes     Comment: not often    Drug use: No    Sexual activity: Yes     Partners: Male   Social History Narrative    [employer] - [facility]        Dr. Stephan Brown MD - [facility] - General Surgery & Surgery - mammograms        Current Outpatient Medications   Medication Sig Dispense Refill    atorvastatin (LIPITOR) 20 MG tablet TAKE 1 TABLET BY MOUTH EVERY DAY 90 tablet 2    cholecalciferol, vitamin D3, 2,000 unit Cap Take 1 capsule (2,000 Units total) by mouth once daily.      ciclopirox (PENLAC) 8 % Soln Apply topically nightly. 6 mL 3    cyanocobalamin (VITAMIN B-12) 1000 MCG tablet Take 2 tablets (2,000 mcg total) by mouth once daily. 60 tablet 6    EScitalopram oxalate (LEXAPRO) 5 MG Tab TAKE 1 TABLET BY MOUTH EVERY DAY 90 tablet 3    ferrous sulfate (FEOSOL) 325 mg (65 mg iron) Tab tablet TAKE 1 TABLET BY MOUTH EVERY DAY WITH BREAKFAST 90 tablet 2    metFORMIN (GLUCOPHAGE-XR) 500 MG ER 24hr tablet TAKE 2 TABLETS (1,000 MG TOTAL) BY MOUTH ONCE DAILY. 180 tablet 0    multivit-min-iron-FA-lutein (CENTRUM SILVER WOMEN) 8 mg iron-400 mcg-300 mcg Tab Take 1 tablet by mouth once daily at 6am.      naproxen (NAPROSYN) 500 MG tablet Take 1 tablet (500 mg total) by mouth 2 (two) times daily with meals. 30 tablet 0     olmesartan (BENICAR) 20 MG tablet TAKE 1 TABLET BY MOUTH EVERY DAY 90 tablet 3     No current facility-administered medications for this visit.       Review of patient's allergies indicates:   Allergen Reactions    Ciprofloxacin hcl Rash          Objective   Vitals:    11/06/23 0911   BP: 104/64   Pulse: 84       Physical Exam  Constitutional:       General: She is not in acute distress.     Appearance: She is well-developed. She is not diaphoretic.   HENT:      Head: Normocephalic and atraumatic.      Right Ear: External ear normal.      Left Ear: External ear normal.      Nose: Nose normal.      Mouth/Throat:      Pharynx: No oropharyngeal exudate.   Eyes:      General: No scleral icterus.        Right eye: No discharge.         Left eye: No discharge.      Conjunctiva/sclera: Conjunctivae normal.      Pupils: Pupils are equal, round, and reactive to light.   Neck:      Thyroid: No thyromegaly.      Vascular: No JVD.      Trachea: No tracheal deviation.   Cardiovascular:      Rate and Rhythm: Normal rate and regular rhythm.      Heart sounds: Normal heart sounds. No murmur heard.     No friction rub. No gallop.   Pulmonary:      Effort: Pulmonary effort is normal.      Breath sounds: Normal breath sounds. No stridor. No wheezing or rales.   Chest:      Chest wall: No tenderness.   Abdominal:      General: Bowel sounds are normal. There is no distension.      Palpations: Abdomen is soft. There is no mass.      Tenderness: There is no abdominal tenderness. There is no guarding or rebound.      Hernia: No hernia is present.   Musculoskeletal:         General: No tenderness. Normal range of motion.      Cervical back: Normal range of motion and neck supple.   Lymphadenopathy:      Cervical: No cervical adenopathy.   Skin:     General: Skin is warm and dry.      Coloration: Skin is not pale.      Findings: Erythema and lesion present. No rash.      Comments: Left 5th toe erythema and TTP. No deformity   Neurological:       Mental Status: She is alert and oriented to person, place, and time.      Cranial Nerves: No cranial nerve deficit.      Motor: No abnormal muscle tone.      Coordination: Coordination normal.      Deep Tendon Reflexes: Reflexes are normal and symmetric. Reflexes normal.   Psychiatric:         Behavior: Behavior normal.         Thought Content: Thought content normal.         Judgment: Judgment normal.            Assessment and Plan     1. Frequent falls  -     X-Ray Toe 2 or More Views Left; Future; Expected date: 11/06/2023    2. Toe pain, left  -     X-Ray Toe 2 or More Views Left; Future; Expected date: 11/06/2023    3. Toe pain, right  -     X-Ray Toe 2 or More Views Right; Future; Expected date: 11/06/2023        Falls/toe pain right  - x ray  -ice and ortho if needed  -hold HCTZ due to risk of fall and dehydration    Spent adequate time in obtaining history and explaining differentials    30 minutes spent during this visit of which greater than 50% devoted to face-face counseling and coordination of care regarding diagnosis and management plan    Follow up in about 4 weeks (around 12/4/2023), or if symptoms worsen or fail to improve.           Follow up in about 4 weeks (around 12/4/2023), or if symptoms worsen or fail to improve.

## 2023-11-10 ENCOUNTER — TELEPHONE (OUTPATIENT)
Dept: ADMINISTRATIVE | Facility: OTHER | Age: 78
End: 2023-11-10
Payer: MEDICARE

## 2023-11-16 ENCOUNTER — OFFICE VISIT (OUTPATIENT)
Dept: ORTHOPEDICS | Facility: CLINIC | Age: 78
End: 2023-11-16
Payer: MEDICARE

## 2023-11-16 DIAGNOSIS — S92.514A CLOSED NONDISPLACED FRACTURE OF PROXIMAL PHALANX OF LESSER TOE OF RIGHT FOOT, INITIAL ENCOUNTER: Primary | ICD-10-CM

## 2023-11-16 PROCEDURE — 1159F MED LIST DOCD IN RCRD: CPT | Mod: CPTII,S$GLB,, | Performed by: ORTHOPAEDIC SURGERY

## 2023-11-16 PROCEDURE — 1100F PR PT FALLS ASSESS DOC 2+ FALLS/FALL W/INJURY/YR: ICD-10-PCS | Mod: CPTII,S$GLB,, | Performed by: ORTHOPAEDIC SURGERY

## 2023-11-16 PROCEDURE — 99999 PR PBB SHADOW E&M-EST. PATIENT-LVL III: ICD-10-PCS | Mod: PBBFAC,,, | Performed by: ORTHOPAEDIC SURGERY

## 2023-11-16 PROCEDURE — 1159F PR MEDICATION LIST DOCUMENTED IN MEDICAL RECORD: ICD-10-PCS | Mod: CPTII,S$GLB,, | Performed by: ORTHOPAEDIC SURGERY

## 2023-11-16 PROCEDURE — 1100F PTFALLS ASSESS-DOCD GE2>/YR: CPT | Mod: CPTII,S$GLB,, | Performed by: ORTHOPAEDIC SURGERY

## 2023-11-16 PROCEDURE — 3072F LOW RISK FOR RETINOPATHY: CPT | Mod: CPTII,S$GLB,, | Performed by: ORTHOPAEDIC SURGERY

## 2023-11-16 PROCEDURE — 1160F RVW MEDS BY RX/DR IN RCRD: CPT | Mod: CPTII,S$GLB,, | Performed by: ORTHOPAEDIC SURGERY

## 2023-11-16 PROCEDURE — 1125F AMNT PAIN NOTED PAIN PRSNT: CPT | Mod: CPTII,S$GLB,, | Performed by: ORTHOPAEDIC SURGERY

## 2023-11-16 PROCEDURE — 99999 PR PBB SHADOW E&M-EST. PATIENT-LVL III: CPT | Mod: PBBFAC,,, | Performed by: ORTHOPAEDIC SURGERY

## 2023-11-16 PROCEDURE — 99213 OFFICE O/P EST LOW 20 MIN: CPT | Mod: S$GLB,,, | Performed by: ORTHOPAEDIC SURGERY

## 2023-11-16 PROCEDURE — 99213 PR OFFICE/OUTPT VISIT, EST, LEVL III, 20-29 MIN: ICD-10-PCS | Mod: S$GLB,,, | Performed by: ORTHOPAEDIC SURGERY

## 2023-11-16 PROCEDURE — 1160F PR REVIEW ALL MEDS BY PRESCRIBER/CLIN PHARMACIST DOCUMENTED: ICD-10-PCS | Mod: CPTII,S$GLB,, | Performed by: ORTHOPAEDIC SURGERY

## 2023-11-16 PROCEDURE — 3072F PR LOW RISK FOR RETINOPATHY: ICD-10-PCS | Mod: CPTII,S$GLB,, | Performed by: ORTHOPAEDIC SURGERY

## 2023-11-16 PROCEDURE — 1125F PR PAIN SEVERITY QUANTIFIED, PAIN PRESENT: ICD-10-PCS | Mod: CPTII,S$GLB,, | Performed by: ORTHOPAEDIC SURGERY

## 2023-11-16 PROCEDURE — 3288F PR FALLS RISK ASSESSMENT DOCUMENTED: ICD-10-PCS | Mod: CPTII,S$GLB,, | Performed by: ORTHOPAEDIC SURGERY

## 2023-11-16 PROCEDURE — 3288F FALL RISK ASSESSMENT DOCD: CPT | Mod: CPTII,S$GLB,, | Performed by: ORTHOPAEDIC SURGERY

## 2023-11-16 NOTE — PROGRESS NOTES
Subjective:      Patient ID: Aretha Nguyen is a 78 y.o. female.    Chief Complaint:  Right foot pain  HPI    The patient reportedly fell last week injuring her right forefoot.  She was evaluated in the ER but not given any specific treatment.  She complains of pain in the right forefoot with all ambulation.  She is wearing slippers.          Review of Systems   Musculoskeletal:  Positive for joint pain.         Objective:      Ortho/SPM Exam      Right foot    The patient is not in acute distress.   Body habitus is normal.   Sclera appear normal  No respiratory distress  The patient walks with a slight limp  The skin over the for is intact.  Minimal swelling  Tendernes is located on the 4th digit  Range of motion- minimally restricted  Pulses DP present, PT present.  Motor normal 5/5 strength in all tested muscle groups.   Sensory normal.      Radiographs the right foot show a transverse fracture of the distal, 4th proximal phalanx.  Alignment is anatomic          Assessment:       Encounter Diagnosis   Name Primary?    Closed nondisplaced fracture of proximal phalanx of lesser toe of right foot, initial encounter Yes        Stable fracture pattern.  Expected to heal with nonsurgical care.          Plan:       Aretha was seen today for injury and follow-up.    Diagnoses and all orders for this visit:    Closed nondisplaced fracture of proximal phalanx of lesser toe of right foot, initial encounter          I explained my diagnostic impression and the reasoning behind it in detail, using layman's terms.      Walking boot for 1 month-usage explained    Follow-up options reviewed.  Patient prefers to follow up p.r.n.

## 2023-11-27 ENCOUNTER — NURSE TRIAGE (OUTPATIENT)
Dept: ADMINISTRATIVE | Facility: CLINIC | Age: 78
End: 2023-11-27
Payer: MEDICARE

## 2023-11-27 NOTE — TELEPHONE ENCOUNTER
No Contact/Triage. Unable to reach patient. No answer at this time.       Reason for Disposition   Second attempt to contact caller AND no contact made. Phone number verified.    Additional Information   Negative: Caller has already spoken with the PCP (or office), and has no further questions   Negative: Caller has already spoken with another triager and has no further questions   Negative: Caller has already spoken with another triager or PCP (or office), and has further questions and triager able to answer questions.   Negative: Busy signal.  First attempt to contact caller.  Follow-up call scheduled within 15 minutes.   Negative: No answer.  First attempt to contact caller.  Follow-up call scheduled within 15 minutes.   Negative: Message left on identified voicemail   Negative: Message left on unidentified voice mail. Phone number verified.   Negative: Message left with person in household   Negative: Wrong number reached. Phone number verified.    Protocols used: No Contact or Duplicate Contact Call-A-OH

## 2023-12-04 ENCOUNTER — OFFICE VISIT (OUTPATIENT)
Dept: FAMILY MEDICINE | Facility: CLINIC | Age: 78
End: 2023-12-04
Payer: MEDICARE

## 2023-12-04 VITALS
DIASTOLIC BLOOD PRESSURE: 64 MMHG | WEIGHT: 138.25 LBS | BODY MASS INDEX: 25.44 KG/M2 | SYSTOLIC BLOOD PRESSURE: 106 MMHG | HEIGHT: 62 IN | HEART RATE: 75 BPM | OXYGEN SATURATION: 98 %

## 2023-12-04 DIAGNOSIS — E11.59 HYPERTENSION COMPLICATING DIABETES: ICD-10-CM

## 2023-12-04 DIAGNOSIS — T78.40XA ALLERGIC REACTION TO DRUG, INITIAL ENCOUNTER: Primary | ICD-10-CM

## 2023-12-04 DIAGNOSIS — B35.1 ONYCHOMYCOSIS: ICD-10-CM

## 2023-12-04 DIAGNOSIS — I15.2 HYPERTENSION COMPLICATING DIABETES: ICD-10-CM

## 2023-12-04 DIAGNOSIS — K51.20 ULCERATIVE PROCTITIS WITHOUT COMPLICATION: ICD-10-CM

## 2023-12-04 DIAGNOSIS — E78.5 HYPERLIPIDEMIA ASSOCIATED WITH TYPE 2 DIABETES MELLITUS: ICD-10-CM

## 2023-12-04 DIAGNOSIS — E11.65 CONTROLLED TYPE 2 DIABETES MELLITUS WITH HYPERGLYCEMIA, WITHOUT LONG-TERM CURRENT USE OF INSULIN: ICD-10-CM

## 2023-12-04 DIAGNOSIS — E11.69 HYPERLIPIDEMIA ASSOCIATED WITH TYPE 2 DIABETES MELLITUS: ICD-10-CM

## 2023-12-04 PROCEDURE — 3288F PR FALLS RISK ASSESSMENT DOCUMENTED: ICD-10-PCS | Mod: CPTII,S$GLB,, | Performed by: INTERNAL MEDICINE

## 2023-12-04 PROCEDURE — 3074F PR MOST RECENT SYSTOLIC BLOOD PRESSURE < 130 MM HG: ICD-10-PCS | Mod: CPTII,S$GLB,, | Performed by: INTERNAL MEDICINE

## 2023-12-04 PROCEDURE — 3288F FALL RISK ASSESSMENT DOCD: CPT | Mod: CPTII,S$GLB,, | Performed by: INTERNAL MEDICINE

## 2023-12-04 PROCEDURE — 99214 PR OFFICE/OUTPT VISIT, EST, LEVL IV, 30-39 MIN: ICD-10-PCS | Mod: S$GLB,,, | Performed by: INTERNAL MEDICINE

## 2023-12-04 PROCEDURE — 99999 PR PBB SHADOW E&M-EST. PATIENT-LVL IV: ICD-10-PCS | Mod: PBBFAC,,, | Performed by: INTERNAL MEDICINE

## 2023-12-04 PROCEDURE — 1126F AMNT PAIN NOTED NONE PRSNT: CPT | Mod: CPTII,S$GLB,, | Performed by: INTERNAL MEDICINE

## 2023-12-04 PROCEDURE — 1160F RVW MEDS BY RX/DR IN RCRD: CPT | Mod: CPTII,S$GLB,, | Performed by: INTERNAL MEDICINE

## 2023-12-04 PROCEDURE — 1159F MED LIST DOCD IN RCRD: CPT | Mod: CPTII,S$GLB,, | Performed by: INTERNAL MEDICINE

## 2023-12-04 PROCEDURE — 1100F PR PT FALLS ASSESS DOC 2+ FALLS/FALL W/INJURY/YR: ICD-10-PCS | Mod: CPTII,S$GLB,, | Performed by: INTERNAL MEDICINE

## 2023-12-04 PROCEDURE — 1126F PR PAIN SEVERITY QUANTIFIED, NO PAIN PRESENT: ICD-10-PCS | Mod: CPTII,S$GLB,, | Performed by: INTERNAL MEDICINE

## 2023-12-04 PROCEDURE — 3078F DIAST BP <80 MM HG: CPT | Mod: CPTII,S$GLB,, | Performed by: INTERNAL MEDICINE

## 2023-12-04 PROCEDURE — 3078F PR MOST RECENT DIASTOLIC BLOOD PRESSURE < 80 MM HG: ICD-10-PCS | Mod: CPTII,S$GLB,, | Performed by: INTERNAL MEDICINE

## 2023-12-04 PROCEDURE — 1159F PR MEDICATION LIST DOCUMENTED IN MEDICAL RECORD: ICD-10-PCS | Mod: CPTII,S$GLB,, | Performed by: INTERNAL MEDICINE

## 2023-12-04 PROCEDURE — 1100F PTFALLS ASSESS-DOCD GE2>/YR: CPT | Mod: CPTII,S$GLB,, | Performed by: INTERNAL MEDICINE

## 2023-12-04 PROCEDURE — 99214 OFFICE O/P EST MOD 30 MIN: CPT | Mod: S$GLB,,, | Performed by: INTERNAL MEDICINE

## 2023-12-04 PROCEDURE — 99999 PR PBB SHADOW E&M-EST. PATIENT-LVL IV: CPT | Mod: PBBFAC,,, | Performed by: INTERNAL MEDICINE

## 2023-12-04 PROCEDURE — 3072F PR LOW RISK FOR RETINOPATHY: ICD-10-PCS | Mod: CPTII,S$GLB,, | Performed by: INTERNAL MEDICINE

## 2023-12-04 PROCEDURE — 1160F PR REVIEW ALL MEDS BY PRESCRIBER/CLIN PHARMACIST DOCUMENTED: ICD-10-PCS | Mod: CPTII,S$GLB,, | Performed by: INTERNAL MEDICINE

## 2023-12-04 PROCEDURE — 3074F SYST BP LT 130 MM HG: CPT | Mod: CPTII,S$GLB,, | Performed by: INTERNAL MEDICINE

## 2023-12-04 PROCEDURE — 3072F LOW RISK FOR RETINOPATHY: CPT | Mod: CPTII,S$GLB,, | Performed by: INTERNAL MEDICINE

## 2023-12-04 NOTE — PROGRESS NOTES
Subjective:       Patient ID: Aretha Nguyen is a 78 y.o. female.    Chief Complaint: Rash      HPI  Aretha Nguyen is a 78 y.o. female with  DM type 2, HLD, HTN, IBS, Breast cancer who presents today for Rash    Reports she has been noticing itching of hands but sometimes can affect other sites after she uses ciclopirox.  Has tried stopping other medications to see which 1 would be causing symptoms.  She applied the enamel last night and notices itching of her hands with slight mottled discoloration.  Benadryl has helped her symptoms.  Her toenail is doing better.  Denies shortness or breath, changes in color of the urine, palpitations, or other symptoms.  Toes do not seem affected..    In October bumped to a table and fell hitting her head.  She went to urgent care and found with a cracked toe.  She is now using a walking boot.  Tried pain killers but caused nightmares and had a fall getting off the bed.  Stopped the medication feeling was strong.  Currently not using anything for pain with no discomfort but still using the boot.    On previous visit HCTZ was held when noticing frequent falls.  Denies dizziness or leg swelling.  Blood pressure has been stable.    Has no toxic habits.    Up-to-date with flu shot and mammogram.  Pending eye exam.    Health Maintenance:  Health Maintenance   Topic Date Due    Eye Exam  11/17/2023    Mammogram  01/05/2024    Hemoglobin A1c  03/06/2024    Lipid Panel  05/26/2024    DEXA Scan  08/01/2025    TETANUS VACCINE  10/26/2027    Hepatitis C Screening  Completed    Shingles Vaccine  Completed       Review of Systems   Constitutional: Negative.  Negative for fever and unexpected weight change.   HENT: Negative.     Respiratory: Negative.  Negative for shortness of breath.    Cardiovascular: Negative.  Negative for palpitations.   Gastrointestinal: Negative.  Negative for blood in stool and change in bowel habit.   Genitourinary:  Negative for difficulty urinating.   Musculoskeletal:  Negative.    Integumentary:  Negative.   Neurological: Negative.    Psychiatric/Behavioral: Negative.  Negative for sleep disturbance.    All other systems reviewed and are negative.     Past Medical History:   Diagnosis Date    Allergy     Breast cancer, left     Cataract     Controlled type 2 diabetes mellitus with hyperglycemia, without long-term current use of insulin 03/22/2017    Hyperlipidemia associated with type 2 diabetes mellitus 03/22/2017    Hypertension complicating diabetes 03/22/2017    Inflammatory bowel disease 03/22/2017    Urinary tract infection        Past Surgical History:   Procedure Laterality Date    BREAST SURGERY      COLONOSCOPY N/A 1/18/2022    Procedure: COLONOSCOPY suprep;  Surgeon: Lawrence Garcia MD;  Location: [facility];  Service: Endoscopy;  Laterality: N/A;    HYSTERECTOMY      MASTECTOMY Left     for benign recurrent growth. Dr. Stephan Brown MD - [facility] - [facility]    SIMPLE MASTECTOMY Left     TOTAL ABDOMINAL HYSTERECTOMY W/ BILATERAL SALPINGOOPHORECTOMY  2009    for benign cysts with concern for future malginancy. Fibromoid uterus for AUB. [place]in results       Family History   Problem Relation Age of Onset    Breast cancer Neg Hx        Social History     Socioeconomic History    Marital status:    Tobacco Use    Smoking status: Never    Smokeless tobacco: Never   Substance and Sexual Activity    Alcohol use: Yes     Comment: not often    Drug use: No    Sexual activity: Yes     Partners: Male   Social History Narrative    [facility] - [facility]        Dr. Stephan Brown MD - [facility] - General Surgery & Surgery - mammograms        Current Outpatient Medications   Medication Sig Dispense Refill    atorvastatin (LIPITOR) 20 MG tablet TAKE 1 TABLET BY MOUTH EVERY DAY 90 tablet 2    cholecalciferol, vitamin D3, 2,000 unit Cap Take 1 capsule (2,000 Units total) by mouth once daily.       "cyanocobalamin (VITAMIN B-12) 1000 MCG tablet Take 2 tablets (2,000 mcg total) by mouth once daily. 60 tablet 6    EScitalopram oxalate (LEXAPRO) 5 MG Tab TAKE 1 TABLET BY MOUTH EVERY DAY 90 tablet 3    ferrous sulfate (FEOSOL) 325 mg (65 mg iron) Tab tablet TAKE 1 TABLET BY MOUTH EVERY DAY WITH BREAKFAST 90 tablet 2    metFORMIN (GLUCOPHAGE-XR) 500 MG ER 24hr tablet TAKE 2 TABLETS (1,000 MG TOTAL) BY MOUTH ONCE DAILY. 180 tablet 1    multivit-min-iron-FA-lutein (CENTRUM SILVER WOMEN) 8 mg iron-400 mcg-300 mcg Tab Take 1 tablet by mouth once daily at 6am.      naproxen (NAPROSYN) 500 MG tablet Take 1 tablet (500 mg total) by mouth 2 (two) times daily with meals. 30 tablet 0    olmesartan (BENICAR) 20 MG tablet TAKE 1 TABLET BY MOUTH EVERY DAY 90 tablet 3     No current facility-administered medications for this visit.       Review of patient's allergies indicates:   Allergen Reactions    Ciclopirox Itching    Ciprofloxacin hcl Rash         Objective:       Last 3 sets of Vitals        11/6/2023     9:11 AM 11/16/2023     9:16 AM 12/4/2023     8:20 AM   Vitals - 1 value per visit   SYSTOLIC 104  106   DIASTOLIC 64  64   Pulse 84  75   SPO2 99 %  98 %   Weight (lb) 135.8  138.23   Weight (kg) 61.6  62.7   Height 5' 2" (1.575 m)  5' 2" (1.575 m)   BMI (Calculated) 24.8  25.3   Pain Score Seven Nine Zero   Physical Exam  Constitutional:       General: She is not in acute distress.     Appearance: Normal appearance.   Eyes:      General: No scleral icterus.     Extraocular Movements: Extraocular movements intact.      Conjunctiva/sclera: Conjunctivae normal.   Neck:      Comments: No goiter.  Cardiovascular:      Rate and Rhythm: Normal rate and regular rhythm.      Pulses: Normal pulses.      Heart sounds: Normal heart sounds.   Pulmonary:      Effort: Pulmonary effort is normal.      Breath sounds: Normal breath sounds.   Abdominal:      General: Bowel sounds are normal. There is no distension.      Palpations: Abdomen " is soft.   Musculoskeletal:         General: No swelling. Normal range of motion.   Lymphadenopathy:      Cervical: No cervical adenopathy.   Skin:     General: Skin is warm and dry.      Comments: Right Toenail with a slight discoloration on the medial side of the nail   Neurological:      General: No focal deficit present.      Mental Status: She is alert and oriented to person, place, and time.   Psychiatric:         Mood and Affect: Mood normal.         Behavior: Behavior normal.           CBC:  Recent Labs   Lab 02/22/22  1704 07/22/22  0807 05/26/23  0807   WBC 8.09 5.65 6.13   RBC 3.95 L 4.10 3.96 L   Hemoglobin 11.0 L 11.2 L 11.1 L   Hematocrit 34.3 L 35.6 L 34.5 L   Platelets 310 277 272   MCV 87 87 87   MCH 27.8 27.3 28.0   MCHC 32.1 31.5 L 32.2     CMP:  Recent Labs   Lab 07/22/22  0807 05/26/23  0807 09/06/23  0821   Glucose 100 89 95   Calcium 9.9 9.8 10.1   Albumin 3.7 3.8 3.9   Total Protein 7.7 7.6 8.0   Sodium 139 138 137   Potassium 3.7 4.0 3.8   CO2 28 26 25   Chloride 101 101 100   BUN 11 11 10   Creatinine 0.8 0.8 0.8   Alkaline Phosphatase 76 77 86   ALT 7 L 14 12   AST 19 21 21   Total Bilirubin 0.7 0.3 0.4     URINALYSIS:  Recent Labs   Lab 02/22/22  1640 03/09/22  1502 10/27/23  1107   Color, UA Colorless A Yellow  --    Specific Frost, UA 1.005  --   --    Spec Grav UA  --  1.015  --    pH, UA 7.0 7 7.0   Protein, UA Negative  --   --    Bacteria Few A  --   --    Nitrite, UA Negative neg  --    Leukocytes, UA 1+ A  --   --    Urobilinogen, UA Negative norm  --       LIPIDS:  Recent Labs   Lab 06/11/21  0913 06/11/21  0924 09/21/21  1058 05/26/23  0807   TSH  --  3.239  --  3.331   HDL 42  --  52 48   Cholesterol 165  --  174 157   Triglycerides 139  --  131 127   LDL Cholesterol 95.2  --  95.8 83.6   HDL/Cholesterol Ratio 25.5  --  29.9 30.6   Non-HDL Cholesterol 123  --  122 109   Total Cholesterol/HDL Ratio 3.9  --  3.3 3.3     TSH:  Recent Labs   Lab 06/11/21  0924 05/26/23  0807    TSH 3.239 3.331       A1C:  Recent Labs   Lab 12/04/20  0900 06/11/21  0913 09/21/21  1058 07/22/22  0807 11/28/22  0825 05/26/23  0807 09/06/23  0821   Hemoglobin A1C 6.0 H 5.6 5.8 H 6.7 H 6.1 H 6.0 H 6.0 H       Imaging:  X-Ray Toe 2 or More Views Right  Narrative: EXAMINATION:  XR TOE 2 OR MORE VIEWS RIGHT    CLINICAL HISTORY:  Pain in right toe(s)    TECHNIQUE:  Three views of the right toes were performed    COMPARISON:  None    FINDINGS:  Mild degenerative changes seen at the 1st carpal metacarpal joint space.  There is an undisplaced fracture through the distal and of the proximal phalanx of the 4th toe.  No other significant findings are noted.  Impression: Fracture of the distal end of the proximal phalanx of the 4th toe    Electronically signed by: Regino Roblero MD  Date:    11/06/2023  Time:    10:29      Assessment:       1. Allergic reaction to drug, initial encounter    2. Onychomycosis    3. Hypertension complicating diabetes    4. Controlled type 2 diabetes mellitus with hyperglycemia, without long-term current use of insulin    5. Hyperlipidemia associated with type 2 diabetes mellitus    6. Ulcerative proctitis without complication            Plan:       1. Allergic reaction to drug, initial encounter   - symptoms associated to [place] with minor allergy changes but will add to the allergy list.  For now hold the medication and if symptoms persist we will re-evaluate.    2. Onychomycosis   - okay for diluted vinegar soaks, avoid leaving the [place] for over a week.    3. Hypertension complicating diabetes   - controlled blood pressure without the HCTZ.  Continue olmesartan.    4. Controlled type 2 diabetes mellitus with hyperglycemia, without long-term current use of insulin  -     Ambulatory referral/consult to Optometry; Future; Expected date: 12/04/2023  - controlled on metformin.    5. Hyperlipidemia associated with type 2 diabetes mellitus   - on atorvastatin, same treatment and  re-evaluate with labs next visit.    6. Ulcerative proctitis without complication   - stable with no bleeding.  Gastroenterology following.     Health Maintenance Due   Topic Date Due    RSV Vaccine (Age 60+ and Pregnant patients) (1 - 1-dose 60+ series) Never done    COVID-19 Vaccine (5 - 2023-24 season) 09/01/2023    Eye Exam  11/17/2023    Mammogram  01/05/2024            Return to clinic as scheduled.    Brittany Floyd MD  [facility]  Disclaimer:  This note has been generated using voice-recognition software. There may be grammatical or spelling errors that have been missed during proof-reading

## 2024-01-21 DIAGNOSIS — D50.0 IRON DEFICIENCY ANEMIA DUE TO CHRONIC BLOOD LOSS: ICD-10-CM

## 2024-01-22 RX ORDER — FERROUS SULFATE 325(65) MG
TABLET ORAL
Qty: 90 TABLET | Refills: 2 | Status: SHIPPED | OUTPATIENT
Start: 2024-01-22 | End: 2024-04-09 | Stop reason: DRUGHIGH

## 2024-04-01 ENCOUNTER — OFFICE VISIT (OUTPATIENT)
Dept: OPTOMETRY | Facility: CLINIC | Age: 79
End: 2024-04-01
Payer: MEDICARE

## 2024-04-01 DIAGNOSIS — E11.9 TYPE 2 DIABETES MELLITUS WITHOUT RETINOPATHY: Primary | ICD-10-CM

## 2024-04-01 DIAGNOSIS — H25.13 NUCLEAR SCLEROSIS, BILATERAL: ICD-10-CM

## 2024-04-01 DIAGNOSIS — H52.4 PRESBYOPIA: ICD-10-CM

## 2024-04-01 DIAGNOSIS — E11.65 CONTROLLED TYPE 2 DIABETES MELLITUS WITH HYPERGLYCEMIA, WITHOUT LONG-TERM CURRENT USE OF INSULIN: ICD-10-CM

## 2024-04-01 DIAGNOSIS — Z13.5 GLAUCOMA SCREENING: ICD-10-CM

## 2024-04-01 PROCEDURE — 1126F AMNT PAIN NOTED NONE PRSNT: CPT | Mod: CPTII,S$GLB,, | Performed by: OPTOMETRIST

## 2024-04-01 PROCEDURE — 92015 DETERMINE REFRACTIVE STATE: CPT | Mod: S$GLB,,, | Performed by: OPTOMETRIST

## 2024-04-01 PROCEDURE — 1159F MED LIST DOCD IN RCRD: CPT | Mod: CPTII,S$GLB,, | Performed by: OPTOMETRIST

## 2024-04-01 PROCEDURE — 1101F PT FALLS ASSESS-DOCD LE1/YR: CPT | Mod: CPTII,S$GLB,, | Performed by: OPTOMETRIST

## 2024-04-01 PROCEDURE — 2023F DILAT RTA XM W/O RTNOPTHY: CPT | Mod: CPTII,S$GLB,, | Performed by: OPTOMETRIST

## 2024-04-01 PROCEDURE — 99999 PR PBB SHADOW E&M-EST. PATIENT-LVL III: CPT | Mod: PBBFAC,,, | Performed by: OPTOMETRIST

## 2024-04-01 PROCEDURE — 3288F FALL RISK ASSESSMENT DOCD: CPT | Mod: CPTII,S$GLB,, | Performed by: OPTOMETRIST

## 2024-04-01 PROCEDURE — 1160F RVW MEDS BY RX/DR IN RCRD: CPT | Mod: CPTII,S$GLB,, | Performed by: OPTOMETRIST

## 2024-04-01 PROCEDURE — 92014 COMPRE OPH EXAM EST PT 1/>: CPT | Mod: S$GLB,,, | Performed by: OPTOMETRIST

## 2024-04-01 RX ORDER — HYDROCHLOROTHIAZIDE 12.5 MG/1
12.5 CAPSULE ORAL
COMMUNITY
Start: 2023-12-05 | End: 2024-04-09

## 2024-04-01 NOTE — PROGRESS NOTES
HPI    RINKU: 11/17/2022  MRX: 3 yrs old       Aretha Nguyen is a 78 y.o. female who comes in for diabetic eye exam.   Patient reports today no change of vision since last eye exam.  Pt. Denies any other ocular health complains today.    (--)blurred vision(--)Headaches(--)diplopia  (--)flashes(--)floaters(--)pain  (--)Itching(--)tearing(--)burning  (--)Dryness(--) OTC Drops(--)Photophobia    Hemoglobin A1C       Date                     Value               Ref Range             Status                09/06/2023               6.0 (H)             4.0 - 5.6 %           Final              Last edited by Ale Johnson on 4/1/2024 10:22 AM.            Assessment /Plan     For exam results, see Encounter Report.    Type 2 diabetes mellitus without retinopathy    Controlled type 2 diabetes mellitus with hyperglycemia, without long-term current use of insulin  -     Ambulatory referral/consult to Optometry    Nuclear sclerosis, bilateral    Glaucoma screening    Presbyopia        Cat OD>OS--discussed surgery--pt wishes to think about it.  Wants new Rx  DM- WITHOUT RETINOPATHY.  Advised yearly DFE   Glauc susp by CDS (see prev notes from Dr Fregoso/[place])--iop wnl without meds.  Prev VF/OCT wnl per notes.  Doubt glauc now--will monitor    PLAN:    Rtc 1 yr, or pt will call sooner if wishes cat christian Gonsales

## 2024-04-01 NOTE — PROGRESS NOTES
Assessment /Plan     For exam results, see Encounter Report.    Controlled type 2 diabetes mellitus with hyperglycemia, without long-term current use of insulin  -     Ambulatory referral/consult to Optometry

## 2024-04-03 ENCOUNTER — LAB VISIT (OUTPATIENT)
Dept: LAB | Facility: HOSPITAL | Age: 79
End: 2024-04-03
Attending: INTERNAL MEDICINE
Payer: MEDICARE

## 2024-04-03 DIAGNOSIS — D50.0 IRON DEFICIENCY ANEMIA DUE TO CHRONIC BLOOD LOSS: ICD-10-CM

## 2024-04-03 DIAGNOSIS — E53.8 B12 DEFICIENCY: ICD-10-CM

## 2024-04-03 DIAGNOSIS — I15.2 HYPERTENSION COMPLICATING DIABETES: ICD-10-CM

## 2024-04-03 DIAGNOSIS — K90.89 OTHER INTESTINAL MALABSORPTION: ICD-10-CM

## 2024-04-03 DIAGNOSIS — E11.59 HYPERTENSION COMPLICATING DIABETES: ICD-10-CM

## 2024-04-03 DIAGNOSIS — E11.65 CONTROLLED TYPE 2 DIABETES MELLITUS WITH HYPERGLYCEMIA, WITHOUT LONG-TERM CURRENT USE OF INSULIN: ICD-10-CM

## 2024-04-03 LAB
25(OH)D3+25(OH)D2 SERPL-MCNC: 51 NG/ML (ref 30–96)
ALBUMIN SERPL BCP-MCNC: 4 G/DL (ref 3.5–5.2)
ALP SERPL-CCNC: 93 U/L (ref 55–135)
ALT SERPL W/O P-5'-P-CCNC: 15 U/L (ref 10–44)
ANION GAP SERPL CALC-SCNC: 8 MMOL/L (ref 8–16)
AST SERPL-CCNC: 21 U/L (ref 10–40)
BASOPHILS # BLD AUTO: 0.04 K/UL (ref 0–0.2)
BASOPHILS NFR BLD: 0.8 % (ref 0–1.9)
BILIRUB SERPL-MCNC: 0.5 MG/DL (ref 0.1–1)
BUN SERPL-MCNC: 7 MG/DL (ref 8–23)
CALCIUM SERPL-MCNC: 9.9 MG/DL (ref 8.7–10.5)
CHLORIDE SERPL-SCNC: 107 MMOL/L (ref 95–110)
CHOLEST SERPL-MCNC: 174 MG/DL (ref 120–199)
CHOLEST/HDLC SERPL: 3.5 {RATIO} (ref 2–5)
CO2 SERPL-SCNC: 25 MMOL/L (ref 23–29)
CREAT SERPL-MCNC: 0.8 MG/DL (ref 0.5–1.4)
DIFFERENTIAL METHOD BLD: ABNORMAL
EOSINOPHIL # BLD AUTO: 0.2 K/UL (ref 0–0.5)
EOSINOPHIL NFR BLD: 3 % (ref 0–8)
ERYTHROCYTE [DISTWIDTH] IN BLOOD BY AUTOMATED COUNT: 14.6 % (ref 11.5–14.5)
EST. GFR  (NO RACE VARIABLE): >60 ML/MIN/1.73 M^2
ESTIMATED AVG GLUCOSE: 126 MG/DL (ref 68–131)
FERRITIN SERPL-MCNC: 113 NG/ML (ref 20–300)
GLUCOSE SERPL-MCNC: 85 MG/DL (ref 70–110)
HBA1C MFR BLD: 6 % (ref 4–5.6)
HCT VFR BLD AUTO: 37.4 % (ref 37–48.5)
HDLC SERPL-MCNC: 50 MG/DL (ref 40–75)
HDLC SERPL: 28.7 % (ref 20–50)
HGB BLD-MCNC: 12 G/DL (ref 12–16)
IMM GRANULOCYTES # BLD AUTO: 0.02 K/UL (ref 0–0.04)
IMM GRANULOCYTES NFR BLD AUTO: 0.4 % (ref 0–0.5)
IRON SERPL-MCNC: 66 UG/DL (ref 30–160)
LDLC SERPL CALC-MCNC: 96.4 MG/DL (ref 63–159)
LYMPHOCYTES # BLD AUTO: 1.6 K/UL (ref 1–4.8)
LYMPHOCYTES NFR BLD: 29.3 % (ref 18–48)
MCH RBC QN AUTO: 27.8 PG (ref 27–31)
MCHC RBC AUTO-ENTMCNC: 32.1 G/DL (ref 32–36)
MCV RBC AUTO: 87 FL (ref 82–98)
MONOCYTES # BLD AUTO: 0.5 K/UL (ref 0.3–1)
MONOCYTES NFR BLD: 9 % (ref 4–15)
NEUTROPHILS # BLD AUTO: 3.1 K/UL (ref 1.8–7.7)
NEUTROPHILS NFR BLD: 57.5 % (ref 38–73)
NONHDLC SERPL-MCNC: 124 MG/DL
NRBC BLD-RTO: 0 /100 WBC
PLATELET # BLD AUTO: 269 K/UL (ref 150–450)
PMV BLD AUTO: 10.6 FL (ref 9.2–12.9)
POTASSIUM SERPL-SCNC: 3.7 MMOL/L (ref 3.5–5.1)
PROT SERPL-MCNC: 8 G/DL (ref 6–8.4)
RBC # BLD AUTO: 4.32 M/UL (ref 4–5.4)
SATURATED IRON: 19 % (ref 20–50)
SODIUM SERPL-SCNC: 140 MMOL/L (ref 136–145)
TOTAL IRON BINDING CAPACITY: 339 UG/DL (ref 250–450)
TRANSFERRIN SERPL-MCNC: 229 MG/DL (ref 200–375)
TRIGL SERPL-MCNC: 138 MG/DL (ref 30–150)
TSH SERPL DL<=0.005 MIU/L-ACNC: 2.77 UIU/ML (ref 0.4–4)
VIT B12 SERPL-MCNC: >2000 PG/ML (ref 210–950)
WBC # BLD AUTO: 5.32 K/UL (ref 3.9–12.7)

## 2024-04-03 PROCEDURE — 85025 COMPLETE CBC W/AUTO DIFF WBC: CPT | Performed by: INTERNAL MEDICINE

## 2024-04-03 PROCEDURE — 80061 LIPID PANEL: CPT | Performed by: INTERNAL MEDICINE

## 2024-04-03 PROCEDURE — 82306 VITAMIN D 25 HYDROXY: CPT | Performed by: INTERNAL MEDICINE

## 2024-04-03 PROCEDURE — 83540 ASSAY OF IRON: CPT | Performed by: INTERNAL MEDICINE

## 2024-04-03 PROCEDURE — 83036 HEMOGLOBIN GLYCOSYLATED A1C: CPT | Performed by: INTERNAL MEDICINE

## 2024-04-03 PROCEDURE — 84443 ASSAY THYROID STIM HORMONE: CPT | Performed by: INTERNAL MEDICINE

## 2024-04-03 PROCEDURE — 82607 VITAMIN B-12: CPT | Performed by: INTERNAL MEDICINE

## 2024-04-03 PROCEDURE — 80053 COMPREHEN METABOLIC PANEL: CPT | Performed by: INTERNAL MEDICINE

## 2024-04-03 PROCEDURE — 82728 ASSAY OF FERRITIN: CPT | Performed by: INTERNAL MEDICINE

## 2024-04-03 PROCEDURE — 36415 COLL VENOUS BLD VENIPUNCTURE: CPT | Performed by: INTERNAL MEDICINE

## 2024-04-09 ENCOUNTER — OFFICE VISIT (OUTPATIENT)
Dept: PRIMARY CARE CLINIC | Facility: CLINIC | Age: 79
End: 2024-04-09
Payer: MEDICARE

## 2024-04-09 VITALS
BODY MASS INDEX: 24.76 KG/M2 | DIASTOLIC BLOOD PRESSURE: 64 MMHG | HEIGHT: 62 IN | OXYGEN SATURATION: 96 % | WEIGHT: 134.56 LBS | HEART RATE: 69 BPM | SYSTOLIC BLOOD PRESSURE: 112 MMHG

## 2024-04-09 DIAGNOSIS — F33.9 RECURRENT DEPRESSION: ICD-10-CM

## 2024-04-09 DIAGNOSIS — E53.8 B12 DEFICIENCY: ICD-10-CM

## 2024-04-09 DIAGNOSIS — K51.20 ULCERATIVE PROCTITIS WITHOUT COMPLICATION: ICD-10-CM

## 2024-04-09 DIAGNOSIS — E11.59 HYPERTENSION COMPLICATING DIABETES: ICD-10-CM

## 2024-04-09 DIAGNOSIS — Z00.00 HEALTHCARE MAINTENANCE: ICD-10-CM

## 2024-04-09 DIAGNOSIS — E11.69 HYPERLIPIDEMIA ASSOCIATED WITH TYPE 2 DIABETES MELLITUS: ICD-10-CM

## 2024-04-09 DIAGNOSIS — Z12.31 ENCOUNTER FOR SCREENING MAMMOGRAM FOR MALIGNANT NEOPLASM OF BREAST: ICD-10-CM

## 2024-04-09 DIAGNOSIS — L98.9 SKIN LESION OF SCALP: ICD-10-CM

## 2024-04-09 DIAGNOSIS — E11.65 CONTROLLED TYPE 2 DIABETES MELLITUS WITH HYPERGLYCEMIA, WITHOUT LONG-TERM CURRENT USE OF INSULIN: Primary | ICD-10-CM

## 2024-04-09 DIAGNOSIS — I15.2 HYPERTENSION COMPLICATING DIABETES: ICD-10-CM

## 2024-04-09 DIAGNOSIS — E78.5 HYPERLIPIDEMIA ASSOCIATED WITH TYPE 2 DIABETES MELLITUS: ICD-10-CM

## 2024-04-09 PROCEDURE — 3288F FALL RISK ASSESSMENT DOCD: CPT | Mod: CPTII,S$GLB,, | Performed by: INTERNAL MEDICINE

## 2024-04-09 PROCEDURE — 1160F RVW MEDS BY RX/DR IN RCRD: CPT | Mod: CPTII,S$GLB,, | Performed by: INTERNAL MEDICINE

## 2024-04-09 PROCEDURE — 3074F SYST BP LT 130 MM HG: CPT | Mod: CPTII,S$GLB,, | Performed by: INTERNAL MEDICINE

## 2024-04-09 PROCEDURE — 1101F PT FALLS ASSESS-DOCD LE1/YR: CPT | Mod: CPTII,S$GLB,, | Performed by: INTERNAL MEDICINE

## 2024-04-09 PROCEDURE — 99215 OFFICE O/P EST HI 40 MIN: CPT | Mod: S$GLB,,, | Performed by: INTERNAL MEDICINE

## 2024-04-09 PROCEDURE — 1159F MED LIST DOCD IN RCRD: CPT | Mod: CPTII,S$GLB,, | Performed by: INTERNAL MEDICINE

## 2024-04-09 PROCEDURE — 99999 PR PBB SHADOW E&M-EST. PATIENT-LVL IV: CPT | Mod: PBBFAC,,, | Performed by: INTERNAL MEDICINE

## 2024-04-09 PROCEDURE — 1126F AMNT PAIN NOTED NONE PRSNT: CPT | Mod: CPTII,S$GLB,, | Performed by: INTERNAL MEDICINE

## 2024-04-09 PROCEDURE — 3078F DIAST BP <80 MM HG: CPT | Mod: CPTII,S$GLB,, | Performed by: INTERNAL MEDICINE

## 2024-04-09 RX ORDER — LANOLIN ALCOHOL/MO/W.PET/CERES
1000 CREAM (GRAM) TOPICAL
Qty: 60 TABLET | Refills: 6
Start: 2024-04-11

## 2024-04-10 PROBLEM — L98.9 SKIN LESION OF SCALP: Status: ACTIVE | Noted: 2024-04-10

## 2024-04-10 PROBLEM — E11.9 HYPERTENSION COMPLICATING DIABETES: Status: ACTIVE | Noted: 2024-04-10

## 2024-04-10 PROBLEM — Z00.00 HEALTHCARE MAINTENANCE: Status: ACTIVE | Noted: 2024-04-10

## 2024-04-10 PROBLEM — I15.2 HYPERTENSION COMPLICATING DIABETES: Status: ACTIVE | Noted: 2024-04-10

## 2024-04-10 PROBLEM — I10 HYPERTENSION COMPLICATING DIABETES: Status: ACTIVE | Noted: 2024-04-10

## 2024-04-10 PROBLEM — E11.59 HYPERTENSION COMPLICATING DIABETES: Status: ACTIVE | Noted: 2024-04-10

## 2024-04-10 NOTE — PROGRESS NOTES
Subjective:       Patient ID: Aretha Nguyen is a 78 y.o. female.    Chief Complaint: Follow-up      HPI  Aretha Nguyen is a 78 y.o. female with DM type 2, HLD, HTN, IBD, Breast cancer who presents today for Follow-up    Reports she is concerned of a mole on her left frontal hairline.  Has been changing, is itchy, and now raised.    Her  was diagnose with Alzheimer's and has been under stress but able to manage.  Continues on low-dose Lexapro.    Has not had any gastrointestinal symptoms so far after holding medications for colitis.  No evidence of bleeding.    Blood pressure has been well controlled and compliant with medications for blood pressure and diabetes.  Stays active but not exercising.    Has no toxic habits.  Keeps her diet healthy.    [facility] no documents    Health Maintenance:  Health Maintenance   Topic Date Due    Mammogram  01/05/2024    Hemoglobin A1c  10/03/2024    Eye Exam  04/01/2025    Lipid Panel  04/03/2025    DEXA Scan  08/01/2025    TETANUS VACCINE  10/26/2027    Hepatitis C Screening  Completed    Shingles Vaccine  Completed       Review of Systems   Constitutional: Negative.  Negative for fever and unexpected weight change.   HENT: Negative.     Respiratory: Negative.     Cardiovascular: Negative.    Gastrointestinal: Negative.  Negative for blood in stool and change in bowel habit.   Genitourinary:  Negative for difficulty urinating.   Musculoskeletal: Negative.    Integumentary:  Positive for mole/lesion. Negative.   Neurological: Negative.    Psychiatric/Behavioral:  Negative for dysphoric mood and sleep disturbance. The patient is nervous/anxious.       Past Medical History:   Diagnosis Date    Allergy     Breast cancer, left     Cataract     Controlled type 2 diabetes mellitus with hyperglycemia, without long-term current use of insulin 03/22/2017    Hyperlipidemia associated with type 2 diabetes mellitus 03/22/2017    Hypertension complicating diabetes 03/22/2017    Inflammatory  bowel disease 03/22/2017    Urinary tract infection        Past Surgical History:   Procedure Laterality Date    BREAST SURGERY      COLONOSCOPY N/A 1/18/2022    Procedure: COLONOSCOPY suprep;  Surgeon: Lawrence Garcia MD;  Location: [facility];  Service: Endoscopy;  Laterality: N/A;    HYSTERECTOMY      MASTECTOMY Left     for benign recurrent growth. Dr. Stephan Brown MD - [place] - General Surgery & Surgery    SIMPLE MASTECTOMY Left     TOTAL ABDOMINAL HYSTERECTOMY W/ BILATERAL SALPINGOOPHORECTOMY  2009    for benign cysts with concern for future malginancy. Fibromoid uterus for AUB. Bengin results       Family History   Problem Relation Age of Onset    Breast cancer Neg Hx        Social History     Socioeconomic History    Marital status:    Tobacco Use    Smoking status: Never    Smokeless tobacco: Never   Substance and Sexual Activity    Alcohol use: Yes     Comment: not often    Drug use: No    Sexual activity: Yes     Partners: Male   Social History Narrative    [employer] [facility] - [facility], [place], operated by [facility]        Dr. Stephan Brown MD - [place] - General Surgery & Surgery - mammograms        Current Outpatient Medications   Medication Sig Dispense Refill    atorvastatin (LIPITOR) 20 MG tablet TAKE 1 TABLET BY MOUTH EVERY DAY 90 tablet 2    cholecalciferol, vitamin D3, 2,000 unit Cap Take 1 capsule (2,000 Units total) by mouth once daily.      EScitalopram oxalate (LEXAPRO) 5 MG Tab TAKE 1 TABLET BY MOUTH EVERY DAY 90 tablet 3    metFORMIN (GLUCOPHAGE-XR) 500 MG ER 24hr tablet TAKE 2 TABLETS (1,000 MG TOTAL) BY MOUTH ONCE DAILY. 180 tablet 1    multivit-min-iron-FA-lutein (CENTRUM SILVER WOMEN) 8 mg iron-400 mcg-300 mcg Tab Take 1 tablet by mouth once daily at 6am.      olmesartan (BENICAR) 20 MG tablet TAKE 1 TABLET BY MOUTH EVERY DAY 90 tablet 3    [START ON 4/11/2024] cyanocobalamin (VITAMIN B-12) 1000 MCG tablet Take 1 tablet (1,000 mcg total) by mouth twice a  "week. 60 tablet 6     No current facility-administered medications for this visit.       Review of patient's allergies indicates:   Allergen Reactions    Ciclopirox Itching    Ciprofloxacin hcl Rash         Objective:       Last 3 sets of Vitals        12/4/2023     8:20 AM 4/1/2024    10:06 AM 4/9/2024     3:07 PM   Vitals - 1 value per visit   SYSTOLIC 106  112   DIASTOLIC 64  64   Pulse 75  69   SPO2 98 %  96 %   Weight (lb) 138.23  134.59   Weight (kg) 62.7  61.05   Height 5' 2" (1.575 m)  5' 2" (1.575 m)   BMI (Calculated) 25.3  24.6   Pain Score Zero Zero Zero   Physical Exam  Constitutional:       General: She is not in acute distress.  HENT:      Head: Normocephalic.      Right Ear: Tympanic membrane, ear canal and external ear normal.      Left Ear: Tympanic membrane, ear canal and external ear normal.      Nose: Nose normal.      Mouth/Throat:      Mouth: Mucous membranes are moist.   Eyes:      General: No scleral icterus.     Extraocular Movements: Extraocular movements intact.      Conjunctiva/sclera: Conjunctivae normal.   Neck:      Vascular: No carotid bruit.      Comments: No goiter.  Cardiovascular:      Rate and Rhythm: Normal rate and regular rhythm.      Pulses: Normal pulses.      Heart sounds: Normal heart sounds.   Pulmonary:      Effort: Pulmonary effort is normal.      Breath sounds: Normal breath sounds.   Abdominal:      General: Bowel sounds are normal. There is no distension.      Palpations: Abdomen is soft. There is no mass.      Tenderness: There is no abdominal tenderness.   Musculoskeletal:         General: No swelling.   Lymphadenopathy:      Cervical: No cervical adenopathy.   Skin:     General: Skin is warm and dry.   Neurological:      General: No focal deficit present.      Mental Status: She is alert and oriented to person, place, and time.      Coordination: Abnormal coordination: has a left frontal side dark mole by scalp hairline with lighter/tan discoloration around it.  " Is slightly raised and scaly..   Psychiatric:         Mood and Affect: Mood normal.         Behavior: Behavior normal.           CBC:  Recent Labs   Lab 07/22/22  0807 05/26/23  0807 04/03/24  0850   WBC 5.65 6.13 5.32   RBC 4.10 3.96 L 4.32   Hemoglobin 11.2 L 11.1 L 12.0   Hematocrit 35.6 L 34.5 L 37.4   Platelets 277 272 269   MCV 87 87 87   MCH 27.3 28.0 27.8   MCHC 31.5 L 32.2 32.1     CMP:  Recent Labs   Lab 05/26/23  0807 09/06/23  0821 04/03/24  0850   Glucose 89 95 85   Calcium 9.8 10.1 9.9   Albumin 3.8 3.9 4.0   Total Protein 7.6 8.0 8.0   Sodium 138 137 140   Potassium 4.0 3.8 3.7   CO2 26 25 25   Chloride 101 100 107   BUN 11 10 7 L   Creatinine 0.8 0.8 0.8   Alkaline Phosphatase 77 86 93   ALT 14 12 15   AST 21 21 21   Total Bilirubin 0.3 0.4 0.5     URINALYSIS:  Recent Labs   Lab 02/22/22  1640 03/09/22  1502 10/27/23  1107   Color, UA Colorless A Yellow  --    Spec Grav UA  --  1.015  --    Specific Ayr, UA 1.005  --   --    pH, UA 7.0 7 7.0   Protein, UA Negative  --   --    Bacteria Few A  --   --    Nitrite, UA Negative neg  --    Leukocytes, UA 1+ A  --   --    Urobilinogen, UA Negative norm  --       LIPIDS:  Recent Labs   Lab 06/11/21  0924 09/21/21  1058 05/26/23  0807 04/03/24  0850   TSH 3.239  --  3.331 2.767   HDL  --  52 48 50   Cholesterol  --  174 157 174   Triglycerides  --  131 127 138   LDL Cholesterol  --  95.8 83.6 96.4   HDL/Cholesterol Ratio  --  29.9 30.6 28.7   Non-HDL Cholesterol  --  122 109 124   Total Cholesterol/HDL Ratio  --  3.3 3.3 3.5     TSH:  Recent Labs   Lab 06/11/21  0924 05/26/23  0807 04/03/24  0850   TSH 3.239 3.331 2.767       A1C:  Recent Labs   Lab 06/11/21  0913 09/21/21  1058 07/22/22  0807 11/28/22  0825 05/26/23  0807 09/06/23  0821 04/03/24  0850   Hemoglobin A1C 5.6 5.8 H 6.7 H 6.1 H 6.0 H 6.0 H 6.0 H       Imaging:  X-Ray Toe 2 or More Views Right  Narrative: EXAMINATION:  XR TOE 2 OR MORE VIEWS RIGHT    CLINICAL HISTORY:  Pain in right  toe(s)    TECHNIQUE:  Three views of the right toes were performed    COMPARISON:  None    FINDINGS:  Mild degenerative changes seen at the 1st carpal metacarpal joint space.  There is an undisplaced fracture through the distal and of the proximal phalanx of the 4th toe.  No other significant findings are noted.  Impression: Fracture of the distal end of the proximal phalanx of the 4th toe    Electronically signed by: Regino Roblero MD  Date:    11/06/2023  Time:    10:29      Assessment:       1. Controlled type 2 diabetes mellitus with hyperglycemia, without long-term current use of insulin    2. Hypertension complicating diabetes    3. Hyperlipidemia associated with type 2 diabetes mellitus    4. Skin lesion of scalp    5. Ulcerative proctitis without complication    6. Recurrent depression    7. B12 deficiency    8. Encounter for screening mammogram for malignant neoplasm of breast    9. Healthcare maintenance            Plan:       1. Controlled type 2 diabetes mellitus with hyperglycemia, without long-term current use of insulin  Overview:  On metformin ER 1000 mg daily    Assessment & Plan:  A1cs stable, less than 7%.  Same treatment.  Encourage healthy diet and exercise.    Orders:  -     Hemoglobin A1C; Future; Expected date: 04/09/2024  -     Comprehensive Metabolic Panel; Future; Expected date: 04/09/2024  -     Lipid Panel; Future; Expected date: 04/09/2024  -     Microalbumin/Creatinine Ratio, Urine; Future; Expected date: 04/09/2024    2. Hypertension complicating diabetes  Overview:  On olmesartan 20 mg daily    Assessment & Plan:  Blood pressure well controlled.  Continue same treatment.      3. Hyperlipidemia associated with type 2 diabetes mellitus  Overview:  On atorvastatin 20 mg daily    Assessment & Plan:  Last LDL slightly increased.  Just returned from vacation and thinks probably can be optimized.  May need to increase medication.  Encourage healthy diet and exercise.      4. Skin lesion  of scalp  Assessment & Plan:  Consult dermatology.    Orders:  -     Ambulatory referral/consult to Dermatology; Future; Expected date: 04/09/2024    5. Ulcerative proctitis without complication  Assessment & Plan:  Has been stable off medication.      6. Recurrent depression  Overview:  On Lexapro 5 mg daily    Assessment & Plan:  Reports medication is helping.  Will continue same treatment.  Advise on our LCSW if feels needs more help.      7. B12 deficiency  Assessment & Plan:  B12 now elevated, will decrease to twice a week.    Orders:  -     cyanocobalamin (VITAMIN B-12) 1000 MCG tablet; Take 1 tablet (1,000 mcg total) by mouth twice a week.  Dispense: 60 tablet; Refill: 6    8. Encounter for screening mammogram for malignant neoplasm of breast  -     Mammo Digital Screening Bilat w/ Ricky; Future; Expected date: 04/09/2024    9. Healthcare maintenance  Assessment & Plan:  - Yearly labs- done on 04/03/2024.  - Colon cancer screening- done on 2022.  - mammogram- 01/05/2023  - DEXA- 8/1/22  - ACP- no documents in chart.  - Eye exam- done on 04/01/2024  - Foot exam- 01/28/2023  - Vaccination- up-to-date except RSV         Health Maintenance Due   Topic Date Due    RSV Vaccine (Age 60+ and Pregnant patients) (1 - 1-dose 60+ series) Never done    Mammogram  01/05/2024        I spent a total of 45 minutes on the day of the visit.This includes face to face time and non-face to face time preparing to see the patient (eg, review of tests), obtaining and/or reviewing separately obtained history, documenting clinical information in the electronic or other health record, independently interpreting results and communicating results to the patient/family/caregiver, or care coordinator.       Return to clinic in 3-4 months.  WS 2    Brittany Floyd MD  [facility]  Disclaimer:  This note has been generated using voice-recognition software. There may be grammatical or spelling errors that have been missed during  proof-reading

## 2024-04-10 NOTE — ASSESSMENT & PLAN NOTE
Blood pressure well controlled.  Continue same treatment.   [facility] [place] [address], [address]  [place] [address]  Phone: [phone]  Fax: [phone]    Pt Name: Antonino Austin  Injury Date: 02/05/2018   Employee ID: [id number] Date: 03/12/2018   [employer]            Appointment Time: 08:30 AM Arrived:  8:30 AM CDT   Physician: Corey Reis MD Time out: 10:17 AM       Office Treatment: Antonino was seen today for laceration.    Diagnoses and all orders for this visit:    Open displaced fracture of distal phalanx of left index finger with routine healing, subsequent encounter  -     X-Ray Hand 3 view Left; Future  Cellulitis of left index finger  Laceration of finger nail bed, subsequent encounter     Patient Instructions: Use splint as directed, Keep dressing clean/dry/covered (cleanse wound site at home daily)    Restrictions: NONE  Regular Duty       Return Appointment: 4/9/2018 at 8:30 AM        none

## 2024-04-10 NOTE — ASSESSMENT & PLAN NOTE
Last LDL slightly increased.  Just returned from vacation and thinks probably can be optimized.  May need to increase medication.  Encourage healthy diet and exercise.

## 2024-04-10 NOTE — ASSESSMENT & PLAN NOTE
Reports medication is helping.  Will continue same treatment.  Advise on our LCSW if feels needs more help.

## 2024-04-10 NOTE — ASSESSMENT & PLAN NOTE
- Yearly labs- done on 04/03/2024.  - Colon cancer screening- done on 2022.  - mammogram- 01/05/2023  - DEXA- 8/1/22  - ACP- no documents in chart.  - Eye exam- done on 04/01/2024  - Foot exam- 01/28/2023  - Vaccination- up-to-date except RSV

## 2024-04-12 ENCOUNTER — HOSPITAL ENCOUNTER (OUTPATIENT)
Dept: RADIOLOGY | Facility: HOSPITAL | Age: 79
Discharge: HOME | End: 2024-04-12
Attending: INTERNAL MEDICINE
Payer: MEDICARE

## 2024-04-12 DIAGNOSIS — Z12.31 ENCOUNTER FOR SCREENING MAMMOGRAM FOR MALIGNANT NEOPLASM OF BREAST: ICD-10-CM

## 2024-04-12 PROCEDURE — 77067 SCR MAMMO BI INCL CAD: CPT | Mod: 26,52,, | Performed by: RADIOLOGY

## 2024-04-12 PROCEDURE — 77063 BREAST TOMOSYNTHESIS BI: CPT | Mod: 26,52,, | Performed by: RADIOLOGY

## 2024-04-12 PROCEDURE — 77063 BREAST TOMOSYNTHESIS BI: CPT | Mod: TC,52

## 2024-05-03 ENCOUNTER — OFFICE VISIT (OUTPATIENT)
Dept: DERMATOLOGY | Facility: CLINIC | Age: 79
End: 2024-05-03
Payer: MEDICARE

## 2024-05-03 DIAGNOSIS — L82.1 SK (SEBORRHEIC KERATOSIS): Primary | ICD-10-CM

## 2024-05-03 DIAGNOSIS — L82.0 INFLAMED SEBORRHEIC KERATOSIS: ICD-10-CM

## 2024-05-03 DIAGNOSIS — L91.8 ST (SKIN TAG): ICD-10-CM

## 2024-05-03 DIAGNOSIS — R20.9 SKIN SENSATION DISTURBANCE: ICD-10-CM

## 2024-05-03 PROCEDURE — 1101F PT FALLS ASSESS-DOCD LE1/YR: CPT | Mod: CPTII,S$GLB,, | Performed by: DERMATOLOGY

## 2024-05-03 PROCEDURE — 17110 DESTRUCTION B9 LES UP TO 14: CPT | Mod: S$GLB,,, | Performed by: DERMATOLOGY

## 2024-05-03 PROCEDURE — 1160F RVW MEDS BY RX/DR IN RCRD: CPT | Mod: CPTII,S$GLB,, | Performed by: DERMATOLOGY

## 2024-05-03 PROCEDURE — 3288F FALL RISK ASSESSMENT DOCD: CPT | Mod: CPTII,S$GLB,, | Performed by: DERMATOLOGY

## 2024-05-03 PROCEDURE — 99999 PR PBB SHADOW E&M-EST. PATIENT-LVL III: CPT | Mod: PBBFAC,,, | Performed by: DERMATOLOGY

## 2024-05-03 PROCEDURE — 99202 OFFICE O/P NEW SF 15 MIN: CPT | Mod: 25,S$GLB,, | Performed by: DERMATOLOGY

## 2024-05-03 PROCEDURE — 1126F AMNT PAIN NOTED NONE PRSNT: CPT | Mod: CPTII,S$GLB,, | Performed by: DERMATOLOGY

## 2024-05-03 PROCEDURE — 1159F MED LIST DOCD IN RCRD: CPT | Mod: CPTII,S$GLB,, | Performed by: DERMATOLOGY

## 2024-05-03 NOTE — PROGRESS NOTES
Subjective:      Patient ID:  Aretha Nguyen is a 78 y.o. female who presents for   Chief Complaint   Patient presents with    Lesion     L-forehead and neck      HPI  Patient with new area of concern: skin lesion  Location: left scalp   Duration: 50 yrs   Symptoms: itchy   Previous treatments: none   Would like it removed because it is very itchy- daily.    Patient with new area of concern: skin lesion  Location: Neck, bilateral sides  Symptoms: mole on left neck causes her an itch.   Previous treatments: none     Review of Systems   Skin:  Positive for activity-related sunscreen use and wears hat (Walking and outside for extended periods). Negative for daily sunscreen use and recent sunburn.   Hematologic/Lymphatic: Does not bruise/bleed easily.       Objective:   Physical Exam   Constitutional: She appears well-developed and well-nourished. No distress.   Neurological: She is alert and oriented to person, place, and time. She is not disoriented.   Psychiatric: She has a normal mood and affect.   Skin:   Areas Examined (abnormalities noted in diagram):   Scalp / Hair Palpated and Inspected  Head / Face Inspection Performed  Neck Inspection Performed            Diagram Legend     Erythematous scaling macule/papule c/w actinic keratosis       Vascular papule c/w angioma      Pigmented verrucoid papule/plaque c/w seborrheic keratosis      Yellow umbilicated papule c/w sebaceous hyperplasia      Irregularly shaped tan macule c/w lentigo     1-2 mm smooth white papules consistent with Milia      Movable subcutaneous cyst with punctum c/w epidermal inclusion cyst      Subcutaneous movable cyst c/w pilar cyst      Firm pink to brown papule c/w dermatofibroma      Pedunculated fleshy papule(s) c/w skin tag(s)      Evenly pigmented macule c/w junctional nevus     Mildly variegated pigmented, slightly irregular-bordered macule c/w mildly atypical nevus      Flesh colored to evenly pigmented papule c/w intradermal nevus        Pink pearly papule/plaque c/w basal cell carcinoma      Erythematous hyperkeratotic cursted plaque c/w SCC      Surgical scar with no sign of skin cancer recurrence      Open and closed comedones      Inflammatory papules and pustules      Verrucoid papule consistent consistent with wart     Erythematous eczematous patches and plaques     Dystrophic onycholytic nail with subungual debris c/w onychomycosis     Umbilicated papule    Erythematous-base heme-crusted tan verrucoid plaque consistent with inflamed seborrheic keratosis     Erythematous Silvery Scaling Plaque c/w Psoriasis     See annotation      Assessment / Plan:        SK (seborrheic keratosis)  Reassurance given to patient.     ST (skin tag)  Reassurance given to patient.     Inflamed seborrheic keratosis  Skin sensation disturbance  Cryosurgery procedure note:  Verbal consent from the patient is obtained including, but not limited to, risk of hypopigmentation/hyperpigmentation, scar, recurrence of lesion. Liquid nitrogen cryosurgery is applied to 3 lesions to produce a freeze injury. The patient is aware that blisters may form and is instructed on wound care with gentle cleansing and use of vaseline ointment to keep moist until healed. The patient is supplied a handout on cryosurgery and is instructed to call if lesions do not completely resolve.      Follow up if symptoms worsen or fail to improve.

## 2024-05-03 NOTE — PATIENT INSTRUCTIONS
CRYOSURGERY      Your doctor has used a method called cryosurgery to treat your skin condition. Cryosurgery refers to the use of very cold substances to treat a variety of skin conditions such as warts, pre-skin cancers, molluscum contagiosum, sun spots, and several benign growths. The substance we use in cryosurgery is liquid nitrogen and is so cold (-195 degrees Celsius) that is burns when administered.     Following treatment in the office, the skin may immediately burn and become red. You may find the area around the lesion is affected as well. It is sometimes necessary to treat not only the lesion, but a small area of the surrounding normal skin to achieve a good response.     A blister, and even a blood filled blister, may form after treatment.   This is a normal response. If the blister is painful, it is acceptable to sterilize a needle and with rubbing alcohol and gently pop the blister. It is important that you gently wash the area with soap and warm water as the blister fluid may contain wart virus if a wart was treated. Do no remove the roof of the blister.     The area treated can take anywhere from 1-3 weeks to heal. Healing time depends on the kind skin lesion treated, the location, and how aggressively the lesion was treated. It is recommended that the areas treated are covered with Vaseline or bacitracin ointment and a band-aid. If a band-aid is not practical, just ointment applied several times per day will do. Keeping these areas moist will speed the healing time.    Treatment with liquid nitrogen can leave a scar. In dark skin, it may be a light or dark scar, in light skin it may be a white or pink scar. These will generally fade with time, but may never go away completely.     If you have any concerns after your treatment, please feel free to call the office.       [address] [place] [address]/ [phone] FAX/ [email or web address]     Sun Protection      The [facility] would like to remind you of the importance of sun protection all year round and particularly during the summer when the suns rays are the strongest. It has been proven that both acute and chronic sun exposure damages our cells and leads to skin cancer. Beyond skin cancer, the sun causes 90% of the symptoms of premature skin aging, including wrinkles, lentigines (brown spots), and thin, easily bruised skin. Proper sun protection can help prevent these unwanted conditions.    Many patients report that they dont go in the sun. It has been shown that the average person receives 18 hours of incidental sun exposure per week during activities such as walking through parking lots, driving, or sitting next to windows. This accumulates to several bad sunburns per year!    In choosing sunscreen, you want one that protects against both UVA and UVB rays (broad spectrum). It is recommended that you use one of SPF 30 or higher. It is important to apply the sunscreen about 20 minutes prior to sun exposure. Most sunscreens are chemical sunscreens and a reaction must take place in the skin so that they are effective. If they are applied and then you are immediately exposed to the sun or start sweating, this reaction has not had time to take place and you are therefore unprotected. Sunscreen needs to be reapplied every 2 hours if you are participating in water sports or sweating. We recommend Elta MD or CeraVe sunscreens for daily use; however there are many options and it is most important for you to find one that you will use on a consistent basis.    If you have sensitive skin, you may do best with a sunscreen that contains only physical blockers in the active ingredient section. The only physical blockers available in the [place] currently are titanium dioxide or zinc oxide. These are typically thicker and harder to apply, however they afford very good protection. Neutrogena Sensitive Skin, Blue Lizard  Sensitive Skin (pink top) or Neutrogena Pure and Free are popular ones.     Aside from sunscreen, clothes with UV protection (UPF), wide brimmed hats, and sunglasses are other means of sun protection that we recommend.      Based on a recent study (6/2021) and out of an abundance of caution, we are recommending that you AVOID the following sunscreens as they may contain the carcinogen, benzene:    Spray and gel sunscreens  Any [employer] or [employer] brands as well as Max Block and TopCare brands   Neutrogena Ultra Sheer Dry-touch Water Resistant Sunscreen LOTION SPF 70   Neutrogena Sheer Zinc Dry-touch Face Sunscreen LOTION SPF 50   5.   Aveeno Baby Continuous Protection Sensitive Skin Sunscreen LOTION - Broad Spectrum SPF 50    Please note that Benzene is not an ingredient or the degradation product of any ingredient in any sunscreen. This study suggested that the findings are a result of contamination in the manufacturing process. At this point, we don't know how effectively Benzene gets through the skin, if it gets absorbed systemically, and what effects it may have.     We do know that ultraviolet radiation is a well-established carcinogen. Please use daily sun protection/avoidance and use of at least SPF 30, broad-spectrum sunscreen not listed above.                       [facility]  [facility] - DERMATOLOGY [address]  [facility] [address]  Dept: [phone]  Dept Fax: [phone]

## 2024-05-07 ENCOUNTER — TELEPHONE (OUTPATIENT)
Dept: PRIMARY CARE CLINIC | Facility: CLINIC | Age: 79
End: 2024-05-07
Payer: MEDICARE

## 2024-05-07 DIAGNOSIS — N64.89 DEFECT DUE TO MASTECTOMY: Primary | ICD-10-CM

## 2024-05-07 DIAGNOSIS — Z90.10 DEFECT DUE TO MASTECTOMY: Primary | ICD-10-CM

## 2024-06-28 DIAGNOSIS — E11.65 CONTROLLED TYPE 2 DIABETES MELLITUS WITH HYPERGLYCEMIA, WITHOUT LONG-TERM CURRENT USE OF INSULIN: ICD-10-CM

## 2024-06-28 RX ORDER — METFORMIN HYDROCHLORIDE 500 MG/1
1000 TABLET, EXTENDED RELEASE ORAL DAILY
Qty: 180 TABLET | Refills: 1 | Status: SHIPPED | OUTPATIENT
Start: 2024-06-28

## 2024-07-15 DIAGNOSIS — F33.9 RECURRENT DEPRESSION: ICD-10-CM

## 2024-07-15 PROBLEM — Z00.00 HEALTHCARE MAINTENANCE: Status: RESOLVED | Noted: 2024-04-10 | Resolved: 2024-07-15

## 2024-07-15 RX ORDER — ESCITALOPRAM OXALATE 5 MG/1
TABLET ORAL
Qty: 90 TABLET | Refills: 3 | Status: SHIPPED | OUTPATIENT
Start: 2024-07-15

## 2024-07-17 DIAGNOSIS — E11.65 CONTROLLED TYPE 2 DIABETES MELLITUS WITH HYPERGLYCEMIA, WITHOUT LONG-TERM CURRENT USE OF INSULIN: ICD-10-CM

## 2024-07-17 RX ORDER — ATORVASTATIN CALCIUM 20 MG/1
TABLET, FILM COATED ORAL
Qty: 90 TABLET | Refills: 2 | Status: SHIPPED | OUTPATIENT
Start: 2024-07-17

## 2024-07-17 NOTE — TELEPHONE ENCOUNTER
SURGEON: Willy Medina MD    ASSISTANT: Zach Crouch Please see the attached refill request.

## 2024-08-02 ENCOUNTER — LAB VISIT (OUTPATIENT)
Dept: LAB | Facility: HOSPITAL | Age: 79
End: 2024-08-02
Attending: INTERNAL MEDICINE
Payer: MEDICARE

## 2024-08-02 DIAGNOSIS — E11.65 CONTROLLED TYPE 2 DIABETES MELLITUS WITH HYPERGLYCEMIA, WITHOUT LONG-TERM CURRENT USE OF INSULIN: ICD-10-CM

## 2024-08-02 LAB
ALBUMIN SERPL BCP-MCNC: 3.8 G/DL (ref 3.5–5.2)
ALBUMIN/CREAT UR: NORMAL UG/MG (ref 0–30)
ALP SERPL-CCNC: 80 U/L (ref 55–135)
ALT SERPL W/O P-5'-P-CCNC: 13 U/L (ref 10–44)
ANION GAP SERPL CALC-SCNC: 10 MMOL/L (ref 8–16)
AST SERPL-CCNC: 19 U/L (ref 10–40)
BILIRUB SERPL-MCNC: 0.4 MG/DL (ref 0.1–1)
BUN SERPL-MCNC: 15 MG/DL (ref 8–23)
CALCIUM SERPL-MCNC: 10.1 MG/DL (ref 8.7–10.5)
CHLORIDE SERPL-SCNC: 102 MMOL/L (ref 95–110)
CHOLEST SERPL-MCNC: 177 MG/DL (ref 120–199)
CHOLEST/HDLC SERPL: 3.5 {RATIO} (ref 2–5)
CO2 SERPL-SCNC: 26 MMOL/L (ref 23–29)
CREAT SERPL-MCNC: 0.8 MG/DL (ref 0.5–1.4)
CREAT UR-MCNC: 63 MG/DL (ref 15–325)
EST. GFR  (NO RACE VARIABLE): >60 ML/MIN/1.73 M^2
ESTIMATED AVG GLUCOSE: 111 MG/DL (ref 68–131)
GLUCOSE SERPL-MCNC: 91 MG/DL (ref 70–110)
HBA1C MFR BLD: 5.5 % (ref 4–5.6)
HDLC SERPL-MCNC: 50 MG/DL (ref 40–75)
HDLC SERPL: 28.2 % (ref 20–50)
LDLC SERPL CALC-MCNC: 99.2 MG/DL (ref 63–159)
MICROALBUMIN UR DL<=1MG/L-MCNC: <5 UG/ML
NONHDLC SERPL-MCNC: 127 MG/DL
POTASSIUM SERPL-SCNC: 3.9 MMOL/L (ref 3.5–5.1)
PROT SERPL-MCNC: 7.5 G/DL (ref 6–8.4)
SODIUM SERPL-SCNC: 138 MMOL/L (ref 136–145)
TRIGL SERPL-MCNC: 139 MG/DL (ref 30–150)

## 2024-08-02 PROCEDURE — 80053 COMPREHEN METABOLIC PANEL: CPT | Mod: HCNC | Performed by: INTERNAL MEDICINE

## 2024-08-02 PROCEDURE — 36415 COLL VENOUS BLD VENIPUNCTURE: CPT | Mod: HCNC | Performed by: INTERNAL MEDICINE

## 2024-08-02 PROCEDURE — 82043 UR ALBUMIN QUANTITATIVE: CPT | Mod: HCNC | Performed by: INTERNAL MEDICINE

## 2024-08-02 PROCEDURE — 80061 LIPID PANEL: CPT | Mod: HCNC | Performed by: INTERNAL MEDICINE

## 2024-08-02 PROCEDURE — 83036 HEMOGLOBIN GLYCOSYLATED A1C: CPT | Mod: HCNC | Performed by: INTERNAL MEDICINE

## 2024-08-02 PROCEDURE — 82570 ASSAY OF URINE CREATININE: CPT | Mod: HCNC | Performed by: INTERNAL MEDICINE

## 2024-09-27 ENCOUNTER — LAB VISIT (OUTPATIENT)
Dept: LAB | Facility: HOSPITAL | Age: 79
End: 2024-09-27
Attending: INTERNAL MEDICINE
Payer: MEDICARE

## 2024-09-27 DIAGNOSIS — E11.65 CONTROLLED TYPE 2 DIABETES MELLITUS WITH HYPERGLYCEMIA, WITHOUT LONG-TERM CURRENT USE OF INSULIN: ICD-10-CM

## 2024-09-27 DIAGNOSIS — E78.5 HYPERLIPIDEMIA ASSOCIATED WITH TYPE 2 DIABETES MELLITUS: ICD-10-CM

## 2024-09-27 DIAGNOSIS — E11.69 HYPERLIPIDEMIA ASSOCIATED WITH TYPE 2 DIABETES MELLITUS: ICD-10-CM

## 2024-09-27 LAB
ALBUMIN SERPL BCP-MCNC: 4 G/DL (ref 3.5–5.2)
ALP SERPL-CCNC: 84 U/L (ref 55–135)
ALT SERPL W/O P-5'-P-CCNC: 16 U/L (ref 10–44)
ANION GAP SERPL CALC-SCNC: 10 MMOL/L (ref 8–16)
AST SERPL-CCNC: 22 U/L (ref 10–40)
BILIRUB SERPL-MCNC: 0.6 MG/DL (ref 0.1–1)
BUN SERPL-MCNC: 10 MG/DL (ref 8–23)
CALCIUM SERPL-MCNC: 9.9 MG/DL (ref 8.7–10.5)
CHLORIDE SERPL-SCNC: 105 MMOL/L (ref 95–110)
CHOLEST SERPL-MCNC: 178 MG/DL (ref 120–199)
CHOLEST/HDLC SERPL: 3.6 {RATIO} (ref 2–5)
CO2 SERPL-SCNC: 23 MMOL/L (ref 23–29)
CREAT SERPL-MCNC: 0.7 MG/DL (ref 0.5–1.4)
EST. GFR  (NO RACE VARIABLE): >60 ML/MIN/1.73 M^2
ESTIMATED AVG GLUCOSE: 126 MG/DL (ref 68–131)
GLUCOSE SERPL-MCNC: 86 MG/DL (ref 70–110)
HBA1C MFR BLD: 6 % (ref 4–5.6)
HDLC SERPL-MCNC: 50 MG/DL (ref 40–75)
HDLC SERPL: 28.1 % (ref 20–50)
LDLC SERPL CALC-MCNC: 105.4 MG/DL (ref 63–159)
NONHDLC SERPL-MCNC: 128 MG/DL
POTASSIUM SERPL-SCNC: 3.7 MMOL/L (ref 3.5–5.1)
PROT SERPL-MCNC: 7.8 G/DL (ref 6–8.4)
SODIUM SERPL-SCNC: 138 MMOL/L (ref 136–145)
TRIGL SERPL-MCNC: 113 MG/DL (ref 30–150)

## 2024-09-27 PROCEDURE — 80053 COMPREHEN METABOLIC PANEL: CPT | Mod: HCNC | Performed by: INTERNAL MEDICINE

## 2024-09-27 PROCEDURE — 83036 HEMOGLOBIN GLYCOSYLATED A1C: CPT | Mod: HCNC | Performed by: INTERNAL MEDICINE

## 2024-09-27 PROCEDURE — 80061 LIPID PANEL: CPT | Mod: HCNC | Performed by: INTERNAL MEDICINE

## 2024-09-30 ENCOUNTER — TELEPHONE (OUTPATIENT)
Dept: PRIMARY CARE CLINIC | Facility: CLINIC | Age: 79
End: 2024-09-30
Payer: MEDICARE

## 2024-09-30 NOTE — TELEPHONE ENCOUNTER
----- Message from Brittany Floyd MD sent at 9/29/2024  2:21 PM CDT -----  Regarding: Letter  Please print and mail letter to the patient. Thanks.

## 2024-10-15 ENCOUNTER — OFFICE VISIT (OUTPATIENT)
Dept: URGENT CARE | Facility: CLINIC | Age: 79
End: 2024-10-15
Payer: MEDICARE

## 2024-10-15 VITALS
HEART RATE: 75 BPM | RESPIRATION RATE: 17 BRPM | HEIGHT: 62 IN | DIASTOLIC BLOOD PRESSURE: 84 MMHG | WEIGHT: 133 LBS | TEMPERATURE: 98 F | BODY MASS INDEX: 24.48 KG/M2 | OXYGEN SATURATION: 97 % | SYSTOLIC BLOOD PRESSURE: 136 MMHG

## 2024-10-15 DIAGNOSIS — H57.89 IRRITATION OF RIGHT EYE: Primary | ICD-10-CM

## 2024-10-15 PROCEDURE — 99213 OFFICE O/P EST LOW 20 MIN: CPT | Mod: S$GLB,,,

## 2024-10-15 NOTE — PROGRESS NOTES
"Subjective:      Patient ID: Aretha Nguyen is a 79 y.o. female.    Vitals:  height is 5' 2" (1.575 m) and weight is 60.3 kg (133 lb). Her oral temperature is 98.3 °F (36.8 °C). Her blood pressure is 136/84 and her pulse is 75. Her respiration is 17 and oxygen saturation is 97%.     Chief Complaint: Eye Problem    This is a 79 y.o. female who presents today with a chief complaint of eye irritation, sx started today, itchiness and redness. She states that she was walking in the park with her  and felt something go into her eye. She states that she currently feels like there is something stuck in her eye. She has tried flushing the eye with no relief. Denies blurred vision or photophobia.     Eye Problem   The right eye is affected. The current episode started today. The problem occurs constantly. There was no injury mechanism. Associated symptoms include eye redness, a foreign body sensation and itching. Pertinent negatives include no blurred vision, eye discharge, double vision, fever or photophobia.       Constitution: Negative for fever.   Eyes:  Positive for eye itching and eye redness. Negative for eye discharge, eye pain, photophobia, vision loss, double vision, blurred vision and eyelid swelling.      Objective:     Physical Exam   Constitutional: She is oriented to person, place, and time. She appears well-developed.      Comments:Patient sits comfortably in exam chair. Answers questions in complete sentences. Does not show any signs of distress or discoloration.        HENT:   Head: Normocephalic and atraumatic.   Ears:   Right Ear: External ear normal.   Left Ear: External ear normal.   Nose: Nose normal.   Mouth/Throat: Oropharynx is clear and moist.   Eyes: EOM and lids are normal. Pupils are equal, round, and reactive to light. Lids are everted and swept, no foreign bodies found. Right eye exhibits discharge (watery). Right eye exhibits no chemosis, no exudate and no hordeolum. No foreign body " present in the right eye. Left eye exhibits no chemosis and no exudate. Right conjunctiva is injected. Right conjunctiva has no hemorrhage. Left conjunctiva is not injected. Left conjunctiva has no hemorrhage. No scleral icterus. Right eye exhibits normal extraocular motion and no nystagmus. Left eye exhibits normal extraocular motion and no nystagmus. Right pupil is round, reactive and not sluggish.   Slit lamp exam:       The right eye shows no corneal abrasion and no fluorescein uptake. Extraocular movement intact vision grossly intact gaze aligned appropriately periorbital hyperpigmentation     Comments: Fluorescein exam done. No corneal abrasions or foreign bodies noted. Irritation relieved with proparacaine drops.  right eye Karime exam negative    Neck: Trachea normal and phonation normal. Neck supple.   Musculoskeletal: Normal range of motion.         General: Normal range of motion.   Neurological: She is alert and oriented to person, place, and time.   Skin: Skin is warm, dry and intact.   Psychiatric: Her speech is normal and behavior is normal. Judgment and thought content normal.   Nursing note and vitals reviewed.      Assessment:     1. Irritation of right eye        Plan:       Irritation of right eye                Patient Instructions   - Use lubricating eye drops to help with irritation.       - You must understand that you have received an Urgent Care treatment only and that you may be released before all of your medical problems are known or treated.   - You, the patient, will arrange for follow up care as instructed.   - If your condition worsens or fails to improve we recommend that you receive another evaluation at the ER immediately or contact your PCP to discuss your concerns or return here.   - Follow up with your PCP or specialty clinic as directed in the next 1-2 weeks if not improved or as needed.  You can call [phone] to schedule an appointment with the appropriate provider.     If your symptoms do not improve or worsen, go to the emergency room immediately.

## 2024-10-15 NOTE — PATIENT INSTRUCTIONS
- Use lubricating eye drops to help with irritation.       - You must understand that you have received an Urgent Care treatment only and that you may be released before all of your medical problems are known or treated.   - You, the patient, will arrange for follow up care as instructed.   - If your condition worsens or fails to improve we recommend that you receive another evaluation at the ER immediately or contact your PCP to discuss your concerns or return here.   - Follow up with your PCP or specialty clinic as directed in the next 1-2 weeks if not improved or as needed.  You can call [phone] to schedule an appointment with the appropriate provider.    If your symptoms do not improve or worsen, go to the emergency room immediately.

## 2024-11-04 ENCOUNTER — OFFICE VISIT (OUTPATIENT)
Dept: PRIMARY CARE CLINIC | Facility: CLINIC | Age: 79
End: 2024-11-04
Payer: MEDICARE

## 2024-11-04 VITALS
SYSTOLIC BLOOD PRESSURE: 118 MMHG | DIASTOLIC BLOOD PRESSURE: 78 MMHG | OXYGEN SATURATION: 95 % | BODY MASS INDEX: 24.5 KG/M2 | WEIGHT: 133.94 LBS | HEART RATE: 78 BPM

## 2024-11-04 DIAGNOSIS — E11.69 HYPERLIPIDEMIA ASSOCIATED WITH TYPE 2 DIABETES MELLITUS: ICD-10-CM

## 2024-11-04 DIAGNOSIS — Z00.00 HEALTHCARE MAINTENANCE: ICD-10-CM

## 2024-11-04 DIAGNOSIS — I10 ESSENTIAL HYPERTENSION: ICD-10-CM

## 2024-11-04 DIAGNOSIS — E78.5 HYPERLIPIDEMIA ASSOCIATED WITH TYPE 2 DIABETES MELLITUS: ICD-10-CM

## 2024-11-04 DIAGNOSIS — E53.8 B12 DEFICIENCY: ICD-10-CM

## 2024-11-04 DIAGNOSIS — E11.65 CONTROLLED TYPE 2 DIABETES MELLITUS WITH HYPERGLYCEMIA, WITHOUT LONG-TERM CURRENT USE OF INSULIN: Primary | ICD-10-CM

## 2024-11-04 PROCEDURE — 1160F RVW MEDS BY RX/DR IN RCRD: CPT | Mod: HCNC,CPTII,S$GLB, | Performed by: INTERNAL MEDICINE

## 2024-11-04 PROCEDURE — 1126F AMNT PAIN NOTED NONE PRSNT: CPT | Mod: HCNC,CPTII,S$GLB, | Performed by: INTERNAL MEDICINE

## 2024-11-04 PROCEDURE — 90653 IIV ADJUVANT VACCINE IM: CPT | Mod: HCNC,S$GLB,, | Performed by: INTERNAL MEDICINE

## 2024-11-04 PROCEDURE — 99999 PR PBB SHADOW E&M-EST. PATIENT-LVL III: CPT | Mod: PBBFAC,HCNC,, | Performed by: INTERNAL MEDICINE

## 2024-11-04 PROCEDURE — 3078F DIAST BP <80 MM HG: CPT | Mod: HCNC,CPTII,S$GLB, | Performed by: INTERNAL MEDICINE

## 2024-11-04 PROCEDURE — 3288F FALL RISK ASSESSMENT DOCD: CPT | Mod: HCNC,CPTII,S$GLB, | Performed by: INTERNAL MEDICINE

## 2024-11-04 PROCEDURE — 1159F MED LIST DOCD IN RCRD: CPT | Mod: HCNC,CPTII,S$GLB, | Performed by: INTERNAL MEDICINE

## 2024-11-04 PROCEDURE — 1101F PT FALLS ASSESS-DOCD LE1/YR: CPT | Mod: HCNC,CPTII,S$GLB, | Performed by: INTERNAL MEDICINE

## 2024-11-04 PROCEDURE — 3074F SYST BP LT 130 MM HG: CPT | Mod: HCNC,CPTII,S$GLB, | Performed by: INTERNAL MEDICINE

## 2024-11-04 PROCEDURE — 1157F ADVNC CARE PLAN IN RCRD: CPT | Mod: HCNC,CPTII,S$GLB, | Performed by: INTERNAL MEDICINE

## 2024-11-04 PROCEDURE — G0008 ADMIN INFLUENZA VIRUS VAC: HCPCS | Mod: HCNC,S$GLB,, | Performed by: INTERNAL MEDICINE

## 2024-11-04 PROCEDURE — 99214 OFFICE O/P EST MOD 30 MIN: CPT | Mod: HCNC,25,S$GLB, | Performed by: INTERNAL MEDICINE

## 2024-11-04 RX ORDER — ATORVASTATIN CALCIUM 80 MG/1
80 TABLET, FILM COATED ORAL NIGHTLY
Qty: 90 TABLET | Refills: 3 | Status: SHIPPED | OUTPATIENT
Start: 2024-11-04

## 2024-11-05 NOTE — PROGRESS NOTES
Subjective:       Patient ID: Aretha Nguyen is a 79 y.o. female.    Chief Complaint: Follow-up      HPI  Aretha Nguyen is a 79 y.o. female with DM type 2, HLD, HTN, IBD, Breast cancer who presents today for Follow-up    Diabetes has been stable on metformin.  Stays active and tries to keep a healthy diet but not as strict lately.  She inquired about non-invasive glucose monitoring options, expressing interest in a continuous glucose monitoring device that can be applied to the skin and used with a smartphone for readings. Her insurance plan does not cover this type of device unless she is on insulin therapy.    Blood pressure has been well controlled on olmesartan.    She reports walking infrequently. She performs neck and leg exercises at home and uses an exercise CD designed for seniors 3 times per week, which involves chair-based exercises.    She experiences frequent dreams and nightmares daily, sometimes severe enough to require her  to wake her. These issues have increased in frequency as she has gotten older. She uses chamomile and jane tea to help with sleep, finding them helpful for calming. She attributes some of her sleep disturbances to worry about her , who has Alzheimer's disease.  Not interested in medications.    She recently visited the emergency room for eye irritation, where she underwent an eye wash procedure. No corneal abrasions were observed, and she denies any current eye concerns. She also reports experiencing numbness in both legs after exercising yesterday.      ROS:  General: -fever, -chills, -fatigue, -weight gain, -weight loss  Eyes: -vision changes, -redness, -discharge, -eye pain  ENT: -ear pain, -nasal congestion, -sore throat  Cardiovascular: -chest pain, -palpitations, -lower extremity edema  Respiratory: -cough, -shortness of breath  Gastrointestinal: -abdominal pain, -nausea, -vomiting, -diarrhea, -constipation, -blood in stool  Genitourinary: -dysuria, -hematuria,  -frequency  Musculoskeletal: -joint pain, -muscle pain  Skin: -rash, -lesion  Neurological: -headache, -dizziness, +numbness, -tingling  Psychiatric: -anxiety, -depression, +sleep difficulty            Past Medical History:   Diagnosis Date    Allergy     Breast cancer, left     Cataract     Controlled type 2 diabetes mellitus with hyperglycemia, without long-term current use of insulin 03/22/2017    Hyperlipidemia associated with type 2 diabetes mellitus 03/22/2017    Hypertension complicating diabetes 03/22/2017    Inflammatory bowel disease 03/22/2017    Urinary tract infection        Past Surgical History:   Procedure Laterality Date    BREAST SURGERY      COLONOSCOPY N/A 1/18/2022    Procedure: COLONOSCOPY suprep;  Surgeon: Lawrence Garcia MD;  Location: [facility];  Service: Endoscopy;  Laterality: N/A;    HYSTERECTOMY      MASTECTOMY Left     for benign recurrent growth. Dr. Stephan Brown MD - [facility] - [facility]    SIMPLE MASTECTOMY Left     TOTAL ABDOMINAL HYSTERECTOMY W/ BILATERAL SALPINGOOPHORECTOMY  2009    for benign cysts with concern for future malginancy. Fibromoid uterus for AUB. Bengin results       Family History   Problem Relation Name Age of Onset    Breast cancer Neg Hx         Social History     Socioeconomic History    Marital status:    Tobacco Use    Smoking status: Never    Smokeless tobacco: Never   Substance and Sexual Activity    Alcohol use: Yes     Comment: not often    Drug use: No    Sexual activity: Yes     Partners: Male   Social History Narrative    [employer] [facility] - [facility]        Dr. Stephan Brown MD - [facility] - General Surgery & Surgery - mammograms        Current Outpatient Medications   Medication Sig Dispense Refill    atorvastatin (LIPITOR) 80 MG tablet Take 1 tablet (80 mg total) by mouth every evening. 90 tablet 3    cholecalciferol, vitamin D3, (VITAMIN D3) 50 mcg (2,000 unit) Cap capsule  "Take 1 capsule (2,000 Units total) by mouth once daily.      cyanocobalamin (VITAMIN B-12) 1000 MCG tablet Take 1 tablet (1,000 mcg total) by mouth twice a week. 60 tablet 6    EScitalopram oxalate (LEXAPRO) 5 MG Tab Take 1 tablet (5 mg total) by mouth once daily. 90 tablet 3    metFORMIN (GLUCOPHAGE-XR) 500 MG ER 24hr tablet Take 2 tablets (1,000 mg total) by mouth once daily. 180 tablet 1    multivit-min-iron-FA-vit K-lut (CENTRUM SILVER WOMEN) 8 mg iron-400 mcg-50 mcg Tab Take 1 tablet by mouth Daily. 90 tablet 3    olmesartan (BENICAR) 20 MG tablet Take 1 tablet (20 mg total) by mouth once daily. 90 tablet 3     No current facility-administered medications for this visit.       Review of patient's allergies indicates:   Allergen Reactions    Ciclopirox Itching    Ciprofloxacin hcl Rash         Objective:       Last 3 sets of Vitals        8/6/2024     3:06 PM 10/15/2024     1:46 PM 11/4/2024    11:14 AM   Vitals - 1 value per visit   SYSTOLIC 108 136 118   DIASTOLIC 64 84 78   Pulse 81 75 78   Temp  98.3 °F (36.8 °C)    Resp  17    SPO2 96 % 97 % 95 %   Weight (lb) 133.49 133 133.93   Weight (kg) 60.55 60.328 60.75   Height 5' 2" (1.575 m) 5' 2" (1.575 m)    BMI (Calculated) 24.4 24.3    Pain Score Zero Zero Zero   Physical Exam  Constitutional:       General: She is not in acute distress.  HENT:      Head: Normocephalic.      Right Ear: Tympanic membrane, ear canal and external ear normal.      Left Ear: Tympanic membrane, ear canal and external ear normal.      Nose: Nose normal.      Mouth/Throat:      Mouth: Mucous membranes are moist.   Eyes:      General: No scleral icterus.     Extraocular Movements: Extraocular movements intact.      Conjunctiva/sclera: Conjunctivae normal.   Neck:      Vascular: No carotid bruit.      Comments: No goiter.  Cardiovascular:      Rate and Rhythm: Normal rate and regular rhythm.      Pulses: Normal pulses.      Heart sounds: Normal heart sounds.   Pulmonary:      Effort: " Pulmonary effort is normal.      Breath sounds: Normal breath sounds.   Abdominal:      General: Bowel sounds are normal. There is no distension.      Palpations: Abdomen is soft. There is no mass.      Tenderness: There is no abdominal tenderness.   Musculoskeletal:         General: No swelling.   Lymphadenopathy:      Cervical: No cervical adenopathy.   Skin:     General: Skin is warm and dry.   Neurological:      General: No focal deficit present.      Mental Status: She is alert and oriented to person, place, and time.   Psychiatric:         Mood and Affect: Mood normal.         Behavior: Behavior normal.           CBC:  Recent Labs   Lab 07/22/22  0807 05/26/23  0807 04/03/24  0850   WBC 5.65 6.13 5.32   RBC 4.10 3.96 L 4.32   Hemoglobin 11.2 L 11.1 L 12.0   Hematocrit 35.6 L 34.5 L 37.4   Platelets 277 272 269   MCV 87 87 87   MCH 27.3 28.0 27.8   MCHC 31.5 L 32.2 32.1     CMP:  Recent Labs   Lab 04/03/24  0850 08/02/24  0829 09/27/24  0859   Glucose 85 91 86   Calcium 9.9 10.1 9.9   Albumin 4.0 3.8 4.0   Total Protein 8.0 7.5 7.8   Sodium 140 138 138   Potassium 3.7 3.9 3.7   CO2 25 26 23   Chloride 107 102 105   BUN 7 L 15 10   Creatinine 0.8 0.8 0.7   Alkaline Phosphatase 93 80 84   ALT 15 13 16   AST 21 19 22   Total Bilirubin 0.5 0.4 0.6     URINALYSIS:  Recent Labs   Lab 02/22/22  1640 03/09/22  1502 03/09/22  1502 10/27/23  1107   Color, UA Colorless A Yellow  --   --    Spec Grav UA  --  1.015  --   --    Specific [place], UA 1.005  --   --   --    pH, UA 7.0 7   < > 7.0   Protein, UA Negative  --   --   --    Bacteria Few A  --   --   --    Nitrite, UA Negative neg  --   --    Leukocytes, UA 1+ A  --   --   --    Urobilinogen, UA Negative norm  --   --     < > = values in this interval not displayed.      LIPIDS:  Recent Labs   Lab 05/26/23  0807 04/03/24  0850 08/02/24  0829 09/27/24  0859   TSH 3.331 2.767  --   --    HDL 48 50 50 50   Cholesterol 157 174 177 178   Triglycerides 127 138 139 113    LDL Cholesterol 83.6 96.4 99.2 105.4   HDL/Cholesterol Ratio 30.6 28.7 28.2 28.1   Non-HDL Cholesterol 109 124 127 128   Total Cholesterol/HDL Ratio 3.3 3.5 3.5 3.6     TSH:  Recent Labs   Lab 05/26/23  0807 04/03/24  0850   TSH 3.331 2.767       A1C:  Recent Labs   Lab 07/22/22  0807 11/28/22  0825 05/26/23  0807 09/06/23  0821 04/03/24  0850 08/02/24  0829 09/27/24  0859   Hemoglobin A1C 6.7 H 6.1 H 6.0 H 6.0 H 6.0 H 5.5 6.0 H       Imaging:  Mammo Digital Screening Right with Ricky  Narrative: Result:  Mammo Digital Screening Right with Ricky    History:  Patient is 78 y.o. and is seen for a screening mammogram.    Films Compared:  Prior images (if available) were compared.     Findings:  This procedure was performed using tomosynthesis.   Computer-aided detection was utilized in the interpretation of this   examination.    The right breast is heterogeneously dense, which may obscure small masses.   There is no evidence of suspicious masses, microcalcifications or   architectural distortion.  Impression:    No mammographic evidence of malignancy.    BI-RADS Category 1: Negative    Recommendation:  Routine screening mammogram in 1 year is recommended.      Assessment:       1. Controlled type 2 diabetes mellitus with hyperglycemia, without long-term current use of insulin    2. Essential hypertension    3. Hyperlipidemia associated with type 2 diabetes mellitus    4. B12 deficiency    5. Healthcare maintenance            Plan:       1. Controlled type 2 diabetes mellitus with hyperglycemia, without long-term current use of insulin  Overview:  On metformin ER 1000 mg daily  On olmesartan and atorvastatin    Assessment & Plan:  - Evaluated A1C, which increased slightly but remains below 7%, indicating controlled diabetes  - Discussed target A1C levels: aim for less than 7% to reduce risk of circulation problems and kidney issues.  - Urine test for diabetes ordered to be completed before next visit.    Orders:  -      atorvastatin (LIPITOR) 80 MG tablet; Take 1 tablet (80 mg total) by mouth every evening.  Dispense: 90 tablet; Refill: 3  -     Comprehensive Metabolic Panel; Future; Expected date: 11/04/2024  -     Hemoglobin A1C; Future; Expected date: 11/04/2024  -     Lipid Panel; Future; Expected date: 11/04/2024  -     Microalbumin/Creatinine Ratio, Urine; Future; Expected date: 11/04/2024    2. Essential hypertension  Overview:  On olmesartan 20 mg daily    Orders:  -     CBC Auto Differential; Future; Expected date: 11/04/2024  -     TSH; Future; Expected date: 11/04/2024    3. Hyperlipidemia associated with type 2 diabetes mellitus  Overview:  Increasing atorvastatin to 80 mg daily    Assessment & Plan:  - Last LDL did not change and not at goal (LDL<70).  - Will increase atorvastatin to 40mg.  - Encourage to continue healthy diet and exercise  - Increased atorvastatin from current dose to 80 mg daily.    Orders:  -     Lipid Panel; Future; Expected date: 11/04/2024    4. B12 deficiency  Comments:  B12 levels are high and will be discontinued or to use twice a day week    5. Healthcare maintenance  Assessment & Plan:  - Yearly labs- done on 04/03/2024.  - Colon cancer screening- done on 2022.  - mammogram- 01/05/2023  - DEXA- 8/1/22  - ACP- 8/14/24.  - Eye exam- done on 04/01/2024  - Foot exam- 01/28/2023  - Vaccination- up-to-date except RSV and COVID    Orders:  -     influenza (adjuvanted) (Fluad) 45 mcg/0.5 mL IM vaccine (> or = 64 yo) 0.5 mL             Health Maintenance Due   Topic Date Due    RSV Vaccine (Age 60+ and Pregnant patients) (1 - 1-dose 75+ series) Never done    COVID-19 Vaccine (6 - 2024-25 season) 09/01/2024        I spent a total of 30 minutes on the day of the visit.This includes face to face time and non-face to face time preparing to see the patient (eg, review of tests), obtaining and/or reviewing separately obtained history, documenting clinical information in the electronic or other health  record, independently interpreting results and communicating results to the patient/family/caregiver, or care coordinator.     RETURN TO CLINIC IN: 3 MONTHS    FOR NEXT VISIT: REVIEW LABS, MEDICATION MONITORING, and CARE GAPS       Brittany Floyd MD  [facility]  Disclaimer:  This note has been generated using voice-recognition software. There may be grammatical or spelling errors that have been missed during proof-reading

## 2024-11-06 NOTE — ASSESSMENT & PLAN NOTE
- Yearly labs- done on 04/03/2024.  - Colon cancer screening- done on 2022.  - mammogram- 01/05/2023  - DEXA- 8/1/22  - ACP- 8/14/24.  - Eye exam- done on 04/01/2024  - Foot exam- 01/28/2023  - Vaccination- up-to-date except RSV and COVID

## 2024-11-06 NOTE — ASSESSMENT & PLAN NOTE
- Evaluated A1C, which increased slightly but remains below 7%, indicating controlled diabetes  - Discussed target A1C levels: aim for less than 7% to reduce risk of circulation problems and kidney issues.  - Urine test for diabetes ordered to be completed before next visit.

## 2024-11-06 NOTE — ASSESSMENT & PLAN NOTE
- Last LDL did not change and not at goal (LDL<70).  - Will increase atorvastatin to 40mg.  - Encourage to continue healthy diet and exercise  - Increased atorvastatin from current dose to 80 mg daily.

## 2025-01-28 ENCOUNTER — LAB VISIT (OUTPATIENT)
Dept: LAB | Facility: HOSPITAL | Age: 80
End: 2025-01-28
Attending: INTERNAL MEDICINE
Payer: MEDICARE

## 2025-01-28 DIAGNOSIS — E78.5 HYPERLIPIDEMIA ASSOCIATED WITH TYPE 2 DIABETES MELLITUS: ICD-10-CM

## 2025-01-28 DIAGNOSIS — I10 ESSENTIAL HYPERTENSION: ICD-10-CM

## 2025-01-28 DIAGNOSIS — E11.69 HYPERLIPIDEMIA ASSOCIATED WITH TYPE 2 DIABETES MELLITUS: ICD-10-CM

## 2025-01-28 DIAGNOSIS — E11.65 CONTROLLED TYPE 2 DIABETES MELLITUS WITH HYPERGLYCEMIA, WITHOUT LONG-TERM CURRENT USE OF INSULIN: ICD-10-CM

## 2025-01-28 LAB
ALBUMIN SERPL BCP-MCNC: 4 G/DL (ref 3.5–5.2)
ALP SERPL-CCNC: 95 U/L (ref 40–150)
ALT SERPL W/O P-5'-P-CCNC: 16 U/L (ref 10–44)
ANION GAP SERPL CALC-SCNC: 10 MMOL/L (ref 8–16)
AST SERPL-CCNC: 21 U/L (ref 10–40)
BASOPHILS # BLD AUTO: 0.07 K/UL (ref 0–0.2)
BASOPHILS NFR BLD: 1.1 % (ref 0–1.9)
BILIRUB SERPL-MCNC: 0.4 MG/DL (ref 0.1–1)
BUN SERPL-MCNC: 11 MG/DL (ref 8–23)
CALCIUM SERPL-MCNC: 10 MG/DL (ref 8.7–10.5)
CHLORIDE SERPL-SCNC: 104 MMOL/L (ref 95–110)
CHOLEST SERPL-MCNC: 165 MG/DL (ref 120–199)
CHOLEST/HDLC SERPL: 3.4 {RATIO} (ref 2–5)
CO2 SERPL-SCNC: 27 MMOL/L (ref 23–29)
CREAT SERPL-MCNC: 0.8 MG/DL (ref 0.5–1.4)
DIFFERENTIAL METHOD BLD: ABNORMAL
EOSINOPHIL # BLD AUTO: 0.3 K/UL (ref 0–0.5)
EOSINOPHIL NFR BLD: 4.6 % (ref 0–8)
ERYTHROCYTE [DISTWIDTH] IN BLOOD BY AUTOMATED COUNT: 14.2 % (ref 11.5–14.5)
EST. GFR  (NO RACE VARIABLE): >60 ML/MIN/1.73 M^2
ESTIMATED AVG GLUCOSE: 126 MG/DL (ref 68–131)
GLUCOSE SERPL-MCNC: 95 MG/DL (ref 70–110)
HBA1C MFR BLD: 6 % (ref 4–5.6)
HCT VFR BLD AUTO: 38.1 % (ref 37–48.5)
HDLC SERPL-MCNC: 49 MG/DL (ref 40–75)
HDLC SERPL: 29.7 % (ref 20–50)
HGB BLD-MCNC: 11.8 G/DL (ref 12–16)
IMM GRANULOCYTES # BLD AUTO: 0.01 K/UL (ref 0–0.04)
IMM GRANULOCYTES NFR BLD AUTO: 0.2 % (ref 0–0.5)
LDLC SERPL CALC-MCNC: 84.4 MG/DL (ref 63–159)
LYMPHOCYTES # BLD AUTO: 1.7 K/UL (ref 1–4.8)
LYMPHOCYTES NFR BLD: 27.2 % (ref 18–48)
MCH RBC QN AUTO: 27.2 PG (ref 27–31)
MCHC RBC AUTO-ENTMCNC: 31 G/DL (ref 32–36)
MCV RBC AUTO: 88 FL (ref 82–98)
MONOCYTES # BLD AUTO: 0.5 K/UL (ref 0.3–1)
MONOCYTES NFR BLD: 8.6 % (ref 4–15)
NEUTROPHILS # BLD AUTO: 3.6 K/UL (ref 1.8–7.7)
NEUTROPHILS NFR BLD: 58.3 % (ref 38–73)
NONHDLC SERPL-MCNC: 116 MG/DL
NRBC BLD-RTO: 0 /100 WBC
PLATELET # BLD AUTO: 258 K/UL (ref 150–450)
PMV BLD AUTO: 10.3 FL (ref 9.2–12.9)
POTASSIUM SERPL-SCNC: 4.2 MMOL/L (ref 3.5–5.1)
PROT SERPL-MCNC: 8.2 G/DL (ref 6–8.4)
RBC # BLD AUTO: 4.34 M/UL (ref 4–5.4)
SODIUM SERPL-SCNC: 141 MMOL/L (ref 136–145)
TRIGL SERPL-MCNC: 158 MG/DL (ref 30–150)
TSH SERPL DL<=0.005 MIU/L-ACNC: 3.15 UIU/ML (ref 0.4–4)
WBC # BLD AUTO: 6.25 K/UL (ref 3.9–12.7)

## 2025-01-28 PROCEDURE — 85025 COMPLETE CBC W/AUTO DIFF WBC: CPT | Mod: HCNC | Performed by: INTERNAL MEDICINE

## 2025-01-28 PROCEDURE — 36415 COLL VENOUS BLD VENIPUNCTURE: CPT | Mod: HCNC | Performed by: INTERNAL MEDICINE

## 2025-01-28 PROCEDURE — 83036 HEMOGLOBIN GLYCOSYLATED A1C: CPT | Mod: HCNC | Performed by: INTERNAL MEDICINE

## 2025-01-28 PROCEDURE — 80053 COMPREHEN METABOLIC PANEL: CPT | Mod: HCNC | Performed by: INTERNAL MEDICINE

## 2025-01-28 PROCEDURE — 80061 LIPID PANEL: CPT | Mod: HCNC | Performed by: INTERNAL MEDICINE

## 2025-01-28 PROCEDURE — 84443 ASSAY THYROID STIM HORMONE: CPT | Mod: HCNC | Performed by: INTERNAL MEDICINE

## 2025-02-04 ENCOUNTER — OFFICE VISIT (OUTPATIENT)
Dept: PRIMARY CARE CLINIC | Facility: CLINIC | Age: 80
End: 2025-02-04
Payer: MEDICARE

## 2025-02-04 VITALS
OXYGEN SATURATION: 97 % | DIASTOLIC BLOOD PRESSURE: 80 MMHG | HEART RATE: 84 BPM | SYSTOLIC BLOOD PRESSURE: 130 MMHG | HEIGHT: 62 IN | WEIGHT: 139.13 LBS | BODY MASS INDEX: 25.6 KG/M2

## 2025-02-04 DIAGNOSIS — Z90.10 DEFECT DUE TO MASTECTOMY: ICD-10-CM

## 2025-02-04 DIAGNOSIS — I10 ESSENTIAL HYPERTENSION: ICD-10-CM

## 2025-02-04 DIAGNOSIS — E78.5 HYPERLIPIDEMIA ASSOCIATED WITH TYPE 2 DIABETES MELLITUS: ICD-10-CM

## 2025-02-04 DIAGNOSIS — E11.65 CONTROLLED TYPE 2 DIABETES MELLITUS WITH HYPERGLYCEMIA, WITHOUT LONG-TERM CURRENT USE OF INSULIN: Primary | ICD-10-CM

## 2025-02-04 DIAGNOSIS — Z12.31 ENCOUNTER FOR SCREENING MAMMOGRAM FOR MALIGNANT NEOPLASM OF BREAST: ICD-10-CM

## 2025-02-04 DIAGNOSIS — E55.9 VITAMIN D DEFICIENCY: ICD-10-CM

## 2025-02-04 DIAGNOSIS — F33.9 RECURRENT DEPRESSION: ICD-10-CM

## 2025-02-04 DIAGNOSIS — N64.89 DEFECT DUE TO MASTECTOMY: ICD-10-CM

## 2025-02-04 DIAGNOSIS — Z00.00 HEALTHCARE MAINTENANCE: ICD-10-CM

## 2025-02-04 DIAGNOSIS — E11.69 HYPERLIPIDEMIA ASSOCIATED WITH TYPE 2 DIABETES MELLITUS: ICD-10-CM

## 2025-02-04 DIAGNOSIS — K51.20 ULCERATIVE PROCTITIS WITHOUT COMPLICATION: ICD-10-CM

## 2025-02-04 PROCEDURE — 1101F PT FALLS ASSESS-DOCD LE1/YR: CPT | Mod: HCNC,CPTII,S$GLB, | Performed by: INTERNAL MEDICINE

## 2025-02-04 PROCEDURE — 1123F ACP DISCUSS/DSCN MKR DOCD: CPT | Mod: HCNC,CPTII,S$GLB, | Performed by: INTERNAL MEDICINE

## 2025-02-04 PROCEDURE — 99999 PR PBB SHADOW E&M-EST. PATIENT-LVL IV: CPT | Mod: PBBFAC,HCNC,, | Performed by: INTERNAL MEDICINE

## 2025-02-04 PROCEDURE — 3288F FALL RISK ASSESSMENT DOCD: CPT | Mod: HCNC,CPTII,S$GLB, | Performed by: INTERNAL MEDICINE

## 2025-02-04 PROCEDURE — 1126F AMNT PAIN NOTED NONE PRSNT: CPT | Mod: HCNC,CPTII,S$GLB, | Performed by: INTERNAL MEDICINE

## 2025-02-04 PROCEDURE — 1159F MED LIST DOCD IN RCRD: CPT | Mod: HCNC,CPTII,S$GLB, | Performed by: INTERNAL MEDICINE

## 2025-02-04 PROCEDURE — 3075F SYST BP GE 130 - 139MM HG: CPT | Mod: HCNC,CPTII,S$GLB, | Performed by: INTERNAL MEDICINE

## 2025-02-04 PROCEDURE — 3072F LOW RISK FOR RETINOPATHY: CPT | Mod: HCNC,CPTII,S$GLB, | Performed by: INTERNAL MEDICINE

## 2025-02-04 PROCEDURE — 3079F DIAST BP 80-89 MM HG: CPT | Mod: HCNC,CPTII,S$GLB, | Performed by: INTERNAL MEDICINE

## 2025-02-04 PROCEDURE — 1160F RVW MEDS BY RX/DR IN RCRD: CPT | Mod: HCNC,CPTII,S$GLB, | Performed by: INTERNAL MEDICINE

## 2025-02-04 PROCEDURE — 99214 OFFICE O/P EST MOD 30 MIN: CPT | Mod: HCNC,S$GLB,, | Performed by: INTERNAL MEDICINE

## 2025-02-04 RX ORDER — ATORVASTATIN CALCIUM 40 MG/1
40 TABLET, FILM COATED ORAL NIGHTLY
Start: 2025-02-04

## 2025-02-04 RX ORDER — EZETIMIBE 10 MG/1
10 TABLET ORAL DAILY
Qty: 90 TABLET | Refills: 3 | Status: SHIPPED | OUTPATIENT
Start: 2025-02-04 | End: 2026-02-04

## 2025-02-04 NOTE — PROGRESS NOTES
Subjective:       Patient ID: Aretha Nguyen is a 79 y.o. female.    Chief Complaint: Diabetes and Wheezing      HPI  Aretha Nguyen is a 79 y.o. female with  DM type 2, HLD, HTN, IBD, Breast cancer who presents today for Diabetes and Wheezing    Aretha presents today for follow-up and issues with cholesterol medication    HYPERLIPIDEMIA MANAGEMENT:  She experienced significant adverse reactions to Atorvastatin 80 mg including abdominal pain, headache, and loss of appetite. She was switched to Atorvastatin 40 mg which is better tolerated. She previously took Crestor but was changed to Atorvastatin due to cost concerns. Triglycerides were slightly elevated.    RESPIRATORY:  She reports experiencing whistling in her chest, particularly at night while sleeping, noting slightly slower airflow. She has been taking Dayquil, which has significantly reduced her wheezing symptoms. She denies any current resistance when emptying her lungs.    MEDICAL HISTORY:  She has a history of left-sided mastectomy and prediabetes, which is currently stable with medication.    LABS:  Mild anemia was noted, with improvement towards normal levels. Kidney and liver function tests were normal. Fasting blood sugar was within normal range.    CURRENT MEDICATIONS:  She continues Lexapro/citalopram for anxiety/depression and Vitamin D 13/3.      ROS:  General: -fever, -chills, -fatigue, -weight gain, -weight loss, +loss of appetite  Eyes: -vision changes, -redness, -discharge  ENT: -ear pain, -nasal congestion, -sore throat  Cardiovascular: -chest pain, -palpitations, -lower extremity edema  Respiratory: -cough, -shortness of breath, -wheezing  Gastrointestinal: -abdominal pain, -nausea, -vomiting, -diarrhea, -constipation, -blood in stool  Genitourinary: -dysuria, -hematuria, -frequency  Musculoskeletal: -joint pain, -muscle pain  Skin: -rash, -lesion  Neurological: +headache, -dizziness, -numbness, -tingling  Psychiatric: -anxiety, -depression,  -sleep difficulty          Advance Care Planning     Date: 02/04/2025    ACP Reviewed/No Changes  Voluntary advance care planning discussion had today with patient. Previously completed HCPOA and LW in electronic medical record is current, no changes made.      A total of 4 min was spent on advance care planning. This discussion occurred on a fully voluntary basis with the verbal consent of the patient and/or family.        Past Medical History:   Diagnosis Date    Allergy     Breast cancer, left     Cataract     Controlled type 2 diabetes mellitus with hyperglycemia, without long-term current use of insulin 03/22/2017    Hyperlipidemia associated with type 2 diabetes mellitus 03/22/2017    Hypertension complicating diabetes 03/22/2017    Inflammatory bowel disease 03/22/2017    Urinary tract infection        Past Surgical History:   Procedure Laterality Date    BREAST SURGERY      COLONOSCOPY N/A 1/18/2022    Procedure: COLONOSCOPY suprep;  Surgeon: Lawrence Garcia MD;  Location: [facility];  Service: Endoscopy;  Laterality: N/A;    HYSTERECTOMY      MASTECTOMY Left     for benign recurrent growth. Dr. Stephan Brown MD - [facility] - [facility]    SIMPLE MASTECTOMY Left     TOTAL ABDOMINAL HYSTERECTOMY W/ BILATERAL SALPINGOOPHORECTOMY  2009    for benign cysts with concern for future malginancy. Fibromoid uterus for AUB. Bengin results       Family History   Problem Relation Name Age of Onset    Breast cancer Neg Hx         Social History     Socioeconomic History    Marital status:    Tobacco Use    Smoking status: Never    Smokeless tobacco: Never   Substance and Sexual Activity    Alcohol use: Yes     Comment: not often    Drug use: No    Sexual activity: Yes     Partners: Male   Social History Narrative    [facility] - [facility]        Dr. Stephan Brown MD - [facility] - General Surgery & Surgery - mammograms        Current Outpatient  "Medications   Medication Sig Dispense Refill    cholecalciferol, vitamin D3, (VITAMIN D3) 50 mcg (2,000 unit) Cap capsule Take 1 capsule (2,000 Units total) by mouth once daily.      cyanocobalamin (VITAMIN B-12) 1000 MCG tablet Take 1 tablet (1,000 mcg total) by mouth twice a week. 60 tablet 6    EScitalopram oxalate (LEXAPRO) 5 MG Tab Take 1 tablet (5 mg total) by mouth once daily. 90 tablet 3    metFORMIN (GLUCOPHAGE-XR) 500 MG ER 24hr tablet Take 2 tablets (1,000 mg total) by mouth once daily. 180 tablet 1    multivit-min-iron-FA-vit K-lut (CENTRUM SILVER WOMEN) 8 mg iron-400 mcg-50 mcg Tab Take 1 tablet by mouth Daily. 90 tablet 3    olmesartan (BENICAR) 20 MG tablet Take 1 tablet (20 mg total) by mouth once daily. 90 tablet 3    atorvastatin (LIPITOR) 40 MG tablet Take 1 tablet (40 mg total) by mouth every evening.      ezetimibe (ZETIA) 10 mg tablet Take 1 tablet (10 mg total) by mouth once daily. 90 tablet 3     No current facility-administered medications for this visit.       Review of patient's allergies indicates:   Allergen Reactions    Ciclopirox Itching    Ciprofloxacin hcl Rash         Objective:       Last 3 sets of Vitals        10/15/2024     1:46 PM 11/4/2024    11:14 AM 2/4/2025    10:05 AM   Vitals - 1 value per visit   SYSTOLIC 136 118 130   DIASTOLIC 84 78 86   Pulse 75 78 84   Temp 98.3 °F (36.8 °C)     Resp 17     SPO2 97 % 95 % 97 %   Weight (lb) 133 133.93 139.11   Weight (kg) 60.328 60.75 63.1   Height 5' 2" (1.575 m)  5' 2" (1.575 m)   BMI (Calculated) 24.3  25.4   Pain Score Zero Zero Zero   Physical Exam  Constitutional:       General: She is not in acute distress.  HENT:      Head: Normocephalic.      Right Ear: Tympanic membrane, ear canal and external ear normal.      Left Ear: Tympanic membrane, ear canal and external ear normal.      Nose: Nose normal.      Mouth/Throat:      Mouth: Mucous membranes are moist.   Eyes:      General: No scleral icterus.     Extraocular Movements: " Extraocular movements intact.      Conjunctiva/sclera: Conjunctivae normal.   Neck:      Vascular: No carotid bruit.      Comments: No goiter.  Cardiovascular:      Rate and Rhythm: Normal rate and regular rhythm.      Pulses: Normal pulses.      Heart sounds: Normal heart sounds.   Pulmonary:      Effort: Pulmonary effort is normal.      Breath sounds: Normal breath sounds.   Abdominal:      General: Bowel sounds are normal. There is no distension.      Palpations: Abdomen is soft. There is no mass.      Tenderness: There is no abdominal tenderness.   Musculoskeletal:         General: No swelling.   Lymphadenopathy:      Cervical: No cervical adenopathy.   Skin:     General: Skin is warm and dry.   Neurological:      General: No focal deficit present.      Mental Status: She is alert and oriented to person, place, and time.   Psychiatric:         Mood and Affect: Mood normal.         Behavior: Behavior normal.           CBC:  Recent Labs   Lab 05/26/23  0807 04/03/24  0850 01/28/25  0839   WBC 6.13 5.32 6.25   RBC 3.96 L 4.32 4.34   Hemoglobin 11.1 L 12.0 11.8 L   Hematocrit 34.5 L 37.4 38.1   Platelets 272 269 258   MCV 87 87 88   MCH 28.0 27.8 27.2   MCHC 32.2 32.1 31.0 L     CMP:  Recent Labs   Lab 08/02/24  0829 09/27/24  0859 01/28/25  0839   Glucose 91 86 95   Calcium 10.1 9.9 10.0   Albumin 3.8 4.0 4.0   Total Protein 7.5 7.8 8.2   Sodium 138 138 141   Potassium 3.9 3.7 4.2   CO2 26 23 27   Chloride 102 105 104   BUN 15 10 11   Creatinine 0.8 0.7 0.8   Alkaline Phosphatase 80 84 95   ALT 13 16 16   AST 19 22 21   Total Bilirubin 0.4 0.6 0.4     URINALYSIS:  Recent Labs   Lab 02/22/22  1640 03/09/22  1502 03/09/22  1502 10/27/23  1107   Color, UA Colorless A Yellow  --   --    Spec Grav UA  --  1.015  --   --    Specific [place], UA 1.005  --   --   --    pH, UA 7.0 7   < > 7.0   Protein, UA Negative  --   --   --    Bacteria Few A  --   --   --    Nitrite, UA Negative neg  --   --    Leukocytes, UA 1+ A  --    --   --    Urobilinogen, UA Negative norm  --   --     < > = values in this interval not displayed.      LIPIDS:  Recent Labs   Lab 05/26/23  0807 04/03/24  0850 08/02/24  0829 09/27/24  0859 01/28/25  0839   TSH 3.331 2.767  --   --  3.150   HDL 48 50 50 50 49   Cholesterol 157 174 177 178 165   Triglycerides 127 138 139 113 158 H   LDL Cholesterol 83.6 96.4 99.2 105.4 84.4   HDL/Cholesterol Ratio 30.6 28.7 28.2 28.1 29.7   Non-HDL Cholesterol 109 124 127 128 116   Total Cholesterol/HDL Ratio 3.3 3.5 3.5 3.6 3.4     TSH:  Recent Labs   Lab 05/26/23  0807 04/03/24  0850 01/28/25  0839   TSH 3.331 2.767 3.150       A1C:  Recent Labs   Lab 07/22/22  0807 11/28/22  0825 05/26/23  0807 09/06/23  0821 04/03/24  0850 08/02/24  0829 09/27/24  0859 01/28/25  0839   Hemoglobin A1C 6.7 H 6.1 H 6.0 H 6.0 H 6.0 H 5.5 6.0 H 6.0 H       Imaging:  Mammo Digital Screening Right with Ricky  Narrative: Result:  Mammo Digital Screening Right with Ricky    History:  Patient is 78 y.o. and is seen for a screening mammogram.    Films Compared:  Prior images (if available) were compared.     Findings:  This procedure was performed using tomosynthesis.   Computer-aided detection was utilized in the interpretation of this   examination.    The right breast is heterogeneously dense, which may obscure small masses.   There is no evidence of suspicious masses, microcalcifications or   architectural distortion.  Impression:    No mammographic evidence of malignancy.    BI-RADS Category 1: Negative    Recommendation:  Routine screening mammogram in 1 year is recommended.      Assessment:       1. Controlled type 2 diabetes mellitus with hyperglycemia, without long-term current use of insulin    2. Essential hypertension    3. Hyperlipidemia associated with type 2 diabetes mellitus    4. Ulcerative proctitis without complication    5. Recurrent depression    6. Vitamin D deficiency    7. Defect due to mastectomy    8. Encounter for screening  mammogram for malignant neoplasm of breast    9. Healthcare maintenance            Plan:       1. Controlled type 2 diabetes mellitus with hyperglycemia, without long-term current use of insulin  Overview:  On metformin ER 1000 mg daily  On olmesartan and atorvastatin    Assessment & Plan:  - Monitored patient's fasting blood sugar and noted it was normal.  - diabetes well controlled on metformin.  - Emphasized the importance of maintaining LDL levels at or below 70 for patients with borderline diabetes.  - Ordered a urine test to check for diabetes in the kidneys.    Orders:  -     atorvastatin (LIPITOR) 40 MG tablet; Take 1 tablet (40 mg total) by mouth every evening.  -     Ambulatory referral/consult to Optometry; Future; Expected date: 02/04/2025  -     Microalbumin/Creatinine Ratio, Urine; Future; Expected date: 02/04/2025  -     Comprehensive Metabolic Panel; Future; Expected date: 02/04/2025  -     Hemoglobin A1C; Future; Expected date: 02/04/2025    2. Essential hypertension  Overview:  On olmesartan 20 mg daily    Assessment & Plan:  - Monitored blood pressure, noting previous readings showed slightly elevated diastolic pressure.  - Assessed that the patient's blood pressure is well-controlled.  - Continued current hypertension treatment.  - Advised the patient to monitor blood pressure.  - Cautioned against DayQuil use due to potential blood pressure effects.    Orders:  -     Comprehensive Metabolic Panel; Future; Expected date: 02/04/2025  -     CBC Auto Differential; Future; Expected date: 02/04/2025    3. Hyperlipidemia associated with type 2 diabetes mellitus  Overview:  Increasing atorvastatin to 80 mg daily    Assessment & Plan:  - Lab results show improvement but not at target level. Triglycerides have increased slightly.  - Evaluated the patient's response to Atorvastatin 80mg, noting side effects including stomach ache and headache.  - Decreased Atorvastatin from 80mg to 40mg daily.  -  Prescribed Ezetimibe (Zetia) to be taken daily, either in the morning or at night, to improve Atorvastatin's effectiveness.  - Scheduled follow-up labs in 6 months to check cholesterol levels and medication effectiveness.  - Advised the patient to contact the office if experiencing any problems with new medication regimen, particularly muscle pain.    Orders:  -     ezetimibe (ZETIA) 10 mg tablet; Take 1 tablet (10 mg total) by mouth once daily.  Dispense: 90 tablet; Refill: 3  -     atorvastatin (LIPITOR) 40 MG tablet; Take 1 tablet (40 mg total) by mouth every evening.  -     Lipid Panel; Future; Expected date: 02/04/2025    4. Ulcerative proctitis without complication  Assessment & Plan:  Has been stable off medication.    Orders:  -     CBC Auto Differential; Future; Expected date: 02/04/2025    5. Recurrent depression  Overview:  On Lexapro 5 mg daily    Assessment & Plan:  - Continued Lexapro (citalopram) at current dose for anxiety or depression. Finds the medication works well at present dose.      6. Vitamin D deficiency  -     Vitamin D; Future; Expected date: 02/04/2025    7. Defect due to mastectomy  -     BREAST PROSTHESIS FOR HOME USE    8. Encounter for screening mammogram for malignant neoplasm of breast  -     Mammo Digital Screening Right with Ricky; Future; Expected date: 04/14/2025    9. Healthcare maintenance  Assessment & Plan:  - Yearly labs-01/28/2025  - Colon cancer screening- done on 2022.  - mammogram- 01/05/2023  - DEXA- 8/1/22  - ACP- 8/14/24, reviewed 02/04/2025  - Eye exam- done on 04/01/2024  - Foot exam- 01/28/2023  - Vaccination- up-to-date except RSV and COVID        ALLERGIES:  - Monitored patient's report of whistling in chest and slight wheezing, especially at night.  - Evaluated breathing, noting slower airflow but no current wheezing.  - Assessed that the symptoms are likely due to allergies.  - Educated the patient on allergy management options and their effects.  - Recommend  OTC options for allergy symptoms: - Claritin or Zyrtec for daily use - Benadryl as needed for short-term relief (cautioned about potential drowsiness) - Mucinex for congestion if needed  - Advised the patient to try OTC antihistamines such as Claritin, Zyrtec, or Allegra for nighttime wheezing.  - Suggested using NyQuil, DayQuil, or Mucinex for congestion if needed.      EXERCISE:  - Aretha to continue current exercise routine.      FLU VACCINATION:  - Discussed importance of annual flu and COVID vaccinations.    RSV VACCINATION:  - Informed about RSV vaccine recommendation for adults.      BONE DENSITY:  - Follow up in August for bone density test.    FOLLOW UP:  - Follow up in 6 months.        Health Maintenance Due   Topic Date Due    RSV Vaccine (Age 60+ and Pregnant patients) (1 - 1-dose 75+ series) Never done    COVID-19 Vaccine (6 - 2024-25 season) 09/01/2024    Diabetic Eye Exam  04/01/2025    Mammogram  04/12/2025        I spent a total of 35 minutes on the day of the visit.This includes face to face time and non-face to face time preparing to see the patient (eg, review of tests), obtaining and/or reviewing separately obtained history, documenting clinical information in the electronic or other health record, independently interpreting results and communicating results to the patient/family/caregiver, or care coordinator.     RETURN TO CLINIC IN: 6 MONTHS    FOR NEXT VISIT: BLOOD PRESSURE MONITORING, DIABETES MONITORING, REVIEW LABS, and CARE GAPS       Brittany Floyd MD  [facility]  Disclaimer:  This note has been generated using voice-recognition software. There may be grammatical or spelling errors that have been missed during proof-reading       Yes

## 2025-02-05 NOTE — ASSESSMENT & PLAN NOTE
- Monitored blood pressure, noting previous readings showed slightly elevated diastolic pressure.  - Assessed that the patient's blood pressure is well-controlled.  - Continued current hypertension treatment.  - Advised the patient to monitor blood pressure.  - Cautioned against DayQuil use due to potential blood pressure effects.

## 2025-02-05 NOTE — ASSESSMENT & PLAN NOTE
- Lab results show improvement but not at target level. Triglycerides have increased slightly.  - Evaluated the patient's response to Atorvastatin 80mg, noting side effects including stomach ache and headache.  - Decreased Atorvastatin from 80mg to 40mg daily.  - Prescribed Ezetimibe (Zetia) to be taken daily, either in the morning or at night, to improve Atorvastatin's effectiveness.  - Scheduled follow-up labs in 6 months to check cholesterol levels and medication effectiveness.  - Advised the patient to contact the office if experiencing any problems with new medication regimen, particularly muscle pain.

## 2025-02-05 NOTE — ASSESSMENT & PLAN NOTE
- Continued Lexapro (citalopram) at current dose for anxiety or depression. Finds the medication works well at present dose.

## 2025-02-05 NOTE — ASSESSMENT & PLAN NOTE
- Yearly labs-01/28/2025  - Colon cancer screening- done on 2022.  - mammogram- 01/05/2023  - DEXA- 8/1/22  - ACP- 8/14/24, reviewed 02/04/2025  - Eye exam- done on 04/01/2024  - Foot exam- 01/28/2023  - Vaccination- up-to-date except RSV and COVID

## 2025-02-05 NOTE — ASSESSMENT & PLAN NOTE
- Monitored patient's fasting blood sugar and noted it was normal.  - diabetes well controlled on metformin.  - Emphasized the importance of maintaining LDL levels at or below 70 for patients with borderline diabetes.  - Ordered a urine test to check for diabetes in the kidneys.

## 2025-02-22 DIAGNOSIS — Z00.00 ENCOUNTER FOR MEDICARE ANNUAL WELLNESS EXAM: ICD-10-CM

## 2025-03-16 DIAGNOSIS — E11.65 CONTROLLED TYPE 2 DIABETES MELLITUS WITH HYPERGLYCEMIA, WITHOUT LONG-TERM CURRENT USE OF INSULIN: ICD-10-CM

## 2025-03-17 RX ORDER — METFORMIN HYDROCHLORIDE 500 MG/1
1000 TABLET, EXTENDED RELEASE ORAL DAILY
Qty: 180 TABLET | Refills: 1 | Status: SHIPPED | OUTPATIENT
Start: 2025-03-17

## 2025-05-12 ENCOUNTER — HOSPITAL ENCOUNTER (OUTPATIENT)
Dept: RADIOLOGY | Facility: HOSPITAL | Age: 80
Discharge: HOME | End: 2025-05-12
Attending: INTERNAL MEDICINE
Payer: MEDICARE

## 2025-05-12 DIAGNOSIS — Z12.31 ENCOUNTER FOR SCREENING MAMMOGRAM FOR MALIGNANT NEOPLASM OF BREAST: ICD-10-CM

## 2025-05-12 PROCEDURE — 77063 BREAST TOMOSYNTHESIS BI: CPT | Mod: 26,52,HCNC, | Performed by: RADIOLOGY

## 2025-05-12 PROCEDURE — 77067 SCR MAMMO BI INCL CAD: CPT | Mod: TC,52,HCNC

## 2025-05-12 PROCEDURE — 77067 SCR MAMMO BI INCL CAD: CPT | Mod: 26,52,HCNC, | Performed by: RADIOLOGY

## 2025-06-09 ENCOUNTER — OFFICE VISIT (OUTPATIENT)
Dept: OPTOMETRY | Facility: CLINIC | Age: 80
End: 2025-06-09
Payer: MEDICARE

## 2025-06-09 DIAGNOSIS — E11.9 TYPE 2 DIABETES MELLITUS WITHOUT RETINOPATHY: Primary | ICD-10-CM

## 2025-06-09 DIAGNOSIS — H25.13 NUCLEAR SCLEROSIS, BILATERAL: ICD-10-CM

## 2025-06-09 DIAGNOSIS — Z13.5 GLAUCOMA SCREENING: ICD-10-CM

## 2025-06-09 DIAGNOSIS — E11.65 CONTROLLED TYPE 2 DIABETES MELLITUS WITH HYPERGLYCEMIA, WITHOUT LONG-TERM CURRENT USE OF INSULIN: ICD-10-CM

## 2025-06-09 DIAGNOSIS — H52.4 PRESBYOPIA: ICD-10-CM

## 2025-06-09 PROCEDURE — 3288F FALL RISK ASSESSMENT DOCD: CPT | Mod: CPTII,S$GLB,, | Performed by: OPTOMETRIST

## 2025-06-09 PROCEDURE — 1101F PT FALLS ASSESS-DOCD LE1/YR: CPT | Mod: CPTII,S$GLB,, | Performed by: OPTOMETRIST

## 2025-06-09 PROCEDURE — 99999 PR PBB SHADOW E&M-EST. PATIENT-LVL III: CPT | Mod: PBBFAC,,, | Performed by: OPTOMETRIST

## 2025-06-09 PROCEDURE — 1126F AMNT PAIN NOTED NONE PRSNT: CPT | Mod: CPTII,S$GLB,, | Performed by: OPTOMETRIST

## 2025-06-09 PROCEDURE — 92014 COMPRE OPH EXAM EST PT 1/>: CPT | Mod: S$GLB,,, | Performed by: OPTOMETRIST

## 2025-06-09 PROCEDURE — 1159F MED LIST DOCD IN RCRD: CPT | Mod: CPTII,S$GLB,, | Performed by: OPTOMETRIST

## 2025-06-09 PROCEDURE — 92015 DETERMINE REFRACTIVE STATE: CPT | Mod: S$GLB,,, | Performed by: OPTOMETRIST

## 2025-06-09 PROCEDURE — 1157F ADVNC CARE PLAN IN RCRD: CPT | Mod: CPTII,S$GLB,, | Performed by: OPTOMETRIST

## 2025-06-09 PROCEDURE — 1160F RVW MEDS BY RX/DR IN RCRD: CPT | Mod: CPTII,S$GLB,, | Performed by: OPTOMETRIST

## 2025-06-09 NOTE — PROGRESS NOTES
HPI    78 Y/o female is here for routine eye exam with no C/o about ocular health      Pt denies pain and discomfort   No f/f    Eye med: no gtt   Last edited by Shaan Ruiz MA on 6/9/2025  9:29 AM.            Assessment /Plan     For exam results, see Encounter Report.    Type 2 diabetes mellitus without retinopathy    Controlled type 2 diabetes mellitus with hyperglycemia, without long-term current use of insulin  -     Ambulatory referral/consult to Optometry    Nuclear sclerosis, bilateral    Glaucoma screening    Presbyopia      Cat OD>OS--pt wishes  surgery  DM- WITHOUT RETINOPATHY.  Advised yearly DFE   Glauc susp by CDS (see prev notes from Dr Fregoso/[place])--iop wnl without meds.  Prev VF/OCT wnl per notes.  Doubt glauc now--will monitor    PLAN:    Rtc for cat christian Gonsales

## 2025-08-01 ENCOUNTER — LAB VISIT (OUTPATIENT)
Dept: LAB | Facility: HOSPITAL | Age: 80
End: 2025-08-01
Attending: INTERNAL MEDICINE
Payer: MEDICARE

## 2025-08-01 DIAGNOSIS — I10 ESSENTIAL HYPERTENSION: ICD-10-CM

## 2025-08-01 DIAGNOSIS — K51.20 ULCERATIVE PROCTITIS WITHOUT COMPLICATION: ICD-10-CM

## 2025-08-01 DIAGNOSIS — E55.9 VITAMIN D DEFICIENCY: ICD-10-CM

## 2025-08-01 DIAGNOSIS — E78.5 HYPERLIPIDEMIA ASSOCIATED WITH TYPE 2 DIABETES MELLITUS: ICD-10-CM

## 2025-08-01 DIAGNOSIS — E11.65 CONTROLLED TYPE 2 DIABETES MELLITUS WITH HYPERGLYCEMIA, WITHOUT LONG-TERM CURRENT USE OF INSULIN: ICD-10-CM

## 2025-08-01 DIAGNOSIS — E11.69 HYPERLIPIDEMIA ASSOCIATED WITH TYPE 2 DIABETES MELLITUS: ICD-10-CM

## 2025-08-01 LAB
25(OH)D3+25(OH)D2 SERPL-MCNC: 39 NG/ML (ref 30–96)
ABSOLUTE EOSINOPHIL (OHS): 0.43 K/UL
ABSOLUTE MONOCYTE (OHS): 0.6 K/UL (ref 0.3–1)
ABSOLUTE NEUTROPHIL COUNT (OHS): 3.07 K/UL (ref 1.8–7.7)
ALBUMIN SERPL BCP-MCNC: 4.1 G/DL (ref 3.5–5.2)
ALBUMIN/CREAT UR: 7.1 UG/MG
ALP SERPL-CCNC: 85 UNIT/L (ref 40–150)
ALT SERPL W/O P-5'-P-CCNC: 17 UNIT/L (ref 10–44)
ANION GAP (OHS): 10 MMOL/L (ref 8–16)
AST SERPL-CCNC: 30 UNIT/L (ref 11–45)
BASOPHILS # BLD AUTO: 0.05 K/UL
BASOPHILS NFR BLD AUTO: 0.9 %
BILIRUB SERPL-MCNC: 0.6 MG/DL (ref 0.1–1)
BUN SERPL-MCNC: 12 MG/DL (ref 8–23)
CALCIUM SERPL-MCNC: 9.7 MG/DL (ref 8.7–10.5)
CHLORIDE SERPL-SCNC: 106 MMOL/L (ref 95–110)
CHOLEST SERPL-MCNC: 147 MG/DL (ref 120–199)
CHOLEST/HDLC SERPL: 3.1 {RATIO} (ref 2–5)
CO2 SERPL-SCNC: 26 MMOL/L (ref 23–29)
CREAT SERPL-MCNC: 0.7 MG/DL (ref 0.5–1.4)
CREAT UR-MCNC: 84 MG/DL (ref 15–325)
EAG (OHS): 126 MG/DL (ref 68–131)
ERYTHROCYTE [DISTWIDTH] IN BLOOD BY AUTOMATED COUNT: 15.2 % (ref 11.5–14.5)
GFR SERPLBLD CREATININE-BSD FMLA CKD-EPI: >60 ML/MIN/1.73/M2
GLUCOSE SERPL-MCNC: 84 MG/DL (ref 70–110)
HBA1C MFR BLD: 6 % (ref 4–5.6)
HCT VFR BLD AUTO: 35.8 % (ref 37–48.5)
HDLC SERPL-MCNC: 48 MG/DL (ref 40–75)
HDLC SERPL: 32.7 % (ref 20–50)
HGB BLD-MCNC: 11 GM/DL (ref 12–16)
IMM GRANULOCYTES # BLD AUTO: 0.01 K/UL (ref 0–0.04)
IMM GRANULOCYTES NFR BLD AUTO: 0.2 % (ref 0–0.5)
LDLC SERPL CALC-MCNC: 79 MG/DL (ref 63–159)
LYMPHOCYTES # BLD AUTO: 1.68 K/UL (ref 1–4.8)
MCH RBC QN AUTO: 27 PG (ref 27–31)
MCHC RBC AUTO-ENTMCNC: 30.7 G/DL (ref 32–36)
MCV RBC AUTO: 88 FL (ref 82–98)
MICROALBUMIN UR-MCNC: 6 UG/ML (ref ?–5000)
NONHDLC SERPL-MCNC: 99 MG/DL
NUCLEATED RBC (/100WBC) (OHS): 0 /100 WBC
PLATELET # BLD AUTO: 265 K/UL (ref 150–450)
PMV BLD AUTO: 11 FL (ref 9.2–12.9)
POTASSIUM SERPL-SCNC: 4 MMOL/L (ref 3.5–5.1)
PROT SERPL-MCNC: 7.7 GM/DL (ref 6–8.4)
RBC # BLD AUTO: 4.07 M/UL (ref 4–5.4)
RELATIVE EOSINOPHIL (OHS): 7.4 %
RELATIVE LYMPHOCYTE (OHS): 28.8 % (ref 18–48)
RELATIVE MONOCYTE (OHS): 10.3 % (ref 4–15)
RELATIVE NEUTROPHIL (OHS): 52.4 % (ref 38–73)
SODIUM SERPL-SCNC: 142 MMOL/L (ref 136–145)
TRIGL SERPL-MCNC: 100 MG/DL (ref 30–150)
WBC # BLD AUTO: 5.84 K/UL (ref 3.9–12.7)

## 2025-08-01 PROCEDURE — 80053 COMPREHEN METABOLIC PANEL: CPT | Mod: HCNC

## 2025-08-01 PROCEDURE — 82465 ASSAY BLD/SERUM CHOLESTEROL: CPT | Mod: HCNC

## 2025-08-01 PROCEDURE — 82043 UR ALBUMIN QUANTITATIVE: CPT | Mod: HCNC

## 2025-08-01 PROCEDURE — 36415 COLL VENOUS BLD VENIPUNCTURE: CPT | Mod: HCNC

## 2025-08-01 PROCEDURE — 82306 VITAMIN D 25 HYDROXY: CPT | Mod: HCNC

## 2025-08-01 PROCEDURE — 85025 COMPLETE CBC W/AUTO DIFF WBC: CPT | Mod: HCNC

## 2025-08-01 PROCEDURE — 83036 HEMOGLOBIN GLYCOSYLATED A1C: CPT | Mod: HCNC

## 2025-08-05 ENCOUNTER — OFFICE VISIT (OUTPATIENT)
Dept: PRIMARY CARE CLINIC | Facility: CLINIC | Age: 80
End: 2025-08-05
Payer: MEDICARE

## 2025-08-05 VITALS
HEART RATE: 73 BPM | DIASTOLIC BLOOD PRESSURE: 78 MMHG | HEIGHT: 62 IN | BODY MASS INDEX: 25.45 KG/M2 | SYSTOLIC BLOOD PRESSURE: 126 MMHG | WEIGHT: 138.31 LBS | OXYGEN SATURATION: 97 %

## 2025-08-05 DIAGNOSIS — N64.89 DEFECT DUE TO MASTECTOMY: ICD-10-CM

## 2025-08-05 DIAGNOSIS — D50.0 IRON DEFICIENCY ANEMIA DUE TO CHRONIC BLOOD LOSS: ICD-10-CM

## 2025-08-05 DIAGNOSIS — Z00.00 HEALTHCARE MAINTENANCE: ICD-10-CM

## 2025-08-05 DIAGNOSIS — E53.8 B12 DEFICIENCY: ICD-10-CM

## 2025-08-05 DIAGNOSIS — E11.69 HYPERLIPIDEMIA ASSOCIATED WITH TYPE 2 DIABETES MELLITUS: ICD-10-CM

## 2025-08-05 DIAGNOSIS — E11.9 HYPERTENSION COMPLICATING DIABETES: ICD-10-CM

## 2025-08-05 DIAGNOSIS — E78.5 HYPERLIPIDEMIA ASSOCIATED WITH TYPE 2 DIABETES MELLITUS: ICD-10-CM

## 2025-08-05 DIAGNOSIS — I10 HYPERTENSION COMPLICATING DIABETES: ICD-10-CM

## 2025-08-05 DIAGNOSIS — E11.65 CONTROLLED TYPE 2 DIABETES MELLITUS WITH HYPERGLYCEMIA, WITHOUT LONG-TERM CURRENT USE OF INSULIN: Primary | ICD-10-CM

## 2025-08-05 DIAGNOSIS — Z90.10 DEFECT DUE TO MASTECTOMY: ICD-10-CM

## 2025-08-05 DIAGNOSIS — K51.20 ULCERATIVE PROCTITIS WITHOUT COMPLICATION: ICD-10-CM

## 2025-08-05 DIAGNOSIS — F33.9 RECURRENT DEPRESSION: ICD-10-CM

## 2025-08-05 PROCEDURE — 99999 PR PBB SHADOW E&M-EST. PATIENT-LVL III: CPT | Mod: PBBFAC,HCNC,, | Performed by: INTERNAL MEDICINE

## 2025-08-05 RX ORDER — EZETIMIBE 10 MG/1
10 TABLET ORAL DAILY
Qty: 90 TABLET | Refills: 3 | Status: SHIPPED | OUTPATIENT
Start: 2025-08-05 | End: 2026-08-05

## 2025-08-05 RX ORDER — ESCITALOPRAM OXALATE 5 MG/1
5 TABLET ORAL DAILY
Qty: 90 TABLET | Refills: 3 | Status: SHIPPED | OUTPATIENT
Start: 2025-08-05

## 2025-08-05 RX ORDER — OLMESARTAN MEDOXOMIL 20 MG/1
20 TABLET ORAL DAILY
Qty: 90 TABLET | Refills: 3 | Status: SHIPPED | OUTPATIENT
Start: 2025-08-05

## 2025-08-05 RX ORDER — LANOLIN ALCOHOL/MO/W.PET/CERES
1 CREAM (GRAM) TOPICAL DAILY
COMMUNITY

## 2025-08-05 RX ORDER — ATORVASTATIN CALCIUM 40 MG/1
40 TABLET, FILM COATED ORAL NIGHTLY
Qty: 90 TABLET | Refills: 3 | Status: SHIPPED | OUTPATIENT
Start: 2025-08-05

## 2025-08-05 NOTE — PROGRESS NOTES
Subjective:       Patient ID: Aretha Nguyen is a 79 y.o. female.    Chief Complaint: Follow-up      HPI  Aretha Nguyen is a 79 y.o. female with DM type 2, HLD, HTN, IBD, Breast cancer who presents today for Follow-up    Aretha presents today for follow up.    OCULAR:  She has scheduled cataract surgery on right eye in October. The cataract has been present for approximately three years and was previously asymptomatic. Recently, she reports increased visual difficulty, particularly with reading and distance vision.    HYPERLIPIDEMIA:  Cholesterol levels are almost at target, currently a few points over goal. She continues atorvastatin 40 mg and ezetimibe 10 mg for cholesterol management. She experienced a previous pharmacy dosing error with cholesterol medication, experiencing adverse effects with 80 mg dose.    ANEMIA:  Anemia was noted, slightly lower than previous measurement. She reports long-standing mild anemia since childhood and was reportedly born with anemia.    MEDICAL HISTORY:  She has a history of ulcerative colitis currently in remission for 2-3 years, with no active bleeding. Her last colitis-related episode was in 2022, with no recurrence since then. She is not currently taking any medication for ulcerative colitis. She has a history of hemorrhoids that have not been symptomatic with bleeding.    LABS:  Vitamin D levels are now within normal range, previously deficient. Urinalysis results were normal.    CURRENT MEDICATIONS:  She takes iron supplement daily, vitamin B12 (frequency to be reduced to once or twice weekly), omega 3, and Centrum multivitamin.    DIET AND EXERCISE:  She reports adherence to recommended diet plan, making dietary sacrifices and utilizing portion control strategies, including using a small plate and consuming moderate amounts of various foods. She engages in light physical activity including bed-based exercises, performing marching movements in bed, uses stationary bicycle, and  "lifts small weights. She denies experiencing palpitations or shortness of breath during exercise activities.     has dementia and kept her at home most of the time, except to go to [employer].    SLEEP:  She reports being a heavy sleeper, describing sleeping "too well" with no specific concerns about sleep quality or disruptions.    4Ms for Medical Decision-Making in Older Adults    Last Completed EAWV:  None    MEDICATIONS:  High Risk Medications:  Total Active Medications: 1  EScitalopram oxalate Tab - 5 MG    MOBILITY:  Activities of Daily Livin/5/2025     8:38 PM   Activities of Daily Living   Ambulation Independent   Dressing Independent   Transfers Independent   Toileting Continent of bladder   Feeding Independent   Cleaning home/[place] Independent   Telephone use Independent   Shopping Independent   Paying bills Independent   Taking meds Independent     Fall Risk:      2025     9:30 AM 2025    10:00 AM 2024    11:20 AM   Fall Risk Assessment - Outpatient   Mobility Status Ambulatory Ambulatory Ambulatory   Number of falls 0 0 0   Identified as fall risk False False False     Disability Status:      2025     8:38 PM   Disability Status   Are you deaf or do you have serious difficulty hearing? N   Are you blind or do you have serious difficulty seeing, even when wearing glasses? N   Because of a physical, mental, or emotional condition, do you have serious difficulty concentrating, remembering, or making decisions? N   Do you have serious difficulty walking or climbing stairs? N   Do you have difficulty dressing or bathing? N   Because of a physical, mental, or emotional condition, do you have difficulty doing errands alone such as visiting a doctor's office or shopping? N     Nutrition Screenin/5/2025     8:38 PM   Nutrition Screening   Has food intake declined over the past three months due to loss of appetite, digestive problems, chewing or swallowing difficulties? No " decrease in food intake   Involuntary weight loss during the last 3 months? No weight loss   Mobility? Goes out   Has the patient suffered psychological stress or acute disease in the past three months? No   Neuropsychological problems? No psychological problems   Body Mass Index (BMI)?  BMI 23 or greater   Screening Score 14   Interpretation Normal nutritional status    Screening Score: 0-7 Malnourished, 8-11 At Risk, 12-14 Normal  Get Up and Go:       No data to display              Whisper Test:       No data to display                    MENTATION:   Has Dementia Dx: No  Has Anxiety Dx: No    Depression Patient Health Questionnaire:      2/4/2025    10:03 AM   Depression Patient Health Questionnaire   Over the last two weeks how often have you been bothered by little interest or pleasure in doing things Not at all   Over the last two weeks how often have you been bothered by feeling down, depressed or hopeless Not at all   PHQ-2 Total Score 0     Cognitive Function Screening:       No data to display              Cognitive Function Screening Total - Less than 4 = Abnormal,  Greater than or equal to 4 = Normal        WHAT MATTERS MOST:  Advance Care Planning   ACP Status:   Patient has had an ACP conversation  Living Will: Yes  Power of : Yes  POLST: No      ACP Reviewed/No Changes  Voluntary advance care planning discussion had today with patient. Previously completed HCPOA and LW in electronic medical record is current, no changes made.      A total of 4 min was spent on advance care planning. This discussion occurred on a fully voluntary basis with the verbal consent of the patient and/or family.            ROS:  General: -fever, -chills, -fatigue, -weight gain, -weight loss  Eyes: -vision changes, -redness, -discharge, +eye strain  ENT: -ear pain, -nasal congestion, -sore throat  Cardiovascular: -chest pain, -palpitations, -lower extremity edema  Respiratory: -cough, -shortness of  breath  Gastrointestinal: -abdominal pain, -nausea, -vomiting, -diarrhea, -constipation, -blood in stool  Genitourinary: -dysuria, -hematuria, -frequency  Musculoskeletal: -joint pain, -muscle pain  Skin: -rash, -lesion  Neurological: -headache, -dizziness, -numbness, -tingling  Psychiatric: -anxiety, -depression, -sleep difficulty            Past Medical History:   Diagnosis Date    Allergy     Breast cancer, left     Cataract     Controlled type 2 diabetes mellitus with hyperglycemia, without long-term current use of insulin 03/22/2017    Hyperlipidemia associated with type 2 diabetes mellitus 03/22/2017    Hypertension complicating diabetes 03/22/2017    Inflammatory bowel disease 03/22/2017    Urinary tract infection        Past Surgical History:   Procedure Laterality Date    BREAST SURGERY      COLONOSCOPY N/A 1/18/2022    Procedure: COLONOSCOPY suprep;  Surgeon: Lawrence Garcia MD;  Location: [facility];  Service: Endoscopy;  Laterality: N/A;    HYSTERECTOMY      MASTECTOMY Left     for benign recurrent growth. Dr. Stephan Brown MD - [facility] - [facility]    SIMPLE MASTECTOMY Left     TOTAL ABDOMINAL HYSTERECTOMY W/ BILATERAL SALPINGOOPHORECTOMY  2009    for benign cysts with concern for future malginancy. Fibromoid uterus for AUB. Bengin results       Family History   Problem Relation Name Age of Onset    Breast cancer Neg Hx         Social History     Socioeconomic History    Marital status:    Tobacco Use    Smoking status: Never    Smokeless tobacco: Never   Substance and Sexual Activity    Alcohol use: Yes     Comment: not often    Drug use: No    Sexual activity: Yes     Partners: Male   Social History Narrative    [facility] - [facility]        Dr. Stephan Brown MD - [facility] - General Surgery & Surgery - mammograms        Current Medications[1]    Review of patient's allergies indicates:   Allergen Reactions    Ciclopirox  "Itching    Ciprofloxacin hcl Rash         Objective:       Last 3 sets of Vitals        2/4/2025    10:05 AM 6/9/2025     9:24 AM 8/5/2025    10:10 AM   Vitals - 1 value per visit   SYSTOLIC 130  126   DIASTOLIC 86  78   Pulse 84  73   SPO2 97 %  97 %   Weight (lb) 139.11  138.34   Weight (kg) 63.1  62.75   Height 5' 2" (1.575 m)  5' 2" (1.575 m)   BMI (Calculated) 25.4  25.3   Pain Score Zero Zero Zero   Physical Exam  Constitutional:       General: She is not in acute distress.  HENT:      Head: Normocephalic.      Right Ear: Tympanic membrane, ear canal and external ear normal.      Left Ear: Tympanic membrane, ear canal and external ear normal.      Nose: Nose normal.      Mouth/Throat:      Mouth: Mucous membranes are moist.   Eyes:      General: No scleral icterus.     Extraocular Movements: Extraocular movements intact.      Conjunctiva/sclera: Conjunctivae normal.   Neck:      Vascular: No carotid bruit.      Comments: No goiter.  Cardiovascular:      Rate and Rhythm: Normal rate and regular rhythm.      Pulses: Normal pulses.      Heart sounds: Normal heart sounds.   Pulmonary:      Effort: Pulmonary effort is normal.      Breath sounds: Normal breath sounds.   Abdominal:      General: Bowel sounds are normal. There is no distension.      Palpations: Abdomen is soft. There is no mass.      Tenderness: There is no abdominal tenderness.   Musculoskeletal:         General: No swelling.   Lymphadenopathy:      Cervical: No cervical adenopathy.   Skin:     General: Skin is warm and dry.   Neurological:      General: No focal deficit present.      Mental Status: She is alert and oriented to person, place, and time.   Psychiatric:         Mood and Affect: Mood normal.         Behavior: Behavior normal.       Protective Sensation (w/ 10 gram monofilament):  Right: Intact  Left: Intact    Visual Inspection:  Normal -  Bilateral    Pedal Pulses:   Right: Present  Left: Present    Posterior Tibialis Pulses: "   Right:Present  Left: Present     CBC:  Recent Labs   Lab 04/03/24  0850 01/28/25  0839 08/01/25  0746   WBC 5.32 6.25 5.84   RBC 4.32 4.34 4.07   Hemoglobin 12.0 11.8 L  --    HGB  --   --  11.0 L   Hematocrit 37.4 38.1  --    HCT  --   --  35.8 L   Platelet Count  --   --  265   Platelets 269 258  --    MCV 87 88 88   MCH 27.8 27.2 27.0   MCHC 32.1 31.0 L 30.7 L     CMP:  Recent Labs   Lab 09/27/24  0859 01/28/25  0839 08/01/25  0746   Glucose 86 95 84   Calcium 9.9 10.0 9.7   Albumin 4.0 4.0 4.1   Protein Total  --   --  7.7   Total Protein 7.8 8.2  --    Sodium 138 141 142   Potassium 3.7 4.2 4.0   CO2 23 27 26   Chloride 105 104 106   BUN 10 11 12   Creatinine 0.7 0.8 0.7   Alkaline Phosphatase 84 95  --    ALP  --   --  85   ALT 16 16 17   AST 22 21 30   Total Bilirubin 0.6 0.4  --    Bilirubin Total  --   --  0.6     URINALYSIS:  Recent Labs   Lab 10/27/23  1107   pH, UA 7.0      LIPIDS:  Recent Labs   Lab 05/26/23  0807 04/03/24  0850 08/02/24  0829 09/27/24  0859 01/28/25  0839 08/01/25  0746   TSH 3.331 2.767  --   --  3.150  --    HDL 48 50   < > 50 49  --    HDL Cholesterol  --   --   --   --   --  48   Cholesterol Total  --   --   --   --   --  147   Cholesterol 157 174   < > 178 165  --    Triglycerides 127 138   < > 113 158 H  --    Triglyceride  --   --   --   --   --  100   LDL Cholesterol 83.6 96.4   < > 105.4 84.4 79.0   HDL/Cholesterol Ratio 30.6 28.7   < > 28.1 29.7 32.7   Non-HDL Cholesterol 109 124   < > 128 116  --    Non HDL Cholesterol  --   --   --   --   --  99   Total Cholesterol/HDL Ratio 3.3 3.5   < > 3.6 3.4  --    Cholesterol/HDL Ratio  --   --   --   --   --  3.1    < > = values in this interval not displayed.     TSH:  Recent Labs   Lab 05/26/23  0807 04/03/24  0850 01/28/25  0839   TSH 3.331 2.767 3.150       A1C:  Recent Labs   Lab 11/28/22  0825 05/26/23  0807 09/06/23  0821 04/03/24  0850 08/02/24  0829 09/27/24  0859 01/28/25  0839 08/01/25  0746   Hemoglobin A1C 6.1 H 6.0 H  6.0 H 6.0 H 5.5 6.0 H 6.0 H  --    Hemoglobin A1c  --   --   --   --   --   --   --  6.0 H       Imaging:  Mammo Digital Screening Right with Ricky  Narrative: Facility:  [facility] [address]  [place] [id number] [phone]    Name: Aretha Nguyen    MRN: [id number]    Result:  Mammo Digital Screening Right with Ricky    History:  Patient is 79 y.o. and is seen for a screening mammogram.    Films Compared:  Prior images (if available) were compared.     Findings:  This procedure was performed using tomosynthesis.   Computer-aided detection was utilized in the interpretation of this   examination.    The breasts are heterogeneously dense, which may obscure small masses.   There is no evidence of suspicious masses, microcalcifications or   architectural distortion.  Impression:    No mammographic evidence of malignancy.    BI-RADS Category 1: Negative    Recommendation:  Routine screening mammogram in 1 year is recommended.    Electronically signed by,  Bridger Marley MD      Assessment:       1. Controlled type 2 diabetes mellitus with hyperglycemia, without long-term current use of insulin    2. Hypertension complicating diabetes    3. Hyperlipidemia associated with type 2 diabetes mellitus    4. Iron deficiency anemia due to chronic blood loss    5. Ulcerative proctitis without complication    6. Recurrent depression    7. Defect due to mastectomy    8. B12 deficiency    9. Healthcare maintenance            Plan:       1. Controlled type 2 diabetes mellitus with hyperglycemia, without long-term current use of insulin  Overview:  On metformin ER 1000 mg daily  On olmesartan and atorvastatin    Assessment & Plan:  A1cs stable, less than 7%.    Encourage healthy diet and exercise.  Diabetes well controlled. Same tx.    Orders:  -     atorvastatin (LIPITOR) 40 MG tablet; Take 1 tablet (40 mg total) by mouth every evening.  Dispense: 90 tablet; Refill: 3  -     Comprehensive  Metabolic Panel; Future; Expected date: 08/05/2025  -     Hemoglobin A1C; Future; Expected date: 08/05/2025  -     TSH; Future; Expected date: 08/05/2025    2. Hypertension complicating diabetes  Overview:  On olmesartan 20 mg daily    Orders:  -     olmesartan (BENICAR) 20 MG tablet; Take 1 tablet (20 mg total) by mouth once daily.  Dispense: 90 tablet; Refill: 3  -     TSH; Future; Expected date: 08/05/2025    3. Hyperlipidemia associated with type 2 diabetes mellitus  Overview:  On atorvastatin to 40 mg daily and Zetia 10 mg daily.    Assessment & Plan:  - Evaluated cholesterol levels which are better, almost at target, only a few points over.  - Continued atorvastatin 40 mg and ezetimibe 10 mg for cholesterol management.  - Explained the function of ezetimibe in enhancing cholesterol management.    Orders:  -     ezetimibe (ZETIA) 10 mg tablet; Take 1 tablet (10 mg total) by mouth once daily.  Dispense: 90 tablet; Refill: 3  -     atorvastatin (LIPITOR) 40 MG tablet; Take 1 tablet (40 mg total) by mouth every evening.  Dispense: 90 tablet; Refill: 3  -     Comprehensive Metabolic Panel; Future; Expected date: 08/05/2025  -     Lipid Panel; Future; Expected date: 08/05/2025    4. Iron deficiency anemia due to chronic blood loss  Assessment & Plan:  - Monitored the patient's anemia which is slightly lower while taking iron supplements.  - Hgb levels are low, but the rest of the bloodwork looks fine.  - Provided information on the relationship between chronic inflammation and iron absorption/utilization.  - Continued OTC iron supplementation daily, but reduced vitamin B12 intake to once or twice weekly due to high levels.  - Instructed the patient to bring current iron supplement to next appointment for review.  - If hgb levels continue to drop, further investigation will be conducted.    Orders:  -     CBC Auto Differential; Future; Expected date: 08/05/2025  -     Ferritin; Future; Expected date: 08/05/2025  -      Iron and TIBC; Future; Expected date: 08/05/2025    5. Ulcerative proctitis without complication  Assessment & Plan:  - Aretha has had no bleeding for about 2-3 years and is not on medication, suggesting the condition may be in remission since 2022.  - Advised the patient to defer to the gastroenterologist's opinion regarding further colonoscopy due to risk of cancer with ulcerative colitis (next due at age 81).      6. Recurrent depression  Overview:  On Lexapro 5 mg daily    Assessment & Plan:  - Continued Lexapro (citalopram) at current dose for anxiety or depression. Finds the medication works well at present dose.    Orders:  -     EScitalopram oxalate (LEXAPRO) 5 MG Tab; Take 1 tablet (5 mg total) by mouth once daily.  Dispense: 90 tablet; Refill: 3    7. Defect due to mastectomy  -     POST-MASTECTOMY BRA FOR HOME USE    8. B12 deficiency  -     Vitamin B12; Future; Expected date: 08/05/2025  -     Folate; Future; Expected date: 08/05/2025    9. Healthcare maintenance  Assessment & Plan:  - Yearly labs-01/28/2025  - Colon cancer screening- done on 2022.  - mammogram- 01/05/2023, s/p bilateral mastectomy.  - DEXA- 8/1/22  - ACP-  reviewed 02/04/2025, 8/5/25  - Eye exam- 6/9/25  - Foot exam- 8/5/25  - Vaccination- up-to-date except RSV and COVID         Assessment & Plan    > Assessed overall health status, noting stable condition with good pulses and stable cardiovascular health.  > Reviewed recent lab results:  > Persistent mild anemia, not significantly changed  > Improved cholesterol levels, nearly at target  > Normal kidney and liver function  > Adequate vitamin D levels  > Ulcerative colitis currently in remission without medication.  > Continued current iron supplementation and monitor for anemia management.  > Upcoming cataract surgery in October.  > Cognitive function and activities of daily living without significant concerns.    RIGHT EYE CATARACT:  - Scheduled the patient for cataract surgery on  the right eye in October.    BILATERAL MASTECTOMY:  - Prescribed 6 mastectomy bras, size 40C, per year as requested by the patient.    GENERAL HEALTH MANAGEMENT:  - Explained optional RSV vaccine for adults and its similarity to flu vaccine.  - Aretha to continue current exercise routine, including marching and bed exercises, and maintain current diet with portion control.  - Decreased vitamin B12 supplementation to once or twice weekly.  - Continued Centrum multivitamin.    FOLLOW-UP:  - Perform labs prior to next visit.  - Follow up in 6 months.        Health Maintenance Due   Topic Date Due    RSV Vaccine (Age 60+ and Pregnant patients) (1 - 1-dose 75+ series) Never done        I spent a total of 40 minutes on the day of the visit.This includes face to face time and non-face to face time preparing to see the patient (eg, review of tests), obtaining and/or reviewing separately obtained history, documenting clinical information in the electronic or other health record, independently interpreting results and communicating results to the patient/family/caregiver, or care coordinator.     RETURN TO CLINIC IN: 6 MONTHS    FOR NEXT VISIT: MEDICATION MONITORING and CARE GAPS       Brittany Floyd MD  [facility]  Disclaimer:  This note has been generated using voice-recognition software. There may be grammatical or spelling errors that have been missed during proof-reading      This note was generated with the assistance of ambient listening technology. Verbal consent was obtained by the patient and accompanying visitor(s) for the recording of patient appointment to facilitate this note. I attest to having reviewed and edited the generated note for accuracy, though some syntax or spelling errors may persist. Please contact the author of this note for any clarification.            [1]   Current Outpatient Medications   Medication Sig Dispense Refill    cholecalciferol, vitamin D3, (VITAMIN D3) 50 mcg (2,000 unit) Cap  capsule Take 1 capsule (2,000 Units total) by mouth once daily.      cyanocobalamin (VITAMIN B-12) 1000 MCG tablet Take 1 tablet (1,000 mcg total) by mouth twice a week. 60 tablet 6    ferrous sulfate (FEOSOL) Tab tablet Take 1 tablet by mouth once daily.      metFORMIN (GLUCOPHAGE-XR) 500 MG ER 24hr tablet TAKE 2 TABLETS (1,000 MG TOTAL) BY MOUTH ONCE DAILY. 180 tablet 1    multivit-min-iron-FA-vit K-lut (CENTRUM SILVER WOMEN) 8 mg iron-400 mcg-50 mcg Tab Take 1 tablet by mouth Daily. 90 tablet 3    atorvastatin (LIPITOR) 40 MG tablet Take 1 tablet (40 mg total) by mouth every evening. 90 tablet 3    EScitalopram oxalate (LEXAPRO) 5 MG Tab Take 1 tablet (5 mg total) by mouth once daily. 90 tablet 3    ezetimibe (ZETIA) 10 mg tablet Take 1 tablet (10 mg total) by mouth once daily. 90 tablet 3    olmesartan (BENICAR) 20 MG tablet Take 1 tablet (20 mg total) by mouth once daily. 90 tablet 3     No current facility-administered medications for this visit.

## 2025-08-06 NOTE — ASSESSMENT & PLAN NOTE
- Monitored the patient's anemia which is slightly lower while taking iron supplements.  - Hgb levels are low, but the rest of the bloodwork looks fine.  - Provided information on the relationship between chronic inflammation and iron absorption/utilization.  - Continued OTC iron supplementation daily, but reduced vitamin B12 intake to once or twice weekly due to high levels.  - Instructed the patient to bring current iron supplement to next appointment for review.  - If hgb levels continue to drop, further investigation will be conducted.

## 2025-08-06 NOTE — ASSESSMENT & PLAN NOTE
- Aretha has had no bleeding for about 2-3 years and is not on medication, suggesting the condition may be in remission since 2022.  - Advised the patient to defer to the gastroenterologist's opinion regarding further colonoscopy due to risk of cancer with ulcerative colitis (next due at age 81).

## 2025-08-06 NOTE — ASSESSMENT & PLAN NOTE
- Yearly labs-01/28/2025  - Colon cancer screening- done on 2022.  - mammogram- 01/05/2023, s/p bilateral mastectomy.  - DEXA- 8/1/22  - ACP-  reviewed 02/04/2025, 8/5/25  - Eye exam- 6/9/25  - Foot exam- 8/5/25  - Vaccination- up-to-date except RSV and COVID

## 2025-08-06 NOTE — ASSESSMENT & PLAN NOTE
- Evaluated cholesterol levels which are better, almost at target, only a few points over.  - Continued atorvastatin 40 mg and ezetimibe 10 mg for cholesterol management.  - Explained the function of ezetimibe in enhancing cholesterol management.

## 2025-08-06 NOTE — ASSESSMENT & PLAN NOTE
A1cs stable, less than 7%.    Encourage healthy diet and exercise.  Diabetes well controlled. Same tx.

## 2025-08-25 DIAGNOSIS — E11.65 CONTROLLED TYPE 2 DIABETES MELLITUS WITH HYPERGLYCEMIA, WITHOUT LONG-TERM CURRENT USE OF INSULIN: ICD-10-CM

## 2025-08-25 RX ORDER — METFORMIN HYDROCHLORIDE 500 MG/1
1000 TABLET, EXTENDED RELEASE ORAL DAILY
Qty: 180 TABLET | Refills: 1 | Status: SHIPPED | OUTPATIENT
Start: 2025-08-25